# Patient Record
Sex: MALE | Race: WHITE | Employment: OTHER | URBAN - NONMETROPOLITAN AREA
[De-identification: names, ages, dates, MRNs, and addresses within clinical notes are randomized per-mention and may not be internally consistent; named-entity substitution may affect disease eponyms.]

---

## 2017-11-06 ENCOUNTER — HOSPITAL ENCOUNTER (EMERGENCY)
Age: 52
Discharge: HOME | End: 2017-11-06
Payer: MEDICARE

## 2017-11-06 ENCOUNTER — APPOINTMENT (OUTPATIENT)
Dept: GENERAL RADIOLOGY | Age: 52
End: 2017-11-06
Payer: MEDICARE

## 2017-11-06 VITALS
DIASTOLIC BLOOD PRESSURE: 85 MMHG | TEMPERATURE: 97.1 F | RESPIRATION RATE: 16 BRPM | SYSTOLIC BLOOD PRESSURE: 124 MMHG | HEIGHT: 73 IN | OXYGEN SATURATION: 96 % | HEART RATE: 91 BPM

## 2017-11-06 DIAGNOSIS — M54.50 ACUTE EXACERBATION OF CHRONIC LOW BACK PAIN: ICD-10-CM

## 2017-11-06 DIAGNOSIS — G89.29 ACUTE EXACERBATION OF CHRONIC LOW BACK PAIN: ICD-10-CM

## 2017-11-06 DIAGNOSIS — S16.1XXA STRAIN OF NECK MUSCLE, INITIAL ENCOUNTER: Primary | ICD-10-CM

## 2017-11-06 PROCEDURE — 6370000000 HC RX 637 (ALT 250 FOR IP): Performed by: PHYSICIAN ASSISTANT

## 2017-11-06 PROCEDURE — 72100 X-RAY EXAM L-S SPINE 2/3 VWS: CPT

## 2017-11-06 PROCEDURE — 72040 X-RAY EXAM NECK SPINE 2-3 VW: CPT

## 2017-11-06 PROCEDURE — 99283 EMERGENCY DEPT VISIT LOW MDM: CPT

## 2017-11-06 RX ORDER — CYCLOBENZAPRINE HCL 10 MG
10 TABLET ORAL ONCE
Status: COMPLETED | OUTPATIENT
Start: 2017-11-06 | End: 2017-11-06

## 2017-11-06 RX ORDER — BENZTROPINE MESYLATE 1 MG/1
1 TABLET ORAL 2 TIMES DAILY
COMMUNITY

## 2017-11-06 RX ORDER — IBUPROFEN 800 MG/1
800 TABLET ORAL EVERY 8 HOURS PRN
Qty: 20 TABLET | Refills: 0 | Status: SHIPPED | OUTPATIENT
Start: 2017-11-06 | End: 2017-11-14 | Stop reason: ALTCHOICE

## 2017-11-06 RX ORDER — IBUPROFEN 800 MG/1
800 TABLET ORAL ONCE
Status: COMPLETED | OUTPATIENT
Start: 2017-11-06 | End: 2017-11-06

## 2017-11-06 RX ORDER — CYCLOBENZAPRINE HCL 10 MG
10 TABLET ORAL 3 TIMES DAILY PRN
Qty: 30 TABLET | Refills: 0 | Status: SHIPPED | OUTPATIENT
Start: 2017-11-06 | End: 2018-11-08 | Stop reason: ALTCHOICE

## 2017-11-06 RX ADMIN — CYCLOBENZAPRINE HYDROCHLORIDE 10 MG: 10 TABLET, FILM COATED ORAL at 18:38

## 2017-11-06 RX ADMIN — IBUPROFEN 800 MG: 800 TABLET, FILM COATED ORAL at 18:38

## 2017-11-06 ASSESSMENT — ENCOUNTER SYMPTOMS
ABDOMINAL PAIN: 0
CONSTIPATION: 0
NAUSEA: 0
SORE THROAT: 0
EYE DISCHARGE: 0
WHEEZING: 0
BLOOD IN STOOL: 0
BACK PAIN: 1
VOMITING: 0
DIARRHEA: 0
COUGH: 0
ABDOMINAL DISTENTION: 0
EYE PAIN: 0
CHEST TIGHTNESS: 0
SHORTNESS OF BREATH: 0

## 2017-11-06 ASSESSMENT — PAIN DESCRIPTION - LOCATION: LOCATION: BACK

## 2017-11-06 ASSESSMENT — PAIN DESCRIPTION - PAIN TYPE: TYPE: ACUTE PAIN

## 2017-11-06 ASSESSMENT — PAIN SCALES - GENERAL
PAINLEVEL_OUTOF10: 9
PAINLEVEL_OUTOF10: 9

## 2017-11-06 NOTE — ED PROVIDER NOTES
[facility] ED  eMERGENCY dEPARTMENT eNCOUnter      Pt Name: Willem Barlow  MRN: [id number]  Birthdate 1965  Date of evaluation: 11/6/2017  Provider: Jessie Lind PA-C    CHIEF COMPLAINT       Chief Complaint   Patient presents with    Neck Pain     feel out of chair 1.5 week ago and states has neck pain since    Back Pain     just woke up today from it. lower back       HISTORY OF PRESENT ILLNESS    Willem Barlow is a 47 y.o. male who presents to the emergency department from home with c/o Neck pain. Says he fell getting up out of a chair a week and a half ago and has noticed right-sided neck pain since that time. It is minimal at rest but worse with movement. He denies any numbness or tingling. He denies any headache. He also reports right-sided low back pain which has been present x 6 days. It is not worsening but not getting any better. It radiates down his buttocks and into his hamstring. Worse with movement. Denies any loss of bowel or bladder. Denies any injury to the low back. He does report some chronic low back pain that comes and goes. He has tried aspirin and migraine medicine for his pain which have not been helpful for either the neck or back pain. Triage notes and Nursing notes were reviewed by myself. Any discrepancies are addressed above. PAST MEDICAL HISTORY     Past Medical History:   Diagnosis Date    Bipolar 1 disorder ([facility]     Seizures ([facility]        SURGICAL HISTORY     History reviewed. No pertinent surgical history.     CURRENT MEDICATIONS       Discharge Medication List as of 11/6/2017  7:14 PM      CONTINUE these medications which have NOT CHANGED    Details   benztropine (COGENTIN) 1 MG tablet Take 1 mg by mouth 2 times dailyHistorical Med      lurasidone (LATUDA) 40 MG TABS tablet Take 40 mg by mouth nightlyHistorical Med      atorvastatin (LIPITOR) 20 MG tablet Take 20 mg by mouth daily      DULoxetine (CYMBALTA) 20 MG capsule Take 20 mg by pain), tenderness (right sciatic notch) and spasm (right low back, paraspinous muscles). He exhibits no bony tenderness, no swelling, no edema and no laceration. Arms:  Lymphadenopathy:     He has no cervical adenopathy. Neurological: He is alert and oriented to person, place, and time. He has normal reflexes. No cranial nerve deficit. He exhibits normal muscle tone. Coordination normal.   Skin: Skin is warm and dry. No rash noted. He is not diaphoretic. No erythema. No pallor. Psychiatric: He has a normal mood and affect. His behavior is normal. Judgment and thought content normal.       DIAGNOSTIC RESULTS     EKG: (none if blank)  All EKG's are interpreted by the Emergency Department Physician who either signs or Co-signs this chart in the absence of a cardiologist.    RADIOLOGY: (none if blank)   Interpretation per the Radiologist below, if available at the time of this note:    XR LUMBAR SPINE (2-3 VIEWS)   Final Result   Impression:     No acute findings. Electronically Signed By: Sadiq Hernadez   on  11/06/2017  19:02      XR CERVICAL SPINE (2-3 VIEWS)   Final Result   Impression:     No significant findings. Electronically Signed By: Sadiq Hernadez   on  11/06/2017  19:00          LABS:  Labs Reviewed - No data to display    All other labs were within normal range or not returned as of this dictation. EMERGENCY DEPARTMENT COURSE and Medical Decision Making:     MDM/  ED Course      Pt reports improvement in pain with analgesic and muscle relaxant. Xrays show no acute injury. Advised to f/u with PCP for pain. Strict return precautions and follow up instructions were discussed with the patient with which the patient agrees    CONSULTS: (None if blank)  None    Procedures: (None if blank)       CLINICAL IMPRESSION      1. Strain of neck muscle, initial encounter    2.  Acute exacerbation of chronic low back pain          DISPOSITION/PLAN   DISPOSITION Decision to Discharge    PATIENT

## 2017-11-14 ENCOUNTER — HOSPITAL ENCOUNTER (EMERGENCY)
Age: 52
Discharge: HOME | End: 2017-11-14
Payer: MEDICARE

## 2017-11-14 VITALS
RESPIRATION RATE: 18 BRPM | SYSTOLIC BLOOD PRESSURE: 116 MMHG | WEIGHT: 280 LBS | HEART RATE: 89 BPM | DIASTOLIC BLOOD PRESSURE: 79 MMHG | TEMPERATURE: 97.2 F | BODY MASS INDEX: 36.94 KG/M2 | OXYGEN SATURATION: 98 %

## 2017-11-14 DIAGNOSIS — S39.012A STRAIN OF LUMBAR REGION, INITIAL ENCOUNTER: Primary | ICD-10-CM

## 2017-11-14 PROCEDURE — 6370000000 HC RX 637 (ALT 250 FOR IP): Performed by: PHYSICIAN ASSISTANT

## 2017-11-14 PROCEDURE — 96372 THER/PROPH/DIAG INJ SC/IM: CPT

## 2017-11-14 PROCEDURE — 99282 EMERGENCY DEPT VISIT SF MDM: CPT

## 2017-11-14 PROCEDURE — 6360000002 HC RX W HCPCS: Performed by: PHYSICIAN ASSISTANT

## 2017-11-14 RX ORDER — CYCLOBENZAPRINE HCL 10 MG
10 TABLET ORAL ONCE
Status: COMPLETED | OUTPATIENT
Start: 2017-11-14 | End: 2017-11-14

## 2017-11-14 RX ORDER — DEXAMETHASONE SODIUM PHOSPHATE 10 MG/ML
10 INJECTION, SOLUTION INTRAMUSCULAR; INTRAVENOUS ONCE
Status: COMPLETED | OUTPATIENT
Start: 2017-11-14 | End: 2017-11-14

## 2017-11-14 RX ORDER — METHOCARBAMOL 750 MG/1
750 TABLET, FILM COATED ORAL 4 TIMES DAILY
Qty: 20 TABLET | Refills: 0 | Status: SHIPPED | OUTPATIENT
Start: 2017-11-14 | End: 2017-11-19

## 2017-11-14 RX ORDER — KETOROLAC TROMETHAMINE 30 MG/ML
60 INJECTION, SOLUTION INTRAMUSCULAR; INTRAVENOUS ONCE
Status: COMPLETED | OUTPATIENT
Start: 2017-11-14 | End: 2017-11-14

## 2017-11-14 RX ADMIN — CYCLOBENZAPRINE HYDROCHLORIDE 10 MG: 10 TABLET, FILM COATED ORAL at 18:08

## 2017-11-14 RX ADMIN — KETOROLAC TROMETHAMINE 60 MG: 30 INJECTION, SOLUTION INTRAMUSCULAR at 18:09

## 2017-11-14 RX ADMIN — DEXAMETHASONE SODIUM PHOSPHATE 10 MG: 10 INJECTION, SOLUTION INTRAMUSCULAR; INTRAVENOUS at 18:08

## 2017-11-14 ASSESSMENT — PAIN SCALES - GENERAL
PAINLEVEL_OUTOF10: 7
PAINLEVEL_OUTOF10: 9

## 2017-11-14 ASSESSMENT — ENCOUNTER SYMPTOMS
EYE PAIN: 0
RHINORRHEA: 0
TROUBLE SWALLOWING: 0
COUGH: 0
BACK PAIN: 1
EYE DISCHARGE: 0
DIARRHEA: 0
VOMITING: 0
NAUSEA: 0
WHEEZING: 0
ABDOMINAL PAIN: 0
SINUS PRESSURE: 0

## 2017-11-14 ASSESSMENT — PAIN DESCRIPTION - ORIENTATION: ORIENTATION: LOWER;MID

## 2017-11-14 ASSESSMENT — PAIN DESCRIPTION - PAIN TYPE: TYPE: CHRONIC PAIN

## 2017-11-14 ASSESSMENT — PAIN DESCRIPTION - LOCATION: LOCATION: BACK

## 2017-11-14 NOTE — ED PROVIDER NOTES
[facility] ED  eMERGENCY dEPARTMENT eNCOUnter      Pt Name: Bernabe Farrell  MRN: [id number]  [place] 1965  Date of evaluation: 11/14/2017  Provider: Autumn Aldana PA-C, [address]       Chief Complaint   Patient presents with    Back Pain     pt states low chronic back pain , seen here last week for the same. Pt states it is now worse after raking leaves and riding his bike       [address] [facility]    Bernabe Farrell is a 47 y.o. male who presents to the emergency department from home With complaints of back pain. Patient states that he's had chronic back pain. He states that he was raking his leaves over the weekend and strained his back. He denies any direct trauma or blow. Patient states she was seen here last week and actually had a doctor's appointment today but did not have a ride to the doctor department this morning but did have a ride to the emergency room this evening. Patient denies any loss of bladder bowel function. Patient denies any weakness in his lower extremity is. Patient denies any hematuria or dysuria. Triage notes and Nursing notes were reviewed by myself. Any discrepancies are addressed above. PAST MEDICAL HISTORY     Past Medical History:   Diagnosis Date    Back pain     B[facility] 1 disorder ([facility]     Seizures ([facility]        SURGICAL HISTORY     History reviewed. No pertinent surgical history. CURRENT MEDICATIONS       Previous Medications    ALBUTEROL (PROVENTIL) (2.5 MG/3ML) 0.083% NEBULIZER SOLUTION    Take 3 mLs by nebulization every 4 hours as needed for Wheezing or Shortness of Breath. ATORVASTATIN (LIPITOR) 20 MG TABLET    Take 20 mg by mouth daily    BENZTROPINE (COGENTIN) 1 MG TABLET    Take 1 mg by mouth 2 times daily    COLCHICINE (COLCRYS) 0.6 MG TABLET    Take 1 tablet by mouth daily for 3 doses.     CYCLOBENZAPRINE (FLEXERIL) 10 MG TABLET    Take 1 tablet by mouth 3 times daily as needed for Muscle spasms    DIVALPROEX (DEPAKOTE) 500 MG EC TABLET    Take 1,500 mg by mouth daily. DULOXETINE (CYMBALTA) 20 MG CAPSULE    Take 20 mg by mouth daily    LURASIDONE (LATUDA) 40 MG TABS TABLET    Take 40 mg by mouth nightly    OLANZAPINE ZYDIS (ZYPREXA ZYDIS) 15 MG DISINTEGRATING TABLET    Take 30 mg by mouth nightly. ALLERGIES     Pcn [penicillins]    FAMILY HISTORY     History reviewed. No pertinent family history. SOCIAL HISTORY       Social History     Social History    Marital status:      Spouse name: N/A    Number of children: N/A    Years of education: N/A     Social History Main Topics    Smoking status: Current Every Day Smoker     Packs/day: 1.00     Types: Cigarettes    Smokeless tobacco: Never Used    Alcohol use Yes      Comment: SOMETIMES    Drug use:      Types: Marijuana      Comment: sometimes    Sexual activity: Not Asked     Other Topics Concern    None     Social History Narrative    None       REVIEW OF SYSTEMS       Review of Systems   Constitutional: Negative for chills and fever. HENT: Negative for congestion, ear pain, rhinorrhea, sinus pressure and trouble swallowing. Eyes: Negative for pain and discharge. Respiratory: Negative for cough and wheezing. Cardiovascular: Negative for chest pain and palpitations. Gastrointestinal: Negative for abdominal pain, diarrhea, nausea and vomiting. Endocrine: Negative for polyuria. Genitourinary: Negative for difficulty urinating, dysuria, flank pain and frequency. Musculoskeletal: Positive for back pain. Skin: Negative for rash. Neurological: Negative for headaches. Psychiatric/Behavioral: Negative for agitation. All other systems reviewed and are negative. Except as noted above the remainder of the review of systems was reviewed and is negative.    PHYSICAL EXAM    (up to 7 for level 4, 8 or more for level 5)     ED Triage Vitals [11/14/17 1618]   BP Temp Temp Source Pulse Resp SpO2 Height Weight   116/79 97.2 °F received Toradol and Decadron and Flexeril discharged home with etodolac and Robaxin  Strict return precautions and follow up instructions were discussed with the patient with which the patient agrees    CONSULTS: (None if blank)  None    Procedures: (None if blank)       CLINICAL IMPRESSION      1.  Strain of lumbar region, initial encounter          DISPOSITION/PLAN   DISPOSITION Discharge - Pending Orders Complete    PATIENT REFERRED TO:  Princess Horton, CNP  [address]   [phone]    In 3 days        DISCHARGE MEDICATIONS:  New Prescriptions    ETODOLAC (LODINE) 300 MG CAPSULE    Take 1 capsule by mouth every 8 hours    METHOCARBAMOL (ROBAXIN) 750 MG TABLET    Take 1 tablet by mouth 4 times daily for 5 days              (Please note that portions of this note were completed with a voice recognition program.  Efforts were made to edit the dictations but occasionally words are mis-transcribed.)      Shaun Mauricio PA-C, [address] [facility] (electronically signed)  Attending Emergency Physician          Shaun Mauricio PA-C  11/14/17 6610

## 2017-12-18 ENCOUNTER — HOSPITAL ENCOUNTER (OUTPATIENT)
Age: 52
Discharge: HOME | End: 2017-12-18
Payer: MEDICARE

## 2017-12-18 LAB
ABSOLUTE EOS #: 0.3 K/UL (ref 0–0.4)
ABSOLUTE IMMATURE GRANULOCYTE: ABNORMAL K/UL (ref 0–0.3)
ABSOLUTE LYMPH #: 2.8 K/UL (ref 1–4.8)
ABSOLUTE MONO #: 0.6 K/UL (ref 0–1)
ALBUMIN SERPL-MCNC: 4.5 G/DL (ref 3.5–5.2)
ALBUMIN/GLOBULIN RATIO: 1.7 (ref 1–2.5)
ALP BLD-CCNC: 81 U/L (ref 40–129)
ALT SERPL-CCNC: 24 U/L (ref 5–41)
AST SERPL-CCNC: 23 U/L
BASOPHILS # BLD: 1 % (ref 0–2)
BASOPHILS ABSOLUTE: 0 K/UL (ref 0–0.2)
BILIRUB SERPL-MCNC: 0.47 MG/DL (ref 0.3–1.2)
BILIRUBIN DIRECT: <0.08 MG/DL
BILIRUBIN, INDIRECT: NORMAL MG/DL (ref 0–1)
CHOLESTEROL/HDL RATIO: 5.8
CHOLESTEROL: 263 MG/DL
DIFFERENTIAL TYPE: ABNORMAL
EOSINOPHILS RELATIVE PERCENT: 4 % (ref 0–8)
GLOBULIN: NORMAL G/DL (ref 1.5–3.8)
GLUCOSE BLD-MCNC: 89 MG/DL (ref 70–99)
HCT VFR BLD CALC: 54.9 % (ref 41–53)
HDLC SERPL-MCNC: 45 MG/DL
HEMOGLOBIN: 18 G/DL (ref 13.5–17)
IMMATURE GRANULOCYTES: ABNORMAL %
LDL CHOLESTEROL: 172 MG/DL (ref 0–130)
LYMPHOCYTES # BLD: 35 % (ref 24–44)
MCH RBC QN AUTO: 31.4 PG (ref 26–34)
MCHC RBC AUTO-ENTMCNC: 32.8 G/DL (ref 31–37)
MCV RBC AUTO: 96 FL (ref 80–100)
MONOCYTES # BLD: 7 % (ref 0–12)
PDW BLD-RTO: 13.4 % (ref 12.1–15.2)
PLATELET # BLD: 218 K/UL (ref 140–450)
PLATELET ESTIMATE: ABNORMAL
PMV BLD AUTO: 8.1 FL (ref 6–12)
RBC # BLD: 5.72 M/UL (ref 4.5–5.9)
RBC # BLD: ABNORMAL 10*6/UL
SEG NEUTROPHILS: 53 % (ref 36–66)
SEGMENTED NEUTROPHILS ABSOLUTE COUNT: 4.2 K/UL (ref 1.8–7.7)
TOTAL PROTEIN: 7.2 G/DL (ref 6.4–8.3)
TRIGL SERPL-MCNC: 228 MG/DL
VALPROIC ACID LEVEL: 79 UG/ML (ref 50–125)
VALPROIC DATE LAST DOSE: NORMAL
VALPROIC DOSE AMOUNT: NORMAL
VALPROIC TIME LAST DOSE: NORMAL
VLDLC SERPL CALC-MCNC: ABNORMAL MG/DL (ref 1–30)
WBC # BLD: 7.9 K/UL (ref 3.5–11)
WBC # BLD: ABNORMAL 10*3/UL

## 2017-12-18 PROCEDURE — 80061 LIPID PANEL: CPT

## 2017-12-18 PROCEDURE — 80164 ASSAY DIPROPYLACETIC ACD TOT: CPT

## 2017-12-18 PROCEDURE — 85025 COMPLETE CBC W/AUTO DIFF WBC: CPT

## 2017-12-18 PROCEDURE — 82947 ASSAY GLUCOSE BLOOD QUANT: CPT

## 2017-12-18 PROCEDURE — 80076 HEPATIC FUNCTION PANEL: CPT

## 2017-12-18 PROCEDURE — 36415 COLL VENOUS BLD VENIPUNCTURE: CPT

## 2018-05-21 ENCOUNTER — HOSPITAL ENCOUNTER (OUTPATIENT)
Age: 53
Discharge: HOME | End: 2018-05-21
Payer: MEDICARE

## 2018-05-21 LAB
ALBUMIN SERPL-MCNC: 4.9 G/DL (ref 3.5–5.2)
ALBUMIN/GLOBULIN RATIO: 1.8 (ref 1–2.5)
ALP BLD-CCNC: 80 U/L (ref 40–129)
ALT SERPL-CCNC: 12 U/L (ref 5–41)
AST SERPL-CCNC: 18 U/L
BILIRUB SERPL-MCNC: 0.6 MG/DL (ref 0.3–1.2)
BILIRUBIN DIRECT: <0.08 MG/DL
BILIRUBIN, INDIRECT: NORMAL MG/DL (ref 0–1)
CHOLESTEROL/HDL RATIO: 6.5
CHOLESTEROL: 287 MG/DL
GLOBULIN: NORMAL G/DL (ref 1.5–3.8)
HDLC SERPL-MCNC: 44 MG/DL
LDL CHOLESTEROL: 186 MG/DL (ref 0–130)
TOTAL PROTEIN: 7.7 G/DL (ref 6.4–8.3)
TRIGL SERPL-MCNC: 286 MG/DL
VLDLC SERPL CALC-MCNC: ABNORMAL MG/DL (ref 1–30)

## 2018-05-21 PROCEDURE — 80061 LIPID PANEL: CPT

## 2018-05-21 PROCEDURE — 36415 COLL VENOUS BLD VENIPUNCTURE: CPT

## 2018-05-21 PROCEDURE — 80076 HEPATIC FUNCTION PANEL: CPT

## 2018-08-20 ENCOUNTER — APPOINTMENT (OUTPATIENT)
Dept: GENERAL RADIOLOGY | Age: 53
End: 2018-08-20
Payer: MEDICARE

## 2018-08-20 ENCOUNTER — HOSPITAL ENCOUNTER (EMERGENCY)
Age: 53
Discharge: HOME | End: 2018-08-20
Attending: EMERGENCY MEDICINE
Payer: MEDICARE

## 2018-08-20 VITALS
RESPIRATION RATE: 18 BRPM | TEMPERATURE: 96.9 F | HEIGHT: 72 IN | SYSTOLIC BLOOD PRESSURE: 132 MMHG | WEIGHT: 275 LBS | BODY MASS INDEX: 37.25 KG/M2 | OXYGEN SATURATION: 95 % | DIASTOLIC BLOOD PRESSURE: 96 MMHG | HEART RATE: 101 BPM

## 2018-08-20 DIAGNOSIS — M25.561 ACUTE PAIN OF RIGHT KNEE: Primary | ICD-10-CM

## 2018-08-20 PROCEDURE — 6370000000 HC RX 637 (ALT 250 FOR IP): Performed by: EMERGENCY MEDICINE

## 2018-08-20 PROCEDURE — 99283 EMERGENCY DEPT VISIT LOW MDM: CPT

## 2018-08-20 PROCEDURE — 73564 X-RAY EXAM KNEE 4 OR MORE: CPT

## 2018-08-20 RX ORDER — IBUPROFEN 600 MG/1
600 TABLET ORAL ONCE
Status: COMPLETED | OUTPATIENT
Start: 2018-08-20 | End: 2018-08-20

## 2018-08-20 RX ADMIN — IBUPROFEN 600 MG: 600 TABLET ORAL at 09:33

## 2018-08-20 ASSESSMENT — PAIN DESCRIPTION - LOCATION: LOCATION: KNEE

## 2018-08-20 ASSESSMENT — PAIN SCALES - GENERAL
PAINLEVEL_OUTOF10: 8
PAINLEVEL_OUTOF10: 8

## 2018-08-20 ASSESSMENT — PAIN DESCRIPTION - DESCRIPTORS: DESCRIPTORS: ACHING;THROBBING

## 2018-08-20 ASSESSMENT — PAIN DESCRIPTION - ORIENTATION: ORIENTATION: RIGHT

## 2018-08-20 ASSESSMENT — PAIN DESCRIPTION - PAIN TYPE: TYPE: CHRONIC PAIN

## 2018-08-20 NOTE — ED PROVIDER NOTES
[facility] ED  eMERGENCY dEPARTMENT eNCOUnter      Pt Name: Vielka Stoddard  MRN: [id number]  [place] 1965  Date of evaluation: 8/20/2018  Provider: Laura Hooker, [address]       Chief Complaint   Patient presents with    Knee Pain     Onset 2.5months, no known injury         HISTORY OF PRESENT ILLNESS   (Location/Symptom, Timing/Onset, Context/Setting, Quality, Duration, Modifying Factors, Severity) Note limiting factors. HPI    Vielka Stoddard is a 47 y.o. male who presents to the emergency department Complains of was becoming chronic knee pain. No known injury. Patient is advised to establish a primary care physician for treatment he may consider follow-up with orthopedic surgery. Patient is overweight. He has not followed -up with a primary care doctor. He decided to ride his bike to the ER without any difficulty. He does have a mental health history. He denies recent fall or trauma. He did say about a week ago he had fallen however, when I asked about an abrasion and  scab over his right knee. Advised the patient I will get an x-ray of the knee. He denies head  Injury, he denies any other complaints. He has full range of motion of his right knee. Dorsal pedal posterior tibial pulses are strong. Popliteal pulses are strong. No knee dislocation. Full range of motion. No findings a medical emergency    Nursing Notes were reviewed. REVIEW OF SYSTEMS    (2+ for level 4; 10+ for level 5)   Review of Systems   systems reviewed and otherwise acutely negative except as in the [address] PAST MEDICAL HISTORY     Past Medical History:   Diagnosis Date    Back pain     [facility] [address]     Seizures ([facility]        SURGICAL HISTORY     History reviewed. No pertinent surgical history.     CURRENT MEDICATIONS       Discharge Medication List as of 8/20/2018 11:06 AM      CONTINUE these medications which have NOT CHANGED    Details   etodolac (LODINE) 300 MG capsule Take 1 capsule by mouth every 8 hours, Disp-15 capsule, R-0Print      benztropine (COGENTIN) 1 MG tablet Take 1 mg by mouth 2 times dailyHistorical Med      lurasidone (LATUDA) 40 MG TABS tablet Take 40 mg by mouth nightlyHistorical Med      atorvastatin (LIPITOR) 20 MG tablet Take 20 mg by mouth daily      DULoxetine (CYMBALTA) 20 MG capsule Take 20 mg by mouth daily      albuterol (PROVENTIL) (2.5 MG/3ML) 0.083% nebulizer solution Take 3 mLs by nebulization every 4 hours as needed for Wheezing or Shortness of Breath., Disp-120 each, R-3      divalproex (DEPAKOTE) 500 MG EC tablet Take 1,500 mg by mouth daily. OLANZapine zydis (ZYPREXA ZYDIS) 15 MG disintegrating tablet Take 30 mg by mouth nightly. cyclobenzaprine (FLEXERIL) 10 MG tablet Take 1 tablet by mouth 3 times daily as needed for Muscle spasms, Disp-30 tablet, R-0Print      colchicine (COLCRYS) 0.6 MG tablet Take 1 tablet by mouth daily for 3 doses. , Disp-3 tablet, R-0Take 2 tablet initially and the 1 tablet in 1 hour . ALLERGIES     Pcn [penicillins]    FAMILY HISTORY     History reviewed. No pertinent family history.      SOCIAL HISTORY       Social History     Social History    Marital status:      Spouse name: N/A    Number of children: N/A    Years of education: N/A     Social History Main Topics    Smoking status: Current Every Day Smoker     Packs/day: 1.00     Types: Cigarettes    Smokeless tobacco: Never Used    Alcohol use Yes      Comment: SOMETIMES    Drug use: Yes     Types: Marijuana      Comment: sometimes    Sexual activity: Not Asked     Other Topics Concern    None     Social History Narrative    None       SCREENINGS           PHYSICAL EXAM    (up to 7 for level 4, 8 or more for level 5)     ED Triage Vitals   BP Temp Temp Source Pulse Resp SpO2 Height Weight   08/20/18 0837 08/20/18 0835 08/20/18 0835 08/20/18 0835 08/20/18 0837 08/20/18 0837 08/20/18 0835 08/20/18 0835   (!) 132/96 96.9 °F (36.1 °C) Tympanic 101 18 95 % 6' (1.829 m) 275 lb (124.7 kg)     ED Triage Vitals   Enc Vitals Group      BP 08/20/18 0837 (!) 132/96      Pulse 08/20/18 0835 101      Resp 08/20/18 0837 18      Temp 08/20/18 0835 96.9 °F (36.1 °C)      Temp Source 08/20/18 0835 Tympanic      SpO2 08/20/18 0837 95 %      Weight 08/20/18 0835 275 lb (124.7 kg)      Height 08/20/18 0835 6' (1.829 m)      Head Circumference --       Peak Flow --       Pain Score --       Pain Loc --       Pain Edu? --       Excl. in [address]? --      Physical Exam    GENERAL APPEARANCE: Awake and alert. Cooperative. No acute distress. HEAD: Normocephalic. Atraumatic. EYES: Sclera anicteric. ABDOMEN: Soft. Non-distended. Non-tender. Obese  EXTREMITIES: No acute deformities. See history of present illness for full exam.   SKIN: Warm and dry. No petechiae/ purpura. Old appearing abrasion scab over right knee  NEUROLOGICAL: No gross facial drooping. Moves all 4 extremities spontaneously. DIAGNOSTIC RESULTS     EKG (Per Emergency Physician):       RADIOLOGY (Per Emergency Physician): Interpretation per the Radiologist below, if available at the time of this note:  Xr Knee Right (min 4 Views)    Result Date: 8/20/2018  REPORT: 4 views right knee INDICATION: Right knee pain; nontraumatic FINDINGS: No acute fracture or dislocation is seen. Mild tricompartmental joint space loss with marginal osteophyte formation. Patella is well seated within the patellar groove. No joint effusion or soft tissue swelling. Final report electronically signed by Dafne Ornelas on 8/20/2018 9:24 AM    NO ACUTE PROCESS      ED BEDSIDE ULTRASOUND:   Performed by ED Physician - none    LABS:  Labs Reviewed - No data to display     No results found for this visit on 08/20/18. All other labs were within normal range or not returned as of this dictation.     EMERGENCY DEPARTMENT COURSE and DIFFERENTIAL DIAGNOSIS/MDM:   Vitals:    Vitals:    08/20/18 1364 08/20/18

## 2018-11-08 ENCOUNTER — HOSPITAL ENCOUNTER (EMERGENCY)
Age: 53
Discharge: HOME | End: 2018-11-08
Payer: MEDICARE

## 2018-11-08 ENCOUNTER — APPOINTMENT (OUTPATIENT)
Dept: GENERAL RADIOLOGY | Age: 53
End: 2018-11-08
Payer: MEDICARE

## 2018-11-08 VITALS
TEMPERATURE: 98.1 F | SYSTOLIC BLOOD PRESSURE: 121 MMHG | RESPIRATION RATE: 18 BRPM | HEIGHT: 72 IN | OXYGEN SATURATION: 98 % | DIASTOLIC BLOOD PRESSURE: 84 MMHG | BODY MASS INDEX: 36.84 KG/M2 | HEART RATE: 80 BPM | WEIGHT: 272 LBS

## 2018-11-08 DIAGNOSIS — M10.9 GOUT OF LEFT FOOT, UNSPECIFIED CAUSE, UNSPECIFIED CHRONICITY: Primary | ICD-10-CM

## 2018-11-08 PROCEDURE — 99283 EMERGENCY DEPT VISIT LOW MDM: CPT

## 2018-11-08 PROCEDURE — 73620 X-RAY EXAM OF FOOT: CPT

## 2018-11-08 PROCEDURE — 6370000000 HC RX 637 (ALT 250 FOR IP): Performed by: PHYSICIAN ASSISTANT

## 2018-11-08 RX ORDER — PREDNISONE 20 MG/1
60 TABLET ORAL ONCE
Status: COMPLETED | OUTPATIENT
Start: 2018-11-08 | End: 2018-11-08

## 2018-11-08 RX ORDER — PREDNISONE 20 MG/1
60 TABLET ORAL DAILY
Qty: 15 TABLET | Refills: 0 | Status: SHIPPED | OUTPATIENT
Start: 2018-11-08 | End: 2018-11-13

## 2018-11-08 RX ORDER — HYDROCODONE BITARTRATE AND ACETAMINOPHEN 5; 325 MG/1; MG/1
1 TABLET ORAL EVERY 6 HOURS PRN
Qty: 10 TABLET | Refills: 0 | Status: SHIPPED | OUTPATIENT
Start: 2018-11-08 | End: 2018-11-11

## 2018-11-08 RX ADMIN — PREDNISONE 60 MG: 20 TABLET ORAL at 14:43

## 2018-11-08 ASSESSMENT — ENCOUNTER SYMPTOMS: ROS SKIN COMMENTS: SEE HPI

## 2018-11-08 ASSESSMENT — PAIN DESCRIPTION - PAIN TYPE: TYPE: ACUTE PAIN

## 2018-11-08 ASSESSMENT — PAIN DESCRIPTION - ORIENTATION: ORIENTATION: LEFT

## 2018-11-08 ASSESSMENT — PAIN SCALES - GENERAL: PAINLEVEL_OUTOF10: 9

## 2018-11-08 ASSESSMENT — PAIN DESCRIPTION - DESCRIPTORS: DESCRIPTORS: ACHING

## 2018-11-08 ASSESSMENT — PAIN DESCRIPTION - LOCATION: LOCATION: FOOT

## 2018-11-29 ENCOUNTER — HOSPITAL ENCOUNTER (OUTPATIENT)
Age: 53
Discharge: HOME | End: 2018-11-29
Payer: MEDICARE

## 2018-11-29 LAB
ABSOLUTE EOS #: 0.19 K/UL (ref 0–0.44)
ABSOLUTE IMMATURE GRANULOCYTE: 0.04 K/UL (ref 0–0.3)
ABSOLUTE LYMPH #: 3.9 K/UL (ref 1.1–3.7)
ABSOLUTE MONO #: 0.54 K/UL (ref 0.1–1.2)
ALBUMIN SERPL-MCNC: 4.6 G/DL (ref 3.5–5.2)
ALBUMIN/GLOBULIN RATIO: 1.7 (ref 1–2.5)
ALP BLD-CCNC: 92 U/L (ref 40–129)
ALT SERPL-CCNC: 14 U/L (ref 5–41)
AST SERPL-CCNC: 19 U/L
BASOPHILS # BLD: 1 % (ref 0–2)
BASOPHILS ABSOLUTE: 0.07 K/UL (ref 0–0.2)
BILIRUB SERPL-MCNC: 0.41 MG/DL (ref 0.3–1.2)
BILIRUBIN DIRECT: <0.08 MG/DL
BILIRUBIN, INDIRECT: NORMAL MG/DL (ref 0–1)
CHOLESTEROL/HDL RATIO: 4.7
CHOLESTEROL: 218 MG/DL
DIFFERENTIAL TYPE: ABNORMAL
EOSINOPHILS RELATIVE PERCENT: 2 % (ref 1–4)
GLOBULIN: NORMAL G/DL (ref 1.5–3.8)
GLUCOSE BLD-MCNC: 82 MG/DL (ref 70–99)
HCT VFR BLD CALC: 52.7 % (ref 40.7–50.3)
HDLC SERPL-MCNC: 46 MG/DL
HEMOGLOBIN: 18.3 G/DL (ref 13–17)
IMMATURE GRANULOCYTES: 1 %
LDL CHOLESTEROL: 116 MG/DL (ref 0–130)
LYMPHOCYTES # BLD: 49 % (ref 24–43)
MCH RBC QN AUTO: 32.2 PG (ref 25.2–33.5)
MCHC RBC AUTO-ENTMCNC: 34.7 G/DL (ref 28.4–34.8)
MCV RBC AUTO: 92.8 FL (ref 82.6–102.9)
MONOCYTES # BLD: 7 % (ref 3–12)
NRBC AUTOMATED: 0 PER 100 WBC
PDW BLD-RTO: 12.4 % (ref 11.8–14.4)
PLATELET # BLD: 220 K/UL (ref 138–453)
PLATELET ESTIMATE: ABNORMAL
PMV BLD AUTO: 9.2 FL (ref 8.1–13.5)
RBC # BLD: 5.68 M/UL (ref 4.21–5.77)
RBC # BLD: ABNORMAL 10*6/UL
SEG NEUTROPHILS: 40 % (ref 36–65)
SEGMENTED NEUTROPHILS ABSOLUTE COUNT: 3.19 K/UL (ref 1.5–8.1)
TOTAL PROTEIN: 7.3 G/DL (ref 6.4–8.3)
TRIGL SERPL-MCNC: 282 MG/DL
VALPROIC ACID LEVEL: 46 UG/ML (ref 50–125)
VALPROIC DATE LAST DOSE: ABNORMAL
VALPROIC DOSE AMOUNT: ABNORMAL
VALPROIC TIME LAST DOSE: ABNORMAL
VLDLC SERPL CALC-MCNC: ABNORMAL MG/DL (ref 1–30)
WBC # BLD: 7.9 K/UL (ref 3.5–11.3)
WBC # BLD: ABNORMAL 10*3/UL

## 2018-11-29 PROCEDURE — 36415 COLL VENOUS BLD VENIPUNCTURE: CPT

## 2018-11-29 PROCEDURE — 80061 LIPID PANEL: CPT

## 2018-11-29 PROCEDURE — 80076 HEPATIC FUNCTION PANEL: CPT

## 2018-11-29 PROCEDURE — 82947 ASSAY GLUCOSE BLOOD QUANT: CPT

## 2018-11-29 PROCEDURE — 80164 ASSAY DIPROPYLACETIC ACD TOT: CPT

## 2018-11-29 PROCEDURE — 85025 COMPLETE CBC W/AUTO DIFF WBC: CPT

## 2019-05-16 ENCOUNTER — HOSPITAL ENCOUNTER (OUTPATIENT)
Age: 54
Setting detail: SPECIMEN
Discharge: HOME | End: 2019-05-16
Payer: MEDICARE

## 2019-05-16 LAB
ABSOLUTE EOS #: 0.11 K/UL (ref 0–0.44)
ABSOLUTE IMMATURE GRANULOCYTE: 0.03 K/UL (ref 0–0.3)
ABSOLUTE LYMPH #: 2.25 K/UL (ref 1.1–3.7)
ABSOLUTE MONO #: 0.48 K/UL (ref 0.1–1.2)
ALBUMIN SERPL-MCNC: 4.9 G/DL (ref 3.5–5.2)
ALBUMIN/GLOBULIN RATIO: 2.2 (ref 1–2.5)
ALP BLD-CCNC: 77 U/L (ref 40–129)
ALT SERPL-CCNC: 10 U/L (ref 5–41)
ANION GAP SERPL CALCULATED.3IONS-SCNC: 17 MMOL/L (ref 9–17)
AST SERPL-CCNC: 15 U/L
BASOPHILS # BLD: 1 % (ref 0–2)
BASOPHILS ABSOLUTE: 0.04 K/UL (ref 0–0.2)
BILIRUB SERPL-MCNC: 0.59 MG/DL (ref 0.3–1.2)
BUN BLDV-MCNC: 15 MG/DL (ref 6–20)
BUN/CREAT BLD: NORMAL (ref 9–20)
CALCIUM SERPL-MCNC: 10.2 MG/DL (ref 8.6–10.4)
CHLORIDE BLD-SCNC: 101 MMOL/L (ref 98–107)
CHOLESTEROL/HDL RATIO: 4.8
CHOLESTEROL: 207 MG/DL
CO2: 22 MMOL/L (ref 20–31)
CREAT SERPL-MCNC: 1.02 MG/DL (ref 0.7–1.2)
DIFFERENTIAL TYPE: ABNORMAL
EOSINOPHILS RELATIVE PERCENT: 2 % (ref 1–4)
GFR AFRICAN AMERICAN: >60 ML/MIN
GFR NON-AFRICAN AMERICAN: >60 ML/MIN
GFR SERPL CREATININE-BSD FRML MDRD: NORMAL ML/MIN/{1.73_M2}
GFR SERPL CREATININE-BSD FRML MDRD: NORMAL ML/MIN/{1.73_M2}
GLUCOSE BLD-MCNC: 94 MG/DL (ref 70–99)
HCT VFR BLD CALC: 57.4 % (ref 40.7–50.3)
HDLC SERPL-MCNC: 43 MG/DL
HEMOGLOBIN: 18.9 G/DL (ref 13–17)
IMMATURE GRANULOCYTES: 1 %
LDL CHOLESTEROL: 111 MG/DL (ref 0–130)
LYMPHOCYTES # BLD: 36 % (ref 24–43)
MCH RBC QN AUTO: 31.8 PG (ref 25.2–33.5)
MCHC RBC AUTO-ENTMCNC: 32.9 G/DL (ref 28.4–34.8)
MCV RBC AUTO: 96.6 FL (ref 82.6–102.9)
MONOCYTES # BLD: 8 % (ref 3–12)
NRBC AUTOMATED: 0 PER 100 WBC
PDW BLD-RTO: 13.8 % (ref 11.8–14.4)
PLATELET # BLD: 217 K/UL (ref 138–453)
PLATELET ESTIMATE: ABNORMAL
PMV BLD AUTO: 10 FL (ref 8.1–13.5)
POTASSIUM SERPL-SCNC: 5 MMOL/L (ref 3.7–5.3)
PROSTATE SPECIFIC ANTIGEN: 0.78 UG/L
RBC # BLD: 5.94 M/UL (ref 4.21–5.77)
RBC # BLD: ABNORMAL 10*6/UL
SEG NEUTROPHILS: 52 % (ref 36–65)
SEGMENTED NEUTROPHILS ABSOLUTE COUNT: 3.33 K/UL (ref 1.5–8.1)
SODIUM BLD-SCNC: 140 MMOL/L (ref 135–144)
THYROXINE, FREE: 0.95 NG/DL (ref 0.93–1.7)
TOTAL PROTEIN: 7.1 G/DL (ref 6.4–8.3)
TRIGL SERPL-MCNC: 263 MG/DL
TSH SERPL DL<=0.05 MIU/L-ACNC: 2.65 MIU/L (ref 0.3–5)
VLDLC SERPL CALC-MCNC: ABNORMAL MG/DL (ref 1–30)
WBC # BLD: 6.2 K/UL (ref 3.5–11.3)
WBC # BLD: ABNORMAL 10*3/UL

## 2019-08-06 ENCOUNTER — HOSPITAL ENCOUNTER (OUTPATIENT)
Dept: ULTRASOUND IMAGING | Age: 54
Discharge: HOME | End: 2019-08-08
Payer: MEDICARE

## 2019-08-06 DIAGNOSIS — D17.30 BENIGN LIPOMATOUS NEOPLASM OF SKIN AND SUBCUTANEOUS TISSUE: ICD-10-CM

## 2019-08-06 PROCEDURE — 76881 US COMPL JOINT R-T W/IMG: CPT

## 2019-08-20 ENCOUNTER — OFFICE VISIT (OUTPATIENT)
Dept: SURGERY | Age: 54
End: 2019-08-20
Payer: MEDICARE

## 2019-08-20 VITALS
SYSTOLIC BLOOD PRESSURE: 117 MMHG | RESPIRATION RATE: 22 BRPM | WEIGHT: 265 LBS | HEART RATE: 98 BPM | BODY MASS INDEX: 35.89 KG/M2 | DIASTOLIC BLOOD PRESSURE: 78 MMHG | HEIGHT: 72 IN

## 2019-08-20 DIAGNOSIS — F31.9 BIPOLAR 1 DISORDER (HCC): ICD-10-CM

## 2019-08-20 DIAGNOSIS — G40.909 SEIZURE DISORDER (HCC): ICD-10-CM

## 2019-08-20 DIAGNOSIS — K62.5 RECTAL BLEEDING: ICD-10-CM

## 2019-08-20 DIAGNOSIS — D17.21 LIPOMA OF RIGHT UPPER EXTREMITY: Primary | ICD-10-CM

## 2019-08-20 DIAGNOSIS — Z01.818 PRE-OP TESTING: ICD-10-CM

## 2019-08-20 PROCEDURE — 3017F COLORECTAL CA SCREEN DOC REV: CPT | Performed by: SURGERY

## 2019-08-20 PROCEDURE — 99203 OFFICE O/P NEW LOW 30 MIN: CPT | Performed by: SURGERY

## 2019-08-20 PROCEDURE — G8417 CALC BMI ABV UP PARAM F/U: HCPCS | Performed by: SURGERY

## 2019-08-20 PROCEDURE — G8427 DOCREV CUR MEDS BY ELIG CLIN: HCPCS | Performed by: SURGERY

## 2019-08-20 PROCEDURE — 4004F PT TOBACCO SCREEN RCVD TLK: CPT | Performed by: SURGERY

## 2019-08-20 RX ORDER — SODIUM, POTASSIUM,MAG SULFATES 17.5-3.13G
1 SOLUTION, RECONSTITUTED, ORAL ORAL ONCE
Qty: 2 BOTTLE | Refills: 0 | Status: SHIPPED | OUTPATIENT
Start: 2019-08-20 | End: 2019-08-20

## 2019-08-20 ASSESSMENT — ENCOUNTER SYMPTOMS
ABDOMINAL PAIN: 0
VOMITING: 0
BACK PAIN: 0
COUGH: 0
SORE THROAT: 0
ANAL BLEEDING: 1
TROUBLE SWALLOWING: 0
NAUSEA: 0
CHOKING: 0
BLOOD IN STOOL: 0
SHORTNESS OF BREATH: 0

## 2019-08-20 NOTE — PROGRESS NOTES
meetings of clubs or organizations: None     Relationship status: None    Intimate partner violence:     Fear of current or ex partner: None     Emotionally abused: None     Physically abused: None     Forced sexual activity: None   Other Topics Concern    None   Social History Narrative    None       Objective:   Physical Exam   Constitutional: He is oriented to person, place, and time. He appears well-developed and well-nourished. HENT:   Head: Normocephalic and atraumatic. Mouth/Throat: Oropharynx is clear and moist.   Eyes: Pupils are equal, round, and reactive to light. Conjunctivae and EOM are normal. No scleral icterus. Neck: Normal range of motion. Neck supple. No JVD present. No tracheal deviation present. Cardiovascular: Normal rate and regular rhythm. Pulmonary/Chest: Effort normal and breath sounds normal. No respiratory distress. He exhibits no tenderness. Abdominal: Soft. Bowel sounds are normal. He exhibits no distension and no mass. There is no tenderness. There is no rebound and no guarding. Musculoskeletal: Normal range of motion. He exhibits no edema. Lymphadenopathy:     He has no cervical adenopathy. Neurological: He is alert and oriented to person, place, and time. No cranial nerve deficit. Skin: Skin is warm and dry. No rash noted. No erythema. R upper arm lipoma. Psychiatric: He has a normal mood and affect. His behavior is normal. Judgment and thought content normal.   Nursing note and vitals reviewed.        US EXTREMITY JOINT RIGHT NON VASC COMPLETE   Status: Final result   Order Providers     Authorizing Encounter Billing   [facility] Golden CNP [facility] ULTRASOUND ROOM Xavier Lynn MD          Signed by     Signed Date/Time  Phone Pager   Alyse Vivas 8/06/2019 15:35 [phone]    Reading Providers     Read Date Phone Pager   Alyse Vivas Aug 6, 2019 [phone]    Radiation Dose Estimates     No radiation information found for this patient   Narrative

## 2019-08-20 NOTE — COMMUNICATION BODY
Assessment:   Diagnosis Orders   1. Lipoma of right upper extremity     2. Rectal bleeding     3. Bipolar 1 disorder ([facility]     4. Seizure disorder ([facility]     5. Pre-op testing  EKG 12 Lead    XR CHEST STANDARD (2 VW)         Plan: Will plan excisional biopsy R upper arm lipoma and colonoscopy.   Risks, benefits, alternatives thoroughly reviewed and accepted by Mr Jeffrey Be, including remote risk of bleeding, infection, recurrence, GI perforation, missed lesions, etc.

## 2019-08-20 NOTE — PATIENT INSTRUCTIONS
prep is worse than the test. It may be uncomfortable, and you may feel hungry on the clear liquid diet. Some people do not go to work or do their usual activities on the day of the prep. Arrange to have someone take you home after the test.  What can you expect after a colonoscopy? The nurses will watch you for 1 to 2 hours until the medicines wear off. Then you can go home. You will need a ride. Your doctor will tell you when you can eat and do your usual activities. Your doctor will talk to you about when you will need your next colonoscopy. The results of your test and your risk for colorectal cancer will help your doctor decide how often you need to be checked. Follow-up care is a key part of your treatment and safety. Be sure to make and go to all appointments, and call your doctor if you are having problems. It's also a good idea to know your test results and keep a list of the medicines you take. Where can you learn more? Go to https://[employer]encr[employer] Wave. org and sign in to your demandmart account. Enter J215 in the ididwork box to learn more about \"Learning About Colonoscopy. \"     If you do not have an account, please click on the \"Sign Up Now\" link. Current as of: December 19, 2018  Content Version: 12.1  © 1000-0242 Healthwise, Incorporated. Care instructions adapted under license by [facility] ([facility]). If you have questions about a medical condition or this instruction, always ask your healthcare professional. [place] [address] any warranty or liability for your use of this information.

## 2019-09-05 NOTE — ED NOTES
Pt aware of high volume in the department, awaiting room availability     David Jasso RN  11/14/17 7803
I have reviewed and confirmed nurses' notes for patient's medications, allergies, medical history, and surgical history.

## 2019-09-10 ENCOUNTER — HOSPITAL ENCOUNTER (OUTPATIENT)
Age: 54
Discharge: HOME | DRG: 286 | End: 2019-09-10
Payer: MEDICARE

## 2019-09-10 ENCOUNTER — HOSPITAL ENCOUNTER (OUTPATIENT)
Dept: GENERAL RADIOLOGY | Age: 54
Discharge: HOME | DRG: 286 | End: 2019-09-12
Payer: MEDICARE

## 2019-09-10 ENCOUNTER — HOSPITAL ENCOUNTER (OUTPATIENT)
Age: 54
Discharge: HOME | DRG: 286 | End: 2019-09-12
Payer: MEDICARE

## 2019-09-10 DIAGNOSIS — Z01.818 PRE-OP TESTING: ICD-10-CM

## 2019-09-10 PROCEDURE — 71046 X-RAY EXAM CHEST 2 VIEWS: CPT

## 2019-09-10 PROCEDURE — 93005 ELECTROCARDIOGRAM TRACING: CPT | Performed by: SURGERY

## 2019-09-11 ENCOUNTER — TELEPHONE (OUTPATIENT)
Dept: SURGERY | Age: 54
End: 2019-09-11

## 2019-09-11 DIAGNOSIS — G40.909 SEIZURE DISORDER (HCC): Primary | ICD-10-CM

## 2019-09-11 NOTE — TELEPHONE ENCOUNTER
Attempted to contact patient regarding colonoscopy and excision of lipoma right upper extremity scheduled with Dr Ceasar Chatman on 09/18/19. Surgery postponed due to recent seizure activity. Referral placed to neurology. Left patient message encouraging to contact [facility] surgery office to discuss.

## 2019-09-12 ENCOUNTER — APPOINTMENT (OUTPATIENT)
Dept: GENERAL RADIOLOGY | Age: 54
DRG: 286 | End: 2019-09-12
Payer: MEDICARE

## 2019-09-12 ENCOUNTER — HOSPITAL ENCOUNTER (INPATIENT)
Age: 54
LOS: 4 days | Discharge: ANOTHER HOSPITAL | DRG: 286 | End: 2019-09-17
Attending: EMERGENCY MEDICINE | Admitting: INTERNAL MEDICINE
Payer: MEDICARE

## 2019-09-12 ENCOUNTER — APPOINTMENT (OUTPATIENT)
Dept: CT IMAGING | Age: 54
DRG: 286 | End: 2019-09-12
Payer: MEDICARE

## 2019-09-12 ENCOUNTER — APPOINTMENT (OUTPATIENT)
Dept: ULTRASOUND IMAGING | Age: 54
DRG: 286 | End: 2019-09-12
Payer: MEDICARE

## 2019-09-12 DIAGNOSIS — I50.9 ACUTE ON CHRONIC CONGESTIVE HEART FAILURE, UNSPECIFIED HEART FAILURE TYPE (HCC): Primary | ICD-10-CM

## 2019-09-12 DIAGNOSIS — J81.0 ACUTE PULMONARY EDEMA (HCC): ICD-10-CM

## 2019-09-12 DIAGNOSIS — N50.9 TENDERNESS OF SCROTUM: ICD-10-CM

## 2019-09-12 DIAGNOSIS — E87.70 HYPERVOLEMIA, UNSPECIFIED HYPERVOLEMIA TYPE: ICD-10-CM

## 2019-09-12 DIAGNOSIS — R18.8 OTHER ASCITES: ICD-10-CM

## 2019-09-12 LAB
-: ABNORMAL
ABO/RH: NORMAL
ABSOLUTE EOS #: 0.47 K/UL (ref 0–0.44)
ABSOLUTE IMMATURE GRANULOCYTE: 0.05 K/UL (ref 0–0.3)
ABSOLUTE LYMPH #: 1.78 K/UL (ref 1.1–3.7)
ABSOLUTE MONO #: 1.21 K/UL (ref 0.1–1.2)
ALBUMIN SERPL-MCNC: 4 G/DL (ref 3.5–5.2)
ALBUMIN/GLOBULIN RATIO: 1.5 (ref 1–2.5)
ALLEN TEST: ABNORMAL
ALP BLD-CCNC: 96 U/L (ref 40–129)
ALT SERPL-CCNC: 27 U/L (ref 5–41)
AMORPHOUS: ABNORMAL
ANION GAP SERPL CALCULATED.3IONS-SCNC: 15 MMOL/L (ref 9–17)
ANTIBODY SCREEN: NEGATIVE
ARM BAND NUMBER: NORMAL
AST SERPL-CCNC: 30 U/L
BACTERIA: ABNORMAL
BASOPHILS # BLD: 1 % (ref 0–2)
BASOPHILS ABSOLUTE: 0.05 K/UL (ref 0–0.2)
BILIRUB SERPL-MCNC: 0.57 MG/DL (ref 0.3–1.2)
BILIRUBIN URINE: ABNORMAL
BNP INTERPRETATION: ABNORMAL
BUN BLDV-MCNC: 13 MG/DL (ref 6–20)
BUN/CREAT BLD: 12 (ref 9–20)
CALCIUM SERPL-MCNC: 9.5 MG/DL (ref 8.6–10.4)
CARBOXYHEMOGLOBIN: ABNORMAL % (ref 0–5)
CASTS UA: ABNORMAL /LPF
CHLORIDE BLD-SCNC: 96 MMOL/L (ref 98–107)
CO2: 25 MMOL/L (ref 20–31)
COLOR: YELLOW
COMMENT UA: ABNORMAL
CREAT SERPL-MCNC: 1.05 MG/DL (ref 0.7–1.2)
CRYSTALS, UA: ABNORMAL /HPF
DIFFERENTIAL TYPE: ABNORMAL
EOSINOPHILS RELATIVE PERCENT: 6 % (ref 1–4)
EPITHELIAL CELLS UA: ABNORMAL /HPF (ref 0–5)
EXPIRATION DATE: NORMAL
FIO2: ABNORMAL
GFR AFRICAN AMERICAN: >60 ML/MIN
GFR NON-AFRICAN AMERICAN: >60 ML/MIN
GFR SERPL CREATININE-BSD FRML MDRD: ABNORMAL ML/MIN/{1.73_M2}
GFR SERPL CREATININE-BSD FRML MDRD: ABNORMAL ML/MIN/{1.73_M2}
GLUCOSE BLD-MCNC: 103 MG/DL (ref 70–99)
GLUCOSE URINE: NEGATIVE
HCO3 VENOUS: 26.9 MMOL/L (ref 24–30)
HCT VFR BLD CALC: 47.4 % (ref 40.7–50.3)
HEMOGLOBIN: 15 G/DL (ref 13–17)
IMMATURE GRANULOCYTES: 1 %
KETONES, URINE: NEGATIVE
LEUKOCYTE ESTERASE, URINE: NEGATIVE
LIPASE: 15 U/L (ref 13–60)
LYMPHOCYTES # BLD: 21 % (ref 24–43)
MCH RBC QN AUTO: 28.8 PG (ref 25.2–33.5)
MCHC RBC AUTO-ENTMCNC: 31.6 G/DL (ref 28.4–34.8)
MCV RBC AUTO: 91 FL (ref 82.6–102.9)
METHEMOGLOBIN: ABNORMAL % (ref 0–1.9)
MODE: ABNORMAL
MONOCYTES # BLD: 14 % (ref 3–12)
MUCUS: ABNORMAL
NEGATIVE BASE EXCESS, VEN: ABNORMAL MMOL/L (ref 0–2)
NITRITE, URINE: NEGATIVE
NOTIFICATION TIME: ABNORMAL
NOTIFICATION: ABNORMAL
NRBC AUTOMATED: 0 PER 100 WBC
O2 DEVICE/FLOW/%: ABNORMAL
O2 SAT, VEN: 55.1 % (ref 60–85)
OTHER OBSERVATIONS UA: ABNORMAL
OXYHEMOGLOBIN: ABNORMAL % (ref 95–98)
PATIENT TEMP: 37
PCO2, VEN, TEMP ADJ: ABNORMAL MMHG (ref 39–55)
PCO2, VEN: 44 (ref 39–55)
PDW BLD-RTO: 14.6 % (ref 11.8–14.4)
PEEP/CPAP: ABNORMAL
PH UA: 6.5 (ref 5–9)
PH VENOUS: 7.4 (ref 7.32–7.42)
PH, VEN, TEMP ADJ: ABNORMAL (ref 7.32–7.42)
PLATELET # BLD: 175 K/UL (ref 138–453)
PLATELET ESTIMATE: ABNORMAL
PMV BLD AUTO: 9.5 FL (ref 8.1–13.5)
PO2, VEN, TEMP ADJ: ABNORMAL MMHG (ref 30–50)
PO2, VEN: 29.1 (ref 30–50)
POSITIVE BASE EXCESS, VEN: 1.7 MMOL/L (ref 0–2)
POTASSIUM SERPL-SCNC: 3.8 MMOL/L (ref 3.7–5.3)
PRO-BNP: ABNORMAL PG/ML
PROTEIN UA: ABNORMAL
PSV: ABNORMAL
PT. POSITION: ABNORMAL
RBC # BLD: 5.21 M/UL (ref 4.21–5.77)
RBC # BLD: ABNORMAL 10*6/UL
RBC UA: ABNORMAL /HPF (ref 0–2)
RENAL EPITHELIAL, UA: ABNORMAL /HPF
RESPIRATORY RATE: 20
SAMPLE SITE: ABNORMAL
SEG NEUTROPHILS: 57 % (ref 36–65)
SEGMENTED NEUTROPHILS ABSOLUTE COUNT: 4.86 K/UL (ref 1.5–8.1)
SET RATE: ABNORMAL
SODIUM BLD-SCNC: 136 MMOL/L (ref 135–144)
SPECIFIC GRAVITY UA: 1.01 (ref 1.01–1.02)
TEXT FOR RESPIRATORY: ABNORMAL
TOTAL HB: ABNORMAL G/DL (ref 12–16)
TOTAL PROTEIN: 6.7 G/DL (ref 6.4–8.3)
TOTAL RATE: ABNORMAL
TRICHOMONAS: ABNORMAL
TROPONIN INTERP: NORMAL
TROPONIN INTERP: NORMAL
TROPONIN T: <0.03 NG/ML
TROPONIN T: <0.03 NG/ML
TROPONIN, HIGH SENSITIVITY: NORMAL NG/L (ref 0–22)
TROPONIN, HIGH SENSITIVITY: NORMAL NG/L (ref 0–22)
TURBIDITY: CLEAR
URINE HGB: NEGATIVE
UROBILINOGEN, URINE: NORMAL
VALPROIC ACID LEVEL: 40 UG/ML (ref 50–125)
VALPROIC DATE LAST DOSE: ABNORMAL
VALPROIC DOSE AMOUNT: ABNORMAL
VALPROIC TIME LAST DOSE: ABNORMAL
VT: ABNORMAL
WBC # BLD: 8.4 K/UL (ref 3.5–11.3)
WBC # BLD: ABNORMAL 10*3/UL
WBC UA: ABNORMAL /HPF (ref 0–5)
YEAST: ABNORMAL

## 2019-09-12 PROCEDURE — 71045 X-RAY EXAM CHEST 1 VIEW: CPT

## 2019-09-12 PROCEDURE — 6360000004 HC RX CONTRAST MEDICATION: Performed by: EMERGENCY MEDICINE

## 2019-09-12 PROCEDURE — 84484 ASSAY OF TROPONIN QUANT: CPT

## 2019-09-12 PROCEDURE — 86901 BLOOD TYPING SEROLOGIC RH(D): CPT

## 2019-09-12 PROCEDURE — 86850 RBC ANTIBODY SCREEN: CPT

## 2019-09-12 PROCEDURE — 6360000002 HC RX W HCPCS: Performed by: EMERGENCY MEDICINE

## 2019-09-12 PROCEDURE — 96375 TX/PRO/DX INJ NEW DRUG ADDON: CPT

## 2019-09-12 PROCEDURE — 93005 ELECTROCARDIOGRAM TRACING: CPT | Performed by: EMERGENCY MEDICINE

## 2019-09-12 PROCEDURE — 80165 DIPROPYLACETIC ACID FREE: CPT

## 2019-09-12 PROCEDURE — 93975 VASCULAR STUDY: CPT

## 2019-09-12 PROCEDURE — 85025 COMPLETE CBC W/AUTO DIFF WBC: CPT

## 2019-09-12 PROCEDURE — 80053 COMPREHEN METABOLIC PANEL: CPT

## 2019-09-12 PROCEDURE — 81001 URINALYSIS AUTO W/SCOPE: CPT

## 2019-09-12 PROCEDURE — 96374 THER/PROPH/DIAG INJ IV PUSH: CPT

## 2019-09-12 PROCEDURE — 36415 COLL VENOUS BLD VENIPUNCTURE: CPT

## 2019-09-12 PROCEDURE — 86900 BLOOD TYPING SEROLOGIC ABO: CPT

## 2019-09-12 PROCEDURE — 82805 BLOOD GASES W/O2 SATURATION: CPT

## 2019-09-12 PROCEDURE — 99285 EMERGENCY DEPT VISIT HI MDM: CPT

## 2019-09-12 PROCEDURE — 6370000000 HC RX 637 (ALT 250 FOR IP): Performed by: EMERGENCY MEDICINE

## 2019-09-12 PROCEDURE — 76870 US EXAM SCROTUM: CPT

## 2019-09-12 PROCEDURE — 74174 CTA ABD&PLVS W/CONTRAST: CPT

## 2019-09-12 PROCEDURE — 83880 ASSAY OF NATRIURETIC PEPTIDE: CPT

## 2019-09-12 PROCEDURE — 83690 ASSAY OF LIPASE: CPT

## 2019-09-12 PROCEDURE — 94664 DEMO&/EVAL PT USE INHALER: CPT

## 2019-09-12 PROCEDURE — 94640 AIRWAY INHALATION TREATMENT: CPT

## 2019-09-12 PROCEDURE — 80164 ASSAY DIPROPYLACETIC ACD TOT: CPT

## 2019-09-12 RX ORDER — IPRATROPIUM BROMIDE AND ALBUTEROL SULFATE 2.5; .5 MG/3ML; MG/3ML
1 SOLUTION RESPIRATORY (INHALATION) ONCE
Status: COMPLETED | OUTPATIENT
Start: 2019-09-12 | End: 2019-09-12

## 2019-09-12 RX ORDER — FUROSEMIDE 10 MG/ML
40 INJECTION INTRAMUSCULAR; INTRAVENOUS ONCE
Status: COMPLETED | OUTPATIENT
Start: 2019-09-12 | End: 2019-09-12

## 2019-09-12 RX ORDER — FUROSEMIDE 10 MG/ML
20 INJECTION INTRAMUSCULAR; INTRAVENOUS ONCE
Status: DISCONTINUED | OUTPATIENT
Start: 2019-09-12 | End: 2019-09-12

## 2019-09-12 RX ORDER — METHYLPREDNISOLONE SODIUM SUCCINATE 125 MG/2ML
125 INJECTION, POWDER, LYOPHILIZED, FOR SOLUTION INTRAMUSCULAR; INTRAVENOUS ONCE
Status: COMPLETED | OUTPATIENT
Start: 2019-09-12 | End: 2019-09-12

## 2019-09-12 RX ADMIN — FUROSEMIDE 40 MG: 10 INJECTION, SOLUTION INTRAMUSCULAR; INTRAVENOUS at 21:44

## 2019-09-12 RX ADMIN — IOPAMIDOL 100 ML: 755 INJECTION, SOLUTION INTRAVENOUS at 21:18

## 2019-09-12 RX ADMIN — METHYLPREDNISOLONE SODIUM SUCCINATE 125 MG: 125 INJECTION, POWDER, FOR SOLUTION INTRAMUSCULAR; INTRAVENOUS at 20:31

## 2019-09-12 RX ADMIN — IPRATROPIUM BROMIDE AND ALBUTEROL SULFATE 1 AMPULE: .5; 3 SOLUTION RESPIRATORY (INHALATION) at 20:28

## 2019-09-12 ASSESSMENT — ENCOUNTER SYMPTOMS
NAUSEA: 0
SHORTNESS OF BREATH: 1
COUGH: 0
ABDOMINAL PAIN: 0
VOMITING: 0
RHINORRHEA: 0
COLOR CHANGE: 0
CHEST TIGHTNESS: 1

## 2019-09-13 ENCOUNTER — APPOINTMENT (OUTPATIENT)
Dept: NON INVASIVE DIAGNOSTICS | Age: 54
DRG: 286 | End: 2019-09-13
Payer: MEDICARE

## 2019-09-13 PROBLEM — I50.33 ACUTE ON CHRONIC DIASTOLIC CHF (CONGESTIVE HEART FAILURE), NYHA CLASS 3 (HCC): Status: ACTIVE | Noted: 2019-09-13

## 2019-09-13 LAB
ABSOLUTE EOS #: 0 K/UL (ref 0–0.44)
ABSOLUTE IMMATURE GRANULOCYTE: 0 K/UL (ref 0–0.3)
ABSOLUTE LYMPH #: 0.48 K/UL (ref 1.1–3.7)
ABSOLUTE MONO #: 0.06 K/UL (ref 0.1–1.2)
ANION GAP SERPL CALCULATED.3IONS-SCNC: 17 MMOL/L (ref 9–17)
BASOPHILS # BLD: 0 % (ref 0–2)
BASOPHILS ABSOLUTE: 0 K/UL (ref 0–0.2)
BUN BLDV-MCNC: 13 MG/DL (ref 6–20)
BUN/CREAT BLD: 16 (ref 9–20)
CALCIUM SERPL-MCNC: 9.5 MG/DL (ref 8.6–10.4)
CHLORIDE BLD-SCNC: 94 MMOL/L (ref 98–107)
CHOLESTEROL/HDL RATIO: 3
CHOLESTEROL: 113 MG/DL
CO2: 22 MMOL/L (ref 20–31)
CREAT SERPL-MCNC: 0.83 MG/DL (ref 0.7–1.2)
DIFFERENTIAL TYPE: ABNORMAL
EKG ATRIAL RATE: 107 BPM
EKG ATRIAL RATE: 83 BPM
EKG ATRIAL RATE: 97 BPM
EKG P AXIS: 57 DEGREES
EKG P AXIS: 59 DEGREES
EKG P AXIS: 66 DEGREES
EKG P-R INTERVAL: 156 MS
EKG P-R INTERVAL: 174 MS
EKG P-R INTERVAL: 182 MS
EKG Q-T INTERVAL: 364 MS
EKG Q-T INTERVAL: 376 MS
EKG Q-T INTERVAL: 420 MS
EKG QRS DURATION: 122 MS
EKG QRS DURATION: 136 MS
EKG QRS DURATION: 142 MS
EKG QTC CALCULATION (BAZETT): 477 MS
EKG QTC CALCULATION (BAZETT): 485 MS
EKG QTC CALCULATION (BAZETT): 493 MS
EKG R AXIS: 108 DEGREES
EKG R AXIS: 26 DEGREES
EKG R AXIS: 42 DEGREES
EKG T AXIS: 12 DEGREES
EKG T AXIS: 23 DEGREES
EKG T AXIS: 73 DEGREES
EKG VENTRICULAR RATE: 107 BPM
EKG VENTRICULAR RATE: 83 BPM
EKG VENTRICULAR RATE: 97 BPM
EOSINOPHILS RELATIVE PERCENT: 0 % (ref 1–4)
GFR AFRICAN AMERICAN: >60 ML/MIN
GFR NON-AFRICAN AMERICAN: >60 ML/MIN
GFR SERPL CREATININE-BSD FRML MDRD: ABNORMAL ML/MIN/{1.73_M2}
GFR SERPL CREATININE-BSD FRML MDRD: ABNORMAL ML/MIN/{1.73_M2}
GLUCOSE BLD-MCNC: 138 MG/DL (ref 70–99)
HCT VFR BLD CALC: 45 % (ref 40.7–50.3)
HDLC SERPL-MCNC: 38 MG/DL
HEMOGLOBIN: 14.1 G/DL (ref 13–17)
IMMATURE GRANULOCYTES: 0 %
LDL CHOLESTEROL: 62 MG/DL (ref 0–130)
LV EF: 20 %
LVEF MODALITY: NORMAL
LYMPHOCYTES # BLD: 8 % (ref 24–43)
MAGNESIUM: 2.2 MG/DL (ref 1.6–2.6)
MCH RBC QN AUTO: 28.7 PG (ref 25.2–33.5)
MCHC RBC AUTO-ENTMCNC: 31.3 G/DL (ref 28.4–34.8)
MCV RBC AUTO: 91.6 FL (ref 82.6–102.9)
MONOCYTES # BLD: 1 % (ref 3–12)
MORPHOLOGY: NORMAL
NRBC AUTOMATED: 0 PER 100 WBC
PDW BLD-RTO: 14.5 % (ref 11.8–14.4)
PLATELET # BLD: 145 K/UL (ref 138–453)
PLATELET ESTIMATE: ABNORMAL
PMV BLD AUTO: 9.4 FL (ref 8.1–13.5)
POTASSIUM SERPL-SCNC: 3.9 MMOL/L (ref 3.7–5.3)
PROCALCITONIN: 0.06 NG/ML
RBC # BLD: 4.91 M/UL (ref 4.21–5.77)
RBC # BLD: ABNORMAL 10*6/UL
SEG NEUTROPHILS: 91 % (ref 36–65)
SEGMENTED NEUTROPHILS ABSOLUTE COUNT: 5.46 K/UL (ref 1.5–8.1)
SODIUM BLD-SCNC: 133 MMOL/L (ref 135–144)
THYROXINE, FREE: 1.37 NG/DL (ref 0.93–1.7)
TRIGL SERPL-MCNC: 67 MG/DL
TSH SERPL DL<=0.05 MIU/L-ACNC: 2.37 MIU/L (ref 0.3–5)
VALPROIC ACID, FREE: 5.3 UG/ML (ref 7–23)
VLDLC SERPL CALC-MCNC: ABNORMAL MG/DL (ref 1–30)
WBC # BLD: 6 K/UL (ref 3.5–11.3)
WBC # BLD: ABNORMAL 10*3/UL

## 2019-09-13 PROCEDURE — 93010 ELECTROCARDIOGRAM REPORT: CPT | Performed by: INTERNAL MEDICINE

## 2019-09-13 PROCEDURE — 6370000000 HC RX 637 (ALT 250 FOR IP): Performed by: INTERNAL MEDICINE

## 2019-09-13 PROCEDURE — 94664 DEMO&/EVAL PT USE INHALER: CPT

## 2019-09-13 PROCEDURE — 84439 ASSAY OF FREE THYROXINE: CPT

## 2019-09-13 PROCEDURE — 6360000002 HC RX W HCPCS: Performed by: INTERNAL MEDICINE

## 2019-09-13 PROCEDURE — 6370000000 HC RX 637 (ALT 250 FOR IP): Performed by: FAMILY MEDICINE

## 2019-09-13 PROCEDURE — 93306 TTE W/DOPPLER COMPLETE: CPT

## 2019-09-13 PROCEDURE — 80061 LIPID PANEL: CPT

## 2019-09-13 PROCEDURE — 85025 COMPLETE CBC W/AUTO DIFF WBC: CPT

## 2019-09-13 PROCEDURE — 99222 1ST HOSP IP/OBS MODERATE 55: CPT | Performed by: FAMILY MEDICINE

## 2019-09-13 PROCEDURE — 2580000003 HC RX 258: Performed by: INTERNAL MEDICINE

## 2019-09-13 PROCEDURE — 80048 BASIC METABOLIC PNL TOTAL CA: CPT

## 2019-09-13 PROCEDURE — 1200000000 HC SEMI PRIVATE

## 2019-09-13 PROCEDURE — 84443 ASSAY THYROID STIM HORMONE: CPT

## 2019-09-13 PROCEDURE — 83735 ASSAY OF MAGNESIUM: CPT

## 2019-09-13 PROCEDURE — 84145 PROCALCITONIN (PCT): CPT

## 2019-09-13 PROCEDURE — 93005 ELECTROCARDIOGRAM TRACING: CPT | Performed by: INTERNAL MEDICINE

## 2019-09-13 RX ORDER — OLANZAPINE 20 MG/1
20 TABLET, ORALLY DISINTEGRATING ORAL NIGHTLY
Status: DISCONTINUED | OUTPATIENT
Start: 2019-09-13 | End: 2019-09-14 | Stop reason: CLARIF

## 2019-09-13 RX ORDER — SODIUM CHLORIDE 0.9 % (FLUSH) 0.9 %
10 SYRINGE (ML) INJECTION PRN
Status: DISCONTINUED | OUTPATIENT
Start: 2019-09-13 | End: 2019-09-17 | Stop reason: HOSPADM

## 2019-09-13 RX ORDER — ACETAMINOPHEN 325 MG/1
650 TABLET ORAL EVERY 4 HOURS PRN
Status: DISCONTINUED | OUTPATIENT
Start: 2019-09-13 | End: 2019-09-17 | Stop reason: HOSPADM

## 2019-09-13 RX ORDER — ALBUTEROL SULFATE 2.5 MG/3ML
2.5 SOLUTION RESPIRATORY (INHALATION) EVERY 4 HOURS PRN
Status: DISCONTINUED | OUTPATIENT
Start: 2019-09-13 | End: 2019-09-17 | Stop reason: HOSPADM

## 2019-09-13 RX ORDER — FUROSEMIDE 10 MG/ML
40 INJECTION INTRAMUSCULAR; INTRAVENOUS 2 TIMES DAILY
Status: DISCONTINUED | OUTPATIENT
Start: 2019-09-13 | End: 2019-09-16

## 2019-09-13 RX ORDER — ONDANSETRON 2 MG/ML
4 INJECTION INTRAMUSCULAR; INTRAVENOUS EVERY 6 HOURS PRN
Status: DISCONTINUED | OUTPATIENT
Start: 2019-09-13 | End: 2019-09-17 | Stop reason: HOSPADM

## 2019-09-13 RX ORDER — METOPROLOL SUCCINATE 25 MG/1
25 TABLET, EXTENDED RELEASE ORAL DAILY
Status: DISCONTINUED | OUTPATIENT
Start: 2019-09-13 | End: 2019-09-17 | Stop reason: HOSPADM

## 2019-09-13 RX ORDER — BENZTROPINE MESYLATE 1 MG/1
1 TABLET ORAL 2 TIMES DAILY
Status: DISCONTINUED | OUTPATIENT
Start: 2019-09-13 | End: 2019-09-17 | Stop reason: HOSPADM

## 2019-09-13 RX ORDER — DIVALPROEX SODIUM 500 MG/1
1500 TABLET, DELAYED RELEASE ORAL DAILY
Status: DISCONTINUED | OUTPATIENT
Start: 2019-09-13 | End: 2019-09-17 | Stop reason: HOSPADM

## 2019-09-13 RX ORDER — DULOXETIN HYDROCHLORIDE 60 MG/1
60 CAPSULE, DELAYED RELEASE ORAL DAILY
Status: DISCONTINUED | OUTPATIENT
Start: 2019-09-13 | End: 2019-09-17 | Stop reason: HOSPADM

## 2019-09-13 RX ORDER — ATORVASTATIN CALCIUM 20 MG/1
20 TABLET, FILM COATED ORAL DAILY
Status: DISCONTINUED | OUTPATIENT
Start: 2019-09-13 | End: 2019-09-17 | Stop reason: HOSPADM

## 2019-09-13 RX ORDER — SODIUM CHLORIDE 0.9 % (FLUSH) 0.9 %
10 SYRINGE (ML) INJECTION EVERY 12 HOURS SCHEDULED
Status: DISCONTINUED | OUTPATIENT
Start: 2019-09-13 | End: 2019-09-17 | Stop reason: HOSPADM

## 2019-09-13 RX ORDER — FAMOTIDINE 20 MG/1
20 TABLET, FILM COATED ORAL 2 TIMES DAILY
Status: DISCONTINUED | OUTPATIENT
Start: 2019-09-13 | End: 2019-09-17 | Stop reason: HOSPADM

## 2019-09-13 RX ADMIN — BENZTROPINE MESYLATE 1 MG: 1 TABLET ORAL at 02:19

## 2019-09-13 RX ADMIN — FUROSEMIDE 40 MG: 10 INJECTION, SOLUTION INTRAMUSCULAR; INTRAVENOUS at 09:04

## 2019-09-13 RX ADMIN — FAMOTIDINE 20 MG: 20 TABLET ORAL at 20:52

## 2019-09-13 RX ADMIN — BENZTROPINE MESYLATE 1 MG: 1 TABLET ORAL at 09:05

## 2019-09-13 RX ADMIN — Medication 10 ML: at 20:52

## 2019-09-13 RX ADMIN — ENOXAPARIN SODIUM 40 MG: 40 INJECTION SUBCUTANEOUS at 09:05

## 2019-09-13 RX ADMIN — FUROSEMIDE 40 MG: 10 INJECTION, SOLUTION INTRAMUSCULAR; INTRAVENOUS at 17:20

## 2019-09-13 RX ADMIN — ACETAMINOPHEN 650 MG: 325 TABLET, FILM COATED ORAL at 23:22

## 2019-09-13 RX ADMIN — DIVALPROEX SODIUM 1500 MG: 500 TABLET, DELAYED RELEASE ORAL at 09:06

## 2019-09-13 RX ADMIN — DULOXETINE HYDROCHLORIDE 60 MG: 60 CAPSULE, DELAYED RELEASE ORAL at 09:05

## 2019-09-13 RX ADMIN — ATORVASTATIN CALCIUM 20 MG: 20 TABLET, FILM COATED ORAL at 09:05

## 2019-09-13 RX ADMIN — FAMOTIDINE 20 MG: 20 TABLET ORAL at 09:05

## 2019-09-13 RX ADMIN — FUROSEMIDE 40 MG: 10 INJECTION, SOLUTION INTRAMUSCULAR; INTRAVENOUS at 02:19

## 2019-09-13 RX ADMIN — METOPROLOL SUCCINATE 25 MG: 25 TABLET, EXTENDED RELEASE ORAL at 18:52

## 2019-09-13 RX ADMIN — Medication 10 ML: at 17:21

## 2019-09-13 RX ADMIN — Medication 10 ML: at 09:05

## 2019-09-13 RX ADMIN — FAMOTIDINE 20 MG: 20 TABLET ORAL at 02:19

## 2019-09-13 RX ADMIN — SACUBITRIL AND VALSARTAN 1 TABLET: 24; 26 TABLET, FILM COATED ORAL at 20:52

## 2019-09-13 RX ADMIN — BENZTROPINE MESYLATE 1 MG: 1 TABLET ORAL at 20:52

## 2019-09-13 ASSESSMENT — PAIN DESCRIPTION - PAIN TYPE: TYPE: ACUTE PAIN

## 2019-09-13 ASSESSMENT — PAIN SCALES - GENERAL
PAINLEVEL_OUTOF10: 0
PAINLEVEL_OUTOF10: 6
PAINLEVEL_OUTOF10: 0

## 2019-09-13 ASSESSMENT — PAIN DESCRIPTION - LOCATION: LOCATION: GENERALIZED

## 2019-09-13 NOTE — PROGRESS NOTES
TBSP) over past 24 hrs []  Ineffective & non-prod to less than 25 ML over past 24 hrs []  Ineffective and, or greater than 25 ml sputum prod. past 24 hrs. []  Nonspon- taneous; Requires suctioning 0   Pulmonary History  (PULM HX) []  No smoking and no chronic pulmonaryhistory []  Former smoker. Quit over 12 mos. ago [x]  Current smoker or quit w/ in 12 mos []  Pulm. History and, or 20 pk/yr smoking hx []  Admitted w/ acute pulm. dx and, or has been admitted w/ pulm. dx 2 or more times over past 12 mos 2   Surgical History this Admit  (SURG HX) [x]  No surgery []  General surgery []  Lower abdominal []  Thoracic or upper abdominal   []  Thoracic w/ pulm. disease 0   Chest X-Ray (CXR)/CT Scan [x]  Clear or not applicable []  Not available []  Atelect- asis or pleural effusions []  Localized infiltrate or pulm. edema []  Con-solidated Infiltrates, bilateral, or in more than 1 lobe 0   Slow or Forced VC, FEV1 OR PEFR (PULM FXN)  [x]  80% or greater, or not indicated []  Pt. unable to perform []  FEV1 or PEFR or VC 51-79%. []  FEV1 or PEFR or VC  30-49%   []  FEV1 or PEFR or VC less than 30%   0   TOTAL ACUITY: 3       CARE PLAN    If Acuity Level is 2, 3, or 4 in any of the following:    [] BILATERAL BREATH SOUNDS (BBS)     [x] PULMONARY HISTORY (PULM HX)  [] PULMONARY FUNCTION (PULM FX)    Goal: Improve respiratory functions in patients with airway disease and decrease WOB    [x] AEROSOL PROTOCOL    Total Acuity:   16-32  []  Secondary Assessment in 24 hrs Total Acuity:  9-15  []  Secondary Assessment in 24 hrs Total Acuity:  4-8  []  Secondary Assessment in 48 hrs Total Acuity:  0-3  [x]  Secondary Assessment in 72 hrs   [facility] AEROSOL THERAPY with  [physician-ordered bronchodilator(s)] q 4 & Albuterol PRN q2 hrs. Breath-Actuated Neb if BBS Acuity = 4, and pt. can use MP. Notify physician if condition deteriorates. [facility] AEROSOL THERAPY with  [physician-ordered bronchodilator(s)]  QID and Albuterol PRN q4 hrs.

## 2019-09-13 NOTE — PLAN OF CARE
Problem: Falls - Risk of:  Goal: Will remain free from falls  Description  Will remain free from falls  Note:   PT is alert and oriented to own limitations and knows how to use the call system to ask for assist     Problem: OXYGENATION/RESPIRATORY FUNCTION  Goal: Patient will maintain patent airway  Note:   PT^ has a strong cough     Problem: ACTIVITY INTOLERANCE/IMPAIRED MOBILITY  Goal: Mobility/activity is maintained at optimum level for patient  Note:   SOB with exertion

## 2019-09-13 NOTE — PROGRESS NOTES
Met with Patient this a.m. He is a 49year old single, white male, admitted with new diagnosis of CHF. Patient lives alone in [place]. Not able to stay awake for this assessment. Requests that writer come back at a later time.     Abdiaziz. Shawn Hernandez [address] [place] [address]  9/13/2019

## 2019-09-13 NOTE — ED PROVIDER NOTES
[facility] AT THE [facility]  Emergency Department Encounter  EmergencyMedicine Attending     Pt Name:Hayes Padron  MRN: [id number]  Birthdate 1965  Date of evaluation: 9/12/19  PCP:  CAM Bruon - CNP    CHIEF COMPLAINT       Chief Complaint   Patient presents with    Insect Bite     complains of insect bite to scrotum for past 4 days. HISTORY OF PRESENT ILLNESS  (Location/Symptom, Timing/Onset, Context/Setting, Quality, Duration, Modifying Factors, Severity.)      Hallie Fernandez is a 49 y.o. male who presents with multiple different complaints including left-sided testicular and scrotal pain for the last 4 or 5 days. Also reports chest pain and shortness of breath ongoing for the last 4 or 5 days as well. Pain is left-sided, 9 out of 10, no radiations, no associated nausea vomiting however does report associated shortness of breath. + Orthopnea. Patient also reports of diffuse abdominal pain, denies any nausea vomiting, diarrhea constipation, blood in his stool. Denies any bowel movements that are bloody. Denies any black stool as well. Patient does appear significantly anemic. No dysuria frequency urgency reported however does report left-sided testicular pain which she says started from an insect bite. Denies history of a blood clot, denies any hemoptysis. PAST MEDICAL / SURGICAL / SOCIAL / FAMILY HISTORY      has a past medical history of Back pain, Bipolar 1 disorder ([facility] [address] and Seizures ([facility] [address] has a past surgical history that includes Toe Surgery and Tooth Extraction.     Social History     Socioeconomic History    Marital status: Single     Spouse name: Not on file    Number of children: Not on file    Years of education: Not on file    Highest education level: Not on file   Occupational History    Not on file   Social Needs    Financial resource strain: Not on file    Food insecurity:     Worry: Not on file     Inability: Not on file    Transportation needs:     Medical: Not gangrene. Some minimal left scrotal swelling with tenderness over the left testicle. Musculoskeletal: He exhibits no edema or tenderness. No asymmetrical leg swelling, no calf tenderness on examination bilaterally   Neurological: He is alert and oriented to person, place, and time. Skin: Skin is warm and dry. No erythema. Vitals reviewed.       DIFFERENTIAL  DIAGNOSIS     PLAN (LABS / IMAGING / EKG):  Orders Placed This Encounter   Procedures    XR CHEST PORTABLE    CTA CHEST ABDOMEN PELVIS W CONTRAST    US SCROTUM AND TESTICLES    US DUP ABD PEL RETRO SCROT COMPLETE    CBC auto differential    Comprehensive Metabolic Panel    Lipase    Urinalysis Reflex to Culture    Blood gas, venous    Valproic acid level, total    Valproic acid level, free    Brain Natriuretic Peptide    Troponin    Troponin    Microscopic Urinalysis    Basic Metabolic Panel    Magnesium    TSH    T4, free    Lipid panel - fasting    CBC auto differential    DIET CARDIAC;    Vital signs per unit routine    Telemetry monitoring    Orthostatic blood pressure    Notify physician    Bedrest with bedside commode    Daily weights    Intake and output    Tobacco cessation education protocol    Full Code    Inpatient consult to Heart Failure Nurse/Coordinator    Inpatient consult to Dietitian    Consult to Cardiology    Respiratory care evaluation only    Initiate Oxygen Therapy Protocol    EKG 12 Lead    EKG 12 lead    Echocardiogram complete 2D with doppler with color    TYPE AND SCREEN    PATIENT STATUS (FROM ED OR OR/PROCEDURAL) Inpatient       MEDICATIONS ORDERED:  Orders Placed This Encounter   Medications    ipratropium-albuterol (DUONEB) nebulizer solution 1 ampule    methylPREDNISolone sodium (SOLU-MEDROL) injection 125 mg    iopamidol (ISOVUE-370) 76 % injection 100 mL    DISCONTD: furosemide (LASIX) injection 20 mg    furosemide (LASIX) injection 40 mg    albuterol (PROVENTIL) nebulizer solution 2.5 mg    atorvastatin (LIPITOR) tablet 20 mg    benztropine (COGENTIN) tablet 1 mg    divalproex (DEPAKOTE) DR tablet 1,500 mg    DULoxetine (CYMBALTA) extended release capsule 60 mg    lurasidone (LATUDA) tablet 40 mg    OLANZapine zydis (ZYPREXA) disintegrating tablet 20 mg    sodium chloride flush 0.9 % injection 10 mL    sodium chloride flush 0.9 % injection 10 mL    magnesium hydroxide (MILK OF MAGNESIA) 400 MG/5ML suspension 30 mL    ondansetron (ZOFRAN) injection 4 mg    famotidine (PEPCID) tablet 20 mg    enoxaparin (LOVENOX) injection 40 mg    acetaminophen (TYLENOL) tablet 650 mg    furosemide (LASIX) injection 40 mg       DDX: CHF versus COPD versus PE versus pneumonia versus testicular torsion versus colitis vs UTI    DIAGNOSTIC RESULTS / EMERGENCY DEPARTMENT COURSE / MDM   :  Results for orders placed or performed during the hospital encounter of 09/12/19   CBC auto differential   Result Value Ref Range    WBC 8.4 3.5 - 11.3 k/uL    RBC 5.21 4.21 - 5.77 m/uL    Hemoglobin 15.0 13.0 - 17.0 g/dL    Hematocrit 47.4 40.7 - 50.3 %    MCV 91.0 82.6 - 102.9 fL    MCH 28.8 25.2 - 33.5 pg    MCHC 31.6 28.4 - 34.8 g/dL    RDW 14.6 (H) 11.8 - 14.4 %    Platelets 242 685 - 076 k/uL    MPV 9.5 8.1 - 13.5 fL    NRBC Automated 0.0 0.0 per 100 WBC    Differential Type NOT REPORTED     Seg Neutrophils 57 36 - 65 %    Lymphocytes 21 (L) 24 - 43 %    Monocytes 14 (H) 3 - 12 %    Eosinophils % 6 (H) 1 - 4 %    Basophils 1 0 - 2 %    Immature Granulocytes 1 (H) 0 %    Segs Absolute 4.86 1.50 - 8.10 k/uL    Absolute Lymph # 1.78 1.10 - 3.70 k/uL    Absolute Mono # 1.21 (H) 0.10 - 1.20 k/uL    Absolute Eos # 0.47 (H) 0.00 - 0.44 k/uL    Basophils Absolute 0.05 0.00 - 0.20 k/uL    Absolute Immature Granulocyte 0.05 0.00 - 0.30 k/uL    WBC Morphology NOT REPORTED     RBC Morphology NOT REPORTED     Platelet Estimate NOT REPORTED    Comprehensive Metabolic Panel   Result Value Ref Range    Glucose 103 (H)

## 2019-09-13 NOTE — H&P
Hospital Medicine  History and Physical    Patient:  Mehreen Smith  MRN: [id number]    Chief Complaint:  Scrotal pain    History Obtained From:  patient, electronic medical record  PCP: CAM Gaytan CNP    History of Present Illness: The patient is a 49 y.o. male  with a history of severe mental illness who presented to the ER C/O something biting him in the scrotum. Work up subsequently showed he was in acute CHF. Now admitted for eval and treatment. Past Medical History:        Diagnosis Date    Back pain     Bipolar 1 disorder ([facility]     Seizures ([facility] [address]     LAST SEIZURE 9-4-19       Past Surgical History:        Procedure Laterality Date    TOE SURGERY      TOOTH EXTRACTION         Medications Prior to Admission:    Prior to Admission medications    Medication Sig Start Date End Date Taking? Authorizing Provider   benztropine (COGENTIN) 1 MG tablet Take 1 mg by mouth 2 times daily   Yes Historical Provider, MD   lurasidone (LATUDA) 40 MG TABS tablet Take 40 mg by mouth daily    Yes Historical Provider, MD   atorvastatin (LIPITOR) 20 MG tablet Take 20 mg by mouth daily   Yes Historical Provider, MD   DULoxetine (CYMBALTA) 20 MG capsule Take 60 mg by mouth daily    Yes Historical Provider, MD   divalproex (DEPAKOTE) 500 MG EC tablet Take 1,500 mg by mouth daily. Yes Historical Provider, MD   OLANZapine zydis (ZYPREXA ZYDIS) 15 MG disintegrating tablet Take 20 mg by mouth nightly    Yes Historical Provider, MD   albuterol (PROVENTIL) (2.5 MG/3ML) 0.083% nebulizer solution Take 3 mLs by nebulization every 4 hours as needed for Wheezing or Shortness of Breath. 4/29/13   Kvng Walton MD       Allergies:  Pcn [penicillins]    Social History:   TOBACCO:   reports that he has been smoking cigarettes. He has been smoking about 1.00 pack per day. He has never used smokeless tobacco.  ETOH:   reports that he drinks alcohol. OCCUPATION: none    Family History:   History reviewed.  No pertinent family history. Review of Systems:  Not obtainable due to patient's mental state. Physical Exam:    Vitals:   Vitals:    09/13/19 0600   BP:    Pulse: 79   Resp: 29   Temp:    SpO2: 98%     Weight: 255 lb 6.4 oz (115.8 kg)       GEN:   Not oriented to date, no apparent distress--strange affect, answers \"yes\" to all health inquiries   EYES: No gross abnormalities. , PERRL and EOMI  NECK: normal, supple, no lymphadenopathy,  no carotid bruits  PULM: clear to auscultation bilaterally- no wheezes, rales or rhonchi, normal air movement, no respiratory distress  COR: regular rate & rhythm, no murmurs and no gallops  ABD:  soft, non-tender, non-distended, normal bowel sounds, no masses or organomegaly  EXT:   no cyanosis, clubbing or edema present    NEURO: grossly intact except mental status  SKIN:  no rashes or significant lesions      -----------------------------------------------------------------  Diagnostic Data: Reviewed    Assessment:      Hospital Problems:  Active Problems:    Acute on chronic diastolic CHF (congestive heart failure), NYHA class 3 ([facility])  Resolved Problems:    * No resolved hospital problems. *      All Problems:  Patient Active Problem List   Diagnosis    Onychia and paronychia of toe    Other specified disease of nail    Acute on chronic diastolic CHF (congestive heart failure), [facility]           Plan:       · This patient requires inpatient admission because of  New, acute diastolic CHF   · Factors affecting the medical complexity of this patient include mental health  · Estimated length of stay is 2 days  · Echo, cardio eval  ·   High risk medications: none    Adan Lowe MD  CORE MEASURES  · DVT prophylaxis: Lovenox  · Decubitus ulcer present on admission: No  · CODE STATUS: FULL CODE  · Nutrition Status: good   · Physical therapy: No   · Old Charts reviewed: Yes  · EKG Reviewed: Yes  · Advance Directive Addressed:  \"Yes - in chart    Adan Lowe M.D.

## 2019-09-13 NOTE — CONSULTS
Anderson MERIDA am scribing for and in the presence of Narendra Crabtree MD, MS, F.A.C.C..      Patient: Mehreen Smith  : 1965  Date of Admission: 2019  Primary Care Physician: Randy Chatman  Today's Date: 2019    REASON FOR CONSULTATION: Insect Bite (complains of insect bite to scrotum for past 4 days. )    HPI: Mr. Lenny Pryor is a 49 y.o. male who was admitted to the [facility] due to insect bite to his scrotum and was found to have acute combined heart failure with symptoms including lower extremity edema and week history of progressive increased shortness of breath. He was also found to have evidence of cirrhosis of the liver and portal hypertension. He is a past smoker and would smoke about a pack per day but is not sure how long he had smoked before. He does drink every day and says he drinks a couple of shots of whiskey per day. Echocardiogram on 2019 showed an ejection fraction of 20% with moderate to severe mitral regurgitation. Symptoms of heart failure: He reports that he could walk about a block a couple of months ago without any problems but now develops shortness of breath with any exertion. Mr. Lenny Pryor does report having chest pressure/pain that is on and off that has been going on for days. He denies any abdominal pain, bleeding problems, problems with his medications or any other concerns at this time. Patient Vitals for the past 96 hrs (Last 3 readings):   Weight   19 0600 255 lb 6.4 oz (115.8 kg)   19 0145 261 lb 14.4 oz (118.8 kg)       Past Medical History:   Diagnosis Date    Back pain     Bipolar 1 disorder (HCC)     Seizures ([facility] [address]     LAST SEIZURE 19       CURRENT ALLERGIES: Pcn [penicillins] REVIEW OF SYSTEMS: 14 systems were reviewed. Pertinent positives and negatives as above, all else negative.      Past Surgical History:   Procedure Laterality Date    TOE SURGERY      TOOTH EXTRACTION      Social History:  Social History Tobacco Use    Smoking status: Current Every Day Smoker     Packs/day: 1.00     Types: Cigarettes    Smokeless tobacco: Never Used   Substance Use Topics    Alcohol use: Yes     Comment: SOMETIMES    Drug use: Yes     Types: Marijuana     Comment: sometimes        CURRENT MEDICATIONS:  Outpatient Medications Marked as Taking for the 9/12/19 encounter [facility] Encounter)   Medication Sig Dispense Refill    benztropine (COGENTIN) 1 MG tablet Take 1 mg by mouth 2 times daily      lurasidone (LATUDA) 40 MG TABS tablet Take 40 mg by mouth daily       atorvastatin (LIPITOR) 20 MG tablet Take 20 mg by mouth daily      DULoxetine (CYMBALTA) 20 MG capsule Take 60 mg by mouth daily       divalproex (DEPAKOTE) 500 MG EC tablet Take 1,500 mg by mouth daily.  OLANZapine zydis (ZYPREXA ZYDIS) 15 MG disintegrating tablet Take 20 mg by mouth nightly            albuterol (PROVENTIL) nebulizer solution 2.5 mg Q4H PRN   atorvastatin (LIPITOR) tablet 20 mg Daily   benztropine (COGENTIN) tablet 1 mg BID   divalproex (DEPAKOTE) DR tablet 1,500 mg Daily   DULoxetine (CYMBALTA) extended release capsule 60 mg Daily   lurasidone (LATUDA) tablet 40 mg Daily   OLANZapine zydis (ZYPREXA) disintegrating tablet 20 mg Nightly   sodium chloride flush 0.9 % injection 10 mL 2 times per day   sodium chloride flush 0.9 % injection 10 mL PRN   magnesium hydroxide (MILK OF MAGNESIA) 400 MG/5ML suspension 30 mL Daily PRN   ondansetron (ZOFRAN) injection 4 mg Q6H PRN   famotidine (PEPCID) tablet 20 mg BID   enoxaparin (LOVENOX) injection 40 mg Daily   acetaminophen (TYLENOL) tablet 650 mg Q4H PRN   furosemide (LASIX) injection 40 mg BID        FAMILY HISTORY: family history is not on file. PHYSICAL EXAM:   /82   Pulse 89   Temp 96.6 °F (35.9 °C) (Temporal)   Resp 20   Ht 6' (1.829 m)   Wt 255 lb 6.4 oz (115.8 kg)   SpO2 93%   BMI 34.64 kg/m²  Body mass index is 34.64 kg/m².     Constitutional: He is oriented to person, combined heart failure: New York Heart Association Class: IV (Severe limitations, symptoms even at rest)   Beta Blocker: START Metoprolol succinate (Toprol XL) 25 mg daily. I also discussed the potential side effects of this medication including lightheadedness and dizziness and instructed them to stop the medication of this occurs and call our office if this occurs.  ACE Inibitor/ARB: START sacubitril/valsartan (Entresto) 24/26 mg twice daily.  Diuretics: Continue furosemide (Lasix) 40 mg 2 times daily.  Patient Education/Instructions: I did have my heart failure nurse spend about 10 minutes with the Mr. Mara Tsai going over his heart failure packet including the signs and symptoms to watch for including shortness of breath, weight gain, lightheadedness/dizziness. I asked him to call the office if he were to develop persistent or worsening shortness of breath or weight gain of more than 3 pounds in 1-7 days.  Continue to monitor daily weight and intake/output     · Moderate to severe Mitral Regurgitation: possibly symptomatic  · Beta Blocker: START Metoprolol succinate (Toprol XL) 25 mg daily. I also discussed the potential side effects of this medication including lightheadedness and dizziness and instructed them to stop the medication of this occurs and call our office if this occurs. · ACE Inibitor/ARB: START sacubitril/valsartan (Entresto) 24/26 mg twice daily. · Diuretics: Continue furosemide (Lasix) 40 mg IV 2 times daily. · Idiopathic Cardiomyopathy  · Treatment as above. · Heart catheterization possibly in the future when his heart failure is better controlled. · History of daily alcohol abuse: Monitor for the possibility of delirium tremmons    I spoke with Dr. Phuong Guardado who was agreeable with the plan. Once again, thank you for allowing me to participate in this patients care. Please do not hesitate to contact me if I could be of any further assistance. Sincerely,  Everton Valerio

## 2019-09-14 LAB
ANION GAP SERPL CALCULATED.3IONS-SCNC: 12 MMOL/L (ref 9–17)
BUN BLDV-MCNC: 14 MG/DL (ref 6–20)
BUN/CREAT BLD: 16 (ref 9–20)
CALCIUM SERPL-MCNC: 9.4 MG/DL (ref 8.6–10.4)
CHLORIDE BLD-SCNC: 93 MMOL/L (ref 98–107)
CO2: 31 MMOL/L (ref 20–31)
CREAT SERPL-MCNC: 0.88 MG/DL (ref 0.7–1.2)
GFR AFRICAN AMERICAN: >60 ML/MIN
GFR NON-AFRICAN AMERICAN: >60 ML/MIN
GFR SERPL CREATININE-BSD FRML MDRD: ABNORMAL ML/MIN/{1.73_M2}
GFR SERPL CREATININE-BSD FRML MDRD: ABNORMAL ML/MIN/{1.73_M2}
GLUCOSE BLD-MCNC: 124 MG/DL (ref 74–100)
GLUCOSE BLD-MCNC: 95 MG/DL (ref 70–99)
MAGNESIUM: 2 MG/DL (ref 1.6–2.6)
POTASSIUM SERPL-SCNC: 3.6 MMOL/L (ref 3.7–5.3)
SODIUM BLD-SCNC: 136 MMOL/L (ref 135–144)

## 2019-09-14 PROCEDURE — 6370000000 HC RX 637 (ALT 250 FOR IP): Performed by: FAMILY MEDICINE

## 2019-09-14 PROCEDURE — 83735 ASSAY OF MAGNESIUM: CPT

## 2019-09-14 PROCEDURE — 1200000000 HC SEMI PRIVATE

## 2019-09-14 PROCEDURE — 6370000000 HC RX 637 (ALT 250 FOR IP): Performed by: INTERNAL MEDICINE

## 2019-09-14 PROCEDURE — 6360000002 HC RX W HCPCS: Performed by: INTERNAL MEDICINE

## 2019-09-14 PROCEDURE — 36415 COLL VENOUS BLD VENIPUNCTURE: CPT

## 2019-09-14 PROCEDURE — 82947 ASSAY GLUCOSE BLOOD QUANT: CPT

## 2019-09-14 PROCEDURE — 80048 BASIC METABOLIC PNL TOTAL CA: CPT

## 2019-09-14 PROCEDURE — 2580000003 HC RX 258: Performed by: INTERNAL MEDICINE

## 2019-09-14 RX ORDER — OLANZAPINE 20 MG/1
20 TABLET ORAL NIGHTLY
Status: DISCONTINUED | OUTPATIENT
Start: 2019-09-14 | End: 2019-09-17 | Stop reason: HOSPADM

## 2019-09-14 RX ADMIN — FAMOTIDINE 20 MG: 20 TABLET ORAL at 20:30

## 2019-09-14 RX ADMIN — SACUBITRIL AND VALSARTAN 1 TABLET: 24; 26 TABLET, FILM COATED ORAL at 08:30

## 2019-09-14 RX ADMIN — BENZTROPINE MESYLATE 1 MG: 1 TABLET ORAL at 20:30

## 2019-09-14 RX ADMIN — SACUBITRIL AND VALSARTAN 1 TABLET: 24; 26 TABLET, FILM COATED ORAL at 20:38

## 2019-09-14 RX ADMIN — FAMOTIDINE 20 MG: 20 TABLET ORAL at 08:30

## 2019-09-14 RX ADMIN — FUROSEMIDE 40 MG: 10 INJECTION, SOLUTION INTRAMUSCULAR; INTRAVENOUS at 15:05

## 2019-09-14 RX ADMIN — DIVALPROEX SODIUM 1500 MG: 500 TABLET, DELAYED RELEASE ORAL at 08:30

## 2019-09-14 RX ADMIN — Medication 10 ML: at 20:30

## 2019-09-14 RX ADMIN — BENZTROPINE MESYLATE 1 MG: 1 TABLET ORAL at 08:30

## 2019-09-14 RX ADMIN — FUROSEMIDE 40 MG: 10 INJECTION, SOLUTION INTRAMUSCULAR; INTRAVENOUS at 08:30

## 2019-09-14 RX ADMIN — METOPROLOL SUCCINATE 25 MG: 25 TABLET, EXTENDED RELEASE ORAL at 08:30

## 2019-09-14 RX ADMIN — LURASIDONE HYDROCHLORIDE 40 MG: 40 TABLET, FILM COATED ORAL at 15:02

## 2019-09-14 RX ADMIN — ACETAMINOPHEN 650 MG: 325 TABLET, FILM COATED ORAL at 15:47

## 2019-09-14 RX ADMIN — DULOXETINE HYDROCHLORIDE 60 MG: 60 CAPSULE, DELAYED RELEASE ORAL at 08:30

## 2019-09-14 RX ADMIN — Medication 10 ML: at 08:30

## 2019-09-14 RX ADMIN — OLANZAPINE 20 MG: 20 TABLET, FILM COATED ORAL at 20:30

## 2019-09-14 RX ADMIN — ENOXAPARIN SODIUM 40 MG: 40 INJECTION SUBCUTANEOUS at 08:30

## 2019-09-14 RX ADMIN — ATORVASTATIN CALCIUM 20 MG: 20 TABLET, FILM COATED ORAL at 08:30

## 2019-09-14 ASSESSMENT — PAIN SCALES - GENERAL
PAINLEVEL_OUTOF10: 6
PAINLEVEL_OUTOF10: 0
PAINLEVEL_OUTOF10: 0

## 2019-09-15 LAB
ANION GAP SERPL CALCULATED.3IONS-SCNC: 12 MMOL/L (ref 9–17)
BUN BLDV-MCNC: 15 MG/DL (ref 6–20)
BUN/CREAT BLD: 16 (ref 9–20)
CALCIUM SERPL-MCNC: 9.6 MG/DL (ref 8.6–10.4)
CHLORIDE BLD-SCNC: 94 MMOL/L (ref 98–107)
CO2: 34 MMOL/L (ref 20–31)
CREAT SERPL-MCNC: 0.96 MG/DL (ref 0.7–1.2)
GFR AFRICAN AMERICAN: >60 ML/MIN
GFR NON-AFRICAN AMERICAN: >60 ML/MIN
GFR SERPL CREATININE-BSD FRML MDRD: ABNORMAL ML/MIN/{1.73_M2}
GFR SERPL CREATININE-BSD FRML MDRD: ABNORMAL ML/MIN/{1.73_M2}
GLUCOSE BLD-MCNC: 86 MG/DL (ref 74–100)
GLUCOSE BLD-MCNC: 92 MG/DL (ref 70–99)
MAGNESIUM: 2.2 MG/DL (ref 1.6–2.6)
POTASSIUM SERPL-SCNC: 3.7 MMOL/L (ref 3.7–5.3)
SODIUM BLD-SCNC: 140 MMOL/L (ref 135–144)

## 2019-09-15 PROCEDURE — 6370000000 HC RX 637 (ALT 250 FOR IP): Performed by: FAMILY MEDICINE

## 2019-09-15 PROCEDURE — 82947 ASSAY GLUCOSE BLOOD QUANT: CPT

## 2019-09-15 PROCEDURE — 6370000000 HC RX 637 (ALT 250 FOR IP): Performed by: INTERNAL MEDICINE

## 2019-09-15 PROCEDURE — 1200000000 HC SEMI PRIVATE

## 2019-09-15 PROCEDURE — 83735 ASSAY OF MAGNESIUM: CPT

## 2019-09-15 PROCEDURE — 80048 BASIC METABOLIC PNL TOTAL CA: CPT

## 2019-09-15 PROCEDURE — 6360000002 HC RX W HCPCS: Performed by: INTERNAL MEDICINE

## 2019-09-15 PROCEDURE — 36415 COLL VENOUS BLD VENIPUNCTURE: CPT

## 2019-09-15 PROCEDURE — 2580000003 HC RX 258: Performed by: INTERNAL MEDICINE

## 2019-09-15 RX ADMIN — FUROSEMIDE 40 MG: 10 INJECTION, SOLUTION INTRAMUSCULAR; INTRAVENOUS at 19:46

## 2019-09-15 RX ADMIN — METOPROLOL SUCCINATE 25 MG: 25 TABLET, EXTENDED RELEASE ORAL at 09:26

## 2019-09-15 RX ADMIN — FUROSEMIDE 40 MG: 10 INJECTION, SOLUTION INTRAMUSCULAR; INTRAVENOUS at 09:25

## 2019-09-15 RX ADMIN — ENOXAPARIN SODIUM 40 MG: 40 INJECTION SUBCUTANEOUS at 09:25

## 2019-09-15 RX ADMIN — OLANZAPINE 20 MG: 20 TABLET, FILM COATED ORAL at 23:19

## 2019-09-15 RX ADMIN — ATORVASTATIN CALCIUM 20 MG: 20 TABLET, FILM COATED ORAL at 09:27

## 2019-09-15 RX ADMIN — FAMOTIDINE 20 MG: 20 TABLET ORAL at 09:27

## 2019-09-15 RX ADMIN — BENZTROPINE MESYLATE 1 MG: 1 TABLET ORAL at 20:58

## 2019-09-15 RX ADMIN — LURASIDONE HYDROCHLORIDE 40 MG: 40 TABLET, FILM COATED ORAL at 23:19

## 2019-09-15 RX ADMIN — FAMOTIDINE 20 MG: 20 TABLET ORAL at 20:59

## 2019-09-15 RX ADMIN — SACUBITRIL AND VALSARTAN 1 TABLET: 24; 26 TABLET, FILM COATED ORAL at 20:59

## 2019-09-15 RX ADMIN — DULOXETINE HYDROCHLORIDE 60 MG: 60 CAPSULE, DELAYED RELEASE ORAL at 09:27

## 2019-09-15 RX ADMIN — SACUBITRIL AND VALSARTAN 1 TABLET: 24; 26 TABLET, FILM COATED ORAL at 09:30

## 2019-09-15 RX ADMIN — DIVALPROEX SODIUM 1500 MG: 500 TABLET, DELAYED RELEASE ORAL at 09:27

## 2019-09-15 RX ADMIN — BENZTROPINE MESYLATE 1 MG: 1 TABLET ORAL at 09:27

## 2019-09-15 RX ADMIN — Medication 10 ML: at 09:28

## 2019-09-15 ASSESSMENT — PAIN SCALES - GENERAL: PAINLEVEL_OUTOF10: 0

## 2019-09-15 NOTE — PLAN OF CARE
Problem: Falls - Risk of:  Goal: Will remain free from falls  Description  Will remain free from falls  Outcome: Ongoing  Note:   Fall assessment completed daily. Bed in lowest position. Call light within reach. Falling star posted to outside of door. Fall risk sticker in place on ID band. Side rails up 2/4. Bed alarm on for safety. Patient ambulates independently. Will continue to monitor. Goal: Absence of physical injury  Description  Absence of physical injury  Outcome: Ongoing     Problem: OXYGENATION/RESPIRATORY FUNCTION  Goal: Patient will achieve/maintain normal respiratory rate/effort  Description  Respiratory rate and effort will be within normal limits for the patient  Outcome: Ongoing  Note:   Patient is SOB with activity. Will continue to monitor. Problem: HEMODYNAMIC STATUS  Goal: Patient has stable vital signs and fluid balance  Outcome: Ongoing     Problem: FLUID AND ELECTROLYTE IMBALANCE  Goal: Fluid and electrolyte balance are achieved/maintained  Outcome: Ongoing     Problem: ACTIVITY INTOLERANCE/IMPAIRED MOBILITY  Goal: Mobility/activity is maintained at optimum level for patient  Outcome: Ongoing  Note:   Patient is alert and oriented and has demonstrated the use of using the call light for assistance before getting up. Education has been provided to defer the use of the bed alarm and/or restraint free alarm as the patient has shown competency in calling for assistance and verbalizing the risk. Problem: Nutrition  Goal: Optimal nutrition therapy  Description  Nutrition Problem: Altered nutrition-related lab values  Intervention: Food and/or Nutrient Delivery: Continue current diet  Nutritional Goals: PO > 75%   Outcome: Ongoing     Problem: Pain:  Goal: Pain level will decrease  Description  Pain level will decrease  Outcome: Ongoing  Note:   Pain assessed every 4 hours. Patient is able to communicate with staff the need for pain interventions. Patient currently denies pain. Will continue to monitor.      Goal: Control of acute pain  Description  Control of acute pain  Outcome: Ongoing  Goal: Control of chronic pain  Description  Control of chronic pain  Outcome: Ongoing

## 2019-09-15 NOTE — PROGRESS NOTES
Progress Note    SUBJECTIVE:  FU related to  / doing better. No SOB  No CP  Some lightheadedness. Medication withdrawal vs BP    OBJECTIVE:    Vitals:   TEMPERATURE:  Current - Temp: 96.9 °F (36.1 °C);  Max - Temp  Av.2 °F (36.2 °C)  Min: 96.9 °F (36.1 °C)  Max: 97.5 °F (36.4 °C)  RESPIRATIONS RANGE: Resp  Av.3  Min: 16  Max: 20  PULSE RANGE: Pulse  Av.5  Min: 70  Max: 86  BLOOD PRESSURE RANGE:  Systolic (07HKC), PH , Min:99 , ZBW:769   ; Diastolic (50XYX), MBC:83, Min:64, Max:76    PULSE OXIMETRY RANGE: SpO2  Av %  Min: 93 %  Max: 96 %  24HR INTAKE/OUTPUT:      Intake/Output Summary (Last 24 hours) at 9/15/2019 0942  Last data filed at 2019 1451  Gross per 24 hour   Intake --   Output 1650 ml   Net -1650 ml     -----------------------------------------------------------------  Exam:  General: A & O x3 and slow response  HEENT: Supple neck & negative  Heart: Regular  Lungs: clear to auscultation bilaterally & no retractions  Abdomen: Normal & soft, No tenderness and BS normal  Extremities:  No edema   Neuro: NonFocal     -----------------------------------------------------------------  Diagnostic Data:  Lab Results   Component Value Date    WBC 6.0 2019    HGB 14.1 2019     2019       Lab Results   Component Value Date    BUN 15 09/15/2019    CREATININE 0.96 09/15/2019     09/15/2019    K 3.7 09/15/2019    CALCIUM 9.6 09/15/2019    CL 94 (L) 09/15/2019    CO2 34 (H) 09/15/2019    LABGLOM >60 09/15/2019       Lab Results   Component Value Date    WBCUA 0 TO 2 2019    RBCUA 0 TO 2 2019    EPITHUA 0 TO 2 2019    LEUKOCYTESUR NEGATIVE 2019    SPECGRAV 1.010 2019    GLUCOSEU NEGATIVE 2019    KETUA NEGATIVE 2019    PROTEINU 2+ (A) 2019    HGBUR NEGATIVE 2019    CASTUA NOT REPORTED 2019    CRYSTUA NOT REPORTED 2019    BACTERIA 1+ (A) 2019    YEAST NOT REPORTED 2019       Lab Results

## 2019-09-16 LAB
ANION GAP SERPL CALCULATED.3IONS-SCNC: 13 MMOL/L (ref 9–17)
BUN BLDV-MCNC: 15 MG/DL (ref 6–20)
BUN/CREAT BLD: 17 (ref 9–20)
CALCIUM SERPL-MCNC: 9.1 MG/DL (ref 8.6–10.4)
CHLORIDE BLD-SCNC: 94 MMOL/L (ref 98–107)
CO2: 30 MMOL/L (ref 20–31)
CREAT SERPL-MCNC: 0.89 MG/DL (ref 0.7–1.2)
GFR AFRICAN AMERICAN: >60 ML/MIN
GFR NON-AFRICAN AMERICAN: >60 ML/MIN
GFR SERPL CREATININE-BSD FRML MDRD: ABNORMAL ML/MIN/{1.73_M2}
GFR SERPL CREATININE-BSD FRML MDRD: ABNORMAL ML/MIN/{1.73_M2}
GLUCOSE BLD-MCNC: 140 MG/DL (ref 70–99)
HCT VFR BLD CALC: 46.7 % (ref 40.7–50.3)
HEMOGLOBIN: 14.5 G/DL (ref 13–17)
MAGNESIUM: 2.3 MG/DL (ref 1.6–2.6)
MCH RBC QN AUTO: 29.1 PG (ref 25.2–33.5)
MCHC RBC AUTO-ENTMCNC: 31 G/DL (ref 28.4–34.8)
MCV RBC AUTO: 93.8 FL (ref 82.6–102.9)
NRBC AUTOMATED: 0 PER 100 WBC
PDW BLD-RTO: 14.4 % (ref 11.8–14.4)
PLATELET # BLD: 152 K/UL (ref 138–453)
PMV BLD AUTO: 9.7 FL (ref 8.1–13.5)
POTASSIUM SERPL-SCNC: 3.4 MMOL/L (ref 3.7–5.3)
RBC # BLD: 4.98 M/UL (ref 4.21–5.77)
SODIUM BLD-SCNC: 137 MMOL/L (ref 135–144)
WBC # BLD: 7.2 K/UL (ref 3.5–11.3)

## 2019-09-16 PROCEDURE — 1200000000 HC SEMI PRIVATE

## 2019-09-16 PROCEDURE — 6370000000 HC RX 637 (ALT 250 FOR IP): Performed by: INTERNAL MEDICINE

## 2019-09-16 PROCEDURE — 6360000002 HC RX W HCPCS: Performed by: INTERNAL MEDICINE

## 2019-09-16 PROCEDURE — 83735 ASSAY OF MAGNESIUM: CPT

## 2019-09-16 PROCEDURE — 2580000003 HC RX 258: Performed by: INTERNAL MEDICINE

## 2019-09-16 PROCEDURE — 6370000000 HC RX 637 (ALT 250 FOR IP): Performed by: FAMILY MEDICINE

## 2019-09-16 PROCEDURE — 80048 BASIC METABOLIC PNL TOTAL CA: CPT

## 2019-09-16 PROCEDURE — 99233 SBSQ HOSP IP/OBS HIGH 50: CPT | Performed by: FAMILY MEDICINE

## 2019-09-16 PROCEDURE — 85027 COMPLETE CBC AUTOMATED: CPT

## 2019-09-16 PROCEDURE — 36415 COLL VENOUS BLD VENIPUNCTURE: CPT

## 2019-09-16 RX ORDER — FUROSEMIDE 40 MG/1
40 TABLET ORAL DAILY
Status: DISCONTINUED | OUTPATIENT
Start: 2019-09-17 | End: 2019-09-17 | Stop reason: HOSPADM

## 2019-09-16 RX ORDER — BACITRACIN, NEOMYCIN, POLYMYXIN B 400; 3.5; 5 [USP'U]/G; MG/G; [USP'U]/G
OINTMENT TOPICAL 2 TIMES DAILY
Status: DISCONTINUED | OUTPATIENT
Start: 2019-09-16 | End: 2019-09-17 | Stop reason: HOSPADM

## 2019-09-16 RX ADMIN — SACUBITRIL AND VALSARTAN 1 TABLET: 24; 26 TABLET, FILM COATED ORAL at 09:15

## 2019-09-16 RX ADMIN — DULOXETINE HYDROCHLORIDE 60 MG: 60 CAPSULE, DELAYED RELEASE ORAL at 09:15

## 2019-09-16 RX ADMIN — Medication 10 ML: at 20:28

## 2019-09-16 RX ADMIN — BENZTROPINE MESYLATE 1 MG: 1 TABLET ORAL at 20:28

## 2019-09-16 RX ADMIN — BENZTROPINE MESYLATE 1 MG: 1 TABLET ORAL at 09:15

## 2019-09-16 RX ADMIN — ATORVASTATIN CALCIUM 20 MG: 20 TABLET, FILM COATED ORAL at 09:15

## 2019-09-16 RX ADMIN — POLYMYXIN B SULFATE, BACITRACIN ZINC, NEOMYCIN SULFATE: 5000; 3.5; 4 OINTMENT TOPICAL at 20:30

## 2019-09-16 RX ADMIN — FUROSEMIDE 40 MG: 10 INJECTION, SOLUTION INTRAMUSCULAR; INTRAVENOUS at 09:15

## 2019-09-16 RX ADMIN — POLYMYXIN B SULFATE, BACITRACIN ZINC, NEOMYCIN SULFATE: 5000; 3.5; 4 OINTMENT TOPICAL at 11:18

## 2019-09-16 RX ADMIN — FAMOTIDINE 20 MG: 20 TABLET ORAL at 09:15

## 2019-09-16 RX ADMIN — DIVALPROEX SODIUM 1500 MG: 500 TABLET, DELAYED RELEASE ORAL at 09:15

## 2019-09-16 RX ADMIN — LURASIDONE HYDROCHLORIDE 40 MG: 40 TABLET, FILM COATED ORAL at 20:30

## 2019-09-16 RX ADMIN — FAMOTIDINE 20 MG: 20 TABLET ORAL at 20:28

## 2019-09-16 RX ADMIN — OLANZAPINE 20 MG: 20 TABLET, FILM COATED ORAL at 20:30

## 2019-09-16 RX ADMIN — ENOXAPARIN SODIUM 40 MG: 40 INJECTION SUBCUTANEOUS at 09:15

## 2019-09-16 RX ADMIN — Medication 10 ML: at 09:15

## 2019-09-16 RX ADMIN — METOPROLOL SUCCINATE 25 MG: 25 TABLET, EXTENDED RELEASE ORAL at 09:15

## 2019-09-16 RX ADMIN — SACUBITRIL AND VALSARTAN 1 TABLET: 24; 26 TABLET, FILM COATED ORAL at 20:28

## 2019-09-16 ASSESSMENT — PAIN SCALES - GENERAL
PAINLEVEL_OUTOF10: 0
PAINLEVEL_OUTOF10: 0

## 2019-09-16 NOTE — PROGRESS NOTES
Sarah Dawkins am scribing for and in the presence of Jacquie Cortez. Inés Davis MD, MS, F.A.C.C..      Patient: Jan Orellana  : 1965  Date of Admission: 2019  Primary Care Physician: Shayla Hollingsworth  Today's Date: 2019    REASON FOR CONSULTATION: Heart failure, EF 20%    HPI: Mr. Conor Sidhu is a 49 y.o. male who was admitted to the [facility] due to insect bite to his scrotum and was found to have acute combined heart failure with symptoms including lower extremity edema and week history of progressive increased shortness of breath. He was also found to have evidence of cirrhosis of the liver and portal hypertension. He is a past smoker and would smoke about a pack per day but is not sure how long he had smoked before. He does drink every day and says he drinks a couple of shots of whiskey per day. Echocardiogram on 2019 showed an ejection fraction of 20% with moderate to severe mitral regurgitation. Mr. Conor Sidhu is better today. He lives at home alone although he does have a sister that helps him at times. He is down 17 pounds since admission and says he feels much better. He denies any abdominal pain, bleeding problems, problems with his medications or any other concerns at this time. Patient Vitals for the past 96 hrs (Last 3 readings):   Weight   19 0001 238 lb (108 kg)   19 0515 253 lb 14.4 oz (115.2 kg)   19 0600 255 lb 6.4 oz (115.8 kg)       Past Medical History:   Diagnosis Date    Back pain     Bipolar 1 disorder (HCC)     Seizures ([facility] [address]     LAST SEIZURE 19       CURRENT ALLERGIES: Pcn [penicillins] REVIEW OF SYSTEMS: 14 systems were reviewed. Pertinent positives and negatives as above, all else negative.      Past Surgical History:   Procedure Laterality Date    TOE SURGERY      TOOTH EXTRACTION      Social History:  Social History     Tobacco Use    Smoking status: Current Every Day Smoker     Packs/day: 1.00     Types: Cigarettes    Smokeless

## 2019-09-16 NOTE — PROGRESS NOTES
Progress Note    SUBJECTIVE:  Patient seen for acute diastolic CHF  ROS:   Constitutional: negative  for fevers, and negative for chills. Respiratory: negative for shortness of breath, negative for cough, and negative for wheezing  Cardiovascular: negative for chest pain, and negative for palpitations  Gastrointestinal: negative for abdominal pain, negative for nausea,negative for vomiting, negative for diarrhea, and negative for constipation     All other systems were reviewed with the patient and are negative unless otherwise stated in HPI    OBJECTIVE:    EXAM:  Vitals:    09/16/19 0001   BP:    Pulse: 82   Resp:    Temp:    SpO2:       Weight: 238 lb (108 kg)    Height: 6' (182.9 cm)     GEN:   A & O x3, no apparent distress  EYES: No gross abnormalities. NECK: normal, supple, no lymphadenopathy,   PULM: clear to auscultation bilaterally- no wheezes, rales or rhonchi, normal air movement, no respiratory distress  COR: regular rate & rhythm, no murmurs and no gallops  ABD:  soft, non-tender, non-distended, normal bowel sounds, no masses or organomegaly  EXT:   no cyanosis, clubbing or edema present    NEURO: negative  SKIN:  no rashes or significant lesions        BMP:    Lab Results   Component Value Date     09/15/2019    K 3.7 09/15/2019    CL 94 09/15/2019    CO2 34 09/15/2019    BUN 15 09/15/2019    LABALBU 4.0 09/12/2019    LABALBU 4.7 11/15/2011    CREATININE 0.96 09/15/2019    CALCIUM 9.6 09/15/2019    GFRAA >60 09/15/2019    LABGLOM >60 09/15/2019    GLUCOSE 92 09/15/2019    GLUCOSE 116 05/27/2012           Us Scrotum And Testicles    Result Date: 9/12/2019  EXAMINATION: ULTRASOUND OF THE SCROTUM/TESTICLES WITH COLOR DOPPLER FLOW EVALUATION 9/12/2019 COMPARISON: None. HISTORY: ORDERING SYSTEM PROVIDED HISTORY: L scrotal tenderness reported. FINDINGS: Measurements: Right testicle: 4.5 x 2.7 x 2.1 cm Left testicle: 4.4 x 2.8 x 1.9 cm Right: Grey scale:   The right testicle demonstrates normal the mA/kV was utilized to reduce the radiation dose to as low as reasonably achievable. COMPARISON: None. HISTORY: ORDERING SYSTEM PROVIDED HISTORY: chest pain, shortness of breath, anemic appearing, resp rate 24-28. R/O PE vs dissection. Diffuse abdominal tenderness as well. FINDINGS: CTA: The thoracoabdominal aorta is normal in caliber. No significant atherosclerotic change. No aneurysm or dissection. The innominate artery and bilateral carotid and subclavian arteries are patent at the arch. The SMA and celiac artery are patent as are proximal branching vessels. The renal arteries are patent bilaterally. The DAYRON is patent. The iliac and femoral arteries are patent bilaterally. Chest: Mediastinum: The heart is enlarged. Pulmonary arteries are normal in caliber. Shotty mediastinal and hilar lymph nodes are observed. An index lymph node at the anterior right jere measures 16 mm with a fatty hilum. Lungs/pleura: The airways are patent. No pleural fluid. Focal ground-glass opacification in the superior segment of the right lower lobe. A secondary focus is observed in the anterior segment of the right upper lobe. Scattered mild interstitial changes are also seen in the periphery of the left upper and lower lobe. Soft Tissues/Bones: No skeletal abnormalities are appreciated within the chest. Abdomen/Pelvis: Organs: Slight nodular contour of the liver with no focal hepatic lesion. Ascites and mesenteric edema is demonstrated with mild periportal edema as well. The gallbladder, biliary ducts and pancreas are normal.  The adrenal glands and kidneys are normal. GI/Bowel: The stomach, duodenum and small bowel are normal. A normal appendix is visualized. The colon is normal. Pelvis: Bladder mucosal thickening likely due to nondistention. The prostate is normal. Peritoneum/Retroperitoneum: No free air. Shotty retroperitoneal nodes are not enlarged. Bones/Soft Tissues: No significant skeletal abnormalities. congestive heart failure, NYHA class 4 (HCC)    Portal hypertension (HCC)    Alcoholic cirrhosis of liver without ascites (HCC)    Acute on chronic diastolic CHF (congestive heart failure), NYHA class 3 ([address]  Resolved Problems:    * No resolved hospital problems.  *      All Problems:  Patient Active Problem List   Diagnosis    Onychia and paronychia of toe    Other specified disease of nail    Acute on chronic diastolic CHF (congestive heart failure), NYHA class 3 (HCC)    Acute on chronic congestive heart failure (HCC)    Acute on chronic combined systolic and diastolic congestive heart failure, NYHA class 4 (HCC)    Portal hypertension (HCC)    Alcoholic cirrhosis of liver without ascites (HCC)       ASSESSMENT/PLAN:  ·  CHF, cardiomyopathy---will discuss with cardiology     HIGH RISK MEDS: none    Petra Grimm M.D.

## 2019-09-17 ENCOUNTER — HOSPITAL ENCOUNTER (INPATIENT)
Age: 54
LOS: 2 days | Discharge: HOME | DRG: 246 | End: 2019-09-19
Attending: INTERNAL MEDICINE | Admitting: INTERNAL MEDICINE
Payer: MEDICARE

## 2019-09-17 ENCOUNTER — APPOINTMENT (OUTPATIENT)
Dept: CARDIAC CATH/INVASIVE PROCEDURES | Age: 54
DRG: 286 | End: 2019-09-17
Payer: MEDICARE

## 2019-09-17 VITALS
SYSTOLIC BLOOD PRESSURE: 107 MMHG | BODY MASS INDEX: 32.21 KG/M2 | HEART RATE: 78 BPM | RESPIRATION RATE: 20 BRPM | DIASTOLIC BLOOD PRESSURE: 64 MMHG | OXYGEN SATURATION: 93 % | WEIGHT: 237.8 LBS | HEIGHT: 72 IN | TEMPERATURE: 97.1 F

## 2019-09-17 DIAGNOSIS — I50.33 ACUTE ON CHRONIC DIASTOLIC CHF (CONGESTIVE HEART FAILURE), NYHA CLASS 3 (HCC): Primary | ICD-10-CM

## 2019-09-17 DIAGNOSIS — I25.10 CAD, MULTIPLE VESSEL: ICD-10-CM

## 2019-09-17 PROBLEM — I50.9 ACUTE HEART FAILURE (HCC): Status: ACTIVE | Noted: 2019-09-17

## 2019-09-17 PROBLEM — I25.110 ATHEROSCLEROSIS OF NATIVE CORONARY ARTERY OF NATIVE HEART WITH UNSTABLE ANGINA PECTORIS (HCC): Status: ACTIVE | Noted: 2019-09-17

## 2019-09-17 LAB
ANION GAP SERPL CALCULATED.3IONS-SCNC: 12 MMOL/L (ref 9–17)
BUN BLDV-MCNC: 17 MG/DL (ref 6–20)
BUN/CREAT BLD: 17 (ref 9–20)
CALCIUM SERPL-MCNC: 9.8 MG/DL (ref 8.6–10.4)
CHLORIDE BLD-SCNC: 98 MMOL/L (ref 98–107)
CO2: 30 MMOL/L (ref 20–31)
CREAT SERPL-MCNC: 0.98 MG/DL (ref 0.7–1.2)
GFR AFRICAN AMERICAN: >60 ML/MIN
GFR NON-AFRICAN AMERICAN: >60 ML/MIN
GFR SERPL CREATININE-BSD FRML MDRD: NORMAL ML/MIN/{1.73_M2}
GFR SERPL CREATININE-BSD FRML MDRD: NORMAL ML/MIN/{1.73_M2}
GLUCOSE BLD-MCNC: 93 MG/DL (ref 70–99)
HCT VFR BLD CALC: 50.7 % (ref 40.7–50.3)
HEMOGLOBIN: 15.7 G/DL (ref 13–17)
MAGNESIUM: 2.4 MG/DL (ref 1.6–2.6)
MCH RBC QN AUTO: 28.2 PG (ref 25.2–33.5)
MCHC RBC AUTO-ENTMCNC: 31 G/DL (ref 28.4–34.8)
MCV RBC AUTO: 91 FL (ref 82.6–102.9)
NRBC AUTOMATED: 0 PER 100 WBC
PDW BLD-RTO: 14.1 % (ref 11.8–14.4)
PLATELET # BLD: 157 K/UL (ref 138–453)
PMV BLD AUTO: 8.9 FL (ref 8.1–13.5)
POTASSIUM SERPL-SCNC: 4.2 MMOL/L (ref 3.7–5.3)
RBC # BLD: 5.57 M/UL (ref 4.21–5.77)
SODIUM BLD-SCNC: 140 MMOL/L (ref 135–144)
TROPONIN INTERP: ABNORMAL
TROPONIN INTERP: NORMAL
TROPONIN T: ABNORMAL NG/ML
TROPONIN T: NORMAL NG/ML
TROPONIN, HIGH SENSITIVITY: 22 NG/L (ref 0–22)
TROPONIN, HIGH SENSITIVITY: 25 NG/L (ref 0–22)
WBC # BLD: 7.5 K/UL (ref 3.5–11.3)

## 2019-09-17 PROCEDURE — 93458 L HRT ARTERY/VENTRICLE ANGIO: CPT | Performed by: FAMILY MEDICINE

## 2019-09-17 PROCEDURE — B2111ZZ FLUOROSCOPY OF MULTIPLE CORONARY ARTERIES USING LOW OSMOLAR CONTRAST: ICD-10-PCS | Performed by: FAMILY MEDICINE

## 2019-09-17 PROCEDURE — 4A023N7 MEASUREMENT OF CARDIAC SAMPLING AND PRESSURE, LEFT HEART, PERCUTANEOUS APPROACH: ICD-10-PCS | Performed by: FAMILY MEDICINE

## 2019-09-17 PROCEDURE — 80048 BASIC METABOLIC PNL TOTAL CA: CPT

## 2019-09-17 PROCEDURE — 85027 COMPLETE CBC AUTOMATED: CPT

## 2019-09-17 PROCEDURE — 6370000000 HC RX 637 (ALT 250 FOR IP): Performed by: FAMILY MEDICINE

## 2019-09-17 PROCEDURE — 83735 ASSAY OF MAGNESIUM: CPT

## 2019-09-17 PROCEDURE — 2060000000 HC ICU INTERMEDIATE R&B

## 2019-09-17 PROCEDURE — 2709999900 HC NON-CHARGEABLE SUPPLY

## 2019-09-17 PROCEDURE — 2500000003 HC RX 250 WO HCPCS

## 2019-09-17 PROCEDURE — 36415 COLL VENOUS BLD VENIPUNCTURE: CPT

## 2019-09-17 PROCEDURE — 2580000003 HC RX 258: Performed by: FAMILY MEDICINE

## 2019-09-17 PROCEDURE — 6360000004 HC RX CONTRAST MEDICATION: Performed by: INTERNAL MEDICINE

## 2019-09-17 PROCEDURE — C1769 GUIDE WIRE: HCPCS

## 2019-09-17 PROCEDURE — 6360000002 HC RX W HCPCS

## 2019-09-17 PROCEDURE — C1894 INTRO/SHEATH, NON-LASER: HCPCS

## 2019-09-17 PROCEDURE — 6370000000 HC RX 637 (ALT 250 FOR IP): Performed by: PHYSICIAN ASSISTANT

## 2019-09-17 PROCEDURE — B2151ZZ FLUOROSCOPY OF LEFT HEART USING LOW OSMOLAR CONTRAST: ICD-10-PCS | Performed by: FAMILY MEDICINE

## 2019-09-17 PROCEDURE — 84484 ASSAY OF TROPONIN QUANT: CPT

## 2019-09-17 PROCEDURE — 99223 1ST HOSP IP/OBS HIGH 75: CPT | Performed by: PHYSICIAN ASSISTANT

## 2019-09-17 PROCEDURE — 2580000003 HC RX 258: Performed by: PHYSICIAN ASSISTANT

## 2019-09-17 PROCEDURE — C1887 CATHETER, GUIDING: HCPCS

## 2019-09-17 PROCEDURE — 6360000002 HC RX W HCPCS: Performed by: PHYSICIAN ASSISTANT

## 2019-09-17 RX ORDER — ACETAMINOPHEN 325 MG/1
650 TABLET ORAL EVERY 4 HOURS PRN
Status: DISCONTINUED | OUTPATIENT
Start: 2019-09-17 | End: 2019-09-17 | Stop reason: HOSPADM

## 2019-09-17 RX ORDER — ONDANSETRON 2 MG/ML
4 INJECTION INTRAMUSCULAR; INTRAVENOUS EVERY 6 HOURS PRN
Status: DISCONTINUED | OUTPATIENT
Start: 2019-09-17 | End: 2019-09-19 | Stop reason: HOSPADM

## 2019-09-17 RX ORDER — HYDRALAZINE HYDROCHLORIDE 20 MG/ML
10 INJECTION INTRAMUSCULAR; INTRAVENOUS EVERY 6 HOURS PRN
Status: DISCONTINUED | OUTPATIENT
Start: 2019-09-17 | End: 2019-09-19 | Stop reason: HOSPADM

## 2019-09-17 RX ORDER — DIVALPROEX SODIUM 500 MG/1
1500 TABLET, DELAYED RELEASE ORAL DAILY
Status: DISCONTINUED | OUTPATIENT
Start: 2019-09-17 | End: 2019-09-19 | Stop reason: HOSPADM

## 2019-09-17 RX ORDER — MAGNESIUM SULFATE 1 G/100ML
1 INJECTION INTRAVENOUS PRN
Status: DISCONTINUED | OUTPATIENT
Start: 2019-09-17 | End: 2019-09-19 | Stop reason: HOSPADM

## 2019-09-17 RX ORDER — POTASSIUM CHLORIDE 29.8 MG/ML
10 INJECTION INTRAVENOUS ONCE
Status: DISCONTINUED | OUTPATIENT
Start: 2019-09-17 | End: 2019-09-17

## 2019-09-17 RX ORDER — POTASSIUM CHLORIDE 7.45 MG/ML
10 INJECTION INTRAVENOUS ONCE
Status: DISCONTINUED | OUTPATIENT
Start: 2019-09-17 | End: 2019-09-17

## 2019-09-17 RX ORDER — FAMOTIDINE 20 MG/1
20 TABLET, FILM COATED ORAL 2 TIMES DAILY
Status: DISCONTINUED | OUTPATIENT
Start: 2019-09-17 | End: 2019-09-19 | Stop reason: HOSPADM

## 2019-09-17 RX ORDER — BENZTROPINE MESYLATE 1 MG/1
1 TABLET ORAL 2 TIMES DAILY
Status: DISCONTINUED | OUTPATIENT
Start: 2019-09-17 | End: 2019-09-19 | Stop reason: HOSPADM

## 2019-09-17 RX ORDER — SODIUM CHLORIDE 0.9 % (FLUSH) 0.9 %
10 SYRINGE (ML) INJECTION PRN
Status: DISCONTINUED | OUTPATIENT
Start: 2019-09-17 | End: 2019-09-17 | Stop reason: HOSPADM

## 2019-09-17 RX ORDER — FUROSEMIDE 10 MG/ML
20 INJECTION INTRAMUSCULAR; INTRAVENOUS DAILY
Status: DISCONTINUED | OUTPATIENT
Start: 2019-09-18 | End: 2019-09-19 | Stop reason: HOSPADM

## 2019-09-17 RX ORDER — POTASSIUM CHLORIDE 7.45 MG/ML
10 INJECTION INTRAVENOUS PRN
Status: DISCONTINUED | OUTPATIENT
Start: 2019-09-17 | End: 2019-09-19 | Stop reason: HOSPADM

## 2019-09-17 RX ORDER — POTASSIUM CHLORIDE 20 MEQ/1
40 TABLET, EXTENDED RELEASE ORAL PRN
Status: DISCONTINUED | OUTPATIENT
Start: 2019-09-17 | End: 2019-09-19 | Stop reason: HOSPADM

## 2019-09-17 RX ORDER — DIPHENHYDRAMINE HCL 25 MG
50 CAPSULE ORAL ONCE
Status: DISCONTINUED | OUTPATIENT
Start: 2019-09-17 | End: 2019-09-17 | Stop reason: HOSPADM

## 2019-09-17 RX ORDER — NITROGLYCERIN 0.4 MG/1
0.4 TABLET SUBLINGUAL EVERY 5 MIN PRN
Status: DISCONTINUED | OUTPATIENT
Start: 2019-09-17 | End: 2019-09-17 | Stop reason: HOSPADM

## 2019-09-17 RX ORDER — SODIUM CHLORIDE 0.9 % (FLUSH) 0.9 %
10 SYRINGE (ML) INJECTION PRN
Status: DISCONTINUED | OUTPATIENT
Start: 2019-09-17 | End: 2019-09-18

## 2019-09-17 RX ORDER — DULOXETIN HYDROCHLORIDE 30 MG/1
60 CAPSULE, DELAYED RELEASE ORAL DAILY
Status: DISCONTINUED | OUTPATIENT
Start: 2019-09-17 | End: 2019-09-19 | Stop reason: HOSPADM

## 2019-09-17 RX ORDER — SODIUM CHLORIDE 9 MG/ML
INJECTION, SOLUTION INTRAVENOUS CONTINUOUS
Status: DISCONTINUED | OUTPATIENT
Start: 2019-09-17 | End: 2019-09-17 | Stop reason: HOSPADM

## 2019-09-17 RX ORDER — SODIUM CHLORIDE 0.9 % (FLUSH) 0.9 %
10 SYRINGE (ML) INJECTION EVERY 12 HOURS SCHEDULED
Status: DISCONTINUED | OUTPATIENT
Start: 2019-09-17 | End: 2019-09-17 | Stop reason: HOSPADM

## 2019-09-17 RX ORDER — ALBUTEROL SULFATE 2.5 MG/3ML
2.5 SOLUTION RESPIRATORY (INHALATION) EVERY 4 HOURS PRN
Status: DISCONTINUED | OUTPATIENT
Start: 2019-09-17 | End: 2019-09-19 | Stop reason: HOSPADM

## 2019-09-17 RX ORDER — FUROSEMIDE 10 MG/ML
20 INJECTION INTRAMUSCULAR; INTRAVENOUS DAILY
Status: DISCONTINUED | OUTPATIENT
Start: 2019-09-17 | End: 2019-09-17

## 2019-09-17 RX ORDER — ATORVASTATIN CALCIUM 20 MG/1
20 TABLET, FILM COATED ORAL DAILY
Status: DISCONTINUED | OUTPATIENT
Start: 2019-09-17 | End: 2019-09-19 | Stop reason: HOSPADM

## 2019-09-17 RX ORDER — SODIUM CHLORIDE 0.9 % (FLUSH) 0.9 %
10 SYRINGE (ML) INJECTION EVERY 12 HOURS SCHEDULED
Status: DISCONTINUED | OUTPATIENT
Start: 2019-09-17 | End: 2019-09-18

## 2019-09-17 RX ORDER — LISINOPRIL 5 MG/1
5 TABLET ORAL DAILY
Status: DISCONTINUED | OUTPATIENT
Start: 2019-09-18 | End: 2019-09-19 | Stop reason: HOSPADM

## 2019-09-17 RX ORDER — OLANZAPINE 10 MG/1
20 TABLET, ORALLY DISINTEGRATING ORAL NIGHTLY
Status: DISCONTINUED | OUTPATIENT
Start: 2019-09-17 | End: 2019-09-19 | Stop reason: HOSPADM

## 2019-09-17 RX ORDER — NITROGLYCERIN 0.4 MG/1
0.4 TABLET SUBLINGUAL EVERY 5 MIN PRN
Status: DISCONTINUED | OUTPATIENT
Start: 2019-09-17 | End: 2019-09-19 | Stop reason: HOSPADM

## 2019-09-17 RX ADMIN — Medication 10 ML: at 20:05

## 2019-09-17 RX ADMIN — LURASIDONE HYDROCHLORIDE 40 MG: 40 TABLET, FILM COATED ORAL at 16:36

## 2019-09-17 RX ADMIN — OLANZAPINE 20 MG: 10 TABLET, ORALLY DISINTEGRATING ORAL at 20:05

## 2019-09-17 RX ADMIN — BENZTROPINE MESYLATE 1 MG: 1 TABLET ORAL at 20:05

## 2019-09-17 RX ADMIN — FAMOTIDINE 20 MG: 20 TABLET, FILM COATED ORAL at 20:05

## 2019-09-17 RX ADMIN — ATORVASTATIN CALCIUM 20 MG: 20 TABLET, FILM COATED ORAL at 10:29

## 2019-09-17 RX ADMIN — SODIUM CHLORIDE: 9 INJECTION, SOLUTION INTRAVENOUS at 07:40

## 2019-09-17 RX ADMIN — SACUBITRIL AND VALSARTAN 1 TABLET: 24; 26 TABLET, FILM COATED ORAL at 10:28

## 2019-09-17 RX ADMIN — ENOXAPARIN SODIUM 40 MG: 40 INJECTION SUBCUTANEOUS at 16:36

## 2019-09-17 RX ADMIN — ACETAMINOPHEN 650 MG: 325 TABLET, FILM COATED ORAL at 10:29

## 2019-09-17 RX ADMIN — DIVALPROEX SODIUM 1500 MG: 500 TABLET, DELAYED RELEASE ORAL at 10:28

## 2019-09-17 RX ADMIN — METOPROLOL TARTRATE 25 MG: 25 TABLET ORAL at 20:05

## 2019-09-17 RX ADMIN — DULOXETINE HYDROCHLORIDE 60 MG: 60 CAPSULE, DELAYED RELEASE ORAL at 10:28

## 2019-09-17 RX ADMIN — IOPAMIDOL 70 ML: 755 INJECTION, SOLUTION INTRAVENOUS at 09:29

## 2019-09-17 RX ADMIN — FAMOTIDINE 20 MG: 20 TABLET ORAL at 10:29

## 2019-09-17 RX ADMIN — METOPROLOL SUCCINATE 25 MG: 25 TABLET, EXTENDED RELEASE ORAL at 10:29

## 2019-09-17 RX ADMIN — BENZTROPINE MESYLATE 1 MG: 1 TABLET ORAL at 10:28

## 2019-09-17 ASSESSMENT — PAIN DESCRIPTION - PAIN TYPE: TYPE: ACUTE PAIN

## 2019-09-17 ASSESSMENT — PAIN SCALES - GENERAL
PAINLEVEL_OUTOF10: 7
PAINLEVEL_OUTOF10: 0
PAINLEVEL_OUTOF10: 0
PAINLEVEL_OUTOF10: 7

## 2019-09-17 ASSESSMENT — PAIN DESCRIPTION - LOCATION: LOCATION: GENERALIZED

## 2019-09-17 NOTE — PROGRESS NOTES
Call received for [facility],  Bed assignment received, waiting on ETA-[facility] to transport.

## 2019-09-17 NOTE — PROGRESS NOTES
Pt resting quietly in bed with eyes shut, arouses easily to name called. Cath lab staff on unit to take pt to his heart cath this morning. Vital signs obtained and morning assessment completed. Pt denies pain at this time. Respirations even and unlabored while on room air. Call light in reach. Will continue to monitor.

## 2019-09-17 NOTE — DISCHARGE SUMMARY
Component Value Date    WBC 7.5 09/17/2019    HGB 15.7 09/17/2019     09/17/2019       Lab Results   Component Value Date    BUN 17 09/17/2019    CREATININE 0.98 09/17/2019     09/17/2019    K 4.2 09/17/2019    CALCIUM 9.8 09/17/2019    CL 98 09/17/2019    CO2 30 09/17/2019    LABGLOM >60 09/17/2019       Lab Results   Component Value Date    WBCUA 0 TO 2 09/12/2019    RBCUA 0 TO 2 09/12/2019    EPITHUA 0 TO 2 09/12/2019    LEUKOCYTESUR NEGATIVE 09/12/2019    SPECGRAV 1.010 09/12/2019    GLUCOSEU NEGATIVE 09/12/2019    KETUA NEGATIVE 09/12/2019    PROTEINU 2+ (A) 09/12/2019    HGBUR NEGATIVE 09/12/2019    CASTUA NOT REPORTED 09/12/2019    CRYSTUA NOT REPORTED 09/12/2019    BACTERIA 1+ (A) 09/12/2019    YEAST NOT REPORTED 09/12/2019       Us Scrotum And Testicles    Result Date: 9/12/2019  EXAMINATION: ULTRASOUND OF THE SCROTUM/TESTICLES WITH COLOR DOPPLER FLOW EVALUATION 9/12/2019 COMPARISON: None. HISTORY: ORDERING SYSTEM PROVIDED HISTORY: L scrotal tenderness reported. FINDINGS: Measurements: Right testicle: 4.5 x 2.7 x 2.1 cm Left testicle: 4.4 x 2.8 x 1.9 cm Right: Grey scale: The right testicle demonstrates normal homogeneous echotexture without focal lesion. No evidence of testicular microlithiasis. Doppler Evaluation:  There is normal arterial and venous Doppler flow within the testicle. Scrotal Sac:  Hyperechoic echogenicity in the scrotum inferior to the right testicle. No evidence of peristalsis. No appreciable mass. Epididymis:  No acute abnormality. Left: Grey scale: The left testicle demonstrates normal homogeneous echotexture without focal lesion. No evidence of testicular microlithiasis. Doppler Evaluation:  There is normal arterial and venous Doppler flow within the testicle. Scrotal Sac:  Similar pattern of hyperechoic echogenicity inferior to the testicle in the scrotum. There is also a 6 mm calcified scrotal curtis that is noted incidentally.   A small varicocele is also applicable)  Preparation of Discharge Summary  Preparation of Medication Reconciliation  Preparation of Discharge Prescriptions    Signed:  Bear Vargas PA-C  9/17/2019, 8:58 AM

## 2019-09-17 NOTE — OP NOTE
-  Coronary Angiography Brief Post Operative Note:    Severe two vessel coronary artery disease involving involving a 100% proximal probably co-dominant RCA, 90% stenosis in the distal part of the proximal LAD at a bifircation with a moderate sized D1 branch that also has an 80% stenosis. Mildly elevated left ventricular end diastolic pressure. Consult to cardiothoracic surgery for consideration of multi-vessel coronary artery bypass surgery vs high risk stenting. EF 20% on echocardiography. See report for further details.

## 2019-09-18 ENCOUNTER — APPOINTMENT (OUTPATIENT)
Dept: CARDIAC CATH/INVASIVE PROCEDURES | Age: 54
DRG: 246 | End: 2019-09-18
Attending: INTERNAL MEDICINE
Payer: MEDICARE

## 2019-09-18 PROBLEM — I25.10 CAD, MULTIPLE VESSEL: Status: ACTIVE | Noted: 2019-09-18

## 2019-09-18 PROBLEM — I25.5 ISCHEMIC CARDIOMYOPATHY: Status: ACTIVE | Noted: 2019-09-18

## 2019-09-18 LAB
ALBUMIN SERPL-MCNC: 3.4 G/DL (ref 3.5–5.2)
ALBUMIN/GLOBULIN RATIO: 1.4 (ref 1–2.5)
ALP BLD-CCNC: 74 U/L (ref 40–129)
ALT SERPL-CCNC: 26 U/L (ref 5–41)
ANION GAP SERPL CALCULATED.3IONS-SCNC: 10 MMOL/L (ref 9–17)
AST SERPL-CCNC: 23 U/L
BILIRUB SERPL-MCNC: 0.49 MG/DL (ref 0.3–1.2)
BNP INTERPRETATION: ABNORMAL
BUN BLDV-MCNC: 14 MG/DL (ref 6–20)
BUN/CREAT BLD: ABNORMAL (ref 9–20)
CALCIUM SERPL-MCNC: 9.1 MG/DL (ref 8.6–10.4)
CHLORIDE BLD-SCNC: 101 MMOL/L (ref 98–107)
CO2: 27 MMOL/L (ref 20–31)
CREAT SERPL-MCNC: 0.74 MG/DL (ref 0.7–1.2)
GFR AFRICAN AMERICAN: >60 ML/MIN
GFR NON-AFRICAN AMERICAN: >60 ML/MIN
GFR SERPL CREATININE-BSD FRML MDRD: ABNORMAL ML/MIN/{1.73_M2}
GFR SERPL CREATININE-BSD FRML MDRD: ABNORMAL ML/MIN/{1.73_M2}
GLUCOSE BLD-MCNC: 92 MG/DL (ref 70–99)
HCT VFR BLD CALC: 49.4 % (ref 40.7–50.3)
HEMOGLOBIN: 14.6 G/DL (ref 13–17)
MAGNESIUM: 2.4 MG/DL (ref 1.6–2.6)
MCH RBC QN AUTO: 27.1 PG (ref 25.2–33.5)
MCHC RBC AUTO-ENTMCNC: 29.6 G/DL (ref 28.4–34.8)
MCV RBC AUTO: 91.7 FL (ref 82.6–102.9)
NRBC AUTOMATED: 0 PER 100 WBC
PDW BLD-RTO: 14.4 % (ref 11.8–14.4)
PLATELET # BLD: 124 K/UL (ref 138–453)
PMV BLD AUTO: 9.3 FL (ref 8.1–13.5)
POTASSIUM SERPL-SCNC: 4.5 MMOL/L (ref 3.7–5.3)
PRO-BNP: 2510 PG/ML
RBC # BLD: 5.39 M/UL (ref 4.21–5.77)
SODIUM BLD-SCNC: 138 MMOL/L (ref 135–144)
TOTAL PROTEIN: 5.9 G/DL (ref 6.4–8.3)
WBC # BLD: 6.7 K/UL (ref 3.5–11.3)

## 2019-09-18 PROCEDURE — 2580000003 HC RX 258: Performed by: NURSE PRACTITIONER

## 2019-09-18 PROCEDURE — 2500000003 HC RX 250 WO HCPCS

## 2019-09-18 PROCEDURE — C9600 PERC DRUG-EL COR STENT SING: HCPCS | Performed by: INTERNAL MEDICINE

## 2019-09-18 PROCEDURE — 6370000000 HC RX 637 (ALT 250 FOR IP)

## 2019-09-18 PROCEDURE — 6360000004 HC RX CONTRAST MEDICATION

## 2019-09-18 PROCEDURE — 92921 HC PRQ CARDIAC ANGIO ADDL ART: CPT | Performed by: INTERNAL MEDICINE

## 2019-09-18 PROCEDURE — 6370000000 HC RX 637 (ALT 250 FOR IP): Performed by: STUDENT IN AN ORGANIZED HEALTH CARE EDUCATION/TRAINING PROGRAM

## 2019-09-18 PROCEDURE — 2580000003 HC RX 258: Performed by: STUDENT IN AN ORGANIZED HEALTH CARE EDUCATION/TRAINING PROGRAM

## 2019-09-18 PROCEDURE — 80053 COMPREHEN METABOLIC PANEL: CPT

## 2019-09-18 PROCEDURE — 027034Z DILATION OF CORONARY ARTERY, ONE ARTERY WITH DRUG-ELUTING INTRALUMINAL DEVICE, PERCUTANEOUS APPROACH: ICD-10-PCS | Performed by: INTERNAL MEDICINE

## 2019-09-18 PROCEDURE — C1887 CATHETER, GUIDING: HCPCS

## 2019-09-18 PROCEDURE — 2060000000 HC ICU INTERMEDIATE R&B

## 2019-09-18 PROCEDURE — 85027 COMPLETE CBC AUTOMATED: CPT

## 2019-09-18 PROCEDURE — 83735 ASSAY OF MAGNESIUM: CPT

## 2019-09-18 PROCEDURE — APPSS30 APP SPLIT SHARED TIME 16-30 MINUTES: Performed by: PHYSICIAN ASSISTANT

## 2019-09-18 PROCEDURE — 83880 ASSAY OF NATRIURETIC PEPTIDE: CPT

## 2019-09-18 PROCEDURE — C1725 CATH, TRANSLUMIN NON-LASER: HCPCS

## 2019-09-18 PROCEDURE — 2709999900 HC NON-CHARGEABLE SUPPLY

## 2019-09-18 PROCEDURE — 6360000002 HC RX W HCPCS

## 2019-09-18 PROCEDURE — C1874 STENT, COATED/COV W/DEL SYS: HCPCS

## 2019-09-18 PROCEDURE — 36415 COLL VENOUS BLD VENIPUNCTURE: CPT

## 2019-09-18 PROCEDURE — 6360000002 HC RX W HCPCS: Performed by: PHYSICIAN ASSISTANT

## 2019-09-18 PROCEDURE — C1769 GUIDE WIRE: HCPCS

## 2019-09-18 PROCEDURE — 2580000003 HC RX 258: Performed by: PHYSICIAN ASSISTANT

## 2019-09-18 PROCEDURE — C1894 INTRO/SHEATH, NON-LASER: HCPCS

## 2019-09-18 PROCEDURE — 6370000000 HC RX 637 (ALT 250 FOR IP): Performed by: PHYSICIAN ASSISTANT

## 2019-09-18 PROCEDURE — 99232 SBSQ HOSP IP/OBS MODERATE 35: CPT | Performed by: INTERNAL MEDICINE

## 2019-09-18 RX ORDER — NICOTINE 21 MG/24HR
1 PATCH, TRANSDERMAL 24 HOURS TRANSDERMAL DAILY
Status: DISCONTINUED | OUTPATIENT
Start: 2019-09-18 | End: 2019-09-19 | Stop reason: HOSPADM

## 2019-09-18 RX ORDER — ASPIRIN 81 MG/1
81 TABLET ORAL DAILY
Status: DISCONTINUED | OUTPATIENT
Start: 2019-09-19 | End: 2019-09-19 | Stop reason: HOSPADM

## 2019-09-18 RX ORDER — SODIUM CHLORIDE 0.9 % (FLUSH) 0.9 %
10 SYRINGE (ML) INJECTION PRN
Status: DISCONTINUED | OUTPATIENT
Start: 2019-09-18 | End: 2019-09-19 | Stop reason: HOSPADM

## 2019-09-18 RX ORDER — 0.9 % SODIUM CHLORIDE 0.9 %
250 INTRAVENOUS SOLUTION INTRAVENOUS ONCE
Status: COMPLETED | OUTPATIENT
Start: 2019-09-18 | End: 2019-09-18

## 2019-09-18 RX ORDER — SODIUM CHLORIDE 0.9 % (FLUSH) 0.9 %
10 SYRINGE (ML) INJECTION EVERY 12 HOURS SCHEDULED
Status: DISCONTINUED | OUTPATIENT
Start: 2019-09-18 | End: 2019-09-19 | Stop reason: HOSPADM

## 2019-09-18 RX ORDER — ACETAMINOPHEN 325 MG/1
650 TABLET ORAL EVERY 4 HOURS PRN
Status: DISCONTINUED | OUTPATIENT
Start: 2019-09-18 | End: 2019-09-19 | Stop reason: HOSPADM

## 2019-09-18 RX ORDER — 0.9 % SODIUM CHLORIDE 0.9 %
250 INTRAVENOUS SOLUTION INTRAVENOUS ONCE
Status: DISCONTINUED | OUTPATIENT
Start: 2019-09-18 | End: 2019-09-18

## 2019-09-18 RX ADMIN — BENZTROPINE MESYLATE 1 MG: 1 TABLET ORAL at 21:03

## 2019-09-18 RX ADMIN — Medication 10 ML: at 08:25

## 2019-09-18 RX ADMIN — Medication 10 ML: at 21:03

## 2019-09-18 RX ADMIN — DULOXETINE HYDROCHLORIDE 60 MG: 30 CAPSULE, DELAYED RELEASE ORAL at 08:26

## 2019-09-18 RX ADMIN — BENZTROPINE MESYLATE 1 MG: 1 TABLET ORAL at 08:27

## 2019-09-18 RX ADMIN — ASPIRIN 325 MG: 325 TABLET, COATED ORAL at 08:27

## 2019-09-18 RX ADMIN — DIVALPROEX SODIUM 1500 MG: 500 TABLET, DELAYED RELEASE ORAL at 08:27

## 2019-09-18 RX ADMIN — ENOXAPARIN SODIUM 40 MG: 40 INJECTION SUBCUTANEOUS at 08:26

## 2019-09-18 RX ADMIN — LURASIDONE HYDROCHLORIDE 40 MG: 40 TABLET, FILM COATED ORAL at 08:26

## 2019-09-18 RX ADMIN — FAMOTIDINE 20 MG: 20 TABLET, FILM COATED ORAL at 21:03

## 2019-09-18 RX ADMIN — SODIUM CHLORIDE 250 ML: 9 INJECTION, SOLUTION INTRAVENOUS at 01:06

## 2019-09-18 RX ADMIN — FUROSEMIDE 20 MG: 10 INJECTION, SOLUTION INTRAMUSCULAR; INTRAVENOUS at 08:25

## 2019-09-18 RX ADMIN — FAMOTIDINE 20 MG: 20 TABLET, FILM COATED ORAL at 08:26

## 2019-09-18 RX ADMIN — OLANZAPINE 20 MG: 10 TABLET, ORALLY DISINTEGRATING ORAL at 21:03

## 2019-09-18 RX ADMIN — ATORVASTATIN CALCIUM 20 MG: 20 TABLET, FILM COATED ORAL at 08:27

## 2019-09-18 NOTE — PROGRESS NOTES
Cheryl Gust[facility]    Progress Note    9/18/2019    4:38 PM    Name:   Elias Oseguera  MRN:     [id number]     Acct:      [de-identified]   Room:   [address] [place] [address] Day:  1  Admit Date:  9/17/2019  1:02 PM    PCP:   [facility] CNP  Code Status:  Full Code    Subjective:     C/C: Chest pain, shortness of breath    Interval History Status: not changed. Pt with no new complaints. States that is chest pain and shortness of breath is the \"same\". Denying fever, chills, nausea, vomiting, diarrhea. Remains afebrile and hemodynamically stable. Labs reviewed. No adverse events reported per nursing. Will undergo cardiac catheterization today. Brief History:     Elias Oseguera is a 49 y.o. male with a medical history significant for cirrhosis, bipolar disorder. Presented to [facility] ED complaining of chest pain, shortness of breath and scrotal swelling. He was ultimately admitted to the [facility]. He underwent echocardiogram which revealed EF of 20%. He was diuresed approximately 17 lbs and underwent heart cath today, which revealed 90% proximal LAD occlusion and 100% occluded non dominant RCA. Transfer to [place] [address] was requested for further evaluation by cardiothoracic surgery. Review of Systems:     Constitutional:  negative for chills, fevers, sweats  Respiratory:  negative for cough, dyspnea on exertion, hemoptysis, shortness of breath, wheezing  Cardiovascular:  negative for chest pain, chest pressure/discomfort, lower extremity edema, palpitations  Gastrointestinal:  negative for abdominal pain, constipation, diarrhea, nausea, vomiting  Neurological:  negative for dizziness, headache    Medications: Allergies:     Allergies   Allergen Reactions    Pcn [Penicillins]        Current Meds:   Scheduled Meds:    atorvastatin  20 mg Oral Daily    benztropine  1 mg Oral BID    divalproex  1,500 mg Oral Daily    DULoxetine  60 mg 4.5   CL 94* 98  --   --  101   CO2 30 30  --   --  27   GLUCOSE 140* 93  --   --  92   BUN 15 17  --   --  14   CREATININE 0.89 0.98  --   --  0.74   MG 2.3 2.4  --   --  2.4   ANIONGAP 13 12  --   --  10   LABGLOM >60 >60  --   --  >60   GFRAA >60 >60  --   --  >60   CALCIUM 9.1 9.8  --   --  9.1   PROBNP  --   --   --   --  2,510*   TROPHS  --   --  22 25*  --      Recent Labs     09/18/19  0601   PROT 5.9*   LABALBU 3.4*   AST 23   ALT 26   ALKPHOS 74   BILITOT 0.49     ABG:  Lab Results   Component Value Date    FIO2 NOT REPORTED 09/12/2019     Lab Results   Component Value Date/Time    SPECIAL NOT REPORTED 04/29/2013 11:51 AM     Lab Results   Component Value Date/Time    CULTURE STREPTOCOCCI, BETA HEMOLYTIC GROUP G LARGE AMOUNT (A) 05/24/2012 08:59 PM    CULTURE (A) 05/24/2012 08:59 PM     STAPHYLOCOCCUS SPECIES, COAGULASE NEGATIVE SMALL AMOUNT    CULTURE  05/24/2012 08:59 PM     Performed at [facility] [address], [facility] [address]    CULTURE  [phone] 05/24/2012 08:59 PM       Radiology:  Us Scrotum And Testicles    Result Date: 9/12/2019  1. No evidence of testicular torsion. 2. Hyperechoic echogenicity bilateral scrotum may represent herniation of inguinal fat. There is no evidence of peristalsing bowel or discrete mass. 3. Left varicocele. Xr Chest Portable    Result Date: 9/12/2019  Cardiomegaly and increasing mild pulmonary vascular congestion centrally. Cta Chest Abdomen Pelvis W Contrast    Result Date: 9/12/2019  1. Normal CTA of the aorta. 2. Probable changes of cirrhosis and portal hypertension. Mild ascites and moderate mesenteric edema throughout the abdomen. 3. Focal ground-glass opacities most notably in the right upper and lower lobes. This may represent asymmetric pulmonary edema. Developing pneumonia can not be excluded. Us Dup Abd Pel Retro Scrot Complete    Result Date: 9/12/2019  1. No evidence of testicular torsion.  2. Hyperechoic echogenicity bilateral scrotum may represent herniation of inguinal fat. There is no evidence of peristalsing bowel or discrete mass. 3. Left varicocele. Physical Examination:        General appearance:  alert, cooperative and no distress  Mental Status:  oriented to person, place and time and normal affect  Lungs:  clear to auscultation bilaterally, normal effort  Heart:  regular rate and rhythm, no murmur  Abdomen:  soft, nontender, nondistended, normal bowel sounds, no masses, hepatomegaly, splenomegaly  Extremities:  no edema, redness, tenderness in the calves  Skin:  no gross lesions, rashes, induration    Assessment:        Hospital Problems           Last Modified POA    Portal hypertension ([facility] 5/82/5090 Yes    Alcoholic cirrhosis of liver without ascites ([address] 9/18/2019 Yes    Atherosclerosis of native coronary artery of native heart with unstable angina pectoris ([address] 9/18/2019 Yes    Acute heart failure ([address] 9/17/2019 Yes    Ischemic cardiomyopathy 9/18/2019 Yes          Plan:        1. Plan is for cardiac catheterization today  2. IV diuresis  3. Medical optimization with beta blocker, ACEI, aspirin, statin, diuresis  4. Monitor and control blood pressure  5. Monitor electrolytes, replete as necessary  6.  Continue psych meds    Weldon Romberg, [place]  9/18/2019  4:38 PM

## 2019-09-18 NOTE — CONSULTS
LIPID PANEL:  Lab Results   Component Value Date    HDL 38 09/13/2019    TRIG 67 09/13/2019     LIVER PROFILE:  Recent Labs     09/18/19  0601   AST 23   ALT 26   LABALBU 3.4*         Other Current Problems  Patient Active Problem List   Diagnosis    Onychia and paronychia of toe    Other specified disease of nail    Acute on chronic diastolic CHF (congestive heart failure), NYHA class 3 (HCC)    Acute on chronic congestive heart failure (HCC)    Acute on chronic combined systolic and diastolic congestive heart failure, NYHA class 4 (HCC)    Portal hypertension (HCC)    Alcoholic cirrhosis of liver without ascites (HCC)    Idiopathic cardiomyopathy ([address]    Atherosclerosis of native coronary artery of native heart with unstable angina pectoris ([address]    Acute heart failure ([address]       IMPRESSION & Recommendations:    CAD s/p cath: severe 2 vessel disease, 100% proximal co-dominant RCA, 90% stenosis proximal LAD at a bifircation, 80% stenosis moderate sized D1 branch , CT surgery declined CABG, will require PCI  CHFrEF: EF 20%, likely partially result of ischemic damage, lasix, entresto, toprol xl  Portal hypertension  Alcoholic cirrhosis      Discussed with patient, family, and Nurse. Electronically signed by Janine Dinero MD on 9/18/2019 at 8:32 [address] [place] [address] [facility]  [facility]  [phone]

## 2019-09-18 NOTE — OP NOTE
dev > 0.5 mm         [] PVC's freq  [] PVC's infrequent    Stress Test Performed:      [] Yes    [x] No     Type:     [] Stress Echo   [] Exercise Stress Test (no imaging)      [] Stress Nuclear  [] Stress Imaging     Results   [] Negative   [] Positive        [] Indeterminate  [] Unavailable     If Positive/ Risk / Extent of Ischemia:       [] Low  [] Intermediate         [] High  [] Unavailable      Cardiac CTA Performed:     [] Yes    [x] No      Results   [] CAD   [] Non obstructive CAD      [] No CAD   [] Uncertain      [] Unknown   [] Structural Disease. Pre Procedure Medications:   [x] Yes    [] No         [] ASA  [] Beta Blockers      [] Nitrate  [] Ca Channel Blockers      [] Ranolazine  [x] Statin       [] Plavix/Others antiplatelets          Daily Hawkins MD  Fellow, [address] Cowan MD Attending physician  [facility]

## 2019-09-18 NOTE — H&P
(COGENTIN) 1 MG tablet Take 1 mg by mouth 2 times daily    Historical Provider, MD   lurasidone (LATUDA) 40 MG TABS tablet Take 40 mg by mouth daily     Historical Provider, MD   atorvastatin (LIPITOR) 20 MG tablet Take 20 mg by mouth daily    Historical Provider, MD   DULoxetine (CYMBALTA) 20 MG capsule Take 60 mg by mouth daily     Historical Provider, MD   albuterol (PROVENTIL) (2.5 MG/3ML) 0.083% nebulizer solution Take 3 mLs by nebulization every 4 hours as needed for Wheezing or Shortness of Breath. 4/29/13   Marcelle Dunlap MD   divalproex (DEPAKOTE) 500 MG EC tablet Take 1,500 mg by mouth daily. Historical Provider, MD   OLANZapine zydis (ZYPREXA ZYDIS) 15 MG disintegrating tablet Take 20 mg by mouth nightly     Historical Provider, MD        Allergies:     Pcn [penicillins]    Social History:     Tobacco:    reports that he has been smoking cigarettes. He has been smoking about 1.00 pack per day. He has never used smokeless tobacco.  Alcohol:      reports that he drinks alcohol. Drug Use:  reports that he has current or past drug history. Drug: Marijuana. Family History:     Pt denies any significant family history    Review of Systems:     Positive and Negative as described in HPI.     CONSTITUTIONAL:  negative for fevers, chills, sweats, fatigue, weight loss  HEENT:  negative for vision, hearing changes, runny nose, throat pain  RESPIRATORY:  negative for shortness of breath, cough, congestion, wheezing  CARDIOVASCULAR:  negative for chest pain, palpitations  GASTROINTESTINAL:  negative for nausea, vomiting, diarrhea, constipation, change in bowel habits, abdominal pain   GENITOURINARY:  negative for difficulty of urination, burning with urination, frequency   INTEGUMENT:  negative for rash, skin lesions, easy bruising   HEMATOLOGIC/LYMPHATIC:  negative for swelling/edema   ALLERGIC/IMMUNOLOGIC:  negative for urticaria , itching  ENDOCRINE:  negative increase in drinking, increase in urination,

## 2019-09-18 NOTE — PLAN OF CARE
Problem: Falls - Risk of:  Goal: Will remain free from falls  Description  Will remain free from falls  9/18/2019 0035 by Teofilo Loja RN  Outcome: Ongoing  9/17/2019 1907 by Kathy Wisdom RN  Outcome: Ongoing  Goal: Absence of physical injury  Description  Absence of physical injury  9/18/2019 0035 by Teofilo Loja RN  Outcome: Ongoing  9/17/2019 1907 by Kathy Wisdom RN  Outcome: Ongoing     Problem: Cardiac Output - Decreased:  Goal: Hemodynamic stability will improve  Description  Hemodynamic stability will improve  Outcome: Ongoing

## 2019-09-19 VITALS
TEMPERATURE: 97.7 F | OXYGEN SATURATION: 97 % | HEART RATE: 85 BPM | BODY MASS INDEX: 31 KG/M2 | RESPIRATION RATE: 16 BRPM | HEIGHT: 72 IN | WEIGHT: 228.84 LBS | DIASTOLIC BLOOD PRESSURE: 61 MMHG | SYSTOLIC BLOOD PRESSURE: 96 MMHG

## 2019-09-19 PROBLEM — I25.10 CAD IN NATIVE ARTERY: Status: ACTIVE | Noted: 2019-09-19

## 2019-09-19 PROBLEM — I25.110 ATHEROSCLEROSIS OF NATIVE CORONARY ARTERY OF NATIVE HEART WITH UNSTABLE ANGINA PECTORIS (HCC): Status: RESOLVED | Noted: 2019-09-17 | Resolved: 2019-09-19

## 2019-09-19 LAB
ANION GAP SERPL CALCULATED.3IONS-SCNC: 17 MMOL/L (ref 9–17)
BUN BLDV-MCNC: 20 MG/DL (ref 6–20)
BUN/CREAT BLD: NORMAL (ref 9–20)
CALCIUM SERPL-MCNC: 9.6 MG/DL (ref 8.6–10.4)
CHLORIDE BLD-SCNC: 100 MMOL/L (ref 98–107)
CO2: 22 MMOL/L (ref 20–31)
CREAT SERPL-MCNC: 0.85 MG/DL (ref 0.7–1.2)
GFR AFRICAN AMERICAN: >60 ML/MIN
GFR NON-AFRICAN AMERICAN: >60 ML/MIN
GFR SERPL CREATININE-BSD FRML MDRD: NORMAL ML/MIN/{1.73_M2}
GFR SERPL CREATININE-BSD FRML MDRD: NORMAL ML/MIN/{1.73_M2}
GLUCOSE BLD-MCNC: 88 MG/DL (ref 70–99)
HCT VFR BLD CALC: 45.2 % (ref 40.7–50.3)
HEMOGLOBIN: 16.1 G/DL (ref 13–17)
MCH RBC QN AUTO: 32.7 PG (ref 25.2–33.5)
MCHC RBC AUTO-ENTMCNC: 35.6 G/DL (ref 28.4–34.8)
MCV RBC AUTO: 91.9 FL (ref 82.6–102.9)
NRBC AUTOMATED: 0 PER 100 WBC
PDW BLD-RTO: 17.4 % (ref 11.8–14.4)
PLATELET # BLD: ABNORMAL K/UL (ref 138–453)
PLATELET, FLUORESCENCE: 130 K/UL (ref 138–453)
PLATELET, IMMATURE FRACTION: 1.6 % (ref 1.1–10.3)
PMV BLD AUTO: ABNORMAL FL (ref 8.1–13.5)
POTASSIUM SERPL-SCNC: 4.6 MMOL/L (ref 3.7–5.3)
RBC # BLD: 4.92 M/UL (ref 4.21–5.77)
SODIUM BLD-SCNC: 139 MMOL/L (ref 135–144)
WBC # BLD: 8.1 K/UL (ref 3.5–11.3)

## 2019-09-19 PROCEDURE — 6370000000 HC RX 637 (ALT 250 FOR IP): Performed by: STUDENT IN AN ORGANIZED HEALTH CARE EDUCATION/TRAINING PROGRAM

## 2019-09-19 PROCEDURE — 85027 COMPLETE CBC AUTOMATED: CPT

## 2019-09-19 PROCEDURE — APPSS30 APP SPLIT SHARED TIME 16-30 MINUTES: Performed by: PHYSICIAN ASSISTANT

## 2019-09-19 PROCEDURE — 2580000003 HC RX 258: Performed by: STUDENT IN AN ORGANIZED HEALTH CARE EDUCATION/TRAINING PROGRAM

## 2019-09-19 PROCEDURE — 80048 BASIC METABOLIC PNL TOTAL CA: CPT

## 2019-09-19 PROCEDURE — 36415 COLL VENOUS BLD VENIPUNCTURE: CPT

## 2019-09-19 PROCEDURE — 97165 OT EVAL LOW COMPLEX 30 MIN: CPT

## 2019-09-19 PROCEDURE — 85055 RETICULATED PLATELET ASSAY: CPT

## 2019-09-19 PROCEDURE — 6360000002 HC RX W HCPCS: Performed by: STUDENT IN AN ORGANIZED HEALTH CARE EDUCATION/TRAINING PROGRAM

## 2019-09-19 PROCEDURE — 99232 SBSQ HOSP IP/OBS MODERATE 35: CPT | Performed by: INTERNAL MEDICINE

## 2019-09-19 PROCEDURE — 97162 PT EVAL MOD COMPLEX 30 MIN: CPT

## 2019-09-19 PROCEDURE — 97530 THERAPEUTIC ACTIVITIES: CPT

## 2019-09-19 RX ORDER — FAMOTIDINE 20 MG/1
20 TABLET, FILM COATED ORAL 2 TIMES DAILY
Qty: 60 TABLET | Refills: 3 | Status: SHIPPED | OUTPATIENT
Start: 2019-09-19

## 2019-09-19 RX ORDER — LISINOPRIL 5 MG/1
5 TABLET ORAL DAILY
Qty: 30 TABLET | Refills: 3 | Status: SHIPPED | OUTPATIENT
Start: 2019-09-20 | End: 2019-11-25

## 2019-09-19 RX ORDER — FUROSEMIDE 20 MG/1
20 TABLET ORAL DAILY
Qty: 60 TABLET | Refills: 3 | Status: SHIPPED | OUTPATIENT
Start: 2019-09-19 | End: 2019-10-14

## 2019-09-19 RX ORDER — NITROGLYCERIN 0.4 MG/1
TABLET SUBLINGUAL
Qty: 25 TABLET | Refills: 3 | Status: SHIPPED | OUTPATIENT
Start: 2019-09-19 | End: 2019-12-02

## 2019-09-19 RX ORDER — ASPIRIN 81 MG/1
81 TABLET ORAL DAILY
Qty: 30 TABLET | Refills: 3 | Status: SHIPPED | OUTPATIENT
Start: 2019-09-20 | End: 2020-03-24 | Stop reason: SDUPTHER

## 2019-09-19 RX ORDER — NICOTINE 21 MG/24HR
1 PATCH, TRANSDERMAL 24 HOURS TRANSDERMAL DAILY
Qty: 30 PATCH | Refills: 3 | Status: SHIPPED | OUTPATIENT
Start: 2019-09-20

## 2019-09-19 RX ADMIN — BENZTROPINE MESYLATE 1 MG: 1 TABLET ORAL at 08:58

## 2019-09-19 RX ADMIN — Medication 10 ML: at 08:59

## 2019-09-19 RX ADMIN — DIVALPROEX SODIUM 1500 MG: 500 TABLET, DELAYED RELEASE ORAL at 08:57

## 2019-09-19 RX ADMIN — METOPROLOL TARTRATE 25 MG: 25 TABLET ORAL at 08:57

## 2019-09-19 RX ADMIN — LISINOPRIL 5 MG: 5 TABLET ORAL at 08:56

## 2019-09-19 RX ADMIN — ATORVASTATIN CALCIUM 20 MG: 20 TABLET, FILM COATED ORAL at 08:58

## 2019-09-19 RX ADMIN — DULOXETINE HYDROCHLORIDE 60 MG: 30 CAPSULE, DELAYED RELEASE ORAL at 08:57

## 2019-09-19 RX ADMIN — TICAGRELOR 90 MG: 90 TABLET ORAL at 08:57

## 2019-09-19 RX ADMIN — FAMOTIDINE 20 MG: 20 TABLET, FILM COATED ORAL at 08:57

## 2019-09-19 RX ADMIN — Medication 81 MG: at 08:57

## 2019-09-19 RX ADMIN — LURASIDONE HYDROCHLORIDE 40 MG: 40 TABLET, FILM COATED ORAL at 08:57

## 2019-09-19 RX ADMIN — ENOXAPARIN SODIUM 40 MG: 40 INJECTION SUBCUTANEOUS at 08:59

## 2019-09-19 RX ADMIN — FUROSEMIDE 20 MG: 10 INJECTION, SOLUTION INTRAMUSCULAR; INTRAVENOUS at 08:59

## 2019-09-19 ASSESSMENT — PAIN SCALES - GENERAL: PAINLEVEL_OUTOF10: 0

## 2019-09-19 NOTE — DISCHARGE SUMMARY
Results   Component Value Date    TSH 2.37 09/13/2019        Radiology:  Us Scrotum And Testicles    Result Date: 9/12/2019  1. No evidence of testicular torsion. 2. Hyperechoic echogenicity bilateral scrotum may represent herniation of inguinal fat. There is no evidence of peristalsing bowel or discrete mass. 3. Left varicocele. Xr Chest Portable    Result Date: 9/12/2019  Cardiomegaly and increasing mild pulmonary vascular congestion centrally. Cta Chest Abdomen Pelvis W Contrast    Result Date: 9/12/2019  1. Normal CTA of the aorta. 2. Probable changes of cirrhosis and portal hypertension. Mild ascites and moderate mesenteric edema throughout the abdomen. 3. Focal ground-glass opacities most notably in the right upper and lower lobes. This may represent asymmetric pulmonary edema. Developing pneumonia can not be excluded. Us Dup Abd Pel Retro Scrot Complete    Result Date: 9/12/2019  1. No evidence of testicular torsion. 2. Hyperechoic echogenicity bilateral scrotum may represent herniation of inguinal fat. There is no evidence of peristalsing bowel or discrete mass. 3. Left varicocele. Consultations:    Consults:     Final Specialist Recommendations/Findings:   IP CONSULT TO CARDIOLOGY  IP CONSULT TO [address] [place] [address]      The patient was seen and examined on day of discharge and this discharge summary is in conjunction with any daily progress note from day of discharge.     Discharge plan:     Disposition: Home    Physician Follow Up:   [facility], APRN - [address]  [phone]    Schedule an appointment as soon as possible for a visit in 2 weeks  follow-up    Pastor Anderson MD  [facility]  [phone]    On 10/2/2019  1:20pm for hospital cardiology follow-up       Diet: cardiac diet    Activity: As tolerated    Discharge Medications:      Medication List      START taking these medications    aspirin 81 MG EC tablet  Take 1

## 2019-09-19 NOTE — PROGRESS NOTES
09/17/19  1852 09/18/19  0601 09/19/19  0559     --   --  138 139   K 4.2  --   --  4.5 4.6   CL 98  --   --  101 100   CO2 30  --   --  27 22   GLUCOSE 93  --   --  92 88   BUN 17  --   --  14 20   CREATININE 0.98  --   --  0.74 0.85   MG 2.4  --   --  2.4  --    ANIONGAP 12  --   --  10 17   LABGLOM >60  --   --  >60 >60   GFRAA >60  --   --  >60 >60   CALCIUM 9.8  --   --  9.1 9.6   PROBNP  --   --   --  2,510*  --    TROPHS  --  22 25*  --   --      Recent Labs     09/18/19  0601   PROT 5.9*   LABALBU 3.4*   AST 23   ALT 26   ALKPHOS 74   BILITOT 0.49     ABG:  Lab Results   Component Value Date    FIO2 NOT REPORTED 09/12/2019     Lab Results   Component Value Date/Time    SPECIAL NOT REPORTED 04/29/2013 11:51 AM     Lab Results   Component Value Date/Time    CULTURE STREPTOCOCCI, BETA HEMOLYTIC GROUP G LARGE AMOUNT (A) 05/24/2012 08:59 PM    CULTURE (A) 05/24/2012 08:59 PM     STAPHYLOCOCCUS SPECIES, COAGULASE NEGATIVE SMALL AMOUNT    CULTURE  05/24/2012 08:59 PM     Performed at [facility]  [address]    CULTURE  [phone] 05/24/2012 08:59 PM       Radiology:  Us Scrotum And Testicles    Result Date: 9/12/2019  1. No evidence of testicular torsion. 2. Hyperechoic echogenicity bilateral scrotum may represent herniation of inguinal fat. There is no evidence of peristalsing bowel or discrete mass. 3. Left varicocele. Xr Chest Portable    Result Date: 9/12/2019  Cardiomegaly and increasing mild pulmonary vascular congestion centrally. Cta Chest Abdomen Pelvis W Contrast    Result Date: 9/12/2019  1. Normal CTA of the aorta. 2. Probable changes of cirrhosis and portal hypertension. Mild ascites and moderate mesenteric edema throughout the abdomen. 3. Focal ground-glass opacities most notably in the right upper and lower lobes. This may represent asymmetric pulmonary edema. Developing pneumonia can not be excluded.      Us Dup Abd Pel Retro Scrot Complete    Result Date: 9/12/2019  1. No evidence of testicular torsion. 2. Hyperechoic echogenicity bilateral scrotum may represent herniation of inguinal fat. There is no evidence of peristalsing bowel or discrete mass. 3. Left varicocele. Physical Examination:        General appearance:  alert, cooperative and no distress  Mental Status:  oriented to person, place and time and normal affect  Lungs:  clear to auscultation bilaterally, normal effort  Heart:  regular rate and rhythm, no murmur  Abdomen:  soft, nontender, nondistended, normal bowel sounds, no masses, hepatomegaly, splenomegaly  Extremities:  no edema, redness, tenderness in the calves  Skin:  no gross lesions, rashes, induration    Assessment:        Hospital Problems           Last Modified POA    * (Principal) CAD, multiple vessel 9/19/2019 Yes    Portal hypertension ([address] 4/05/1448 Yes    Alcoholic cirrhosis of liver without ascites ([address] 9/19/2019 Yes    Acute on chronic diastolic CHF (congestive heart failure), NYHA class 3 ([address] 9/19/2019 Yes    Ischemic cardiomyopathy 9/19/2019 Yes    CAD in native artery 9/19/2019 Yes          Plan:        1. Post cath management per cardiology  2. Medical management with BB, ACEI, diuretic, aspirin, brillinta, statin  3. Continue home psych meds  4.  Pt appropriate for discharge home    Cain Fernandez  9/19/2019  4:05 PM

## 2019-09-19 NOTE — DISCHARGE INSTR - DIET
the base of your diet. · Choose heart-healthy fats such as canola, olive, and flaxseed oil, and foods high in heart-healthy fats, such as nuts, seeds, soybeans, tofu, and fish. · Eat fish at least twice per week; the fish highest in omega-3 fatty acids and lowest in mercury include salmon, herring, mackerel, sardines, and canned chunk light tuna. If you eat fish less than twice per week or have high triglycerides, talk to your doctor about taking fish oil supplements. · Read food labels. ¨ For products low in fat and cholesterol, look for fat free, low-fat, cholesterol free, saturated fat free, and trans fat freeAlso scan the Nutrition Facts Label, which lists saturated fat, trans fat, and cholesterol amounts. ¨ For products low in sodium, look for sodium free, very low sodium, low sodium, no added salt, and unsalted   · Skip the salt when cooking or at the table; if food needs more flavor, get creative and try out different herbs and spices. Garlic and onion also add substantial flavor to foods. · Trim any visible fat off meat and poultry before cooking, and drain the fat off after robertson. · Use cooking methods that require little or no added fat, such as grilling, boiling, baking, poaching, broiling, roasting, steaming, stir-frying, and sauting. · Avoid fast food and convenience food. They tend to be high in saturated and trans fat and have a lot of added salt. · Talk to a registered dietitian for individualized diet advice.       Last Reviewed: March 2011 Bam Arellano MS, MPH, RD   Updated: 3/29/2011

## 2019-09-19 NOTE — PROGRESS NOTES
Pt. Family here to pick pt. Up. Pt. And family given discharge instructions and verbalize instructions. Pt. Wheeled off floor by aide to car.

## 2019-09-19 NOTE — PROGRESS NOTES
Occupational Therapy   Occupational Therapy Initial Assessment  Date: 2019   Patient Name: Kai Vidal  MRN: [id number]     : 1965     Copied from Cardiology Progress note 2019:  Assessment / Acute Cardiac Problems:   1. CAD s/p PTCA/KENNY to LAD & D1    Date of Service: 2019    Discharge Recommendations:    No therapy recommended at discharge. OT Equipment Recommendations  Equipment Needed: No    Assessment   Performance deficits / Impairments: Decreased functional mobility ; Decreased ADL status; Decreased endurance  Assessment: Pt would benefit from continued acute care OT to address minimal deficits in ADL/ functional activities, endurance and functional transfers/ mobility following admission. Pt required SBA for functional transfers/ mobility and is home alone. Prognosis: Good  Decision Making: Low Complexity  OT Education: Plan of Care;OT Role  REQUIRES OT FOLLOW UP: Yes  Activity Tolerance  Activity Tolerance: Patient Tolerated treatment well  Safety Devices  Safety Devices in place: Yes  Type of devices: Left in bed;Call light within reach;Nurse notified;Gait belt  Restraints  Initially in place: No        Patient Diagnosis(es): The primary encounter diagnosis was Acute on chronic diastolic CHF (congestive heart failure), NYHA class 3 ([address] A diagnosis of CAD, multiple vessel was also pertinent to this visit. has a past medical history of Back pain, Bipolar 1 disorder ([address] and Seizures ([address] has a past surgical history that includes Toe Surgery; Tooth Extraction; and Cardiac catheterization (Left, 2019).     Restrictions  Restrictions/Precautions  Restrictions/Precautions: Fall Risk, General Precautions  Required Braces or Orthoses?: No    Subjective   General  Patient assessed for rehabilitation services?: Yes  Family / Caregiver Present: No    Social/Functional History  Social/Functional History  Lives With: Alone  Type of Home: Apartment(1st floor apartment [employer]  Coordination  Movements Are Fluid And Coordinated: Yes     Transfers  Stand Step Transfers: Stand by assistance  Sit to stand: Stand by assistance  Stand to sit: Stand by assistance  Transfer Comments: Pt intermittently noted to demo SOB/ \"panting\" , reports, \"I'm fine\"     Cognition  Overall Cognitive Status: WFL     Perception  Overall Perceptual Status: WFL        Sensation  Overall Sensation Status: WFL(Pt denies numbness/ tingling )        LUE AROM (degrees)  LUE AROM : WFL  RUE AROM (degrees)  RUE AROM : WFL  LUE Strength  Gross LUE Strength: WFL  RUE Strength  Gross RUE Strength: WFL     Plan   Plan  Times per week: 1-2 total sessions    AM-PAC Score  AM-PAC Inpatient Daily Activity Raw Score: 19 (09/19/19 1129)  AM-PAC Inpatient ADL T-Scale Score : 40.22 (09/19/19 1129)  ADL Inpatient CMS 0-100% Score: 42.8 (09/19/19 1129)  ADL Inpatient CMS G-Code Modifier : CK (09/19/19 1129)    Goals  Short term goals  Time Frame for Short term goals: by discharge, pt will  Short term goal 1: demo I in UE ADL activities   Short term goal 2: demo MI/ I in LE ADL activities   Short term goal 3: demo understanding and I use of EC/WS, fall prevention and proper pursed lipped breathing tech during functional activities   Short term goal 4: demo increased activity tolerance of 30+ min to assist with ADL/ functional activities   Short term goal 5: demo I in functional transfers/ mobility to assist with ADL/ functional activities   Patient Goals   Patient goals : to go home     Therapy Time   Individual Concurrent Group Co-treatment   Time In 0921         Time Out 0932         Minutes 11          See above for LOF. RN reports patient is medically stable for therapy treatment this date. Chart reviewed prior to treatment and patient is agreeable for therapy. All lines intact and patient positioned comfortably at end of treatment. All patient needs addressed prior to ending therapy session.          Co-evaluation with [address], OTR/L

## 2019-09-19 NOTE — PROGRESS NOTES
Physical Therapy    Facility/Department: [facility] 3  Initial Assessment    NAME: Mellissa Padron  : 1965  MRN: [id number]   CT surgery refused CABG, s/p stenting 19. Date of Service: 2019    Discharge Recommendations: No further therapy required at discharge. PT Equipment Recommendations  Equipment Needed: No    Assessment   Body structures, Functions, Activity limitations: Decreased functional mobility ; Decreased endurance;Decreased balance;Decreased safe awareness  Assessment: The pt ambulated 20ft with CGA due to decreased safety awareness. Ambulation distance limited by pt's endurance deficits this date. Recommend additional acute PT to maximize safety and independence. Prognosis: Good  Decision Making: Medium Complexity  PT Education: Goals;PT Role;Plan of Care;General Safety  REQUIRES PT FOLLOW UP: Yes  Activity Tolerance  Activity Tolerance: Patient limited by endurance       Patient Diagnosis(es): The primary encounter diagnosis was Acute on chronic diastolic CHF (congestive heart failure), NYHA class 3 ([address] A diagnosis of CAD, multiple vessel was also pertinent to this visit. has a past medical history of Back pain, Bipolar 1 disorder ([address] and Seizures ([address] has a past surgical history that includes Toe Surgery; Tooth Extraction; and Cardiac catheterization (Left, 2019). Restrictions  Restrictions/Precautions  Restrictions/Precautions: Fall Risk, General Precautions  Required Braces or Orthoses?: No  Vision/Hearing  Vision: Impaired  Vision Exceptions: Wears glasses at all times  Hearing: Within functional limits     Subjective  General  Patient assessed for rehabilitation services?: Yes  Response To Previous Treatment: Not applicable  Family / Caregiver Present: No  Follows Commands: Within Functional Limits  Subjective  Subjective: RN and pt agreeable to PT. Pt sleeping upon arrival, easily aroused. Pt with flat affect, however cooperative throughout.   Pain Screening  Patient Currently in Pain: Denies  Vital Signs  Patient Currently in Pain: Denies       Orientation  Orientation  Overall Orientation Status: Within Normal Limits  Social/Functional History  Social/Functional History  Lives With: Alone  Type of Home: Apartment(1st floor apartment )  Home Layout: One level  Home Access: Stairs to enter without rails  Entrance Stairs - Number of Steps: 4 CARY  Entrance Stairs - Rails: None  Bathroom Shower/Tub: Tub/Shower unit  Bathroom Toilet: Standard  Bathroom Equipment: (none)  Home Equipment: (none-- pt unsure of AD avaliable )  ADL Assistance: Independent  Homemaking Assistance: Independent  Homemaking Responsibilities: Yes  Meal Prep Responsibility: Primary  Laundry Responsibility: Primary(laundry located in same building )  Cleaning Responsibility: Primary  Shopping Responsibility: Primary  Ambulation Assistance: Independent  Transfer Assistance: Independent  Active : No  Mode of Transportation: Walk  Occupation: On disability  Leisure & Hobbies: listen to music   Cognition   Cognition  Overall Cognitive Status: WFL    Objective          Joint Mobility  Spine: WFL  ROM RLE: WFL  ROM LLE: WFL  ROM RUE: WFL  ROM LUE: WFL  Strength RLE  Strength RLE: WFL  Strength LLE  Strength LLE: WFL  Strength RUE  Strength RUE: WFL  Strength LUE  Strength LUE: WFL  Tone RLE  RLE Tone: [place]  Tone LLE  LLE Tone: [place]  Motor Control  Gross Motor?: WFL  Sensation  Overall Sensation Status: WFL(Pt denies numbness/ tingling )  Bed mobility  Rolling to Left: Supervision  Rolling to Right: Supervision  Supine to Sit: Stand by assistance  Sit to Supine: Stand by assistance  Scooting: Stand by assistance  Comment: VC's for safety awareness for line management  Transfers  Sit to Stand: Contact guard assistance  Stand to sit: Contact guard assistance  Ambulation  Ambulation?: Yes  Ambulation 1  Surface: level tile  Device: No Device  Assistance: Contact guard

## 2019-10-02 ENCOUNTER — OFFICE VISIT (OUTPATIENT)
Dept: CARDIOLOGY | Age: 54
End: 2019-10-02
Payer: MEDICARE

## 2019-10-02 ENCOUNTER — HOSPITAL ENCOUNTER (OUTPATIENT)
Age: 54
Discharge: HOME | End: 2019-10-02
Payer: MEDICARE

## 2019-10-02 VITALS
SYSTOLIC BLOOD PRESSURE: 100 MMHG | RESPIRATION RATE: 18 BRPM | WEIGHT: 220 LBS | DIASTOLIC BLOOD PRESSURE: 76 MMHG | HEART RATE: 88 BPM | BODY MASS INDEX: 29.8 KG/M2 | HEIGHT: 72 IN | OXYGEN SATURATION: 96 %

## 2019-10-02 DIAGNOSIS — I25.10 CAD, MULTIPLE VESSEL: ICD-10-CM

## 2019-10-02 DIAGNOSIS — I25.5 ISCHEMIC CARDIOMYOPATHY: ICD-10-CM

## 2019-10-02 DIAGNOSIS — T50.905A MEDICATION SIDE EFFECT, INITIAL ENCOUNTER: ICD-10-CM

## 2019-10-02 DIAGNOSIS — I25.10 ASHD (ARTERIOSCLEROTIC HEART DISEASE): ICD-10-CM

## 2019-10-02 DIAGNOSIS — R06.02 SHORTNESS OF BREATH: ICD-10-CM

## 2019-10-02 DIAGNOSIS — I25.5 ISCHEMIC CARDIOMYOPATHY: Primary | ICD-10-CM

## 2019-10-02 LAB
ANION GAP SERPL CALCULATED.3IONS-SCNC: 17 MMOL/L (ref 9–17)
BUN BLDV-MCNC: 14 MG/DL (ref 6–20)
BUN/CREAT BLD: 13 (ref 9–20)
CALCIUM SERPL-MCNC: 10.2 MG/DL (ref 8.6–10.4)
CHLORIDE BLD-SCNC: 99 MMOL/L (ref 98–107)
CO2: 23 MMOL/L (ref 20–31)
CREAT SERPL-MCNC: 1.08 MG/DL (ref 0.7–1.2)
GFR AFRICAN AMERICAN: >60 ML/MIN
GFR NON-AFRICAN AMERICAN: >60 ML/MIN
GFR SERPL CREATININE-BSD FRML MDRD: ABNORMAL ML/MIN/{1.73_M2}
GFR SERPL CREATININE-BSD FRML MDRD: ABNORMAL ML/MIN/{1.73_M2}
GLUCOSE BLD-MCNC: 133 MG/DL (ref 70–99)
HCT VFR BLD CALC: 52.6 % (ref 40.7–50.3)
HEMOGLOBIN: 16.6 G/DL (ref 13–17)
MCH RBC QN AUTO: 27.9 PG (ref 25.2–33.5)
MCHC RBC AUTO-ENTMCNC: 31.6 G/DL (ref 28.4–34.8)
MCV RBC AUTO: 88.4 FL (ref 82.6–102.9)
NRBC AUTOMATED: 0 PER 100 WBC
PDW BLD-RTO: 15.1 % (ref 11.8–14.4)
PLATELET # BLD: 256 K/UL (ref 138–453)
PMV BLD AUTO: 8.7 FL (ref 8.1–13.5)
POTASSIUM SERPL-SCNC: 4.7 MMOL/L (ref 3.7–5.3)
RBC # BLD: 5.95 M/UL (ref 4.21–5.77)
SODIUM BLD-SCNC: 139 MMOL/L (ref 135–144)
WBC # BLD: 6.6 K/UL (ref 3.5–11.3)

## 2019-10-02 PROCEDURE — 80048 BASIC METABOLIC PNL TOTAL CA: CPT

## 2019-10-02 PROCEDURE — 85027 COMPLETE CBC AUTOMATED: CPT

## 2019-10-02 PROCEDURE — G8417 CALC BMI ABV UP PARAM F/U: HCPCS | Performed by: FAMILY MEDICINE

## 2019-10-02 PROCEDURE — 36415 COLL VENOUS BLD VENIPUNCTURE: CPT

## 2019-10-02 PROCEDURE — 3017F COLORECTAL CA SCREEN DOC REV: CPT | Performed by: FAMILY MEDICINE

## 2019-10-02 PROCEDURE — 4004F PT TOBACCO SCREEN RCVD TLK: CPT | Performed by: FAMILY MEDICINE

## 2019-10-02 PROCEDURE — 99214 OFFICE O/P EST MOD 30 MIN: CPT | Performed by: FAMILY MEDICINE

## 2019-10-02 PROCEDURE — G8427 DOCREV CUR MEDS BY ELIG CLIN: HCPCS | Performed by: FAMILY MEDICINE

## 2019-10-02 PROCEDURE — 1111F DSCHRG MED/CURRENT MED MERGE: CPT | Performed by: FAMILY MEDICINE

## 2019-10-02 PROCEDURE — G8598 ASA/ANTIPLAT THER USED: HCPCS | Performed by: FAMILY MEDICINE

## 2019-10-02 PROCEDURE — G8484 FLU IMMUNIZE NO ADMIN: HCPCS | Performed by: FAMILY MEDICINE

## 2019-10-02 RX ORDER — CLOPIDOGREL BISULFATE 75 MG/1
75 TABLET ORAL DAILY
Qty: 90 TABLET | Refills: 3 | Status: SHIPPED | OUTPATIENT
Start: 2019-10-02

## 2019-10-10 ENCOUNTER — TELEPHONE (OUTPATIENT)
Dept: CARDIOLOGY | Age: 54
End: 2019-10-10

## 2019-10-10 ENCOUNTER — HOSPITAL ENCOUNTER (EMERGENCY)
Age: 54
Discharge: HOME | End: 2019-10-10
Attending: EMERGENCY MEDICINE
Payer: MEDICARE

## 2019-10-10 VITALS
SYSTOLIC BLOOD PRESSURE: 102 MMHG | RESPIRATION RATE: 13 BRPM | TEMPERATURE: 97.6 F | WEIGHT: 220 LBS | DIASTOLIC BLOOD PRESSURE: 79 MMHG | HEART RATE: 74 BPM | OXYGEN SATURATION: 99 % | HEIGHT: 72 IN | BODY MASS INDEX: 29.8 KG/M2

## 2019-10-10 DIAGNOSIS — I95.9 HYPOTENSION, UNSPECIFIED HYPOTENSION TYPE: Primary | ICD-10-CM

## 2019-10-10 LAB
ABSOLUTE EOS #: 0.17 K/UL (ref 0–0.44)
ABSOLUTE IMMATURE GRANULOCYTE: 0.04 K/UL (ref 0–0.3)
ABSOLUTE LYMPH #: 2.59 K/UL (ref 1.1–3.7)
ABSOLUTE MONO #: 0.8 K/UL (ref 0.1–1.2)
ANION GAP SERPL CALCULATED.3IONS-SCNC: 16 MMOL/L (ref 9–17)
BASOPHILS # BLD: 0 % (ref 0–2)
BASOPHILS ABSOLUTE: <0.03 K/UL (ref 0–0.2)
BUN BLDV-MCNC: 17 MG/DL (ref 6–20)
BUN/CREAT BLD: 17 (ref 9–20)
CALCIUM SERPL-MCNC: 9.3 MG/DL (ref 8.6–10.4)
CHLORIDE BLD-SCNC: 96 MMOL/L (ref 98–107)
CO2: 24 MMOL/L (ref 20–31)
CREAT SERPL-MCNC: 0.98 MG/DL (ref 0.7–1.2)
DIFFERENTIAL TYPE: ABNORMAL
EKG ATRIAL RATE: 69 BPM
EKG P AXIS: 44 DEGREES
EKG P-R INTERVAL: 184 MS
EKG Q-T INTERVAL: 434 MS
EKG QRS DURATION: 140 MS
EKG QTC CALCULATION (BAZETT): 465 MS
EKG R AXIS: -23 DEGREES
EKG T AXIS: 3 DEGREES
EKG VENTRICULAR RATE: 69 BPM
EOSINOPHILS RELATIVE PERCENT: 2 % (ref 1–4)
GFR AFRICAN AMERICAN: >60 ML/MIN
GFR NON-AFRICAN AMERICAN: >60 ML/MIN
GFR SERPL CREATININE-BSD FRML MDRD: ABNORMAL ML/MIN/{1.73_M2}
GFR SERPL CREATININE-BSD FRML MDRD: ABNORMAL ML/MIN/{1.73_M2}
GLUCOSE BLD-MCNC: 74 MG/DL (ref 70–99)
HCT VFR BLD CALC: 45.2 % (ref 40.7–50.3)
HEMOGLOBIN: 14.8 G/DL (ref 13–17)
IMMATURE GRANULOCYTES: 1 %
LYMPHOCYTES # BLD: 34 % (ref 24–43)
MCH RBC QN AUTO: 27.7 PG (ref 25.2–33.5)
MCHC RBC AUTO-ENTMCNC: 32.7 G/DL (ref 28.4–34.8)
MCV RBC AUTO: 84.5 FL (ref 82.6–102.9)
MONOCYTES # BLD: 11 % (ref 3–12)
NRBC AUTOMATED: 0 PER 100 WBC
PDW BLD-RTO: 14.6 % (ref 11.8–14.4)
PLATELET # BLD: 171 K/UL (ref 138–453)
PLATELET ESTIMATE: ABNORMAL
PMV BLD AUTO: 8.3 FL (ref 8.1–13.5)
POTASSIUM SERPL-SCNC: 4.2 MMOL/L (ref 3.7–5.3)
RBC # BLD: 5.35 M/UL (ref 4.21–5.77)
RBC # BLD: ABNORMAL 10*6/UL
SEG NEUTROPHILS: 52 % (ref 36–65)
SEGMENTED NEUTROPHILS ABSOLUTE COUNT: 4.03 K/UL (ref 1.5–8.1)
SODIUM BLD-SCNC: 136 MMOL/L (ref 135–144)
WBC # BLD: 7.7 K/UL (ref 3.5–11.3)
WBC # BLD: ABNORMAL 10*3/UL

## 2019-10-10 PROCEDURE — 80048 BASIC METABOLIC PNL TOTAL CA: CPT

## 2019-10-10 PROCEDURE — 36415 COLL VENOUS BLD VENIPUNCTURE: CPT

## 2019-10-10 PROCEDURE — 2580000003 HC RX 258: Performed by: EMERGENCY MEDICINE

## 2019-10-10 PROCEDURE — 99285 EMERGENCY DEPT VISIT HI MDM: CPT

## 2019-10-10 PROCEDURE — 93010 ELECTROCARDIOGRAM REPORT: CPT | Performed by: INTERNAL MEDICINE

## 2019-10-10 PROCEDURE — 85025 COMPLETE CBC W/AUTO DIFF WBC: CPT

## 2019-10-10 PROCEDURE — 93005 ELECTROCARDIOGRAM TRACING: CPT | Performed by: EMERGENCY MEDICINE

## 2019-10-10 PROCEDURE — 96365 THER/PROPH/DIAG IV INF INIT: CPT

## 2019-10-10 RX ORDER — SODIUM CHLORIDE, SODIUM LACTATE, POTASSIUM CHLORIDE, CALCIUM CHLORIDE 600; 310; 30; 20 MG/100ML; MG/100ML; MG/100ML; MG/100ML
1000 INJECTION, SOLUTION INTRAVENOUS ONCE
Status: COMPLETED | OUTPATIENT
Start: 2019-10-10 | End: 2019-10-10

## 2019-10-10 RX ADMIN — SODIUM CHLORIDE, POTASSIUM CHLORIDE, SODIUM LACTATE AND CALCIUM CHLORIDE 1000 ML: 600; 310; 30; 20 INJECTION, SOLUTION INTRAVENOUS at 16:00

## 2019-10-10 ASSESSMENT — PAIN DESCRIPTION - DESCRIPTORS: DESCRIPTORS: PRESSURE

## 2019-10-10 ASSESSMENT — PAIN SCALES - GENERAL: PAINLEVEL_OUTOF10: 1

## 2019-10-10 ASSESSMENT — PAIN DESCRIPTION - PAIN TYPE: TYPE: ACUTE PAIN

## 2019-10-10 ASSESSMENT — PAIN DESCRIPTION - LOCATION: LOCATION: OTHER (COMMENT)

## 2019-10-10 ASSESSMENT — PAIN DESCRIPTION - ORIENTATION: ORIENTATION: RIGHT;LOWER

## 2019-10-14 ENCOUNTER — OFFICE VISIT (OUTPATIENT)
Dept: CARDIOLOGY | Age: 54
End: 2019-10-14
Payer: MEDICARE

## 2019-10-14 VITALS
RESPIRATION RATE: 20 BRPM | HEIGHT: 72 IN | HEART RATE: 68 BPM | SYSTOLIC BLOOD PRESSURE: 94 MMHG | BODY MASS INDEX: 30.34 KG/M2 | OXYGEN SATURATION: 98 % | DIASTOLIC BLOOD PRESSURE: 70 MMHG | WEIGHT: 224 LBS

## 2019-10-14 DIAGNOSIS — I25.10 CAD IN NATIVE ARTERY: Primary | ICD-10-CM

## 2019-10-14 DIAGNOSIS — I50.33 ACUTE ON CHRONIC DIASTOLIC CHF (CONGESTIVE HEART FAILURE), NYHA CLASS 3 (HCC): ICD-10-CM

## 2019-10-14 DIAGNOSIS — I25.5 ISCHEMIC CARDIOMYOPATHY: ICD-10-CM

## 2019-10-14 DIAGNOSIS — Z71.6 TOBACCO ABUSE COUNSELING: ICD-10-CM

## 2019-10-14 PROCEDURE — 1111F DSCHRG MED/CURRENT MED MERGE: CPT | Performed by: FAMILY MEDICINE

## 2019-10-14 PROCEDURE — G8427 DOCREV CUR MEDS BY ELIG CLIN: HCPCS | Performed by: FAMILY MEDICINE

## 2019-10-14 PROCEDURE — 3017F COLORECTAL CA SCREEN DOC REV: CPT | Performed by: FAMILY MEDICINE

## 2019-10-14 PROCEDURE — 99214 OFFICE O/P EST MOD 30 MIN: CPT | Performed by: FAMILY MEDICINE

## 2019-10-14 PROCEDURE — G8598 ASA/ANTIPLAT THER USED: HCPCS | Performed by: FAMILY MEDICINE

## 2019-10-14 PROCEDURE — G8484 FLU IMMUNIZE NO ADMIN: HCPCS | Performed by: FAMILY MEDICINE

## 2019-10-14 PROCEDURE — G8417 CALC BMI ABV UP PARAM F/U: HCPCS | Performed by: FAMILY MEDICINE

## 2019-10-14 PROCEDURE — 1036F TOBACCO NON-USER: CPT | Performed by: FAMILY MEDICINE

## 2019-10-14 RX ORDER — FUROSEMIDE 20 MG/1
10 TABLET ORAL DAILY
Qty: 90 TABLET | Refills: 3 | Status: ON HOLD
Start: 2019-10-14 | End: 2020-02-13 | Stop reason: HOSPADM

## 2019-10-14 RX ORDER — TERBUTALINE SULFATE 5 MG/1
5 TABLET ORAL EVERY 6 HOURS
COMMUNITY
End: 2019-10-31

## 2019-10-16 ENCOUNTER — HOSPITAL ENCOUNTER (EMERGENCY)
Age: 54
Discharge: HOME | End: 2019-10-16
Attending: EMERGENCY MEDICINE
Payer: MEDICARE

## 2019-10-16 ENCOUNTER — APPOINTMENT (OUTPATIENT)
Dept: CT IMAGING | Age: 54
End: 2019-10-16
Payer: MEDICARE

## 2019-10-16 ENCOUNTER — TELEPHONE (OUTPATIENT)
Dept: OTHER | Facility: CLINIC | Age: 54
End: 2019-10-16

## 2019-10-16 VITALS
SYSTOLIC BLOOD PRESSURE: 92 MMHG | WEIGHT: 226 LBS | DIASTOLIC BLOOD PRESSURE: 68 MMHG | HEART RATE: 67 BPM | TEMPERATURE: 97.6 F | RESPIRATION RATE: 23 BRPM | OXYGEN SATURATION: 99 % | HEIGHT: 72 IN | BODY MASS INDEX: 30.61 KG/M2

## 2019-10-16 DIAGNOSIS — J18.9 PNEUMONIA DUE TO ORGANISM: Primary | ICD-10-CM

## 2019-10-16 DIAGNOSIS — R09.1 PLEURITIS: ICD-10-CM

## 2019-10-16 LAB
ABSOLUTE EOS #: 0.15 K/UL (ref 0–0.44)
ABSOLUTE IMMATURE GRANULOCYTE: 0.03 K/UL (ref 0–0.3)
ABSOLUTE LYMPH #: 1.84 K/UL (ref 1.1–3.7)
ABSOLUTE MONO #: 0.7 K/UL (ref 0.1–1.2)
ALBUMIN SERPL-MCNC: 3.8 G/DL (ref 3.5–5.2)
ALBUMIN/GLOBULIN RATIO: 1.1 (ref 1–2.5)
ALP BLD-CCNC: 116 U/L (ref 40–129)
ALT SERPL-CCNC: 10 U/L (ref 5–41)
ANION GAP SERPL CALCULATED.3IONS-SCNC: 11 MMOL/L (ref 9–17)
AST SERPL-CCNC: 16 U/L
BASOPHILS # BLD: 0 % (ref 0–2)
BASOPHILS ABSOLUTE: <0.03 K/UL (ref 0–0.2)
BILIRUB SERPL-MCNC: 0.54 MG/DL (ref 0.3–1.2)
BUN BLDV-MCNC: 10 MG/DL (ref 6–20)
BUN/CREAT BLD: 10 (ref 9–20)
CALCIUM SERPL-MCNC: 10.1 MG/DL (ref 8.6–10.4)
CHLORIDE BLD-SCNC: 98 MMOL/L (ref 98–107)
CO2: 26 MMOL/L (ref 20–31)
CREAT SERPL-MCNC: 1.03 MG/DL (ref 0.7–1.2)
DIFFERENTIAL TYPE: ABNORMAL
EOSINOPHILS RELATIVE PERCENT: 2 % (ref 1–4)
GFR AFRICAN AMERICAN: >60 ML/MIN
GFR NON-AFRICAN AMERICAN: >60 ML/MIN
GFR SERPL CREATININE-BSD FRML MDRD: NORMAL ML/MIN/{1.73_M2}
GFR SERPL CREATININE-BSD FRML MDRD: NORMAL ML/MIN/{1.73_M2}
GLUCOSE BLD-MCNC: 87 MG/DL (ref 70–99)
HCT VFR BLD CALC: 46.1 % (ref 40.7–50.3)
HEMOGLOBIN: 15.2 G/DL (ref 13–17)
IMMATURE GRANULOCYTES: 0 %
INR BLD: 1 (ref 0.9–1.2)
LYMPHOCYTES # BLD: 26 % (ref 24–43)
MCH RBC QN AUTO: 27.7 PG (ref 25.2–33.5)
MCHC RBC AUTO-ENTMCNC: 33 G/DL (ref 28.4–34.8)
MCV RBC AUTO: 84.1 FL (ref 82.6–102.9)
MONOCYTES # BLD: 10 % (ref 3–12)
NRBC AUTOMATED: 0 PER 100 WBC
PARTIAL THROMBOPLASTIN TIME: 27.8 SEC (ref 23.2–34.4)
PDW BLD-RTO: 15.1 % (ref 11.8–14.4)
PLATELET # BLD: 147 K/UL (ref 138–453)
PLATELET ESTIMATE: ABNORMAL
PMV BLD AUTO: 8.3 FL (ref 8.1–13.5)
POTASSIUM SERPL-SCNC: 4.7 MMOL/L (ref 3.7–5.3)
PROTHROMBIN TIME: 10.3 SEC (ref 9.7–12.2)
RBC # BLD: 5.48 M/UL (ref 4.21–5.77)
RBC # BLD: ABNORMAL 10*6/UL
SEG NEUTROPHILS: 62 % (ref 36–65)
SEGMENTED NEUTROPHILS ABSOLUTE COUNT: 4.39 K/UL (ref 1.5–8.1)
SODIUM BLD-SCNC: 135 MMOL/L (ref 135–144)
TOTAL PROTEIN: 7.3 G/DL (ref 6.4–8.3)
TROPONIN INTERP: NORMAL
TROPONIN T: <0.03 NG/ML
TROPONIN, HIGH SENSITIVITY: NORMAL NG/L (ref 0–22)
WBC # BLD: 7.1 K/UL (ref 3.5–11.3)
WBC # BLD: ABNORMAL 10*3/UL

## 2019-10-16 PROCEDURE — 85730 THROMBOPLASTIN TIME PARTIAL: CPT

## 2019-10-16 PROCEDURE — 80053 COMPREHEN METABOLIC PANEL: CPT

## 2019-10-16 PROCEDURE — 85610 PROTHROMBIN TIME: CPT

## 2019-10-16 PROCEDURE — 2580000003 HC RX 258: Performed by: EMERGENCY MEDICINE

## 2019-10-16 PROCEDURE — 93005 ELECTROCARDIOGRAM TRACING: CPT | Performed by: EMERGENCY MEDICINE

## 2019-10-16 PROCEDURE — 6370000000 HC RX 637 (ALT 250 FOR IP): Performed by: EMERGENCY MEDICINE

## 2019-10-16 PROCEDURE — 85025 COMPLETE CBC W/AUTO DIFF WBC: CPT

## 2019-10-16 PROCEDURE — 71275 CT ANGIOGRAPHY CHEST: CPT

## 2019-10-16 PROCEDURE — 99284 EMERGENCY DEPT VISIT MOD MDM: CPT

## 2019-10-16 PROCEDURE — 84484 ASSAY OF TROPONIN QUANT: CPT

## 2019-10-16 PROCEDURE — 36415 COLL VENOUS BLD VENIPUNCTURE: CPT

## 2019-10-16 PROCEDURE — 6360000004 HC RX CONTRAST MEDICATION: Performed by: EMERGENCY MEDICINE

## 2019-10-16 RX ORDER — DOXYCYCLINE HYCLATE 100 MG
100 TABLET ORAL 2 TIMES DAILY
Qty: 20 TABLET | Refills: 0 | Status: SHIPPED | OUTPATIENT
Start: 2019-10-16 | End: 2019-10-26

## 2019-10-16 RX ORDER — 0.9 % SODIUM CHLORIDE 0.9 %
1000 INTRAVENOUS SOLUTION INTRAVENOUS ONCE
Status: COMPLETED | OUTPATIENT
Start: 2019-10-16 | End: 2019-10-16

## 2019-10-16 RX ORDER — DOXYCYCLINE HYCLATE 100 MG/1
100 CAPSULE ORAL ONCE
Status: COMPLETED | OUTPATIENT
Start: 2019-10-16 | End: 2019-10-16

## 2019-10-16 RX ADMIN — SODIUM CHLORIDE 1000 ML: 9 INJECTION, SOLUTION INTRAVENOUS at 17:07

## 2019-10-16 RX ADMIN — DOXYCYCLINE HYCLATE 100 MG: 100 CAPSULE ORAL at 19:31

## 2019-10-16 RX ADMIN — IOPAMIDOL 100 ML: 755 INJECTION, SOLUTION INTRAVENOUS at 18:37

## 2019-10-16 ASSESSMENT — PAIN DESCRIPTION - DIRECTION: RADIATING_TOWARDS: RIGHT BACK

## 2019-10-16 ASSESSMENT — PAIN DESCRIPTION - LOCATION: LOCATION: CHEST

## 2019-10-16 ASSESSMENT — ENCOUNTER SYMPTOMS
VOMITING: 0
COUGH: 1
SORE THROAT: 0
BACK PAIN: 1
NAUSEA: 0
SHORTNESS OF BREATH: 0
ABDOMINAL PAIN: 0
COLOR CHANGE: 0
DIARRHEA: 0

## 2019-10-16 ASSESSMENT — PAIN DESCRIPTION - ORIENTATION: ORIENTATION: RIGHT

## 2019-10-16 ASSESSMENT — PAIN DESCRIPTION - PAIN TYPE: TYPE: ACUTE PAIN

## 2019-10-16 ASSESSMENT — PAIN DESCRIPTION - DESCRIPTORS: DESCRIPTORS: SHARP

## 2019-10-16 ASSESSMENT — PAIN SCALES - GENERAL: PAINLEVEL_OUTOF10: 10

## 2019-10-17 ENCOUNTER — TELEPHONE (OUTPATIENT)
Dept: OTHER | Facility: CLINIC | Age: 54
End: 2019-10-17

## 2019-10-17 LAB
EKG ATRIAL RATE: 77 BPM
EKG P AXIS: 38 DEGREES
EKG P-R INTERVAL: 182 MS
EKG Q-T INTERVAL: 398 MS
EKG QRS DURATION: 138 MS
EKG QTC CALCULATION (BAZETT): 450 MS
EKG R AXIS: 25 DEGREES
EKG T AXIS: 43 DEGREES
EKG VENTRICULAR RATE: 77 BPM

## 2019-10-17 PROCEDURE — 93010 ELECTROCARDIOGRAM REPORT: CPT | Performed by: FAMILY MEDICINE

## 2019-10-23 ENCOUNTER — HOSPITAL ENCOUNTER (OUTPATIENT)
Dept: NON INVASIVE DIAGNOSTICS | Age: 54
Discharge: HOME | End: 2019-10-23
Payer: MEDICARE

## 2019-10-23 DIAGNOSIS — I25.5 ISCHEMIC CARDIOMYOPATHY: ICD-10-CM

## 2019-10-23 DIAGNOSIS — I25.10 ASHD (ARTERIOSCLEROTIC HEART DISEASE): ICD-10-CM

## 2019-10-23 LAB
LV EF: 23 %
LVEF MODALITY: NORMAL

## 2019-10-23 PROCEDURE — 93306 TTE W/DOPPLER COMPLETE: CPT

## 2019-10-24 ENCOUNTER — TELEPHONE (OUTPATIENT)
Dept: CARDIOLOGY | Age: 54
End: 2019-10-24

## 2019-11-08 ENCOUNTER — TELEPHONE (OUTPATIENT)
Dept: CARDIOLOGY | Age: 54
End: 2019-11-08

## 2019-11-12 ENCOUNTER — OFFICE VISIT (OUTPATIENT)
Dept: SURGERY | Age: 54
End: 2019-11-12
Payer: MEDICARE

## 2019-11-12 VITALS
DIASTOLIC BLOOD PRESSURE: 62 MMHG | HEIGHT: 72 IN | BODY MASS INDEX: 31.69 KG/M2 | SYSTOLIC BLOOD PRESSURE: 93 MMHG | HEART RATE: 60 BPM | WEIGHT: 234 LBS | RESPIRATION RATE: 22 BRPM

## 2019-11-12 DIAGNOSIS — Z95.5 S/P PRIMARY ANGIOPLASTY WITH CORONARY STENT: ICD-10-CM

## 2019-11-12 DIAGNOSIS — D17.21 LIPOMA OF RIGHT UPPER EXTREMITY: ICD-10-CM

## 2019-11-12 DIAGNOSIS — K62.5 RECTAL BLEEDING: Primary | ICD-10-CM

## 2019-11-12 DIAGNOSIS — Z12.11 ENCOUNTER FOR SCREENING COLONOSCOPY: Primary | ICD-10-CM

## 2019-11-12 DIAGNOSIS — F31.9 BIPOLAR 1 DISORDER (HCC): ICD-10-CM

## 2019-11-12 DIAGNOSIS — G40.909 SEIZURE DISORDER (HCC): ICD-10-CM

## 2019-11-12 DIAGNOSIS — I25.10 CAD, MULTIPLE VESSEL: ICD-10-CM

## 2019-11-12 PROCEDURE — G8598 ASA/ANTIPLAT THER USED: HCPCS | Performed by: SURGERY

## 2019-11-12 PROCEDURE — 3017F COLORECTAL CA SCREEN DOC REV: CPT | Performed by: SURGERY

## 2019-11-12 PROCEDURE — 99213 OFFICE O/P EST LOW 20 MIN: CPT | Performed by: SURGERY

## 2019-11-12 PROCEDURE — G8427 DOCREV CUR MEDS BY ELIG CLIN: HCPCS | Performed by: SURGERY

## 2019-11-12 PROCEDURE — 4004F PT TOBACCO SCREEN RCVD TLK: CPT | Performed by: SURGERY

## 2019-11-12 PROCEDURE — G8417 CALC BMI ABV UP PARAM F/U: HCPCS | Performed by: SURGERY

## 2019-11-12 PROCEDURE — G8484 FLU IMMUNIZE NO ADMIN: HCPCS | Performed by: SURGERY

## 2019-11-17 ASSESSMENT — ENCOUNTER SYMPTOMS
CHOKING: 0
SORE THROAT: 0
TROUBLE SWALLOWING: 0
COUGH: 1
ABDOMINAL PAIN: 0
BACK PAIN: 0
NAUSEA: 0
BLOOD IN STOOL: 0
SHORTNESS OF BREATH: 0
VOMITING: 0

## 2019-11-21 ENCOUNTER — HOSPITAL ENCOUNTER (OUTPATIENT)
Dept: NEUROLOGY | Age: 54
Discharge: HOME | End: 2019-11-21
Payer: MEDICARE

## 2019-11-21 ENCOUNTER — HOSPITAL ENCOUNTER (OUTPATIENT)
Dept: CT IMAGING | Age: 54
Discharge: HOME | End: 2019-11-23
Payer: MEDICARE

## 2019-11-21 DIAGNOSIS — R55 VASOVAGAL SYNCOPE: ICD-10-CM

## 2019-11-21 PROCEDURE — 70450 CT HEAD/BRAIN W/O DYE: CPT

## 2019-11-21 PROCEDURE — 95819 EEG AWAKE AND ASLEEP: CPT

## 2019-11-25 ENCOUNTER — OFFICE VISIT (OUTPATIENT)
Dept: CARDIOLOGY | Age: 54
End: 2019-11-25
Payer: MEDICARE

## 2019-11-25 VITALS
HEART RATE: 52 BPM | SYSTOLIC BLOOD PRESSURE: 114 MMHG | DIASTOLIC BLOOD PRESSURE: 74 MMHG | HEIGHT: 72 IN | OXYGEN SATURATION: 96 % | RESPIRATION RATE: 18 BRPM | WEIGHT: 251 LBS | BODY MASS INDEX: 34 KG/M2

## 2019-11-25 DIAGNOSIS — I50.42 CHRONIC COMBINED SYSTOLIC AND DIASTOLIC CONGESTIVE HEART FAILURE (HCC): Primary | ICD-10-CM

## 2019-11-25 DIAGNOSIS — I25.10 CAD IN NATIVE ARTERY: ICD-10-CM

## 2019-11-25 DIAGNOSIS — I25.5 ISCHEMIC CARDIOMYOPATHY: ICD-10-CM

## 2019-11-25 PROCEDURE — G8417 CALC BMI ABV UP PARAM F/U: HCPCS | Performed by: FAMILY MEDICINE

## 2019-11-25 PROCEDURE — G8484 FLU IMMUNIZE NO ADMIN: HCPCS | Performed by: FAMILY MEDICINE

## 2019-11-25 PROCEDURE — 99214 OFFICE O/P EST MOD 30 MIN: CPT | Performed by: FAMILY MEDICINE

## 2019-11-25 PROCEDURE — G8427 DOCREV CUR MEDS BY ELIG CLIN: HCPCS | Performed by: FAMILY MEDICINE

## 2019-11-25 PROCEDURE — 4004F PT TOBACCO SCREEN RCVD TLK: CPT | Performed by: FAMILY MEDICINE

## 2019-11-25 PROCEDURE — G8598 ASA/ANTIPLAT THER USED: HCPCS | Performed by: FAMILY MEDICINE

## 2019-11-25 PROCEDURE — 3017F COLORECTAL CA SCREEN DOC REV: CPT | Performed by: FAMILY MEDICINE

## 2019-11-26 DIAGNOSIS — I50.42 CHRONIC COMBINED SYSTOLIC AND DIASTOLIC CONGESTIVE HEART FAILURE (HCC): ICD-10-CM

## 2019-11-26 DIAGNOSIS — I25.10 CAD IN NATIVE ARTERY: ICD-10-CM

## 2019-12-12 ENCOUNTER — HOSPITAL ENCOUNTER (OUTPATIENT)
Age: 54
Setting detail: SPECIMEN
Discharge: HOME | End: 2019-12-12
Payer: MEDICARE

## 2019-12-12 LAB
ALBUMIN SERPL-MCNC: 4.3 G/DL (ref 3.5–5.2)
ALBUMIN/GLOBULIN RATIO: 1.5 (ref 1–2.5)
ALP BLD-CCNC: 107 U/L (ref 40–129)
ALT SERPL-CCNC: 14 U/L (ref 5–41)
AST SERPL-CCNC: 19 U/L
BILIRUB SERPL-MCNC: 0.23 MG/DL (ref 0.3–1.2)
BILIRUBIN DIRECT: <0.08 MG/DL
BILIRUBIN, INDIRECT: ABNORMAL MG/DL (ref 0–1)
CHOLESTEROL, FASTING: 203 MG/DL
CHOLESTEROL/HDL RATIO: 3.3
GLOBULIN: ABNORMAL G/DL (ref 1.5–3.8)
HDLC SERPL-MCNC: 62 MG/DL
LDL CHOLESTEROL: 117 MG/DL (ref 0–130)
TOTAL PROTEIN: 7.2 G/DL (ref 6.4–8.3)
TRIGLYCERIDE, FASTING: 118 MG/DL
VLDLC SERPL CALC-MCNC: ABNORMAL MG/DL (ref 1–30)

## 2019-12-18 ENCOUNTER — HOSPITAL ENCOUNTER (OUTPATIENT)
Dept: NON INVASIVE DIAGNOSTICS | Age: 54
Discharge: HOME | End: 2019-12-18
Payer: MEDICARE

## 2019-12-18 DIAGNOSIS — I25.10 CAD IN NATIVE ARTERY: ICD-10-CM

## 2019-12-18 DIAGNOSIS — I50.42 CHRONIC COMBINED SYSTOLIC AND DIASTOLIC CONGESTIVE HEART FAILURE (HCC): ICD-10-CM

## 2019-12-18 LAB
LV EF: 18 %
LVEF MODALITY: NORMAL

## 2019-12-18 PROCEDURE — 93306 TTE W/DOPPLER COMPLETE: CPT

## 2019-12-20 ENCOUNTER — OFFICE VISIT (OUTPATIENT)
Dept: CARDIOLOGY | Age: 54
End: 2019-12-20
Payer: MEDICARE

## 2019-12-20 VITALS
RESPIRATION RATE: 18 BRPM | SYSTOLIC BLOOD PRESSURE: 129 MMHG | OXYGEN SATURATION: 97 % | HEART RATE: 74 BPM | DIASTOLIC BLOOD PRESSURE: 78 MMHG | HEIGHT: 72 IN | WEIGHT: 252 LBS | BODY MASS INDEX: 34.13 KG/M2

## 2019-12-20 DIAGNOSIS — I25.5 ISCHEMIC CARDIOMYOPATHY: ICD-10-CM

## 2019-12-20 DIAGNOSIS — I25.10 CAD, MULTIPLE VESSEL: Primary | ICD-10-CM

## 2019-12-20 DIAGNOSIS — I50.42 CHRONIC COMBINED SYSTOLIC AND DIASTOLIC CONGESTIVE HEART FAILURE (HCC): ICD-10-CM

## 2019-12-20 DIAGNOSIS — Z95.820 S/P ANGIOPLASTY WITH STENT: ICD-10-CM

## 2019-12-20 PROCEDURE — G8598 ASA/ANTIPLAT THER USED: HCPCS | Performed by: FAMILY MEDICINE

## 2019-12-20 PROCEDURE — G8427 DOCREV CUR MEDS BY ELIG CLIN: HCPCS | Performed by: FAMILY MEDICINE

## 2019-12-20 PROCEDURE — 3017F COLORECTAL CA SCREEN DOC REV: CPT | Performed by: FAMILY MEDICINE

## 2019-12-20 PROCEDURE — 99214 OFFICE O/P EST MOD 30 MIN: CPT | Performed by: FAMILY MEDICINE

## 2019-12-20 PROCEDURE — 4004F PT TOBACCO SCREEN RCVD TLK: CPT | Performed by: FAMILY MEDICINE

## 2019-12-20 PROCEDURE — G8417 CALC BMI ABV UP PARAM F/U: HCPCS | Performed by: FAMILY MEDICINE

## 2019-12-20 PROCEDURE — G8484 FLU IMMUNIZE NO ADMIN: HCPCS | Performed by: FAMILY MEDICINE

## 2019-12-20 RX ORDER — LISINOPRIL 2.5 MG/1
2.5 TABLET ORAL DAILY
Qty: 90 TABLET | Refills: 3 | Status: SHIPPED | OUTPATIENT
Start: 2019-12-20 | End: 2020-01-20

## 2020-01-20 ENCOUNTER — OFFICE VISIT (OUTPATIENT)
Dept: CARDIOLOGY | Age: 55
End: 2020-01-20
Payer: MEDICARE

## 2020-01-20 VITALS
DIASTOLIC BLOOD PRESSURE: 67 MMHG | SYSTOLIC BLOOD PRESSURE: 93 MMHG | RESPIRATION RATE: 18 BRPM | HEART RATE: 57 BPM | OXYGEN SATURATION: 98 % | WEIGHT: 247 LBS | HEIGHT: 72 IN | BODY MASS INDEX: 33.46 KG/M2

## 2020-01-20 PROBLEM — I95.0 IDIOPATHIC HYPOTENSION: Status: ACTIVE | Noted: 2020-01-20

## 2020-01-20 PROCEDURE — G8417 CALC BMI ABV UP PARAM F/U: HCPCS | Performed by: FAMILY MEDICINE

## 2020-01-20 PROCEDURE — 4004F PT TOBACCO SCREEN RCVD TLK: CPT | Performed by: FAMILY MEDICINE

## 2020-01-20 PROCEDURE — G8427 DOCREV CUR MEDS BY ELIG CLIN: HCPCS | Performed by: FAMILY MEDICINE

## 2020-01-20 PROCEDURE — 99214 OFFICE O/P EST MOD 30 MIN: CPT | Performed by: FAMILY MEDICINE

## 2020-01-20 PROCEDURE — 99213 OFFICE O/P EST LOW 20 MIN: CPT | Performed by: FAMILY MEDICINE

## 2020-01-20 PROCEDURE — 3017F COLORECTAL CA SCREEN DOC REV: CPT | Performed by: FAMILY MEDICINE

## 2020-01-20 PROCEDURE — G8484 FLU IMMUNIZE NO ADMIN: HCPCS | Performed by: FAMILY MEDICINE

## 2020-01-20 RX ORDER — LOSARTAN POTASSIUM 25 MG/1
12.5 TABLET ORAL DAILY
Qty: 45 TABLET | Refills: 3 | Status: ON HOLD
Start: 2020-01-20 | End: 2020-03-23 | Stop reason: HOSPADM

## 2020-01-20 RX ORDER — ATORVASTATIN CALCIUM 40 MG/1
40 TABLET, FILM COATED ORAL DAILY
COMMUNITY
Start: 2020-01-14

## 2020-01-20 RX ORDER — DOXEPIN HYDROCHLORIDE 50 MG/1
50 CAPSULE ORAL
COMMUNITY
Start: 2020-01-08

## 2020-01-20 NOTE — PATIENT INSTRUCTIONS
SURVEY:    You may be receiving a survey from [employer] regarding your visit today. Please complete the survey to enable us to provide the highest quality of care to you and your family. If you cannot score us a very good on any question, please call the office to discuss how we could have made your experience a very good one. Thank you.

## 2020-01-20 NOTE — PROGRESS NOTES
Jair Jewell am scribing for and in the presence of Jaye Aguayo. Iman GALLO, MS, F.A.C.C. Patient: Elvin Tanner  : 1965  Date of Visit: 2020    REASON FOR VISIT / CONSULTATION: Follow-up (Hx: CAD, s/p stent, CHF, Ischemic cardiomyopathy. Pt is here for 1 month f/u Pt is feeling okay today. He states since he started lisinopril his hair is falling out. Does have CP that feels like an ache all the time for the last couple days, its constant but doesn not radiate. Denies: SOB, dizziness/lightheaded, palpitations.)      History of Present Illness:        Dear CAM Jordan - CNP,    I had the pleasure of seeing Elvin Tanner, who is a 49 y.o. male with a history of being admitted to the hospital due to insect bite to his scrotum and was found to have acute combined heart failure with symptoms including lower extremity edema and week history of progressive increased shortness of breath. He was also found to have evidence of cirrhosis of the liver and portal hypertension. He is a past smoker and would smoke about a pack per day but is not sure how long he had smoked before. Echocardiogram on 2019 showed an ejection fraction of 20% with moderate to severe mitral regurgitation. A heart catheterization on 19 showed severe 2 vessel coronary artery disease leading to stenting of his LAD and D1 arteries on 2019. Echo done on 2019 showed an EF of 15-20%. Mr. Barby Marte is here for follow up today. He states he is having some chest achiness over the last coupole of days. He states he was outside working in the cold and around fire he is unsure if that is the cause of this discomfort in his chest. He denies any palpitations or lightheaded or dizziness. He denies any problems with current medications.       Bleeding Risks: Mr. Barby Marte denies any current or recent bleeding problems including a history of a GI bleed, ulcers, recent or upcoming surgeries, blood in his about the risk and benefits of having Bi-V ICD placed and told him that from a safety standpoint he really should strongly consider this to not only reduce his risk of sudden death, but if we were able to be a bi-ventricular ICD, potentially improve his EF and quality of life. Mr. Charles Needs verbalized understanding of the risks but says would like to try changing Lisinopril to Losartan and have repeat echo in one month.  Additional Testing: I Ordered an Echocardiogram to assess Mr. Padron's ejection fraction and to look for significant valvular heart disease as a source of Mr. Padron symptoms    · Hypotension: Largely asymptomatic: remain well hydrated    · Medication side effect: patient fears lisinopril is causing alopecia: Switch to Losartan as above. In the meantime, I encouraged Mr. Charles Needs to continue to take his other medications. FOLLOW UP:   I told Mr. Charles Needs to call my office if he had any problems, but otherwise I asked him to Return in about 4 weeks (around 2/17/2020). However, I would be happy to see him sooner should the need arise. Sincerely,  Terry Sneed. Iman GALLO, MS, F.A.C.C. [facility] Cardiology Specialist    [address] [place] [address]  Phone: [phone], Fax: [phone]     I believe that the risk of significant morbidity and mortality related to the patient's current medical conditions are: Intermediate. The documentation recorded by the scribe, accurately and completely reflects the services I personally performed and the decisions made by me. Sheryl Villavicencio MD, MS, F.A.C.C.  January 20, 2020

## 2020-02-10 ENCOUNTER — HOSPITAL ENCOUNTER (EMERGENCY)
Age: 55
Discharge: ANOTHER HOSPITAL | End: 2020-02-10
Payer: MEDICARE

## 2020-02-10 ENCOUNTER — APPOINTMENT (OUTPATIENT)
Dept: GENERAL RADIOLOGY | Age: 55
End: 2020-02-10
Payer: MEDICARE

## 2020-02-10 VITALS
HEART RATE: 89 BPM | RESPIRATION RATE: 20 BRPM | SYSTOLIC BLOOD PRESSURE: 100 MMHG | BODY MASS INDEX: 33.9 KG/M2 | DIASTOLIC BLOOD PRESSURE: 63 MMHG | TEMPERATURE: 97 F | WEIGHT: 250 LBS | OXYGEN SATURATION: 94 %

## 2020-02-10 PROBLEM — I21.4 NSTEMI (NON-ST ELEVATED MYOCARDIAL INFARCTION) (HCC): Status: ACTIVE | Noted: 2020-02-10

## 2020-02-10 LAB
ABSOLUTE EOS #: 0.06 K/UL (ref 0–0.44)
ABSOLUTE IMMATURE GRANULOCYTE: 0.03 K/UL (ref 0–0.3)
ABSOLUTE LYMPH #: 1.28 K/UL (ref 1.1–3.7)
ABSOLUTE MONO #: 0.52 K/UL (ref 0.1–1.2)
ANION GAP SERPL CALCULATED.3IONS-SCNC: 15 MMOL/L (ref 9–17)
BASOPHILS # BLD: 1 % (ref 0–2)
BASOPHILS ABSOLUTE: 0.04 K/UL (ref 0–0.2)
BNP INTERPRETATION: ABNORMAL
BUN BLDV-MCNC: 29 MG/DL (ref 6–20)
BUN/CREAT BLD: 23 (ref 9–20)
CALCIUM SERPL-MCNC: 9.2 MG/DL (ref 8.6–10.4)
CHLORIDE BLD-SCNC: 103 MMOL/L (ref 98–107)
CO2: 22 MMOL/L (ref 20–31)
CREAT SERPL-MCNC: 1.26 MG/DL (ref 0.7–1.2)
DIFFERENTIAL TYPE: ABNORMAL
EKG ATRIAL RATE: 96 BPM
EKG P AXIS: 80 DEGREES
EKG P-R INTERVAL: 166 MS
EKG Q-T INTERVAL: 394 MS
EKG QRS DURATION: 140 MS
EKG QTC CALCULATION (BAZETT): 497 MS
EKG R AXIS: 158 DEGREES
EKG T AXIS: 81 DEGREES
EKG VENTRICULAR RATE: 96 BPM
EOSINOPHILS RELATIVE PERCENT: 1 % (ref 1–4)
GFR AFRICAN AMERICAN: >60 ML/MIN
GFR NON-AFRICAN AMERICAN: 60 ML/MIN
GFR SERPL CREATININE-BSD FRML MDRD: ABNORMAL ML/MIN/{1.73_M2}
GFR SERPL CREATININE-BSD FRML MDRD: ABNORMAL ML/MIN/{1.73_M2}
GLUCOSE BLD-MCNC: 106 MG/DL (ref 70–99)
HCT VFR BLD CALC: 42.3 % (ref 40.7–50.3)
HCT VFR BLD CALC: 43.2 % (ref 40.7–50.3)
HEMOGLOBIN: 13.2 G/DL (ref 13–17)
HEMOGLOBIN: 13.4 G/DL (ref 13–17)
IMMATURE GRANULOCYTES: 0 %
LYMPHOCYTES # BLD: 16 % (ref 24–43)
MCH RBC QN AUTO: 29.6 PG (ref 25.2–33.5)
MCH RBC QN AUTO: 29.7 PG (ref 25.2–33.5)
MCHC RBC AUTO-ENTMCNC: 31 G/DL (ref 28.4–34.8)
MCHC RBC AUTO-ENTMCNC: 31.2 G/DL (ref 28.4–34.8)
MCV RBC AUTO: 95.3 FL (ref 82.6–102.9)
MCV RBC AUTO: 95.4 FL (ref 82.6–102.9)
MONOCYTES # BLD: 7 % (ref 3–12)
NRBC AUTOMATED: 0 PER 100 WBC
NRBC AUTOMATED: 0 PER 100 WBC
PARTIAL THROMBOPLASTIN TIME: 25.7 SEC (ref 23.2–34.4)
PARTIAL THROMBOPLASTIN TIME: 92.3 SEC (ref 23.2–34.4)
PDW BLD-RTO: 15.7 % (ref 11.8–14.4)
PDW BLD-RTO: 15.8 % (ref 11.8–14.4)
PLATELET # BLD: 229 K/UL (ref 138–453)
PLATELET # BLD: 239 K/UL (ref 138–453)
PLATELET ESTIMATE: ABNORMAL
PMV BLD AUTO: 8.8 FL (ref 8.1–13.5)
PMV BLD AUTO: 8.9 FL (ref 8.1–13.5)
POTASSIUM SERPL-SCNC: 4.4 MMOL/L (ref 3.7–5.3)
PRO-BNP: ABNORMAL PG/ML
RBC # BLD: 4.44 M/UL (ref 4.21–5.77)
RBC # BLD: 4.53 M/UL (ref 4.21–5.77)
RBC # BLD: ABNORMAL 10*6/UL
SEG NEUTROPHILS: 75 % (ref 36–65)
SEGMENTED NEUTROPHILS ABSOLUTE COUNT: 5.99 K/UL (ref 1.5–8.1)
SODIUM BLD-SCNC: 140 MMOL/L (ref 135–144)
TROPONIN INTERP: ABNORMAL
TROPONIN T: ABNORMAL NG/ML
TROPONIN, HIGH SENSITIVITY: 352 NG/L (ref 0–22)
TROPONIN, HIGH SENSITIVITY: 362 NG/L (ref 0–22)
TROPONIN, HIGH SENSITIVITY: 397 NG/L (ref 0–22)
WBC # BLD: 7.9 K/UL (ref 3.5–11.3)
WBC # BLD: 8.1 K/UL (ref 3.5–11.3)
WBC # BLD: ABNORMAL 10*3/UL

## 2020-02-10 PROCEDURE — 84484 ASSAY OF TROPONIN QUANT: CPT

## 2020-02-10 PROCEDURE — 93005 ELECTROCARDIOGRAM TRACING: CPT | Performed by: EMERGENCY MEDICINE

## 2020-02-10 PROCEDURE — 6370000000 HC RX 637 (ALT 250 FOR IP): Performed by: PHYSICIAN ASSISTANT

## 2020-02-10 PROCEDURE — 85730 THROMBOPLASTIN TIME PARTIAL: CPT

## 2020-02-10 PROCEDURE — 85025 COMPLETE CBC W/AUTO DIFF WBC: CPT

## 2020-02-10 PROCEDURE — 71045 X-RAY EXAM CHEST 1 VIEW: CPT

## 2020-02-10 PROCEDURE — 93010 ELECTROCARDIOGRAM REPORT: CPT | Performed by: INTERNAL MEDICINE

## 2020-02-10 PROCEDURE — 6360000002 HC RX W HCPCS: Performed by: PHYSICIAN ASSISTANT

## 2020-02-10 PROCEDURE — 85027 COMPLETE CBC AUTOMATED: CPT

## 2020-02-10 PROCEDURE — 80048 BASIC METABOLIC PNL TOTAL CA: CPT

## 2020-02-10 PROCEDURE — 83880 ASSAY OF NATRIURETIC PEPTIDE: CPT

## 2020-02-10 PROCEDURE — 96374 THER/PROPH/DIAG INJ IV PUSH: CPT

## 2020-02-10 PROCEDURE — 99285 EMERGENCY DEPT VISIT HI MDM: CPT

## 2020-02-10 PROCEDURE — 96375 TX/PRO/DX INJ NEW DRUG ADDON: CPT

## 2020-02-10 PROCEDURE — 36415 COLL VENOUS BLD VENIPUNCTURE: CPT

## 2020-02-10 RX ORDER — HEPARIN SODIUM 1000 [USP'U]/ML
4000 INJECTION, SOLUTION INTRAVENOUS; SUBCUTANEOUS ONCE
Status: COMPLETED | OUTPATIENT
Start: 2020-02-10 | End: 2020-02-10

## 2020-02-10 RX ORDER — HEPARIN SODIUM 1000 [USP'U]/ML
2000 INJECTION, SOLUTION INTRAVENOUS; SUBCUTANEOUS PRN
Status: DISCONTINUED | OUTPATIENT
Start: 2020-02-10 | End: 2020-02-11 | Stop reason: HOSPADM

## 2020-02-10 RX ORDER — FUROSEMIDE 10 MG/ML
40 INJECTION INTRAMUSCULAR; INTRAVENOUS ONCE
Status: COMPLETED | OUTPATIENT
Start: 2020-02-10 | End: 2020-02-10

## 2020-02-10 RX ORDER — HEPARIN SODIUM 1000 [USP'U]/ML
4000 INJECTION, SOLUTION INTRAVENOUS; SUBCUTANEOUS PRN
Status: DISCONTINUED | OUTPATIENT
Start: 2020-02-10 | End: 2020-02-11 | Stop reason: HOSPADM

## 2020-02-10 RX ORDER — HEPARIN SODIUM 10000 [USP'U]/100ML
1000 INJECTION, SOLUTION INTRAVENOUS CONTINUOUS
Status: DISCONTINUED | OUTPATIENT
Start: 2020-02-10 | End: 2020-02-11 | Stop reason: HOSPADM

## 2020-02-10 RX ADMIN — HEPARIN SODIUM 4000 UNITS: 1000 INJECTION INTRAVENOUS; SUBCUTANEOUS at 14:54

## 2020-02-10 RX ADMIN — FUROSEMIDE 40 MG: 10 INJECTION, SOLUTION INTRAMUSCULAR; INTRAVENOUS at 17:17

## 2020-02-10 RX ADMIN — NITROGLYCERIN 0.5 INCH: 20 OINTMENT TOPICAL at 14:55

## 2020-02-10 RX ADMIN — HEPARIN SODIUM AND DEXTROSE 9 UNITS/KG/HR: 10000; 5 INJECTION INTRAVENOUS at 22:05

## 2020-02-10 RX ADMIN — HEPARIN SODIUM AND DEXTROSE 1000 UNITS/HR: 10000; 5 INJECTION INTRAVENOUS at 14:54

## 2020-02-10 NOTE — ED NOTES
[facility] [employer] [facility] is discharge dependent     Cedrick Handler  02/10/20 8301

## 2020-02-10 NOTE — ED NOTES
Dr Jerald Gallegos called at office, he is with a patient and will call back     [employer]   02/10/20 4128

## 2020-02-10 NOTE — ED PROVIDER NOTES
[facility] ED  EMERGENCY DEPARTMENT ENCOUNTER      Pt Name: Lucian Coates  MRN: [id number]  [place] 1965  Date of evaluation: 2/10/2020  Provider: Renata Herzog PA-C    CHIEF COMPLAINT       Chief Complaint   Patient presents with    Shortness of Breath     pt states onset when he started taking Losartan 2 weeks ago       HISTORY OF PRESENT ILLNESS    Lucian Coates is a 49 y.o. male who presents to the emergency department from home Acute MI in September 2019 with 1 stent placed. Added losartan 2 weeks ago by the cardiologist Dr. Stafford Sizer has been winded since then even short of breath at rest but states he cannot walk across the room without being short of breath. Dyspnea on exertion with no chest pain or palpitations. His cough fever or chills or sore throat. Triage notes and Nursing notes were reviewed by myself. Any discrepancies are addressed above. PAST MEDICAL HISTORY     Past Medical History:   Diagnosis Date    Back pain     Bipolar 1 disorder ([facility]     Seizures ([facility]     LAST SEIZURE 9-4-19       SURGICAL HISTORY       Past Surgical History:   Procedure Laterality Date    CARDIAC CATHETERIZATION Left 09/17/2019    DR Valerio[facility]/right radial-Severe two vessel coronary artery disease involving involving a 100% proximal probably co-dominant RCA, 90% stenosis in the distal part of the proximal LAD at a bifircation with a moderate sized D1 branch that also has an 80% stenosis. Mildly elevated left ventricular end diastolic pressure.  Consult to cardiothoracic surgery for consideration of multi-vess    TOE SURGERY      TOOTH EXTRACTION         CURRENT MEDICATIONS       Previous Medications    ASPIRIN 81 MG EC TABLET    Take 1 tablet by mouth daily    ATORVASTATIN (LIPITOR) 40 MG TABLET    Take 40 mg by mouth daily    BENZTROPINE (COGENTIN) 1 MG TABLET    Take 1 mg by mouth 2 times daily    CLOPIDOGREL (PLAVIX) 75 MG TABLET    Take 1 tablet by mouth daily organization: Not on file     Attends meetings of clubs or organizations: Not on file     Relationship status: Not on file    Intimate partner violence:     Fear of current or ex partner: Not on file     Emotionally abused: Not on file     Physically abused: Not on file     Forced sexual activity: Not on file   Other Topics Concern    Not on file   Social History Narrative    Not on file       REVIEW OF SYSTEMS     Review of Systems  Except as noted above the remainder of the review of systems was reviewed and is negative. SCREENINGS    Ayush Coma Scale  Eye Opening: Spontaneous  Best Verbal Response: Oriented  Best Motor Response: Obeys commands  [place] Coma Scale Score: 15      PHYSICAL EXAM    (up to 7 for level 4, 8 or more for level 5)     ED Triage Vitals [02/10/20 1254]   BP Temp Temp Source Pulse Resp SpO2 Height Weight   112/66 97 °F (36.1 °C) Tympanic 90 24 94 % -- 250 lb (113.4 kg)       Physical Exam  Active and oriented ×3. Nontoxic. No acute distress. Well-hydrated. Appears visibly mildly winded  Head is atraumatic, facies symmetrical.  Pupils equal round and reactive to light, extraocular movements intact, anterior chambers clear bilaterally  Neck is supple. No adenopathy. Mildly labored breathing. Lungs clear to auscultation. No wheezes rales or rhonchi noted. Heart regular rate and rhythm. No murmur noted  Abdomen positive bowel sounds, no bruit. soft and nontender. No organomegaly or masses noted. No pulsatile masses noted. Skin free of any obvious rashes or lesions. Extremities without edema. No calf tenderness noted. Distal pulses and sensation intact. Good capillary refill noted. No acute neurologic deficit noted. Clear speech.       DIAGNOSTIC RESULTS     EKG:(none if blank)  All EKG's are interpreted by [facility] Phys[facility] who either signs or Co-signs this chart in the absence of a cardiologist.    EKG Interpretation    Interpreted by emergency department physician    Rhythm: normal sinus   Rate: 96  Axis: normal  Ectopy: premature ventricular contractions (frequent)  Conduction: nonspecific interventricular conduction block  ST Segments: nonspecific changes  T Waves: non specific changes  Q Waves: II and III    Clinical Impression: non-specific EKG    Mayi Blandon II      RADIOLOGY: (none if blank)   Interpretation per the Radiologistbelow, if available at the time of this note:    XR CHEST PORTABLE   Preliminary Result   Enlarged cardiac silhouette and mild central vascular congestion, similar to   9/12/2019. LABS:  Labs Reviewed   BASIC METABOLIC PANEL - Abnormal; Notable for the following components:       Result Value    Glucose 106 (*)     BUN 29 (*)     CREATININE 1.26 (*)     Bun/Cre Ratio 23 (*)     GFR Non- 60 (*)     All other components within normal limits   BRAIN NATRIURETIC PEPTIDE - Abnormal; Notable for the following components:    Pro-BNP 11,127 (*)     All other components within normal limits   CBC WITH AUTO DIFFERENTIAL - Abnormal; Notable for the following components:    RDW 15.8 (*)     Seg Neutrophils 75 (*)     Lymphocytes 16 (*)     All other components within normal limits   TROPONIN - Abnormal; Notable for the following components:    Troponin, High Sensitivity 397 (*)     All other components within normal limits   CBC - Abnormal; Notable for the following components:    RDW 15.7 (*)     All other components within normal limits   TROPONIN - Abnormal; Notable for the following components:    Troponin, High Sensitivity 352 (*)     All other components within normal limits   APTT   APTT   APTT       All other labs were within normal range or not returned as of this dictation.     EMERGENCY DEPARTMENT COURSE andMedical Decision Making:     Vitals:    Vitals:    02/10/20 1254 02/10/20 1400 02/10/20 1504   BP: 112/66 (!) 118/51 110/85   Pulse: 90 99 101   Resp: 24  18   Temp: 97 °F (36.1 °C)     TempSrc: recognition program.  Efforts were made to edit the dictations but occasionallywords are mis-transcribed.)      Lidia Ward II, PA-C (electronically signed)           Lidia Ward II, PA-C  02/10/20 1677

## 2020-02-10 NOTE — ED NOTES
[facility] called to initiate transfer process to [employer].  She will page Cardiology     Jose Alejandro Sawant  02/10/20 2652

## 2020-02-11 ENCOUNTER — HOSPITAL ENCOUNTER (INPATIENT)
Age: 55
LOS: 2 days | Discharge: HOME | DRG: 250 | End: 2020-02-13
Attending: INTERNAL MEDICINE | Admitting: FAMILY MEDICINE
Payer: MEDICARE

## 2020-02-11 LAB
EKG ATRIAL RATE: 91 BPM
EKG P AXIS: 77 DEGREES
EKG P-R INTERVAL: 176 MS
EKG Q-T INTERVAL: 392 MS
EKG QRS DURATION: 144 MS
EKG QTC CALCULATION (BAZETT): 482 MS
EKG R AXIS: 162 DEGREES
EKG T AXIS: 71 DEGREES
EKG VENTRICULAR RATE: 91 BPM
HCT VFR BLD CALC: 43.6 % (ref 40.7–50.3)
HEMOGLOBIN: 13.5 G/DL (ref 13–17)
MCH RBC QN AUTO: 29.9 PG (ref 25.2–33.5)
MCHC RBC AUTO-ENTMCNC: 31 G/DL (ref 28.4–34.8)
MCV RBC AUTO: 96.5 FL (ref 82.6–102.9)
NRBC AUTOMATED: 0 PER 100 WBC
PARTIAL THROMBOPLASTIN TIME: 27.2 SEC (ref 20.5–30.5)
PARTIAL THROMBOPLASTIN TIME: 27.3 SEC (ref 20.5–30.5)
PARTIAL THROMBOPLASTIN TIME: 35.5 SEC (ref 20.5–30.5)
PDW BLD-RTO: 15.7 % (ref 11.8–14.4)
PLATELET # BLD: 223 K/UL (ref 138–453)
PMV BLD AUTO: 9.1 FL (ref 8.1–13.5)
RBC # BLD: 4.52 M/UL (ref 4.21–5.77)
TROPONIN INTERP: ABNORMAL
TROPONIN INTERP: ABNORMAL
TROPONIN T: ABNORMAL NG/ML
TROPONIN T: ABNORMAL NG/ML
TROPONIN, HIGH SENSITIVITY: 395 NG/L (ref 0–22)
TROPONIN, HIGH SENSITIVITY: 402 NG/L (ref 0–22)
WBC # BLD: 7 K/UL (ref 3.5–11.3)

## 2020-02-11 PROCEDURE — 85027 COMPLETE CBC AUTOMATED: CPT

## 2020-02-11 PROCEDURE — 84484 ASSAY OF TROPONIN QUANT: CPT

## 2020-02-11 PROCEDURE — 93005 ELECTROCARDIOGRAM TRACING: CPT | Performed by: PHYSICIAN ASSISTANT

## 2020-02-11 PROCEDURE — 2580000003 HC RX 258: Performed by: PHYSICIAN ASSISTANT

## 2020-02-11 PROCEDURE — 99223 1ST HOSP IP/OBS HIGH 75: CPT | Performed by: FAMILY MEDICINE

## 2020-02-11 PROCEDURE — 36415 COLL VENOUS BLD VENIPUNCTURE: CPT

## 2020-02-11 PROCEDURE — 6360000002 HC RX W HCPCS: Performed by: PHYSICIAN ASSISTANT

## 2020-02-11 PROCEDURE — 85730 THROMBOPLASTIN TIME PARTIAL: CPT

## 2020-02-11 PROCEDURE — 6370000000 HC RX 637 (ALT 250 FOR IP): Performed by: PHYSICIAN ASSISTANT

## 2020-02-11 PROCEDURE — 2060000000 HC ICU INTERMEDIATE R&B

## 2020-02-11 RX ORDER — HEPARIN SODIUM 1000 [USP'U]/ML
2000 INJECTION, SOLUTION INTRAVENOUS; SUBCUTANEOUS PRN
Status: DISCONTINUED | OUTPATIENT
Start: 2020-02-11 | End: 2020-02-13 | Stop reason: HOSPADM

## 2020-02-11 RX ORDER — DULOXETIN HYDROCHLORIDE 30 MG/1
60 CAPSULE, DELAYED RELEASE ORAL DAILY
Status: DISCONTINUED | OUTPATIENT
Start: 2020-02-11 | End: 2020-02-13 | Stop reason: HOSPADM

## 2020-02-11 RX ORDER — NICOTINE 21 MG/24HR
1 PATCH, TRANSDERMAL 24 HOURS TRANSDERMAL DAILY
Status: DISCONTINUED | OUTPATIENT
Start: 2020-02-11 | End: 2020-02-13 | Stop reason: HOSPADM

## 2020-02-11 RX ORDER — DIVALPROEX SODIUM 500 MG/1
1500 TABLET, DELAYED RELEASE ORAL DAILY
Status: DISCONTINUED | OUTPATIENT
Start: 2020-02-11 | End: 2020-02-13 | Stop reason: HOSPADM

## 2020-02-11 RX ORDER — CLOPIDOGREL BISULFATE 75 MG/1
75 TABLET ORAL DAILY
Status: DISCONTINUED | OUTPATIENT
Start: 2020-02-11 | End: 2020-02-13 | Stop reason: HOSPADM

## 2020-02-11 RX ORDER — FUROSEMIDE 10 MG/ML
40 INJECTION INTRAMUSCULAR; INTRAVENOUS 2 TIMES DAILY
Status: DISCONTINUED | OUTPATIENT
Start: 2020-02-11 | End: 2020-02-13

## 2020-02-11 RX ORDER — SODIUM CHLORIDE 0.9 % (FLUSH) 0.9 %
10 SYRINGE (ML) INJECTION PRN
Status: DISCONTINUED | OUTPATIENT
Start: 2020-02-11 | End: 2020-02-13 | Stop reason: HOSPADM

## 2020-02-11 RX ORDER — BENZTROPINE MESYLATE 1 MG/1
1 TABLET ORAL 2 TIMES DAILY
Status: DISCONTINUED | OUTPATIENT
Start: 2020-02-11 | End: 2020-02-13 | Stop reason: HOSPADM

## 2020-02-11 RX ORDER — OLANZAPINE 10 MG/1
20 TABLET, ORALLY DISINTEGRATING ORAL NIGHTLY
Status: DISCONTINUED | OUTPATIENT
Start: 2020-02-11 | End: 2020-02-13 | Stop reason: HOSPADM

## 2020-02-11 RX ORDER — ALBUTEROL SULFATE 2.5 MG/3ML
2.5 SOLUTION RESPIRATORY (INHALATION)
Status: DISCONTINUED | OUTPATIENT
Start: 2020-02-11 | End: 2020-02-13

## 2020-02-11 RX ORDER — LOSARTAN POTASSIUM 25 MG/1
12.5 TABLET ORAL DAILY
Status: DISCONTINUED | OUTPATIENT
Start: 2020-02-11 | End: 2020-02-13 | Stop reason: HOSPADM

## 2020-02-11 RX ORDER — FAMOTIDINE 20 MG/1
20 TABLET, FILM COATED ORAL 2 TIMES DAILY
Status: DISCONTINUED | OUTPATIENT
Start: 2020-02-11 | End: 2020-02-11

## 2020-02-11 RX ORDER — HEPARIN SODIUM 1000 [USP'U]/ML
4000 INJECTION, SOLUTION INTRAVENOUS; SUBCUTANEOUS PRN
Status: DISCONTINUED | OUTPATIENT
Start: 2020-02-11 | End: 2020-02-13 | Stop reason: HOSPADM

## 2020-02-11 RX ORDER — DOXEPIN HYDROCHLORIDE 25 MG/1
50 CAPSULE ORAL NIGHTLY
Status: DISCONTINUED | OUTPATIENT
Start: 2020-02-11 | End: 2020-02-13 | Stop reason: HOSPADM

## 2020-02-11 RX ORDER — ONDANSETRON 2 MG/ML
4 INJECTION INTRAMUSCULAR; INTRAVENOUS EVERY 6 HOURS PRN
Status: DISCONTINUED | OUTPATIENT
Start: 2020-02-11 | End: 2020-02-13 | Stop reason: HOSPADM

## 2020-02-11 RX ORDER — NITROGLYCERIN 0.4 MG/1
0.4 TABLET SUBLINGUAL EVERY 5 MIN PRN
Status: DISCONTINUED | OUTPATIENT
Start: 2020-02-11 | End: 2020-02-13 | Stop reason: HOSPADM

## 2020-02-11 RX ORDER — POTASSIUM CHLORIDE 7.45 MG/ML
10 INJECTION INTRAVENOUS PRN
Status: DISCONTINUED | OUTPATIENT
Start: 2020-02-11 | End: 2020-02-13 | Stop reason: HOSPADM

## 2020-02-11 RX ORDER — FAMOTIDINE 20 MG/1
20 TABLET, FILM COATED ORAL 2 TIMES DAILY
Status: DISCONTINUED | OUTPATIENT
Start: 2020-02-11 | End: 2020-02-13 | Stop reason: HOSPADM

## 2020-02-11 RX ORDER — MAGNESIUM SULFATE 1 G/100ML
1 INJECTION INTRAVENOUS PRN
Status: DISCONTINUED | OUTPATIENT
Start: 2020-02-11 | End: 2020-02-13 | Stop reason: HOSPADM

## 2020-02-11 RX ORDER — SODIUM CHLORIDE 0.9 % (FLUSH) 0.9 %
10 SYRINGE (ML) INJECTION EVERY 12 HOURS SCHEDULED
Status: DISCONTINUED | OUTPATIENT
Start: 2020-02-11 | End: 2020-02-13 | Stop reason: HOSPADM

## 2020-02-11 RX ORDER — ATORVASTATIN CALCIUM 40 MG/1
40 TABLET, FILM COATED ORAL DAILY
Status: DISCONTINUED | OUTPATIENT
Start: 2020-02-11 | End: 2020-02-13 | Stop reason: HOSPADM

## 2020-02-11 RX ORDER — ASPIRIN 81 MG/1
81 TABLET ORAL DAILY
Status: DISCONTINUED | OUTPATIENT
Start: 2020-02-11 | End: 2020-02-13 | Stop reason: HOSPADM

## 2020-02-11 RX ORDER — POTASSIUM CHLORIDE 20 MEQ/1
40 TABLET, EXTENDED RELEASE ORAL PRN
Status: DISCONTINUED | OUTPATIENT
Start: 2020-02-11 | End: 2020-02-13 | Stop reason: HOSPADM

## 2020-02-11 RX ORDER — HEPARIN SODIUM 10000 [USP'U]/100ML
8.9 INJECTION, SOLUTION INTRAVENOUS CONTINUOUS
Status: DISCONTINUED | OUTPATIENT
Start: 2020-02-11 | End: 2020-02-13

## 2020-02-11 RX ADMIN — METOPROLOL TARTRATE 12.5 MG: 25 TABLET ORAL at 02:22

## 2020-02-11 RX ADMIN — HEPARIN SODIUM 4000 UNITS: 1000 INJECTION INTRAVENOUS; SUBCUTANEOUS at 06:49

## 2020-02-11 RX ADMIN — METOPROLOL TARTRATE 12.5 MG: 25 TABLET ORAL at 10:13

## 2020-02-11 RX ADMIN — DESMOPRESSIN ACETATE 40 MG: 0.2 TABLET ORAL at 10:13

## 2020-02-11 RX ADMIN — Medication 10 ML: at 10:12

## 2020-02-11 RX ADMIN — FUROSEMIDE 40 MG: 10 INJECTION, SOLUTION INTRAMUSCULAR; INTRAVENOUS at 20:33

## 2020-02-11 RX ADMIN — Medication 81 MG: at 10:13

## 2020-02-11 RX ADMIN — FUROSEMIDE 40 MG: 10 INJECTION, SOLUTION INTRAMUSCULAR; INTRAVENOUS at 10:12

## 2020-02-11 RX ADMIN — METOPROLOL TARTRATE 12.5 MG: 25 TABLET ORAL at 23:11

## 2020-02-11 RX ADMIN — DULOXETINE HYDROCHLORIDE 60 MG: 30 CAPSULE, DELAYED RELEASE ORAL at 10:13

## 2020-02-11 RX ADMIN — HEPARIN SODIUM 13.14 UNITS/KG/HR: 10000 INJECTION, SOLUTION INTRAVENOUS at 13:48

## 2020-02-11 RX ADMIN — BENZTROPINE MESYLATE 1 MG: 1 TABLET ORAL at 20:34

## 2020-02-11 RX ADMIN — LURASIDONE HYDROCHLORIDE 40 MG: 40 TABLET, FILM COATED ORAL at 10:13

## 2020-02-11 RX ADMIN — DIVALPROEX SODIUM 1500 MG: 500 TABLET, DELAYED RELEASE ORAL at 10:13

## 2020-02-11 RX ADMIN — FAMOTIDINE 20 MG: 20 TABLET, FILM COATED ORAL at 02:22

## 2020-02-11 RX ADMIN — FAMOTIDINE 20 MG: 20 TABLET, FILM COATED ORAL at 10:13

## 2020-02-11 RX ADMIN — DOXEPIN HYDROCHLORIDE 50 MG: 25 CAPSULE ORAL at 20:34

## 2020-02-11 RX ADMIN — FAMOTIDINE 20 MG: 20 TABLET, FILM COATED ORAL at 20:34

## 2020-02-11 RX ADMIN — HEPARIN SODIUM 9.12 UNITS/KG/HR: 10000 INJECTION, SOLUTION INTRAVENOUS at 01:15

## 2020-02-11 RX ADMIN — OLANZAPINE 20 MG: 10 TABLET, ORALLY DISINTEGRATING ORAL at 20:34

## 2020-02-11 RX ADMIN — CLOPIDOGREL 75 MG: 75 TABLET, FILM COATED ORAL at 10:13

## 2020-02-11 RX ADMIN — LOSARTAN POTASSIUM 12.5 MG: 25 TABLET, FILM COATED ORAL at 10:12

## 2020-02-11 RX ADMIN — BENZTROPINE MESYLATE 1 MG: 1 TABLET ORAL at 10:13

## 2020-02-11 RX ADMIN — HEPARIN SODIUM 2000 UNITS: 1000 INJECTION INTRAVENOUS; SUBCUTANEOUS at 16:44

## 2020-02-11 RX ADMIN — Medication 10 ML: at 20:34

## 2020-02-11 RX ADMIN — BENZTROPINE MESYLATE 1 MG: 1 TABLET ORAL at 02:22

## 2020-02-11 RX ADMIN — DOXEPIN HYDROCHLORIDE 50 MG: 25 CAPSULE ORAL at 02:22

## 2020-02-11 RX ADMIN — OLANZAPINE 20 MG: 10 TABLET, ORALLY DISINTEGRATING ORAL at 02:22

## 2020-02-11 ASSESSMENT — ENCOUNTER SYMPTOMS
ABDOMINAL PAIN: 0
CONSTIPATION: 0
RHINORRHEA: 0
VOMITING: 0
CHEST TIGHTNESS: 0
DIARRHEA: 0
SINUS PRESSURE: 0
SHORTNESS OF BREATH: 1
WHEEZING: 0
NAUSEA: 0
VOICE CHANGE: 0
COUGH: 0
CHOKING: 0
BACK PAIN: 0

## 2020-02-11 ASSESSMENT — PAIN SCALES - GENERAL: PAINLEVEL_OUTOF10: 0

## 2020-02-11 NOTE — PROGRESS NOTES
Smoking Cessation - topics covered   [x]  Health Risks  [x]  Benefits of Quitting   [x]  Smoking Cessation  []  Patient has no history of tobacco use per note in significant history. []  Patient is former smoker per note in significant history. Patient quit in   []  No need for tobacco cessation education. [x]  Booklet given  [x]  Patient verbalizes understanding. []  Patient denies need for tobacco cessation education. []  Unable to meet with patient today. Will follow up as able.   Jess Starks  1:53 PM

## 2020-02-11 NOTE — PROGRESS NOTES
Nutrition Assessment    Type and Reason for Visit: Initial, Positive Nutrition Screen(Wt loss, poor appetite )    Nutrition Recommendations:   -Continue cardiac, 2 gm Na diet w/ 1500 mL FR  -Suggest ensure enlive supplements BID   -Will monitor po intake and weights     Nutrition Assessment:  Pt admitted d/t SOB. Pt was sleeping during time of visit - observed 50-75% of his b-fast tray consumed. Pt w/ 6.4% wt loss x 7 mo per EMR, not considered significant. Will add supplements to better meet pt's est'd needs and monitor wt trends. Malnutrition Assessment:  · Malnutrition Status: Insufficient data  · Context: Acute illness or injury  · Findings of the 6 clinical characteristics of malnutrition (Minimum of 2 out of 6 clinical characteristics is required to make the diagnosis of moderate or severe Protein Calorie Malnutrition based on AND/ASPEN Guidelines):  1. Energy Intake-Unable to assess,      2. Weight Loss-5% loss or greater, in 6 months  3. Fat Loss-Unable to assess,    4. Muscle Loss-Unable to assess,    5. Fluid Accumulation-Mild fluid accumulation, Extremities  6.  Strength-Not measured    Nutrition Risk Level:  Moderate    Nutrient Needs:  · Estimated Daily Total Kcal: 1.2-1.3 ~>9064-1715 kcals/d   · Estimated Daily Protein (g): 1.2-1.3 gm/kg ~> gms/d     Nutrition Diagnosis:   · Problem: Predicted suboptimal energy intake  · Etiology: related to (medical condition )     Signs and symptoms:  as evidenced by Weight loss(Need for ONS )    Objective Information:  · Wound Type: None  · Current Nutrition Therapies:  · Oral Diet Orders: Cardiac, 2gm Sodium, Fluid Restriction   · Oral Diet intake: 51-75%  · Oral Nutrition Supplement (ONS) Orders: None  · Anthropometric Measures:  · Ht: 6' (182.9 cm)   · Current Body Wt: 248 lb (112.5 kg)  · Admission Body Wt: 248 lb (112.5 kg)  · % Weight Change:  ,  6.4% wt loss x 7 mo per EMR   · Ideal Body Wt: 178 lb (80.7 kg), % Ideal Body 139%

## 2020-02-11 NOTE — CONSULTS
[phone] [id number]. EKG normal sinus at 91. Chest xray demonstrated central vascular congestion suggesting pulmonary edema. Of note on 09/17/19 he was admitted with 90% proximal LAD and 100% occluded non dominant RCA. Cardiothoracic surgery was consulted however patient was not deemed a surgical candidate due to liver cirrhosis to the degree of nodularity on CT scan with evidence of cristela-systemic hypertension. He was cathed and stent was placed to LAD and D1. Past Medical History:     has a past medical history of Alcoholic cirrhosis ([facility] [address] Back pain, Bipolar 1 disorder ([facility] [address] CAD (coronary artery disease), Idiopathic cardiomyopathy ([facility] [address] and Seizures ([facility] [address] Past Surgical History:     has a past surgical history that includes Toe Surgery; Tooth Extraction; and Cardiac catheterization (Left, 09/17/2019). Home Medications:    Prior to Admission medications    Medication Sig Start Date End Date Taking?  Authorizing Provider   atorvastatin (LIPITOR) 40 MG tablet Take 40 mg by mouth daily 1/14/20  Yes Historical Provider, MD   doxepin (SINEQUAN) 50 MG capsule Take 50 mg by mouth 1/8/20  Yes Historical Provider, MD   losartan (COZAAR) 25 MG tablet Take 0.5 tablets by mouth daily 1/20/20  Yes Tri Watts MD   metoprolol tartrate (LOPRESSOR) 25 MG tablet Take 0.5 tablets by mouth 2 times daily 11/26/19  Yes Tri Watts MD   furosemide (LASIX) 20 MG tablet Take 0.5 tablets by mouth daily 10/14/19  Yes Tri Watts MD   clopidogrel (PLAVIX) 75 MG tablet Take 1 tablet by mouth daily 10/2/19  Yes Tri Watts MD   aspirin 81 MG EC tablet Take 1 tablet by mouth daily 9/20/19  Yes Kecia Swift APRN - CNP   benztropine (COGENTIN) 1 MG tablet Take 1 mg by mouth 2 times daily   Yes Historical Provider, MD   lurasidone (LATUDA) 40 MG TABS tablet Take 40 mg by mouth daily    Yes Historical Provider, MD   DULoxetine (CYMBALTA) 20 MG capsule Take 60 mg by mouth daily    Yes Historical Provider, MD divalproex (DEPAKOTE) 500 MG EC tablet Take 1,500 mg by mouth daily. Yes Historical Provider, MD   OLANZapine zydis (ZYPREXA ZYDIS) 15 MG disintegrating tablet Take 20 mg by mouth nightly    Yes Historical Provider, MD   famotidine (PEPCID) 20 MG tablet Take 1 tablet by mouth 2 times daily 9/19/19   CAM Cunningham CNP   nicotine (Blessing Pac) 21 MG/24HR Place 1 patch onto the skin daily 9/20/19   CAM Cunningham CNP     Allergies:   Ticagrelor and Pcn [penicillins]     Social History:     reports that he has been smoking cigarettes. He has been smoking about 1.00 pack per day. He has never used smokeless tobacco. He reports current alcohol use. He reports current drug use. Frequency: 1.00 time per week. Drug: Marijuana. Family History:  family history is not on file. No h/o sudden cardiac death. No for premature CAD     REVIEW OF SYSTEMS:    General: Admits to a decrease in energy and physical activity due to shortness of breath. Admits to weight gain. Pt sleeps sitting up. Denies any fevers, chills, rigors. HEENT: No visual disturbances, hearing disturbances, nasal drainage or neck swelling  Heart: Admits to right sided chest pain, Denies palpitations. Lungs: Admits to SOB, SANTIAGO, cough. Abdomen: Denies abdominal pain, nausea, vomiting, constipation, diarrhea. Extremities: Denies any leg swelling or calf tenderness. Musculo skeletal: Denies any arthralgias, joint swelling. Skin: Denies any rash/skin changes. Hem/Onc: Denies bleeding gums, swollen lymph nodes. Endo: Denies polydypsia, polyphagia. PHYSICAL EXAM:    Physical Examination:    Temperature:  Temp: 97.7 °F (36.5 °C)  Respirations:  Resp: 20  Pulse:   Pulse: 89  BP:    BP: 104/80    Constitutional and General Appearance: alert, cooperative, appears in mild distress during examination. Talking only in short sentences and becoming winded with short discussion. HEENT: PERRL, no cervical lymphadenopathy.  No masses palpable. Normal oral mucosa  Respiratory:  · Normal excursion and expansion no use of accessory muscles but there is visible increased work of breathing. · Resp Auscultation: Increased respiratory effort. On auscultation: there are crackles diffusely. Cardiovascular:  · The apical impulse is not displaced  · Heart auscultation: Regular rate, S1 and S2 present, no murmur appreciated. · Peripheral pulses are symmetrical and full   Abdomen:  · No masses or tenderness  · Bowel sounds present  Extremities:  ·  No Cyanosis or Clubbing  ·  Lower extremity edema: No  ·  Skin: Warm and dry  Neurological:  · Alert and oriented. · Moves all extremities well    DATA:    Labs:   CBC:   Recent Labs     02/10/20  1503 02/11/20  0138   WBC 8.1 7.0   HGB 13.4 13.5   HCT 43.2 43.6    223     BMP:   Recent Labs     02/10/20  1328      K 4.4   CO2 22   BUN 29*   CREATININE 1.26*   LABGLOM 60*   GLUCOSE 106*     BNP: No results for input(s): BNP in the last 72 hours. PT/INR: No results for input(s): PROTIME, INR in the last 72 hours. APTT:  Recent Labs     02/11/20  0138 02/11/20  0545   APTT 27.2 27.3     CARDIAC ENZYMES:No results for input(s): CKTOTAL, CKMB, CKMBINDEX, TROPONINI in the last 72 hours. FASTING LIPID PANEL:  Lab Results   Component Value Date    HDL 62 12/12/2019    TRIG 67 09/13/2019     LIVER PROFILE:No results for input(s): AST, ALT, LABALBU in the last 72 hours. Imaging  Xr Chest Portable    Result Date: 2/10/2020  EXAMINATION: ONE XRAY VIEW OF THE CHEST 2/10/2020 1:15 pm COMPARISON: 9/12/2019 HISTORY: ORDERING SYSTEM PROVIDED HISTORY: dyspnea TECHNOLOGIST PROVIDED HISTORY: dyspnea FINDINGS: Enlarged cardiac silhouette is unchanged. Mild central vascular congestion is unchanged. There is mild diffuse interstitial thickening, which is also unchanged. No new airspace consolidation. No pneumothorax or evidence of pleural effusion. No free air beneath the diaphragm.   No evidence of

## 2020-02-11 NOTE — PROGRESS NOTES
Physical Therapy  DATE: 2020    NAME: Yuliet Fitzgerald  MRN: [id number]   : 1965    Patient not seen this date for Physical Therapy due to:  [] Blood transfusion in progress  [] Hemodialysis  []  Patient Declined  [] Spine Precautions   [] Strict Bedrest  [] Surgery/ Procedure  [] Testing      [] Other        [x] PT being discontinued at this time. Patient independent. No further needs. Pt very tired, states he didn't sleep well last night; he states he's independent at baseline; he denies PT needs and states he'll get up to the chair later today. Defer PT eval d/t pt independence. [] PT being discontinued at this time as the patient has been transferred to palliative care. No further needs.     Torri Hinton, PT

## 2020-02-11 NOTE — PROGRESS NOTES
assessment at level  2  ·   ·   · []    Bronchodilator Assessment  BRONCHODILATOR ASSESSMENT SCORE  Score 0 1 2 3 4 5   Breath Sounds   []  Patient Baseline []  No Wheeze good aeration [x]  Faint, scattered wheezing, good aeration []  Expiratory Wheezing and or moderately diminished []  Insp/Exp wheeze and/or very diminished []  Insp/Exp and/ or marked distress   Respiratory Rate   []  Patient Baseline []  Less than 20 [x]  Less than 20 []  20-25 []  Greater than 25 []  Greater than 25   Peak flow % of Pred or PB []  NA   []  Greater than 90%  []  81-90% []  71-80% []  Less than or equal to 70%  or unable to perform []  Unable due to Respiratory Distress   Dyspnea re []  Patient Baseline []  No SOB []  No SOB [x]  SOB on exertion []  SOB min activity []  At rest/acute   e FEV% Predicted       []  NA []  Above 69%  []  Unable []  Above 60-69%  []  Unable []  Above 50-59%  []  Unable []  Above 35-49%  []  Unable []  Less than 35%  []  Unable                  Carlean Mode  2:51 AM

## 2020-02-11 NOTE — PROGRESS NOTES
Pt admitted to floor via EMS from [facility], pt A&Ox4, ambulatory, heparin gtt running. Vitals stable. Call light within reach. Will continue to monitor.

## 2020-02-11 NOTE — PROGRESS NOTES
Occupational Therapy    Occupational Therapy Not Seen Note    DATE: 2020  Name: Gaston Sosa  : 1965  MRN: [id number]    Patient not available for Occupational Therapy due to:    Pt independent with ADL's and functional mobility. Pt with no OT acute care needs at this time, will defer OT eval. RN in agreement.       Electronically signed by Rosy Ramirez OT on 2020 at 10:34 AM

## 2020-02-11 NOTE — H&P
 TOOTH EXTRACTION          Medications Prior to Admission:     Prior to Admission medications    Medication Sig Start Date End Date Taking? Authorizing Provider   atorvastatin (LIPITOR) 40 MG tablet Take 40 mg by mouth daily 1/14/20  Yes Historical Provider, MD   doxepin (SINEQUAN) 50 MG capsule Take 50 mg by mouth 1/8/20  Yes Historical Provider, MD   losartan (COZAAR) 25 MG tablet Take 0.5 tablets by mouth daily 1/20/20  Yes Loki Mcghee MD   metoprolol tartrate (LOPRESSOR) 25 MG tablet Take 0.5 tablets by mouth 2 times daily 11/26/19  Yes Loki Mcghee MD   furosemide (LASIX) 20 MG tablet Take 0.5 tablets by mouth daily 10/14/19  Yes Loki Mcghee MD   clopidogrel (PLAVIX) 75 MG tablet Take 1 tablet by mouth daily 10/2/19  Yes Loki Mcghee MD   aspirin 81 MG EC tablet Take 1 tablet by mouth daily 9/20/19  Yes James Eye, APRN - CNP   benztropine (COGENTIN) 1 MG tablet Take 1 mg by mouth 2 times daily   Yes Historical Provider, MD   lurasidone (LATUDA) 40 MG TABS tablet Take 40 mg by mouth daily    Yes Historical Provider, MD   DULoxetine (CYMBALTA) 20 MG capsule Take 60 mg by mouth daily    Yes Historical Provider, MD   divalproex (DEPAKOTE) 500 MG EC tablet Take 1,500 mg by mouth daily. Yes Historical Provider, MD   OLANZapine zydis (ZYPREXA ZYDIS) 15 MG disintegrating tablet Take 20 mg by mouth nightly    Yes Historical Provider, MD   famotidine (PEPCID) 20 MG tablet Take 1 tablet by mouth 2 times daily 9/19/19   James Eye, APRN - CNP   nicotine (Flex Kaufman) 21 MG/24HR Place 1 patch onto the skin daily 9/20/19   James Eye, APRN - CNP        Allergies:     Ticagrelor and Pcn [penicillins]    Social History:     Tobacco:    reports that he has been smoking cigarettes. He has been smoking about 1.00 pack per day. He has never used smokeless tobacco.  Alcohol:      reports current alcohol use. Drug Use:  reports current drug use. Frequency: 1.00 time per week.  Drug: Ref Range    PTT 25.7 23.2 - 34.4 sec   Troponin    Collection Time: 02/10/20  3:40 PM   Result Value Ref Range    Troponin, High Sensitivity 352 (HH) 0 - 22 ng/L    Troponin T NOT REPORTED <0.03 ng/mL    Troponin Interp NOT REPORTED    Troponin    Collection Time: 02/10/20  6:35 PM   Result Value Ref Range    Troponin, High Sensitivity 362 (HH) 0 - 22 ng/L    Troponin T NOT REPORTED <0.03 ng/mL    Troponin Interp NOT REPORTED    APTT    Collection Time: 02/10/20  8:20 PM   Result Value Ref Range    PTT 92.3 (H) 23.2 - 34.4 sec   Troponin    Collection Time: 02/11/20  1:38 AM   Result Value Ref Range    Troponin, High Sensitivity 395 (HH) 0 - 22 ng/L    Troponin T NOT REPORTED <0.03 ng/mL    Troponin Interp NOT REPORTED    CBC    Collection Time: 02/11/20  1:38 AM   Result Value Ref Range    WBC 7.0 3.5 - 11.3 k/uL    RBC 4.52 4.21 - 5.77 m/uL    Hemoglobin 13.5 13.0 - 17.0 g/dL    Hematocrit 43.6 40.7 - 50.3 %    MCV 96.5 82.6 - 102.9 fL    MCH 29.9 25.2 - 33.5 pg    MCHC 31.0 28.4 - 34.8 g/dL    RDW 15.7 (H) 11.8 - 14.4 %    Platelets 957 474 - 586 k/uL    MPV 9.1 8.1 - 13.5 fL    NRBC Automated 0.0 0.0 per 100 WBC   APTT    Collection Time: 02/11/20  1:38 AM   Result Value Ref Range    PTT 27.2 20.5 - 30.5 sec   EKG 12 lead    Collection Time: 02/11/20  2:28 AM   Result Value Ref Range    Ventricular Rate 91 BPM    Atrial Rate 91 BPM    P-R Interval 176 ms    QRS Duration 144 ms    Q-T Interval 392 ms    QTc Calculation (Bazett) 482 ms    P Axis 77 degrees    R Axis 162 degrees    T Axis 71 degrees   Troponin    Collection Time: 02/11/20  5:45 AM   Result Value Ref Range    Troponin, High Sensitivity 402 (HH) 0 - 22 ng/L    Troponin T NOT REPORTED <0.03 ng/mL    Troponin Interp NOT REPORTED    APTT    Collection Time: 02/11/20  5:45 AM   Result Value Ref Range    PTT 27.3 20.5 - 30.5 sec       Imaging/Diagonstics:    Xr Chest Portable    Result Date: 2/10/2020  Enlarged cardiac silhouette and mild central

## 2020-02-12 ENCOUNTER — APPOINTMENT (OUTPATIENT)
Dept: CARDIAC CATH/INVASIVE PROCEDURES | Age: 55
DRG: 250 | End: 2020-02-12
Attending: INTERNAL MEDICINE
Payer: MEDICARE

## 2020-02-12 LAB
ALBUMIN SERPL-MCNC: 3.4 G/DL (ref 3.5–5.2)
ALBUMIN/GLOBULIN RATIO: 1.3 (ref 1–2.5)
ALP BLD-CCNC: 67 U/L (ref 40–129)
ALT SERPL-CCNC: 10 U/L (ref 5–41)
ANION GAP SERPL CALCULATED.3IONS-SCNC: 16 MMOL/L (ref 9–17)
AST SERPL-CCNC: 13 U/L
BILIRUB SERPL-MCNC: 0.44 MG/DL (ref 0.3–1.2)
BNP INTERPRETATION: ABNORMAL
BUN BLDV-MCNC: 24 MG/DL (ref 6–20)
BUN/CREAT BLD: ABNORMAL (ref 9–20)
CALCIUM SERPL-MCNC: 9 MG/DL (ref 8.6–10.4)
CHLORIDE BLD-SCNC: 102 MMOL/L (ref 98–107)
CHOLESTEROL/HDL RATIO: 2.8
CHOLESTEROL: 122 MG/DL
CO2: 27 MMOL/L (ref 20–31)
CREAT SERPL-MCNC: 0.99 MG/DL (ref 0.7–1.2)
GFR AFRICAN AMERICAN: >60 ML/MIN
GFR NON-AFRICAN AMERICAN: >60 ML/MIN
GFR SERPL CREATININE-BSD FRML MDRD: ABNORMAL ML/MIN/{1.73_M2}
GFR SERPL CREATININE-BSD FRML MDRD: ABNORMAL ML/MIN/{1.73_M2}
GLUCOSE BLD-MCNC: 87 MG/DL (ref 70–99)
HCT VFR BLD CALC: 43.1 % (ref 40.7–50.3)
HDLC SERPL-MCNC: 44 MG/DL
HEMOGLOBIN: 13.6 G/DL (ref 13–17)
LDL CHOLESTEROL: 65 MG/DL (ref 0–130)
MAGNESIUM: 2.5 MG/DL (ref 1.6–2.6)
MCH RBC QN AUTO: 29.7 PG (ref 25.2–33.5)
MCHC RBC AUTO-ENTMCNC: 31.6 G/DL (ref 28.4–34.8)
MCV RBC AUTO: 94.1 FL (ref 82.6–102.9)
NRBC AUTOMATED: 0 PER 100 WBC
PARTIAL THROMBOPLASTIN TIME: 46.9 SEC (ref 20.5–30.5)
PARTIAL THROMBOPLASTIN TIME: 75.7 SEC (ref 20.5–30.5)
PDW BLD-RTO: 15.2 % (ref 11.8–14.4)
PLATELET # BLD: 195 K/UL (ref 138–453)
PMV BLD AUTO: 9.2 FL (ref 8.1–13.5)
POTASSIUM SERPL-SCNC: 3.8 MMOL/L (ref 3.7–5.3)
PRO-BNP: 6080 PG/ML
RBC # BLD: 4.58 M/UL (ref 4.21–5.77)
SODIUM BLD-SCNC: 145 MMOL/L (ref 135–144)
TOTAL PROTEIN: 6.1 G/DL (ref 6.4–8.3)
TRIGL SERPL-MCNC: 66 MG/DL
VLDLC SERPL CALC-MCNC: NORMAL MG/DL (ref 1–30)
WBC # BLD: 5.6 K/UL (ref 3.5–11.3)

## 2020-02-12 PROCEDURE — 6370000000 HC RX 637 (ALT 250 FOR IP): Performed by: PHYSICIAN ASSISTANT

## 2020-02-12 PROCEDURE — 6360000004 HC RX CONTRAST MEDICATION

## 2020-02-12 PROCEDURE — C1887 CATHETER, GUIDING: HCPCS

## 2020-02-12 PROCEDURE — 2709999900 HC NON-CHARGEABLE SUPPLY

## 2020-02-12 PROCEDURE — C1725 CATH, TRANSLUMIN NON-LASER: HCPCS

## 2020-02-12 PROCEDURE — 80053 COMPREHEN METABOLIC PANEL: CPT

## 2020-02-12 PROCEDURE — 83880 ASSAY OF NATRIURETIC PEPTIDE: CPT

## 2020-02-12 PROCEDURE — C1894 INTRO/SHEATH, NON-LASER: HCPCS

## 2020-02-12 PROCEDURE — 80061 LIPID PANEL: CPT

## 2020-02-12 PROCEDURE — B2111ZZ FLUOROSCOPY OF MULTIPLE CORONARY ARTERIES USING LOW OSMOLAR CONTRAST: ICD-10-PCS | Performed by: INTERNAL MEDICINE

## 2020-02-12 PROCEDURE — 2060000000 HC ICU INTERMEDIATE R&B

## 2020-02-12 PROCEDURE — 2580000003 HC RX 258: Performed by: PHYSICIAN ASSISTANT

## 2020-02-12 PROCEDURE — 6360000002 HC RX W HCPCS: Performed by: PHYSICIAN ASSISTANT

## 2020-02-12 PROCEDURE — 92921 HC PRQ CARDIAC ANGIO ADDL ART: CPT | Performed by: INTERNAL MEDICINE

## 2020-02-12 PROCEDURE — C1769 GUIDE WIRE: HCPCS

## 2020-02-12 PROCEDURE — 85730 THROMBOPLASTIN TIME PARTIAL: CPT

## 2020-02-12 PROCEDURE — 93454 CORONARY ARTERY ANGIO S&I: CPT | Performed by: INTERNAL MEDICINE

## 2020-02-12 PROCEDURE — 02703ZZ DILATION OF CORONARY ARTERY, ONE ARTERY, PERCUTANEOUS APPROACH: ICD-10-PCS | Performed by: INTERNAL MEDICINE

## 2020-02-12 PROCEDURE — 2500000003 HC RX 250 WO HCPCS

## 2020-02-12 PROCEDURE — 99232 SBSQ HOSP IP/OBS MODERATE 35: CPT | Performed by: FAMILY MEDICINE

## 2020-02-12 PROCEDURE — 85049 AUTOMATED PLATELET COUNT: CPT

## 2020-02-12 PROCEDURE — 83735 ASSAY OF MAGNESIUM: CPT

## 2020-02-12 PROCEDURE — 85027 COMPLETE CBC AUTOMATED: CPT

## 2020-02-12 PROCEDURE — 36415 COLL VENOUS BLD VENIPUNCTURE: CPT

## 2020-02-12 PROCEDURE — 6360000002 HC RX W HCPCS

## 2020-02-12 PROCEDURE — 92943 PRQ TRLUML REVSC CH OCC ANT: CPT | Performed by: INTERNAL MEDICINE

## 2020-02-12 PROCEDURE — 2580000003 HC RX 258: Performed by: INTERNAL MEDICINE

## 2020-02-12 RX ORDER — SODIUM CHLORIDE 0.9 % (FLUSH) 0.9 %
10 SYRINGE (ML) INJECTION EVERY 12 HOURS SCHEDULED
Status: CANCELLED | OUTPATIENT
Start: 2020-02-12

## 2020-02-12 RX ORDER — ACETAMINOPHEN 325 MG/1
650 TABLET ORAL EVERY 4 HOURS PRN
Status: CANCELLED | OUTPATIENT
Start: 2020-02-12

## 2020-02-12 RX ORDER — SODIUM CHLORIDE 0.9 % (FLUSH) 0.9 %
10 SYRINGE (ML) INJECTION PRN
Status: CANCELLED | OUTPATIENT
Start: 2020-02-12

## 2020-02-12 RX ORDER — SODIUM CHLORIDE 9 MG/ML
INJECTION, SOLUTION INTRAVENOUS CONTINUOUS
Status: DISCONTINUED | OUTPATIENT
Start: 2020-02-12 | End: 2020-02-13

## 2020-02-12 RX ADMIN — METOPROLOL TARTRATE 12.5 MG: 25 TABLET ORAL at 09:36

## 2020-02-12 RX ADMIN — Medication 81 MG: at 09:37

## 2020-02-12 RX ADMIN — DULOXETINE HYDROCHLORIDE 60 MG: 30 CAPSULE, DELAYED RELEASE ORAL at 09:37

## 2020-02-12 RX ADMIN — Medication 10 ML: at 09:37

## 2020-02-12 RX ADMIN — DIVALPROEX SODIUM 1500 MG: 500 TABLET, DELAYED RELEASE ORAL at 09:37

## 2020-02-12 RX ADMIN — BENZTROPINE MESYLATE 1 MG: 1 TABLET ORAL at 09:37

## 2020-02-12 RX ADMIN — FUROSEMIDE 40 MG: 10 INJECTION, SOLUTION INTRAMUSCULAR; INTRAVENOUS at 09:35

## 2020-02-12 RX ADMIN — HEPARIN SODIUM 17.16 UNITS/KG/HR: 10000 INJECTION, SOLUTION INTRAVENOUS at 04:20

## 2020-02-12 RX ADMIN — METOPROLOL TARTRATE 12.5 MG: 25 TABLET ORAL at 22:02

## 2020-02-12 RX ADMIN — OLANZAPINE 20 MG: 10 TABLET, ORALLY DISINTEGRATING ORAL at 22:03

## 2020-02-12 RX ADMIN — FAMOTIDINE 20 MG: 20 TABLET, FILM COATED ORAL at 09:37

## 2020-02-12 RX ADMIN — DOXEPIN HYDROCHLORIDE 50 MG: 25 CAPSULE ORAL at 22:03

## 2020-02-12 RX ADMIN — DESMOPRESSIN ACETATE 40 MG: 0.2 TABLET ORAL at 09:37

## 2020-02-12 RX ADMIN — CLOPIDOGREL 75 MG: 75 TABLET, FILM COATED ORAL at 09:37

## 2020-02-12 RX ADMIN — HEPARIN SODIUM 2000 UNITS: 1000 INJECTION INTRAVENOUS; SUBCUTANEOUS at 00:59

## 2020-02-12 RX ADMIN — Medication 10 ML: at 22:03

## 2020-02-12 RX ADMIN — FUROSEMIDE 40 MG: 10 INJECTION, SOLUTION INTRAMUSCULAR; INTRAVENOUS at 22:02

## 2020-02-12 RX ADMIN — SODIUM CHLORIDE: 9 INJECTION, SOLUTION INTRAVENOUS at 14:23

## 2020-02-12 RX ADMIN — BENZTROPINE MESYLATE 1 MG: 1 TABLET ORAL at 22:03

## 2020-02-12 RX ADMIN — FAMOTIDINE 20 MG: 20 TABLET, FILM COATED ORAL at 22:03

## 2020-02-12 ASSESSMENT — PAIN DESCRIPTION - ORIENTATION
ORIENTATION: RIGHT
ORIENTATION: RIGHT

## 2020-02-12 ASSESSMENT — ENCOUNTER SYMPTOMS
SHORTNESS OF BREATH: 0
NAUSEA: 0
CHOKING: 0
CHEST TIGHTNESS: 0
RHINORRHEA: 0
DIARRHEA: 0
WHEEZING: 0
CONSTIPATION: 0
VOMITING: 0
ABDOMINAL PAIN: 0
VOICE CHANGE: 0
COUGH: 0
SINUS PRESSURE: 0
BACK PAIN: 0

## 2020-02-12 ASSESSMENT — PAIN DESCRIPTION - DESCRIPTORS
DESCRIPTORS: ACHING
DESCRIPTORS: ACHING

## 2020-02-12 ASSESSMENT — PAIN SCALES - GENERAL
PAINLEVEL_OUTOF10: 4
PAINLEVEL_OUTOF10: 0
PAINLEVEL_OUTOF10: 6
PAINLEVEL_OUTOF10: 0

## 2020-02-12 ASSESSMENT — PAIN DESCRIPTION - PAIN TYPE
TYPE: ACUTE PAIN
TYPE: ACUTE PAIN

## 2020-02-12 ASSESSMENT — PAIN DESCRIPTION - LOCATION
LOCATION: ABDOMEN;ARM
LOCATION: ABDOMEN;ARM

## 2020-02-12 NOTE — OP NOTE
[facility]    CARDIAC CATHETERIZATION    Date:   2/12/2020  Patient name:  Jessica Vickers  Date of admission:  2/11/2020 12:41 AM  MRN:   [id number]  YOB: 1965    Operators:  Primary:   Rick Patten MD (Attending Physician)    Assistant/CV fellow: Ashly Lyn MD      Procedure performed:   [x] Left Heart Catheterization. [] Graft Angiography. [x] Left Ventriculography. [] Right Heart Catheterization. [x] Coronary Angiography. [] Aortic Valve Studies. [x] PCI: LAD     [] Other:       Pre Procedure Conscious Sedation Data:  ASA Class:    [] I [] II [x] III [] IV    Mallampati Class:  [] I [] II [x] III [] IV      Indication:  [] STEMI      [] + Stress test  [] ACS      [] + EKG Changes  [x] Non Q MI       [x] Significant Risk Factors  [] Recurrent Angina             [] Diabetes Mellitus    [] New LBBB      [] Uncontrolled HTN. [x] CHF / Low EF changes     [] Abnormal CTA / Ca Score      Procedure:  Access:  [x] Femoral  [] Radial  artery       [x] Right  [] Left    Procedure: After informed consent was obtained with explanation of the risks and benefits, patient was brought to the cath lab. The access area was prepped and draped in sterile fashion. 1% lidocaine was used for local block. The artery was cannulated with 6  Fr sheath with brisk arterial blood return. The side port was frequently flushed and aspirated with normal saline. Findings:    LMCA: Mild irregularities 10-20%.     LAD: 100% proximal stenosis proximal to previous stent, not able to wire into LAD despite multiple attempts and wires, successful wiring into Diagonal. Multiple PTCA in proximal LAD and diagonal with no regain of flow. Lesion on 1st Diag: Proximal subsection. 100% stenosis 12 mm length reduced to 100%      Lesion on Prox LAD: Proximal subsection. 100% stenosis 18 mm length reduced to 100%.      LCx: Mild irregularities 10-20%.     RCA: 100% occluded from before with left to right

## 2020-02-12 NOTE — PROGRESS NOTES
Patient admitted, consent signed and questions answered. Patient ready for procedure. Call light to reach with side rails up 2 of 2. Bilateral groin hairs clipped. No one at bedside with patient. History and physical complete.

## 2020-02-12 NOTE — PROGRESS NOTES
Reactions    Ticagrelor Shortness Of Breath    Pcn [Penicillins]       Medications :  aspirin, 81 mg, Oral, Daily  atorvastatin, 40 mg, Oral, Daily  benztropine, 1 mg, Oral, BID  clopidogrel, 75 mg, Oral, Daily  divalproex, 1,500 mg, Oral, Daily  doxepin, 50 mg, Oral, Nightly  DULoxetine, 60 mg, Oral, Daily  losartan, 12.5 mg, Oral, Daily  lurasidone, 40 mg, Oral, Daily  metoprolol tartrate, 12.5 mg, Oral, BID  OLANZapine zydis, 20 mg, Oral, Nightly  sodium chloride flush, 10 mL, Intravenous, 2 times per day  nicotine, 1 patch, Transdermal, Daily  furosemide, 40 mg, Intravenous, BID  famotidine, 20 mg, Oral, BID        Review of Systems   Review of Systems   Constitutional: Negative for activity change, appetite change, fatigue, fever and unexpected weight change. HENT: Negative for congestion, nosebleeds, rhinorrhea, sinus pressure, sneezing and voice change. Respiratory: Negative for cough, choking, chest tightness, shortness of breath and wheezing. Cardiovascular: Negative for chest pain, palpitations and leg swelling. Gastrointestinal: Negative for abdominal pain, constipation, diarrhea, nausea and vomiting. Genitourinary: Negative for difficulty urinating, discharge, dysuria, frequency and testicular pain. Musculoskeletal: Negative for back pain. Skin: Negative for rash. Neurological: Negative for dizziness, weakness, light-headedness and headaches. Hematological: Does not bruise/bleed easily. Psychiatric/Behavioral: Negative for agitation, behavioral problems, confusion, self-injury, sleep disturbance and suicidal ideas.      Objective :      Current Vitals : Temp: 97.6 °F (36.4 °C),  Pulse: 88, Resp: 20, BP: 92/63, SpO2: 95 %  Last 24 Hrs Vitals   Patient Vitals for the past 24 hrs:   BP Temp Temp src Pulse Resp SpO2 Weight   02/12/20 0638 -- -- -- -- -- -- 243 lb 4.8 oz (110.4 kg)   02/12/20 0418 92/63 97.6 °F (36.4 °C) Oral 88 20 95 % --   02/12/20 0342 -- -- -- 77 -- 96 % -- 02/11/20 2330 (!) 93/58 97.4 °F (36.3 °C) Axillary 98 -- -- --   02/11/20 2311 97/71 -- -- 110 -- -- --   02/11/20 2156 -- -- -- -- -- 98 % --   02/11/20 1900 94/68 97.5 °F (36.4 °C) Axillary 83 -- -- --   02/11/20 1806 102/68 -- -- 90 20 -- --   02/11/20 1055 102/72 98.1 °F (36.7 °C) Oral 99 25 98 % --   02/11/20 0857 104/80 97.7 °F (36.5 °C) Axillary 89 20 96 % --     Intake / output   02/11 0701 - 02/12 0700  In: 7208 [P.O.:1150; I.V.:399]  Out: 3750 [Urine:3750]  Physical Exam:  Physical Exam  Vitals signs and nursing note reviewed. Constitutional:       General: He is not in acute distress. Appearance: He is not diaphoretic. Interventions: Nasal cannula in place. HENT:      Head: Normocephalic and atraumatic. Nose:      Right Sinus: No maxillary sinus tenderness or frontal sinus tenderness. Left Sinus: No maxillary sinus tenderness or frontal sinus tenderness. Mouth/Throat:      Pharynx: No oropharyngeal exudate. Eyes:      General: No scleral icterus. Conjunctiva/sclera: Conjunctivae normal.      Pupils: Pupils are equal, round, and reactive to light. Neck:      Musculoskeletal: Full passive range of motion without pain and neck supple. Thyroid: No thyromegaly. Vascular: No JVD. Cardiovascular:      Rate and Rhythm: Normal rate and regular rhythm. Pulses:           Dorsalis pedis pulses are 2+ on the right side and 2+ on the left side. Heart sounds: Normal heart sounds. No murmur. Pulmonary:      Effort: Pulmonary effort is normal.      Breath sounds: Normal breath sounds. No wheezing or rales. Abdominal:      Palpations: Abdomen is soft. There is no mass. Tenderness: There is no abdominal tenderness. Lymphadenopathy:      Head:      Right side of head: No submandibular adenopathy. Left side of head: No submandibular adenopathy. Cervical: No cervical adenopathy. Skin:     General: Skin is warm.    Neurological:      Mental Status: He is alert and oriented to person, place, and time. Motor: No tremor. Psychiatric:         Behavior: Behavior is cooperative. Laboratory findings:    Recent Labs     02/10/20  1503 02/11/20  0138 02/12/20  0619   WBC 8.1 7.0 5.6   HGB 13.4 13.5 13.6   HCT 43.2 43.6 43.1    223 195     Recent Labs     02/10/20  1328      K 4.4      CO2 22   GLUCOSE 106*   BUN 29*   CREATININE 1.26*   CALCIUM 9.2     No results for input(s): PROT, LABALBU, LABA1C, J8POSOI, V5PEFHC, FT4, TSH, AST, ALT, LDH, GGT, ALKPHOS, BILITOT, BILIDIR, AMMONIA, AMYLASE, LIPASE, LACTATE, CHOL, HDL, LDLCHOLESTEROL, CHOLHDLRATIO, TRIG, VLDL, BNP, TROPONINI, CKTOTAL, CKMB, CKMBINDEX, RF, VIC in the last 72 hours. Specific Gravity, UA   Date Value Ref Range Status   09/12/2019 1.010 1.010 - 1.020 Final     Protein, UA   Date Value Ref Range Status   09/12/2019 2+ (A) NEGATIVE Final     RBC, UA   Date Value Ref Range Status   09/12/2019 0 TO 2 0 - 2 /HPF Final     Bacteria, UA   Date Value Ref Range Status   09/12/2019 1+ (A) None Final     Nitrite, Urine   Date Value Ref Range Status   09/12/2019 NEGATIVE NEGATIVE Final     WBC, UA   Date Value Ref Range Status   09/12/2019 0 TO 2 0 - 5 /HPF Final     Leukocyte Esterase, Urine   Date Value Ref Range Status   09/12/2019 NEGATIVE NEGATIVE Final       Imaging / Clinical Data :-   Xr Chest Portable    Result Date: 2/10/2020  Enlarged cardiac silhouette and mild central vascular congestion, similar to 9/12/2019. Clinical Course : stable  Assessment and Plan  :        1. Acute on chronic systolic and diastolic CHF - continue  IV diuretics, fluid restriction, strict I's and O's monitoring. Monitor kidney function. 2. NSTEMI -heparin infusion. Cardiology on Board and plan for Cath today. 3. CAD s/p stent - continue aspirin, Plavix, Lipitor  4. Nonischemic cardiomyopathy - EF 20%  5. H/o alcoholism -   6.  Alcoholic cirrhosis with portal hypertension      Continue to monitor vitals , Intake / output ,  Cell count , HGB , Kidney function, oxygenation  as indicated . Plan and updates discussed with patient ,  answers  explained to satisfaction.    Plan discussed with Staff  RN     (Please note that portions of this note were completed with a voice recognition program. Efforts were made to edit the dictations but occasionally words are mis-transcribed.)      Patrizia Rob MD  2/12/2020

## 2020-02-13 VITALS
DIASTOLIC BLOOD PRESSURE: 69 MMHG | OXYGEN SATURATION: 94 % | SYSTOLIC BLOOD PRESSURE: 95 MMHG | RESPIRATION RATE: 20 BRPM | HEART RATE: 84 BPM | BODY MASS INDEX: 32.91 KG/M2 | WEIGHT: 242.95 LBS | HEIGHT: 72 IN | TEMPERATURE: 97.3 F

## 2020-02-13 LAB
ANION GAP SERPL CALCULATED.3IONS-SCNC: 12 MMOL/L (ref 9–17)
BUN BLDV-MCNC: 23 MG/DL (ref 6–20)
BUN/CREAT BLD: ABNORMAL (ref 9–20)
CALCIUM SERPL-MCNC: 9 MG/DL (ref 8.6–10.4)
CHLORIDE BLD-SCNC: 100 MMOL/L (ref 98–107)
CO2: 25 MMOL/L (ref 20–31)
CREAT SERPL-MCNC: 0.99 MG/DL (ref 0.7–1.2)
GFR AFRICAN AMERICAN: >60 ML/MIN
GFR NON-AFRICAN AMERICAN: >60 ML/MIN
GFR SERPL CREATININE-BSD FRML MDRD: ABNORMAL ML/MIN/{1.73_M2}
GFR SERPL CREATININE-BSD FRML MDRD: ABNORMAL ML/MIN/{1.73_M2}
GLUCOSE BLD-MCNC: 111 MG/DL (ref 70–99)
HCT VFR BLD CALC: 42.1 % (ref 40.7–50.3)
HEMOGLOBIN: 13.3 G/DL (ref 13–17)
MCH RBC QN AUTO: 29.6 PG (ref 25.2–33.5)
MCHC RBC AUTO-ENTMCNC: 31.6 G/DL (ref 28.4–34.8)
MCV RBC AUTO: 93.6 FL (ref 82.6–102.9)
NRBC AUTOMATED: 0 PER 100 WBC
PDW BLD-RTO: 15.1 % (ref 11.8–14.4)
PLATELET # BLD: 188 K/UL (ref 138–453)
PLATELET # BLD: 191 K/UL (ref 138–453)
PMV BLD AUTO: 8.5 FL (ref 8.1–13.5)
POTASSIUM SERPL-SCNC: 3.7 MMOL/L (ref 3.7–5.3)
RBC # BLD: 4.5 M/UL (ref 4.21–5.77)
SODIUM BLD-SCNC: 137 MMOL/L (ref 135–144)
WBC # BLD: 5.8 K/UL (ref 3.5–11.3)

## 2020-02-13 PROCEDURE — 85027 COMPLETE CBC AUTOMATED: CPT

## 2020-02-13 PROCEDURE — 99232 SBSQ HOSP IP/OBS MODERATE 35: CPT | Performed by: FAMILY MEDICINE

## 2020-02-13 PROCEDURE — 6370000000 HC RX 637 (ALT 250 FOR IP): Performed by: PHYSICIAN ASSISTANT

## 2020-02-13 PROCEDURE — 36415 COLL VENOUS BLD VENIPUNCTURE: CPT

## 2020-02-13 PROCEDURE — 80048 BASIC METABOLIC PNL TOTAL CA: CPT

## 2020-02-13 RX ORDER — NITROGLYCERIN 0.4 MG/1
TABLET SUBLINGUAL
Qty: 25 TABLET | Refills: 3 | Status: SHIPPED | OUTPATIENT
Start: 2020-02-13 | End: 2020-02-18 | Stop reason: SDUPTHER

## 2020-02-13 RX ORDER — FUROSEMIDE 20 MG/1
20 TABLET ORAL 2 TIMES DAILY
Qty: 60 TABLET | Refills: 3 | Status: ON HOLD | OUTPATIENT
Start: 2020-02-13 | End: 2020-03-04 | Stop reason: HOSPADM

## 2020-02-13 RX ORDER — ALBUTEROL SULFATE 2.5 MG/3ML
2.5 SOLUTION RESPIRATORY (INHALATION) EVERY 6 HOURS PRN
Status: DISCONTINUED | OUTPATIENT
Start: 2020-02-13 | End: 2020-02-13 | Stop reason: HOSPADM

## 2020-02-13 RX ORDER — FUROSEMIDE 20 MG/1
20 TABLET ORAL 2 TIMES DAILY
Status: DISCONTINUED | OUTPATIENT
Start: 2020-02-13 | End: 2020-02-13 | Stop reason: HOSPADM

## 2020-02-13 RX ADMIN — BENZTROPINE MESYLATE 1 MG: 1 TABLET ORAL at 09:21

## 2020-02-13 RX ADMIN — CLOPIDOGREL 75 MG: 75 TABLET, FILM COATED ORAL at 09:21

## 2020-02-13 RX ADMIN — Medication 81 MG: at 09:22

## 2020-02-13 RX ADMIN — DIVALPROEX SODIUM 1500 MG: 500 TABLET, DELAYED RELEASE ORAL at 09:21

## 2020-02-13 RX ADMIN — DULOXETINE HYDROCHLORIDE 60 MG: 30 CAPSULE, DELAYED RELEASE ORAL at 09:21

## 2020-02-13 RX ADMIN — FAMOTIDINE 20 MG: 20 TABLET, FILM COATED ORAL at 09:22

## 2020-02-13 ASSESSMENT — ENCOUNTER SYMPTOMS
BACK PAIN: 0
CHOKING: 0
WHEEZING: 0
VOMITING: 0
COUGH: 0
ABDOMINAL PAIN: 0
CHEST TIGHTNESS: 0
RHINORRHEA: 0
SHORTNESS OF BREATH: 0
DIARRHEA: 0
CONSTIPATION: 0
SINUS PRESSURE: 0
NAUSEA: 0
VOICE CHANGE: 0

## 2020-02-13 NOTE — PROGRESS NOTES
Discharge instruction given to patient, pt verbalized understanding of discharge given, pt discharged home with stable condition, care plan met

## 2020-02-13 NOTE — PROGRESS NOTES
RUBI NICOLAS, [facility]. NSTEMI (non-ST elevated myocardial infarction) ([facility] [address] [I21.4] . Vitals:    02/13/20 1043   BP: 95/69   Pulse: 84   Resp: 20   Temp: 97.3 °F (36.3 °C)   SpO2: 94%   . Patients home meds are   Prior to Admission medications    Medication Sig Start Date End Date Taking? Authorizing Provider   nitroGLYCERIN (NITROSTAT) 0.4 MG SL tablet up to max of 3 total doses. If no relief after 1 dose, call 911. 2/13/20  Yes CAM Feliz CNP   furosemide (LASIX) 20 MG tablet Take 1 tablet by mouth 2 times daily 2/13/20  Yes CAM Feliz CNP   atorvastatin (LIPITOR) 40 MG tablet Take 40 mg by mouth daily 1/14/20  Yes Historical Provider, MD   doxepin (SINEQUAN) 50 MG capsule Take 50 mg by mouth 1/8/20  Yes Historical Provider, MD   losartan (COZAAR) 25 MG tablet Take 0.5 tablets by mouth daily 1/20/20  Yes Deepika Brooks MD   metoprolol tartrate (LOPRESSOR) 25 MG tablet Take 0.5 tablets by mouth 2 times daily 11/26/19  Yes Deepika Brooks MD   clopidogrel (PLAVIX) 75 MG tablet Take 1 tablet by mouth daily 10/2/19  Yes Deepika Brooks MD   aspirin 81 MG EC tablet Take 1 tablet by mouth daily 9/20/19  Yes CAM Feliz CNP   benztropine (COGENTIN) 1 MG tablet Take 1 mg by mouth 2 times daily   Yes Historical Provider, MD   lurasidone (LATUDA) 40 MG TABS tablet Take 40 mg by mouth daily    Yes Historical Provider, MD   DULoxetine (CYMBALTA) 20 MG capsule Take 60 mg by mouth daily    Yes Historical Provider, MD   divalproex (DEPAKOTE) 500 MG EC tablet Take 1,500 mg by mouth daily.      Yes Historical Provider, MD   OLANZapine zydis (ZYPREXA ZYDIS) 15 MG disintegrating tablet Take 20 mg by mouth nightly    Yes Historical Provider, MD   famotidine (PEPCID) 20 MG tablet Take 1 tablet by mouth 2 times daily 9/19/19   CAM Feliz CNP   nicotine (NICODERM CQ) 21 MG/24HR Place 1 patch onto the skin daily 9/20/19   Shayan Hilliard APRN - secretions []  Copius, Viscious Yellow/ Secretions   CXR as reported by physician []  clear  []  Unavailable []  Infiltrates and/or consolidation  []  Unavailable []  Mucus Plugging and or lobar consolidation  []  Unavailable   Cough []  Strong, productive cough []  Weak productive cough []  No cough or weak non-productive cough   RUBI GRAHAMON  12:45 PM                            FEMALE                                  MALE                            FEV1 Predicted Normal Values                        FEV1 Predicted Normal Values          Age                                     Height in Feet and Inches       Age                                     Height in Feet and Inches       4' 11\" 5' 1\" 5' 3\" 5' 5\" 5' 7\" 5' 9\" 5' 11\" 6' 1\"  4' 11\" 5' 1\" 5' 3\" 5' 5\" 5' 7\" 5' 9\" 5' 11\" 6' 1\"   42 - 45 2.49 2.66 2.84 3.03 3.22 3.42 3.62 3.83 42 - 45 2.82 3.03 3.26 3.49 3.72 3.96 4.22 4.47   46 - 49 2.40 2.57 2.76 2.94 3.14 3.33 3.54 3.75 46 - 49 2.70 2.92 3.14 3.37 3.61 3.85 4.10 4.36   50 - 53 2.31 2.48 2.66 2.85 3.04 3.24 3.45 3.66 50 - 53 2.58 2.80 3.02 3.25 3.49 3.73 3.98 4.24   54 - 57 2.21 2.38 2.57 2.75 2.95 3.14 3.35 3.56 54 - 57 2.46 2.67 2.89 3.12 3.36 3.60 3.85 4.11   58 - 61 2.10 2.28 2.46 2.65 2.84 3.04 3.24 3.45 58 - 61 2.32 2.54 2.76 2.99 3.23 3.47 3.72 3.98   62 - 65 1.99 2.17 2.35 2.54 2.73 2.93 3.13 3.34 62 - 65 2.19 2.40 2.62 2.85 3.09 3.33 3.58 3.84   66 - 69 1.88 2.05 2.23 2.42 2.61 2.81 3.02 3.23 66 - 69 2.04 2.26 2.48 2.71 2.95 3.19 3.44 3.70   70+ 1.82 1.99 2.17 2.36 2.55 2.75 2.95 3.16 70+ 1.97 2.19 2.41 2.64 2.87 3.12 3.37 3.62             Predicted Peak Expiratory Flow Rate                                       Height (in)  Female       Height (in) Male           Age 64 62 64 63 57 71 78 74 Age            20 173 815 591 184 029 238 275 517  37 78 80 88 35 08 59 77 09   25 337 352 366 381 396 411 426 441 25 447 476 505 533 562 [phone]   30 329 344 359 374 389 404 419 434 30 437 466 [phone]

## 2020-02-13 NOTE — PLAN OF CARE
1815- report recv'd from cath lab RN. Pt arrived via stretcher from cath lab, on RA, right femoral sheath in place. Transferred to hospital bed with slide sheet. Placed on bedside monitor. Patient instructed to lay flat, not to bend legs or lift head, risks identified. Art line connected and zero'd to bedside monitor. family at bedside, questions and concerns addressed.
Problem: Falls - Risk of:  Goal: Will remain free from falls  Description  Will remain free from falls  2/11/2020 2159 by Obdulio Jeong RN  Outcome: Ongoing  2/11/2020 1654 by Charlett Denver, RN  Outcome: Ongoing  Goal: Absence of physical injury  Description  Absence of physical injury  2/11/2020 2159 by Obdulio Jeong RN  Outcome: Ongoing  2/11/2020 1654 by Charlett Denver, RN  Outcome: Ongoing     Problem: Nutrition  Goal: Optimal nutrition therapy  2/11/2020 2159 by Obdulio Jeong RN  Outcome: Ongoing  2/11/2020 1654 by Charlett Denver, RN  Outcome: Ongoing  2/11/2020 1345 by Florentin Ugarte RD, LD  Outcome: Ongoing  Note:   Nutrition Problem: Predicted suboptimal energy intake  Intervention: Food and/or Nutrient Delivery: Continue current diet, Start ONS  Nutritional Goals: Pt to meet % of est'd needs via PO       Problem: Cardiac Output - Decreased:  Goal: Hemodynamic stability will improve  Description  Hemodynamic stability will improve  2/11/2020 2159 by Obdulio Jeong RN  Outcome: Ongoing  2/11/2020 1654 by Charlett Denver, RN  Outcome: Ongoing
Pt. With no falls this shift. No breaks skin integrity. Pt. Calls out appropriately. Eating well. No complaints of pain or discomfort. Will cont. POC.
Fluid and electrolyte balance are achieved/maintained  Outcome: Ongoing     Problem: ACTIVITY INTOLERANCE/IMPAIRED MOBILITY  Goal: Mobility/activity is maintained at optimum level for patient  Outcome: Ongoing    Electronically signed by Tamara Conti RN on 2/13/2020 at 1:26 AM

## 2020-02-13 NOTE — PROGRESS NOTES
CLINICAL PHARMACY NOTE: MEDS TO [address] Select Patient?: No  Total # of Prescriptions Filled: 2   The following medications were delivered to the patient:  · Nitroglycerin 0.4 mg sl tabs  · Furosemide 20 mg tabs  Total # of Interventions Completed: 1  Time Spent (min): 60    Additional Documentation:Medications delivered to the room.

## 2020-02-13 NOTE — PROGRESS NOTES
02/12/20  0619   CHOL 122   HDL 44     INR: No results for input(s): INR in the last 72 hours. ECHO 12/18/19  Summary  Global left ventricular systolic function appears severely reduced with an  estimated ejection fraction of 15-20%. The left ventricular cavity size is severely enlarged and the left  ventricular wall thickness is moderately increased. Global hypokinesis with more severe inferior and inferoseptal hypokinesis  noted. The left atrium is moderately dilated (34-39) with a left atrial volume  index of 34 ml/m2. Aortic leaflets show calcification without restriction of motion. No aortic  insufficiency. Normal mitral valve structure with moderate mitral regurgitation. Evidence of mild (grade I) diastolic dysfunction is seen. The aortic root is considered to be at the upper limits of normal in size  when corrected for body surface area. Objective:   Vitals: BP (!) 80/51   Pulse 78   Temp 97.4 °F (36.3 °C) (Oral)   Resp 16   Ht 6' (1.829 m)   Wt 242 lb 15.2 oz (110.2 kg)   SpO2 94%   BMI 32.95 kg/m²   General appearance: alert and cooperative with exam  HEENT: Head: Normocephalic, no lesions, without obvious abnormality. Neck: no JVD, trachea midline, no adenopathy  Lungs: Course rhonchi to auscultation throughout. No wheezing. No rales. Lying flat in bed without distress  Heart: Regular rate and rhythm, s1/s2 auscultated, no murmurs. SR 94 with PVCs  Abdomen: soft, non-tender, bowel sounds active  Extremities: no edema. Right femoral site CDI. No drainage or hematoma  Neurologic: not done    Cardiac Cath 1/12/2020  Findings:     LMCA: Mild irregularities 10-20%.     LAD: 100% proximal stenosis proximal to previous stent, not able to wire into LAD despite multiple attempts and wires, successful wiring into Diagonal. Multiple PTCA in proximal LAD and diagonal with no regain of flow.       Lesion on 1st Diag: Proximal subsection. 100% stenosis 12 mm length reduced to 100%       Lesion on Prox LAD: Proximal subsection. 100% stenosis 18 mm length reduced to 100%.     LCx: Mild irregularities 10-20%.     RCA: 100% occluded from before with left to right Collaterals.        Estimated Blood Loss: 10 mL     Conclusions:  1. % proximal stenosis proximal to previous stent, not able to wire into LAD despite multiple attempts and wires, successful wiring into Diagonal. Multiple PTCA in proximal LAD and diagonal with no regain of flow.     Recommendations:  1. Post-cath protocol  2. Continue optimal medical therapy  3. Risk factor modification      Dr. Huang Alegria note from 1/20/2020 regarding AICD:  · We had a discussion about the risk and benefits of having Bi-V ICD placed and told him that from a safety standpoint he really should strongly consider this to not only reduce his risk of sudden death, but if we were able to be a bi-ventricular ICD, potentially improve his EF and quality of life. Mr. Fernando Rodriguez verbalized understanding of the risks but says would like to try changing Lisinopril to Losartan and have repeat echo in one month. Assessment / Acute Cardiac Problems:   1. NSTEMI - CAD with LAD stenosis unable to regain flow  2. Acute on Chronic combined Systolic and Diastolic Congestive Heart Failure. 3. Alcoholic cirrhosis  4. Ischemic Cardiomyopathy  5. Portal Hypertension  6. Mutlivessel CAD s/p PCI   7. Acute Kidney Injury secondary to cardiorenal syndrome - ruled out.    8. Tobacco abuse    Patient Active Problem List:     Onychia and paronychia of toe     Other specified disease of nail     Acute on chronic diastolic CHF (congestive heart failure), NYHA class 3 (HCC)     Acute on chronic congestive heart failure (HCC)     Acute on chronic combined systolic and diastolic congestive heart failure, NYHA class 4 (HCC)     Portal hypertension (HCC)     Alcoholic cirrhosis of liver without ascites (HCC)     Idiopathic cardiomyopathy ([facility] [address]     Acute heart failure (HCC)     Ischemic cardiomyopathy     CAD, multiple vessel     CAD in native artery     Medication side effect, initial encounter     Idiopathic hypotension     NSTEMI (non-ST elevated myocardial infarction) ([facility] [address]      Plan of Treatment:   4. Acute systolic CHF. Improving. Will switch lasix to PO and increase home dose. Continue BB and ARB   5. NSTEMI with hx of CAD. Cardiac cath as above. Continue optimal med management with PO BB, ARB, ASA, statin, & Plavix. If symptoms return can consider nitrate or CCB however BP on lower side. May benefit from Ranexa  6. Discussed with patient option of Lifevest with patient. Questions/concerns addressed. He states his doctor talked to him about something like this and he wants to [place] and recheck it. \" Discussed in detail with patient importance of medication compliance, diet, exercise, and tobacco & ETOH abuse cessation. Questions/concerns addressed. Verb understanding. 7. OK for discharge from CV standpoint on present medications. I spoke with Dr. Chapman Pulling office and  Updated on hospital stay. F/U with Dr. Reina Franks next Tuesday as scheduled.      Electronically signed by CAM Golden CNP on 2/13/2020 at 9:58 AM  [address]  [phone]

## 2020-02-13 NOTE — PROGRESS NOTES
[address] [facility]      Daily Progress Note     Admit Date: 2/11/2020  Bed/Room No.  3006/3006-01  Admitting Physician : Maikel Sosa MD  Code Status :[address] Day:  LOS: 2 days   Chief Complaint:   Chest pain    Principal Problem:    NSTEMI (non-ST elevated myocardial infarction) ([address]  Active Problems:    Acute on chronic combined systolic and diastolic congestive heart failure, NYHA class 4 (HCC)    Portal hypertension ([address]    Alcoholic cirrhosis of liver without ascites ([address]    Idiopathic cardiomyopathy ([address]  Resolved Problems:    * No resolved hospital problems. *    Subjective : Interval History/Significant events :  02/13/20    Patient underwent cardiac cath yesterday and was found to have multiple vessel disease. Medical management was recommended. Patient remains chest pain-free. Breathing is stable. Vitals - Stable afebrile  Labs - hypernatremia . Nursing notes , Consults notes reviewed. Overnight events and updates discussed with Nursing staff . Background History:         Brad Councilman is 49 y.o. male  Who was admitted to the hospital on 2/11/2020 for treatment of NSTEMI (non-ST elevated myocardial infarction) ([address] Patient called EMS for acute worsening of breathing and was taken to [facility] emergency room. He denies any chest pain, dizziness, lightheadedness, palpitations, extremity edema. She reported compliance with medication. Evaluation in [facility] ED showed elevated troponin 3977, proBNP 11,127, normal WBC. Chest x-ray showed cardiomegaly with central vascular congestion. Patient has underlying history of bipolar 1 disorder, seizure disorder, CAD with LAD stent. Patient underwent cardiac catheter was found to have multivessel disease, PCI was not possible. Medical management was recommended.   PMH:  Past Medical History:   Diagnosis Date    Alcoholic cirrhosis ([address]     Back pain     Bipolar 1 disorder ([address]     CAD (coronary artery disease) 09/2019    s/p proximal LAD stent    Idiopathic cardiomyopathy ([facility] [address]     Seizures ([facility]     LAST SEIZURE 9-4-19      Allergies: Allergies   Allergen Reactions    Ticagrelor Shortness Of Breath    Pcn [Penicillins]       Medications :  aspirin, 81 mg, Oral, Daily  atorvastatin, 40 mg, Oral, Daily  benztropine, 1 mg, Oral, BID  clopidogrel, 75 mg, Oral, Daily  divalproex, 1,500 mg, Oral, Daily  doxepin, 50 mg, Oral, Nightly  DULoxetine, 60 mg, Oral, Daily  losartan, 12.5 mg, Oral, Daily  lurasidone, 40 mg, Oral, Daily  metoprolol tartrate, 12.5 mg, Oral, BID  OLANZapine zydis, 20 mg, Oral, Nightly  sodium chloride flush, 10 mL, Intravenous, 2 times per day  nicotine, 1 patch, Transdermal, Daily  furosemide, 40 mg, Intravenous, BID  famotidine, 20 mg, Oral, BID        Review of Systems   Review of Systems   Constitutional: Negative for activity change, appetite change, fatigue, fever and unexpected weight change. HENT: Negative for congestion, nosebleeds, rhinorrhea, sinus pressure, sneezing and voice change. Respiratory: Negative for cough, choking, chest tightness, shortness of breath and wheezing. Cardiovascular: Negative for chest pain, palpitations and leg swelling. Gastrointestinal: Negative for abdominal pain, constipation, diarrhea, nausea and vomiting. Genitourinary: Negative for difficulty urinating, discharge, dysuria, frequency and testicular pain. Musculoskeletal: Negative for back pain. Skin: Negative for rash. Neurological: Negative for dizziness, weakness, light-headedness and headaches. Hematological: Does not bruise/bleed easily. Psychiatric/Behavioral: Negative for agitation, behavioral problems, confusion, self-injury, sleep disturbance and suicidal ideas.      Objective :      Current Vitals : Temp: 97.4 °F (36.3 °C),  Pulse: 87, Resp: 16, BP: 96/60, SpO2: 95 %  Last 24 Hrs Vitals   Patient Vitals for the past 24 hrs:   BP Temp Temp src Pulse Resp SpO2 Weight 02/13/20 0600 -- -- -- -- -- -- 242 lb 15.2 oz (110.2 kg)   02/13/20 0353 96/60 -- -- 87 -- 95 % --   02/13/20 0346 (!) 80/50 97.4 °F (36.3 °C) Oral 90 16 96 % --   02/13/20 0115 -- -- -- 84 -- 94 % --   02/13/20 0100 -- -- -- 82 -- 94 % --   02/13/20 0045 -- -- -- 84 -- 93 % --   02/13/20 0030 -- -- -- 91 -- 96 % --   02/13/20 0015 -- -- -- 84 -- 93 % --   02/13/20 0000 -- -- -- 86 -- 94 % --   02/12/20 2345 -- -- -- 83 -- 92 % --   02/12/20 2330 -- -- -- 89 -- 94 % --   02/12/20 2315 -- -- -- 90 -- 94 % --   02/12/20 2245 -- -- -- 87 -- 98 % --   02/12/20 2230 -- -- -- 92 -- 95 % --   02/12/20 2215 -- -- -- 92 -- 97 % --   02/12/20 2202 104/68 97.9 °F (36.6 °C) Oral 86 22 96 % --   02/12/20 2200 -- -- -- 91 -- 92 % --   02/12/20 2145 -- -- -- 94 -- 97 % --   02/12/20 2130 -- -- -- 93 -- 95 % --   02/12/20 2115 -- -- -- 99 -- 93 % --   02/12/20 2100 -- -- -- 100 -- 99 % --   02/12/20 2045 117/75 -- -- 95 -- 97 % --   02/12/20 2030 97/62 -- -- 88 -- 97 % --   02/12/20 2015 -- -- -- 87 -- 96 % --   02/12/20 2000 99/61 97.7 °F (36.5 °C) Oral 88 22 97 % --   02/12/20 1945 -- -- -- 90 -- 98 % --   02/12/20 1930 (!) 97/55 -- -- 89 -- 99 % --   02/12/20 1915 -- -- -- 87 -- 97 % --   02/12/20 1900 (!) 97/55 -- -- 88 23 92 % --   02/12/20 1820 107/72 -- -- 93 20 95 % --   02/12/20 1415 (!) 96/56 -- -- 84 -- 96 % --   02/12/20 1230 (!) 112/96 97.6 °F (36.4 °C) Oral 87 23 94 % --   02/12/20 1109 94/65 97.5 °F (36.4 °C) Oral 94 20 98 % --   02/12/20 0930 101/74 97.6 °F (36.4 °C) Oral 88 22 95 % --     Intake / output   02/12 0701 - 02/13 0700  In: 740 [P.O.:640; I.V.:100]  Out: 1581 [Urine:1581]  Physical Exam:  Physical Exam  Vitals signs and nursing note reviewed. Constitutional:       General: He is not in acute distress. Appearance: He is not diaphoretic. Interventions: Nasal cannula in place. HENT:      Head: Normocephalic and atraumatic.       Nose:      Right Sinus: No maxillary sinus tenderness or frontal sinus tenderness. Left Sinus: No maxillary sinus tenderness or frontal sinus tenderness. Mouth/Throat:      Pharynx: No oropharyngeal exudate. Eyes:      General: No scleral icterus. Conjunctiva/sclera: Conjunctivae normal.      Pupils: Pupils are equal, round, and reactive to light. Neck:      Musculoskeletal: Full passive range of motion without pain and neck supple. Thyroid: No thyromegaly. Vascular: No JVD. Cardiovascular:      Rate and Rhythm: Normal rate and regular rhythm. Pulses:           Dorsalis pedis pulses are 2+ on the right side and 2+ on the left side. Heart sounds: Normal heart sounds. No murmur. Pulmonary:      Effort: Pulmonary effort is normal.      Breath sounds: Normal breath sounds. No wheezing or rales. Abdominal:      Palpations: Abdomen is soft. There is no mass. Tenderness: There is no abdominal tenderness. Lymphadenopathy:      Head:      Right side of head: No submandibular adenopathy. Left side of head: No submandibular adenopathy. Cervical: No cervical adenopathy. Skin:     General: Skin is warm. Neurological:      Mental Status: He is alert and oriented to person, place, and time. Motor: No tremor. Psychiatric:         Behavior: Behavior is cooperative.            Laboratory findings:    Recent Labs     02/11/20  0138 02/12/20  0619 02/13/20  0020 02/13/20  0133   WBC 7.0 5.6  --  5.8   HGB 13.5 13.6  --  13.3   HCT 43.6 43.1  --  42.1    195 191 188     Recent Labs     02/10/20  1328 02/12/20  0619 02/13/20  0133    145* 137   K 4.4 3.8 3.7    102 100   CO2 22 27 25   GLUCOSE 106* 87 111*   BUN 29* 24* 23*   CREATININE 1.26* 0.99 0.99   MG  --  2.5  --    CALCIUM 9.2 9.0 9.0     Recent Labs     02/12/20  0619   PROT 6.1*   LABALBU 3.4*   AST 13   ALT 10   ALKPHOS 67   BILITOT 0.44   CHOL 122   HDL 44   LDLCHOLESTEROL 65   CHOLHDLRATIO 2.8   TRIG 66   VLDL NOT REPORTED          Specific Gravity, UA   Date Value Ref Range Status   09/12/2019 1.010 1.010 - 1.020 Final     Protein, UA   Date Value Ref Range Status   09/12/2019 2+ (A) NEGATIVE Final     RBC, UA   Date Value Ref Range Status   09/12/2019 0 TO 2 0 - 2 /HPF Final     Bacteria, UA   Date Value Ref Range Status   09/12/2019 1+ (A) None Final     Nitrite, Urine   Date Value Ref Range Status   09/12/2019 NEGATIVE NEGATIVE Final     WBC, UA   Date Value Ref Range Status   09/12/2019 0 TO 2 0 - 5 /HPF Final     Leukocyte Esterase, Urine   Date Value Ref Range Status   09/12/2019 NEGATIVE NEGATIVE Final       Imaging / Clinical Data :-   Xr Chest Portable    Result Date: 2/10/2020  Enlarged cardiac silhouette and mild central vascular congestion, similar to 9/12/2019. Clinical Course : stable  Assessment and Plan  :        1. Acute on chronic systolic and diastolic CHF -treated with IV diuretics. Breathing improved. 2. NSTEMI -treated with heparin infusion. Underwent cardiac cath and was found to have multivessel disease, PCI was not possible. Medical management was recommended. .   3. CAD s/p stent - continue aspirin, Plavix, Lipitor  4. Nonischemic cardiomyopathy - EF 20%  5. H/o alcoholism -   6. Alcoholic cirrhosis with portal hypertension      Discharge home and follow-up with primary cardiology  Plan and updates discussed with patient ,  answers  explained to satisfaction.    Plan discussed with Staff  RN     (Please note that portions of this note were completed with a voice recognition program. Efforts were made to edit the dictations but occasionally words are mis-transcribed.)      Vida Harris MD  2/13/2020

## 2020-02-14 NOTE — DISCHARGE SUMMARY
[facility]      Discharge Summary     Patient ID: Tien Duarte  :  1965   MRN: [id number]     ACCOUNT:  [de-identified]   Patient Location : [facility]/8917-  Patient's PCP: CAM Ramírez CNP  Admit Date: 2020   Discharge Date: 2020     Length of Stay: 2  Code Status:  Prior  Admitting Physician: Jasvir Ansari MD  Discharge Physician: Jasvir Ansari MD     Active Discharge Diagnosis :     Primary Problem  NSTEMI (non-ST elevated myocardial infarction) [facility])      [facility] Problems    Diagnosis Date Noted    Idiopathic cardiomyopathy ([facility] [I42.8]      Priority: High    Acute on chronic combined systolic and diastolic congestive heart failure, NYHA class 4 (HCC) [I50.43]      Priority: High    Portal hypertension ([facility] [K76.6]      Priority: High    Alcoholic cirrhosis of liver without ascites ([facility] [K70.30]      Priority: High    NSTEMI (non-ST elevated myocardial infarction) ([facility] [I21.4] 02/10/2020       Admission Condition:  fair     Discharged Condition: stable    Hospital Stay:     Hospital Course:  Tien Duarte is a 49 y.o. male who was admitted for the management of   NSTEMI (non-ST elevated myocardial infarction) ([facility] , presented to ER with Chest pain. Patient called EMS for acute worsening of breathing and was taken to [place] emergency room.  He denies any chest pain, dizziness, lightheadedness, palpitations, extremity edema.  She reported compliance with medication. Evaluation in [place] ED showed elevated troponin 3977, proBNP 11,127, normal WBC.  Chest x-ray showed cardiomegaly with central vascular congestion. Patient has underlying history of bipolar 1 disorder, seizure disorder, CAD with LAD stent.   Patient underwent cardiac catheter was found to have multivessel disease, PCI was not possible. Medical management was recommended. Life vest was recommended but refused .  Patient advised to stop smoking and alcohol use. Medical compliance stressed. Significant therapeutic interventions:   1. Acute on chronic systolic and diastolic CHF -treated with IV diuretics. Breathing improved. 2. NSTEMI -treated with heparin infusion.    Underwent cardiac cath and was found to have multivessel disease, PCI was not possible. Medical management was recommended. .   3. CAD s/p stent - continue aspirin, Plavix, Lipitor  4. Nonischemic cardiomyopathy - EF 20%  5. H/o alcoholism -   6. Alcoholic cirrhosis with portal hypertension       Significant Diagnostic Studies:   Labs / Micro:/Radiology  Recent Labs     02/13/20  0133 02/13/20  0020 02/12/20  0619 02/11/20  0138   WBC 5.8  --  5.6 7.0   HGB 13.3  --  13.6 13.5   HCT 42.1  --  43.1 43.6   MCV 93.6  --  94.1 96.5    191 195 223     Labs Renal Latest Ref Rng & Units 2/13/2020 2/12/2020 2/10/2020 10/16/2019 10/10/2019   BUN 6 - 20 mg/dL 23(H) 24(H) 29(H) 10 17   Cr 0.70 - 1.20 mg/dL 0.99 0.99 1.26(H) 1.03 0.98   K 3.7 - 5.3 mmol/L 3.7 3.8 4.4 4.7 4.2   Na 135 - 144 mmol/L 137 145(H) 140 135 136     Lab Results   Component Value Date    ALT 10 02/12/2020    AST 13 02/12/2020    ALKPHOS 67 02/12/2020    BILITOT 0.44 02/12/2020     Lab Results   Component Value Date    TSH 2.37 09/13/2019     No results found for: HAV, HEPAIGM, HEPBIGM, HEPBCAB, HBEAG, HEPCAB  Lab Results   Component Value Date    COLORU YELLOW 09/12/2019    NITRU NEGATIVE 09/12/2019    GLUCOSEU NEGATIVE 09/12/2019    KETUA NEGATIVE 09/12/2019    UROBILINOGEN Normal 09/12/2019    BILIRUBINUR SMALL 09/12/2019     No results found for: LABA1C  No results found for: EAG  Lab Results   Component Value Date    INR 1.0 10/16/2019    INR 0.9 03/28/2013    PROTIME 10.3 10/16/2019    PROTIME 10.1 03/28/2013       Xr Chest Portable    Result Date: 2/10/2020  Enlarged cardiac silhouette and mild central vascular congestion, similar to 9/12/2019.       Cath 2/12/20   Findings:     LMCA: Mild irregularities 10-20%.     LAD: 100% proximal stenosis proximal to previous stent, not able to wire into LAD despite multiple attempts and wires, successful wiring into Diagonal. Multiple PTCA in proximal LAD and diagonal with no regain of flow.       Lesion on 1st Diag: Proximal subsection. 100% stenosis 12 mm length reduced to 100%       Lesion on Prox LAD: Proximal subsection. 100% stenosis 18 mm length reduced to 100%.     LCx: Mild irregularities 10-20%.     RCA: 100% occluded from before with left to right Collaterals. Consultations:    Consults:     Final Specialist Recommendations/Findings:   IP CONSULT TO CARDIOLOGY      The patient was seen and examined on day of discharge and this discharge summary is in conjunction with any daily progress note from day of discharge. Discharge plan:     Disposition: Home    Physician Follow Up:     Danielle Sanders MD  [address] [place] [address]  [phone]    On 2/18/2020  3:20pm for hospital f/u       Requiring Further Evaluation/Follow Up POST HOSPITALIZATION/Incidental Findings: follow up with Dr Louise Shepherd. Diet: cardiac diet    Activity: As tolerated. Instructions to Patient: stop smoking . Discharge Medications:      Medication List      START taking these medications    nitroGLYCERIN 0.4 MG SL tablet  Commonly known as:  NITROSTAT  up to max of 3 total doses. If no relief after 1 dose, call 911.         CHANGE how you take these medications    furosemide 20 MG tablet  Commonly known as:  LASIX  Take 1 tablet by mouth 2 times daily  What changed:    · how much to take  · when to take this        CONTINUE taking these medications    aspirin 81 MG EC tablet  Take 1 tablet by mouth daily     atorvastatin 40 MG tablet  Commonly known as:  LIPITOR     benztropine 1 MG tablet  Commonly known as:  COGENTIN     clopidogrel 75 MG tablet  Commonly known as:  PLAVIX  Take 1 tablet by mouth daily     Depakote 500 MG DR tablet  Generic drug:  divalproex     doxepin 50 MG capsule  Commonly known as:  SINEQUAN     DULoxetine 20 MG extended release capsule  Commonly known as:  CYMBALTA     famotidine 20 MG tablet  Commonly known as:  PEPCID  Take 1 tablet by mouth 2 times daily     losartan 25 MG tablet  Commonly known as:  COZAAR  Take 0.5 tablets by mouth daily     lurasidone 40 MG Tabs tablet  Commonly known as:  LATUDA     metoprolol tartrate 25 MG tablet  Commonly known as:  LOPRESSOR  Take 0.5 tablets by mouth 2 times daily     nicotine 21 MG/24HR  Commonly known as:  NICODERM CQ  Place 1 patch onto the skin daily     zyPREXA zydis 15 MG disintegrating tablet  Generic drug:  OLANZapine zydis           Where to Get Your Medications      These medications were sent to [facility] [address] [facility], [address], [address]  [address]    Phone:  [phone]   · furosemide 20 MG tablet  · nitroGLYCERIN 0.4 MG SL tablet         Time Spent on discharge is  31 mins in patient examination, evaluation, counseling as well as medication reconciliation, prescriptions for required medications, discharge plan and follow up. Electronically signed by   Michel Braden MD  2/14/2020        Thank you Dr. Nikita Iglesias, APRN - CNP for the opportunity to be involved in this patient's care.

## 2020-02-18 ENCOUNTER — OFFICE VISIT (OUTPATIENT)
Dept: CARDIOLOGY | Age: 55
End: 2020-02-18
Payer: MEDICARE

## 2020-02-18 VITALS
HEIGHT: 72 IN | OXYGEN SATURATION: 96 % | RESPIRATION RATE: 18 BRPM | DIASTOLIC BLOOD PRESSURE: 77 MMHG | WEIGHT: 247 LBS | HEART RATE: 102 BPM | SYSTOLIC BLOOD PRESSURE: 109 MMHG | BODY MASS INDEX: 33.46 KG/M2

## 2020-02-18 PROCEDURE — 99214 OFFICE O/P EST MOD 30 MIN: CPT | Performed by: FAMILY MEDICINE

## 2020-02-18 PROCEDURE — 1036F TOBACCO NON-USER: CPT | Performed by: FAMILY MEDICINE

## 2020-02-18 PROCEDURE — G8484 FLU IMMUNIZE NO ADMIN: HCPCS | Performed by: FAMILY MEDICINE

## 2020-02-18 PROCEDURE — 93010 ELECTROCARDIOGRAM REPORT: CPT | Performed by: FAMILY MEDICINE

## 2020-02-18 PROCEDURE — 3017F COLORECTAL CA SCREEN DOC REV: CPT | Performed by: FAMILY MEDICINE

## 2020-02-18 PROCEDURE — 93005 ELECTROCARDIOGRAM TRACING: CPT | Performed by: FAMILY MEDICINE

## 2020-02-18 PROCEDURE — G8427 DOCREV CUR MEDS BY ELIG CLIN: HCPCS | Performed by: FAMILY MEDICINE

## 2020-02-18 PROCEDURE — 1111F DSCHRG MED/CURRENT MED MERGE: CPT | Performed by: FAMILY MEDICINE

## 2020-02-18 PROCEDURE — G8417 CALC BMI ABV UP PARAM F/U: HCPCS | Performed by: FAMILY MEDICINE

## 2020-02-18 RX ORDER — NITROGLYCERIN 0.4 MG/1
TABLET SUBLINGUAL
Qty: 25 TABLET | Refills: 3 | Status: SHIPPED | OUTPATIENT
Start: 2020-02-18 | End: 2020-02-24 | Stop reason: SDUPTHER

## 2020-02-18 NOTE — PATIENT INSTRUCTIONS
SURVEY:    You may be receiving a survey from [employer] regarding your visit today. Please complete the survey to enable us to provide the highest quality of care to you and your family. If you cannot score us a very good on any question, please call the office to discuss how we could have made your experience a very good one. Thank you.

## 2020-02-18 NOTE — PROGRESS NOTES
or black tarry stools or blood in his urine. Exercise Tolerance: Mr. Darshan Gruber reports that he has a fairly good exercise tolerance. He says that he could ambulate a flight of stairs or walk a block without chest pain or significant shortness of breath. PAST MEDICAL HISTORY:        Past Medical History:   Diagnosis Date    [facility]olic [facility] ([email or web address]     Back pain     Bipolar 1 disorder ([email or web address]     CAD (coronary artery disease) 09/2019    s/p proximal LAD stent    Idiopathic cardiomyopathy ([email or web address]     Seizures ([email or web address]     LAST SEIZURE 9-4-19       CURRENT ALLERGIES: Ticagrelor and Pcn [penicillins] REVIEW OF SYSTEMS: 14 systems were reviewed. Pertinent positives and negatives as above, all else negative. Past Surgical History:   Procedure Laterality Date    CARDIAC CATHETERIZATION Left 09/17/2019    DR Valerio/[facility]/right radial-Severe two vessel coronary artery disease involving involving a 100% proximal probably co-dominant RCA, 90% stenosis in the distal part of the proximal LAD at a bifircation with a moderate sized D1 branch that also has an 80% stenosis. Mildly elevated left ventricular end diastolic pressure. Consult to cardiothoracic surgery for consideration of multi-vess    TOE SURGERY      TOOTH EXTRACTION      Social History:  Social History     Tobacco Use    Smoking status: Former Smoker     Packs/day: 1.00     Types: Cigarettes    Smokeless tobacco: Never Used   Substance Use Topics    Alcohol use: Yes     Comment: bottle of vodka a week    Drug use: Yes     Frequency: 1.0 times per week     Types: Marijuana        CURRENT MEDICATIONS:        Outpatient Medications Marked as Taking for the 2/18/20 encounter (Office Visit) with Deepika Brooks MD   Medication Sig Dispense Refill    nitroGLYCERIN (NITROSTAT) 0.4 MG SL tablet up to max of 3 total doses.  If no relief after 1 dose, call [phone]. 45 tablet 3    furosemide (LASIX) 20 MG tablet Take 1 tablet by mouth 2 times daily 60 and carotid pulses. Distal extremity pulses: 2+ bilaterally. Neurological: He is alert and oriented to person, place, and time. No evidence of gross cranial nerve deficit. Coordination appeared normal.   Skin: Skin is warm and dry. There is no rash or diaphoresis. Psychiatric: He has a normal mood and affect. His speech is normal and behavior is normal.      MOST RECENT LABS ON RECORD:   Lab Results   Component Value Date    WBC 5.8 02/13/2020    HGB 13.3 02/13/2020    HCT 42.1 02/13/2020     02/13/2020    CHOL 122 02/12/2020    TRIG 66 02/12/2020    HDL 44 02/12/2020    ALT 10 02/12/2020    AST 13 02/12/2020     02/13/2020    K 3.7 02/13/2020     02/13/2020    CREATININE 0.99 02/13/2020    BUN 23 (H) 02/13/2020    CO2 25 02/13/2020    TSH 2.37 09/13/2019    PSA 0.78 05/16/2019    INR 1.0 10/16/2019    LABMICR CANNOT BE CALCULATED 12/17/2014       ASSESSMENT:      Diagnosis Orders   1. ASHD (arteriosclerotic heart disease)     2. Chronic combined systolic and diastolic congestive heart failure (HCC)       PLAN:        Atherosclerotic Heart Disease: S/P Stenting x2 9/18/19: Appears to be doing well. No angina  Antiplatelet Agent: Continue aspirin 81 mg daily and Plavix 75 mg daily. Beta Blocker: Continue Metoprolol tartrate (Lopressor) 25 mg 1/2 tab bid. Anti-anginal medications: Continue nitroglycerin 0.4 mg tablets as needed for chest pain. Cholesterol Reduction Therapy: Continue Atorvastatin (Lipitor) 40 mg daily.  Chronic combined heart failure: New York Heart Association Class: IIb, Ischemic Cardiomyopathy, Echo done on 12/2019 showed an EF of 15-20%.  Beta Blocker: Continue Metoprolol tartrate (Lopressor) 25 mg 1/2 tab bid.  ACE Inibitor/ARB: Continue losartan (Cozaar) 25 mg, 1/2 tab daily.  Diuretics: Continue furosemide (Lasix) 10 mg daily.  Heart failure counseling: I advised them to try and keep their legs up whenever possible and to limit salt in their diet.  We again discussion about the risk and benefits of having Bi-V ICD placed and told him that from a safety standpoint he really should strongly consider this to not only reduce his risk of sudden death, but if we were able to be a bi-ventricular ICD, potentially improve his EF and quality of life. He agreed to recheck his EF and pending the results may consider an ICD at that time. I am hopeful this will not be necessary.  Additional Testing: I Ordered an Echocardiogram to assess Mr. Padron's ejection fraction and to re-assess his EF. In the meantime, I encouraged Mr. Yannick Malik to continue to take his other medications. FOLLOW UP:   I told Mr. Yannick Malik to call my office if he had any problems, but otherwise I asked him to Return in about 4 weeks (around 3/17/2020). However, I would be happy to see him sooner should the need arise. Sincerely,  Natalie Valerio MD, MS, F.A.C.C. [facility] Cardiology Specialist    [address] [place] [address]  Phone: [phone], Fax: [phone]     I believe that the risk of significant morbidity and mortality related to the patient's current medical conditions are: Intermediate. The documentation recorded by the scribe, accurately and completely reflects the services I personally performed and the decisions made by me. Celina Vora MD, MS, F.A.C.C.  February 18, 2020

## 2020-02-21 ENCOUNTER — TELEPHONE (OUTPATIENT)
Dept: CARDIOLOGY | Age: 55
End: 2020-02-21

## 2020-02-22 NOTE — TELEPHONE ENCOUNTER
I told Mr. Tabatha Berry not to take the nitro unless he had symptoms lasting > at least 1 minute which he says it never lasted >1 minute. He can have a refill but he cannot keep taking so many. Thanks.

## 2020-02-24 RX ORDER — NITROGLYCERIN 0.4 MG/1
TABLET SUBLINGUAL
Qty: 25 TABLET | Refills: 3 | Status: ON HOLD
Start: 2020-02-24 | End: 2020-04-11 | Stop reason: HOSPADM

## 2020-02-26 ENCOUNTER — HOSPITAL ENCOUNTER (INPATIENT)
Age: 55
LOS: 7 days | Discharge: NURSING, REHAB OR LONG-TERM CARE | DRG: 280 | End: 2020-03-04
Attending: FAMILY MEDICINE | Admitting: FAMILY MEDICINE
Payer: MEDICARE

## 2020-02-26 ENCOUNTER — HOSPITAL ENCOUNTER (EMERGENCY)
Age: 55
Discharge: ANOTHER HOSPITAL | End: 2020-02-26
Attending: EMERGENCY MEDICINE
Payer: MEDICARE

## 2020-02-26 ENCOUNTER — APPOINTMENT (OUTPATIENT)
Dept: GENERAL RADIOLOGY | Age: 55
End: 2020-02-26
Payer: MEDICARE

## 2020-02-26 VITALS
WEIGHT: 250 LBS | DIASTOLIC BLOOD PRESSURE: 79 MMHG | TEMPERATURE: 96.4 F | OXYGEN SATURATION: 96 % | SYSTOLIC BLOOD PRESSURE: 109 MMHG | HEART RATE: 99 BPM | BODY MASS INDEX: 33.9 KG/M2 | RESPIRATION RATE: 30 BRPM

## 2020-02-26 PROBLEM — I50.9 CHF WITH UNKNOWN LVEF (HCC): Status: ACTIVE | Noted: 2020-02-26

## 2020-02-26 LAB
-: ABNORMAL
ABSOLUTE EOS #: <0.03 K/UL (ref 0–0.44)
ABSOLUTE IMMATURE GRANULOCYTE: 0.05 K/UL (ref 0–0.3)
ABSOLUTE LYMPH #: 1.05 K/UL (ref 1.1–3.7)
ABSOLUTE MONO #: 1.01 K/UL (ref 0.1–1.2)
ALBUMIN SERPL-MCNC: 4.5 G/DL (ref 3.5–5.2)
ALBUMIN/GLOBULIN RATIO: 1.8 (ref 1–2.5)
ALP BLD-CCNC: 110 U/L (ref 40–129)
ALT SERPL-CCNC: 889 U/L (ref 5–41)
AMORPHOUS: ABNORMAL
ANION GAP SERPL CALCULATED.3IONS-SCNC: 20 MMOL/L (ref 9–17)
AST SERPL-CCNC: 963 U/L
BACTERIA: ABNORMAL
BASOPHILS # BLD: 0 % (ref 0–2)
BASOPHILS ABSOLUTE: <0.03 K/UL (ref 0–0.2)
BILIRUB SERPL-MCNC: 1.18 MG/DL (ref 0.3–1.2)
BILIRUBIN URINE: NEGATIVE
BNP INTERPRETATION: ABNORMAL
BUN BLDV-MCNC: 26 MG/DL (ref 6–20)
BUN/CREAT BLD: 25 (ref 9–20)
CALCIUM SERPL-MCNC: 9.3 MG/DL (ref 8.6–10.4)
CASTS UA: ABNORMAL /LPF
CHLORIDE BLD-SCNC: 77 MMOL/L (ref 98–107)
CO2: 19 MMOL/L (ref 20–31)
COLOR: YELLOW
COMMENT UA: NORMAL
CREAT SERPL-MCNC: 1.03 MG/DL (ref 0.7–1.2)
CRYSTALS, UA: ABNORMAL /HPF
DIFFERENTIAL TYPE: ABNORMAL
EKG ATRIAL RATE: 112 BPM
EKG P AXIS: 77 DEGREES
EKG P-R INTERVAL: 202 MS
EKG Q-T INTERVAL: 316 MS
EKG QRS DURATION: 116 MS
EKG QTC CALCULATION (BAZETT): 431 MS
EKG R AXIS: 147 DEGREES
EKG T AXIS: 50 DEGREES
EKG VENTRICULAR RATE: 112 BPM
EOSINOPHILS RELATIVE PERCENT: 0 % (ref 1–4)
EPITHELIAL CELLS UA: ABNORMAL /HPF (ref 0–5)
ETHANOL PERCENT: <0.01 %
ETHANOL: <10 MG/DL
GFR AFRICAN AMERICAN: >60 ML/MIN
GFR NON-AFRICAN AMERICAN: >60 ML/MIN
GFR SERPL CREATININE-BSD FRML MDRD: ABNORMAL ML/MIN/{1.73_M2}
GFR SERPL CREATININE-BSD FRML MDRD: ABNORMAL ML/MIN/{1.73_M2}
GLUCOSE BLD-MCNC: 105 MG/DL (ref 70–99)
GLUCOSE URINE: NEGATIVE
HCT VFR BLD CALC: 37.3 % (ref 40.7–50.3)
HCT VFR BLD CALC: 39.5 % (ref 40.7–50.3)
HEMOGLOBIN: 12.5 G/DL (ref 13–17)
HEMOGLOBIN: 13.4 G/DL (ref 13–17)
IMMATURE GRANULOCYTES: 1 %
INR BLD: 1.5 (ref 0.9–1.2)
KETONES, URINE: NEGATIVE
LEUKOCYTE ESTERASE, URINE: NEGATIVE
LIPASE: 9 U/L (ref 13–60)
LYMPHOCYTES # BLD: 14 % (ref 24–43)
MCH RBC QN AUTO: 29.2 PG (ref 25.2–33.5)
MCH RBC QN AUTO: 29.3 PG (ref 25.2–33.5)
MCHC RBC AUTO-ENTMCNC: 33.5 G/DL (ref 28.4–34.8)
MCHC RBC AUTO-ENTMCNC: 33.9 G/DL (ref 28.4–34.8)
MCV RBC AUTO: 86.4 FL (ref 82.6–102.9)
MCV RBC AUTO: 87.1 FL (ref 82.6–102.9)
MONOCYTES # BLD: 14 % (ref 3–12)
MUCUS: ABNORMAL
MYOGLOBIN: 168 NG/ML (ref 28–72)
MYOGLOBIN: 303 NG/ML (ref 28–72)
NITRITE, URINE: NEGATIVE
NRBC AUTOMATED: 1.7 PER 100 WBC
NRBC AUTOMATED: 3.6 PER 100 WBC
OTHER OBSERVATIONS UA: ABNORMAL
PARTIAL THROMBOPLASTIN TIME: 23.7 SEC (ref 20.5–30.5)
PARTIAL THROMBOPLASTIN TIME: 25.4 SEC (ref 20.5–30.5)
PARTIAL THROMBOPLASTIN TIME: 26.7 SEC (ref 23.2–34.4)
PDW BLD-RTO: 15.2 % (ref 11.8–14.4)
PDW BLD-RTO: 15.5 % (ref 11.8–14.4)
PH UA: 6 (ref 5–9)
PLATELET # BLD: 226 K/UL (ref 138–453)
PLATELET # BLD: 263 K/UL (ref 138–453)
PLATELET ESTIMATE: ABNORMAL
PMV BLD AUTO: 10.3 FL (ref 8.1–13.5)
PMV BLD AUTO: 10.4 FL (ref 8.1–13.5)
POTASSIUM SERPL-SCNC: 5.1 MMOL/L (ref 3.7–5.3)
PRO-BNP: 7518 PG/ML
PROTEIN UA: NEGATIVE
PROTHROMBIN TIME: 15.6 SEC (ref 9.7–12.2)
RBC # BLD: 4.28 M/UL (ref 4.21–5.77)
RBC # BLD: 4.57 M/UL (ref 4.21–5.77)
RBC # BLD: ABNORMAL 10*6/UL
RBC UA: ABNORMAL /HPF (ref 0–2)
RENAL EPITHELIAL, UA: ABNORMAL /HPF
SEG NEUTROPHILS: 71 % (ref 36–65)
SEGMENTED NEUTROPHILS ABSOLUTE COUNT: 5.35 K/UL (ref 1.5–8.1)
SODIUM BLD-SCNC: 113 MMOL/L (ref 135–144)
SODIUM BLD-SCNC: 116 MMOL/L (ref 135–144)
SPECIFIC GRAVITY UA: 1.01 (ref 1.01–1.02)
TOTAL PROTEIN: 7 G/DL (ref 6.4–8.3)
TRICHOMONAS: ABNORMAL
TROPONIN INTERP: ABNORMAL
TROPONIN T: ABNORMAL NG/ML
TROPONIN, HIGH SENSITIVITY: 209 NG/L (ref 0–22)
TROPONIN, HIGH SENSITIVITY: 212 NG/L (ref 0–22)
TROPONIN, HIGH SENSITIVITY: 219 NG/L (ref 0–22)
TROPONIN, HIGH SENSITIVITY: 311 NG/L (ref 0–22)
TURBIDITY: CLEAR
URINE HGB: NEGATIVE
UROBILINOGEN, URINE: NORMAL
WBC # BLD: 5.5 K/UL (ref 3.5–11.3)
WBC # BLD: 7.5 K/UL (ref 3.5–11.3)
WBC # BLD: ABNORMAL 10*3/UL
WBC UA: ABNORMAL /HPF (ref 0–5)
YEAST: ABNORMAL

## 2020-02-26 PROCEDURE — 85730 THROMBOPLASTIN TIME PARTIAL: CPT

## 2020-02-26 PROCEDURE — 99285 EMERGENCY DEPT VISIT HI MDM: CPT

## 2020-02-26 PROCEDURE — 99223 1ST HOSP IP/OBS HIGH 75: CPT | Performed by: FAMILY MEDICINE

## 2020-02-26 PROCEDURE — 6360000002 HC RX W HCPCS: Performed by: EMERGENCY MEDICINE

## 2020-02-26 PROCEDURE — 93010 ELECTROCARDIOGRAM REPORT: CPT | Performed by: INTERNAL MEDICINE

## 2020-02-26 PROCEDURE — 84484 ASSAY OF TROPONIN QUANT: CPT

## 2020-02-26 PROCEDURE — 36415 COLL VENOUS BLD VENIPUNCTURE: CPT

## 2020-02-26 PROCEDURE — G0480 DRUG TEST DEF 1-7 CLASSES: HCPCS

## 2020-02-26 PROCEDURE — 6370000000 HC RX 637 (ALT 250 FOR IP): Performed by: NURSE PRACTITIONER

## 2020-02-26 PROCEDURE — 2580000003 HC RX 258: Performed by: NURSE PRACTITIONER

## 2020-02-26 PROCEDURE — 85027 COMPLETE CBC AUTOMATED: CPT

## 2020-02-26 PROCEDURE — 84295 ASSAY OF SERUM SODIUM: CPT

## 2020-02-26 PROCEDURE — 83690 ASSAY OF LIPASE: CPT

## 2020-02-26 PROCEDURE — 71045 X-RAY EXAM CHEST 1 VIEW: CPT

## 2020-02-26 PROCEDURE — 96375 TX/PRO/DX INJ NEW DRUG ADDON: CPT

## 2020-02-26 PROCEDURE — 83880 ASSAY OF NATRIURETIC PEPTIDE: CPT

## 2020-02-26 PROCEDURE — 85610 PROTHROMBIN TIME: CPT

## 2020-02-26 PROCEDURE — 6370000000 HC RX 637 (ALT 250 FOR IP): Performed by: FAMILY MEDICINE

## 2020-02-26 PROCEDURE — 80307 DRUG TEST PRSMV CHEM ANLYZR: CPT

## 2020-02-26 PROCEDURE — 2060000000 HC ICU INTERMEDIATE R&B

## 2020-02-26 PROCEDURE — 80053 COMPREHEN METABOLIC PANEL: CPT

## 2020-02-26 PROCEDURE — 93005 ELECTROCARDIOGRAM TRACING: CPT | Performed by: EMERGENCY MEDICINE

## 2020-02-26 PROCEDURE — 81001 URINALYSIS AUTO W/SCOPE: CPT

## 2020-02-26 PROCEDURE — 6370000000 HC RX 637 (ALT 250 FOR IP): Performed by: EMERGENCY MEDICINE

## 2020-02-26 PROCEDURE — 83874 ASSAY OF MYOGLOBIN: CPT

## 2020-02-26 PROCEDURE — 96374 THER/PROPH/DIAG INJ IV PUSH: CPT

## 2020-02-26 PROCEDURE — 85025 COMPLETE CBC W/AUTO DIFF WBC: CPT

## 2020-02-26 RX ORDER — FOLIC ACID 1 MG/1
1 TABLET ORAL DAILY
Status: DISCONTINUED | OUTPATIENT
Start: 2020-02-26 | End: 2020-03-04 | Stop reason: HOSPADM

## 2020-02-26 RX ORDER — ONDANSETRON 4 MG/1
4 TABLET, ORALLY DISINTEGRATING ORAL EVERY 8 HOURS PRN
Status: DISCONTINUED | OUTPATIENT
Start: 2020-02-26 | End: 2020-03-04 | Stop reason: HOSPADM

## 2020-02-26 RX ORDER — POLYETHYLENE GLYCOL 3350 17 G/17G
17 POWDER, FOR SOLUTION ORAL DAILY PRN
Status: DISCONTINUED | OUTPATIENT
Start: 2020-02-26 | End: 2020-03-04 | Stop reason: HOSPADM

## 2020-02-26 RX ORDER — ASPIRIN 81 MG/1
324 TABLET, CHEWABLE ORAL ONCE
Status: COMPLETED | OUTPATIENT
Start: 2020-02-26 | End: 2020-02-26

## 2020-02-26 RX ORDER — DIVALPROEX SODIUM 500 MG/1
1500 TABLET, DELAYED RELEASE ORAL DAILY
Status: DISCONTINUED | OUTPATIENT
Start: 2020-02-26 | End: 2020-03-04 | Stop reason: HOSPADM

## 2020-02-26 RX ORDER — OLANZAPINE 10 MG/1
20 TABLET, ORALLY DISINTEGRATING ORAL NIGHTLY
Status: DISCONTINUED | OUTPATIENT
Start: 2020-02-27 | End: 2020-03-04 | Stop reason: HOSPADM

## 2020-02-26 RX ORDER — CLOPIDOGREL BISULFATE 75 MG/1
75 TABLET ORAL DAILY
Status: DISCONTINUED | OUTPATIENT
Start: 2020-02-26 | End: 2020-03-04 | Stop reason: HOSPADM

## 2020-02-26 RX ORDER — LORAZEPAM 1 MG/1
1 TABLET ORAL
Status: DISCONTINUED | OUTPATIENT
Start: 2020-02-26 | End: 2020-02-26

## 2020-02-26 RX ORDER — HEPARIN SODIUM 1000 [USP'U]/ML
4000 INJECTION, SOLUTION INTRAVENOUS; SUBCUTANEOUS PRN
Status: DISCONTINUED | OUTPATIENT
Start: 2020-02-26 | End: 2020-02-26 | Stop reason: HOSPADM

## 2020-02-26 RX ORDER — ACETAMINOPHEN 325 MG/1
650 TABLET ORAL EVERY 6 HOURS PRN
Status: DISCONTINUED | OUTPATIENT
Start: 2020-02-26 | End: 2020-03-04 | Stop reason: HOSPADM

## 2020-02-26 RX ORDER — FAMOTIDINE 20 MG/1
20 TABLET, FILM COATED ORAL 2 TIMES DAILY
Status: DISCONTINUED | OUTPATIENT
Start: 2020-02-26 | End: 2020-03-04 | Stop reason: HOSPADM

## 2020-02-26 RX ORDER — THIAMINE MONONITRATE (VIT B1) 100 MG
100 TABLET ORAL DAILY
Status: DISCONTINUED | OUTPATIENT
Start: 2020-02-26 | End: 2020-03-04 | Stop reason: HOSPADM

## 2020-02-26 RX ORDER — NITROGLYCERIN 0.4 MG/1
0.4 TABLET SUBLINGUAL EVERY 5 MIN PRN
Status: DISCONTINUED | OUTPATIENT
Start: 2020-02-26 | End: 2020-03-04 | Stop reason: HOSPADM

## 2020-02-26 RX ORDER — LORAZEPAM 2 MG/ML
1 INJECTION INTRAMUSCULAR ONCE
Status: COMPLETED | OUTPATIENT
Start: 2020-02-27 | End: 2020-02-27

## 2020-02-26 RX ORDER — NITROGLYCERIN 0.4 MG/1
0.4 TABLET SUBLINGUAL ONCE
Status: COMPLETED | OUTPATIENT
Start: 2020-02-26 | End: 2020-02-26

## 2020-02-26 RX ORDER — HEPARIN SODIUM 1000 [USP'U]/ML
4000 INJECTION, SOLUTION INTRAVENOUS; SUBCUTANEOUS ONCE
Status: COMPLETED | OUTPATIENT
Start: 2020-02-26 | End: 2020-02-26

## 2020-02-26 RX ORDER — ACETAMINOPHEN 650 MG/1
650 SUPPOSITORY RECTAL EVERY 6 HOURS PRN
Status: DISCONTINUED | OUTPATIENT
Start: 2020-02-26 | End: 2020-03-04 | Stop reason: HOSPADM

## 2020-02-26 RX ORDER — BENZTROPINE MESYLATE 1 MG/1
1 TABLET ORAL 2 TIMES DAILY
Status: DISCONTINUED | OUTPATIENT
Start: 2020-02-26 | End: 2020-03-04 | Stop reason: HOSPADM

## 2020-02-26 RX ORDER — NICOTINE 21 MG/24HR
1 PATCH, TRANSDERMAL 24 HOURS TRANSDERMAL DAILY PRN
Status: DISCONTINUED | OUTPATIENT
Start: 2020-02-26 | End: 2020-02-26

## 2020-02-26 RX ORDER — HEPARIN SODIUM 1000 [USP'U]/ML
4000 INJECTION, SOLUTION INTRAVENOUS; SUBCUTANEOUS ONCE
Status: DISCONTINUED | OUTPATIENT
Start: 2020-02-26 | End: 2020-02-26

## 2020-02-26 RX ORDER — FUROSEMIDE 20 MG/1
10 TABLET ORAL DAILY
Status: DISCONTINUED | OUTPATIENT
Start: 2020-02-27 | End: 2020-02-27

## 2020-02-26 RX ORDER — MULTIVITAMIN WITH FOLIC ACID 400 MCG
1 TABLET ORAL DAILY
Status: DISCONTINUED | OUTPATIENT
Start: 2020-02-26 | End: 2020-03-04 | Stop reason: HOSPADM

## 2020-02-26 RX ORDER — HEPARIN SODIUM 1000 [USP'U]/ML
2000 INJECTION, SOLUTION INTRAVENOUS; SUBCUTANEOUS PRN
Status: DISCONTINUED | OUTPATIENT
Start: 2020-02-26 | End: 2020-02-26 | Stop reason: HOSPADM

## 2020-02-26 RX ORDER — LORAZEPAM 2 MG/ML
2 INJECTION INTRAMUSCULAR
Status: DISCONTINUED | OUTPATIENT
Start: 2020-02-26 | End: 2020-02-26

## 2020-02-26 RX ORDER — LORAZEPAM 2 MG/ML
3 INJECTION INTRAMUSCULAR
Status: DISCONTINUED | OUTPATIENT
Start: 2020-02-26 | End: 2020-02-26

## 2020-02-26 RX ORDER — ONDANSETRON 2 MG/ML
4 INJECTION INTRAMUSCULAR; INTRAVENOUS EVERY 6 HOURS PRN
Status: DISCONTINUED | OUTPATIENT
Start: 2020-02-26 | End: 2020-03-04 | Stop reason: HOSPADM

## 2020-02-26 RX ORDER — DOXEPIN HYDROCHLORIDE 25 MG/1
50 CAPSULE ORAL NIGHTLY
Status: DISCONTINUED | OUTPATIENT
Start: 2020-02-26 | End: 2020-03-04 | Stop reason: HOSPADM

## 2020-02-26 RX ORDER — LORAZEPAM 2 MG/ML
1 INJECTION INTRAMUSCULAR
Status: DISCONTINUED | OUTPATIENT
Start: 2020-02-26 | End: 2020-02-26

## 2020-02-26 RX ORDER — LOSARTAN POTASSIUM 25 MG/1
12.5 TABLET ORAL DAILY
Status: DISCONTINUED | OUTPATIENT
Start: 2020-02-26 | End: 2020-02-27

## 2020-02-26 RX ORDER — NICOTINE 21 MG/24HR
1 PATCH, TRANSDERMAL 24 HOURS TRANSDERMAL DAILY
Status: DISCONTINUED | OUTPATIENT
Start: 2020-02-26 | End: 2020-03-04 | Stop reason: HOSPADM

## 2020-02-26 RX ORDER — HEPARIN SODIUM 1000 [USP'U]/ML
4000 INJECTION, SOLUTION INTRAVENOUS; SUBCUTANEOUS PRN
Status: DISCONTINUED | OUTPATIENT
Start: 2020-02-26 | End: 2020-02-26

## 2020-02-26 RX ORDER — ASPIRIN 81 MG/1
81 TABLET ORAL DAILY
Status: DISCONTINUED | OUTPATIENT
Start: 2020-02-26 | End: 2020-03-04 | Stop reason: HOSPADM

## 2020-02-26 RX ORDER — MIDODRINE HYDROCHLORIDE 2.5 MG/1
2.5 TABLET ORAL
Status: DISCONTINUED | OUTPATIENT
Start: 2020-02-26 | End: 2020-02-27

## 2020-02-26 RX ORDER — LORAZEPAM 2 MG/ML
4 INJECTION INTRAMUSCULAR
Status: DISCONTINUED | OUTPATIENT
Start: 2020-02-26 | End: 2020-02-26

## 2020-02-26 RX ORDER — FUROSEMIDE 10 MG/ML
40 INJECTION INTRAMUSCULAR; INTRAVENOUS ONCE
Status: COMPLETED | OUTPATIENT
Start: 2020-02-26 | End: 2020-02-26

## 2020-02-26 RX ORDER — HEPARIN SODIUM 10000 [USP'U]/100ML
1000 INJECTION, SOLUTION INTRAVENOUS CONTINUOUS
Status: DISCONTINUED | OUTPATIENT
Start: 2020-02-26 | End: 2020-02-26

## 2020-02-26 RX ORDER — LORAZEPAM 2 MG/1
2 TABLET ORAL
Status: DISCONTINUED | OUTPATIENT
Start: 2020-02-26 | End: 2020-02-26

## 2020-02-26 RX ORDER — FUROSEMIDE 20 MG/1
20 TABLET ORAL 2 TIMES DAILY
Status: DISCONTINUED | OUTPATIENT
Start: 2020-02-26 | End: 2020-02-26

## 2020-02-26 RX ORDER — HEPARIN SODIUM 10000 [USP'U]/100ML
1000 INJECTION, SOLUTION INTRAVENOUS CONTINUOUS
Status: DISCONTINUED | OUTPATIENT
Start: 2020-02-26 | End: 2020-02-26 | Stop reason: HOSPADM

## 2020-02-26 RX ORDER — HEPARIN SODIUM 1000 [USP'U]/ML
2000 INJECTION, SOLUTION INTRAVENOUS; SUBCUTANEOUS PRN
Status: DISCONTINUED | OUTPATIENT
Start: 2020-02-26 | End: 2020-02-26

## 2020-02-26 RX ORDER — ATORVASTATIN CALCIUM 40 MG/1
40 TABLET, FILM COATED ORAL DAILY
Status: DISCONTINUED | OUTPATIENT
Start: 2020-02-26 | End: 2020-03-04 | Stop reason: HOSPADM

## 2020-02-26 RX ORDER — DULOXETIN HYDROCHLORIDE 30 MG/1
60 CAPSULE, DELAYED RELEASE ORAL DAILY
Status: DISCONTINUED | OUTPATIENT
Start: 2020-02-26 | End: 2020-03-03

## 2020-02-26 RX ORDER — SODIUM CHLORIDE 0.9 % (FLUSH) 0.9 %
10 SYRINGE (ML) INJECTION EVERY 12 HOURS SCHEDULED
Status: DISCONTINUED | OUTPATIENT
Start: 2020-02-26 | End: 2020-03-04 | Stop reason: HOSPADM

## 2020-02-26 RX ORDER — PROMETHAZINE HYDROCHLORIDE 25 MG/1
12.5 TABLET ORAL EVERY 6 HOURS PRN
Status: DISCONTINUED | OUTPATIENT
Start: 2020-02-26 | End: 2020-03-04 | Stop reason: HOSPADM

## 2020-02-26 RX ORDER — SODIUM CHLORIDE 0.9 % (FLUSH) 0.9 %
10 SYRINGE (ML) INJECTION PRN
Status: DISCONTINUED | OUTPATIENT
Start: 2020-02-26 | End: 2020-03-04 | Stop reason: HOSPADM

## 2020-02-26 RX ORDER — LORAZEPAM 2 MG/1
4 TABLET ORAL
Status: DISCONTINUED | OUTPATIENT
Start: 2020-02-26 | End: 2020-02-26

## 2020-02-26 RX ADMIN — CLOPIDOGREL 75 MG: 75 TABLET, FILM COATED ORAL at 18:11

## 2020-02-26 RX ADMIN — HEPARIN SODIUM 4000 UNITS: 1000 INJECTION, SOLUTION INTRAVENOUS; SUBCUTANEOUS at 12:20

## 2020-02-26 RX ADMIN — Medication 100 MG: at 18:12

## 2020-02-26 RX ADMIN — SODIUM CHLORIDE, PRESERVATIVE FREE 10 ML: 5 INJECTION INTRAVENOUS at 22:50

## 2020-02-26 RX ADMIN — NITROGLYCERIN 0.4 MG: 0.4 TABLET SUBLINGUAL at 09:15

## 2020-02-26 RX ADMIN — DOXEPIN HYDROCHLORIDE 50 MG: 25 CAPSULE ORAL at 19:49

## 2020-02-26 RX ADMIN — FOLIC ACID 1 MG: 1 TABLET ORAL at 18:11

## 2020-02-26 RX ADMIN — FAMOTIDINE 20 MG: 20 TABLET, FILM COATED ORAL at 19:50

## 2020-02-26 RX ADMIN — DULOXETINE HYDROCHLORIDE 60 MG: 30 CAPSULE, DELAYED RELEASE ORAL at 18:12

## 2020-02-26 RX ADMIN — METOPROLOL TARTRATE 12.5 MG: 25 TABLET ORAL at 19:50

## 2020-02-26 RX ADMIN — DESMOPRESSIN ACETATE 40 MG: 0.2 TABLET ORAL at 18:11

## 2020-02-26 RX ADMIN — ASPIRIN 81 MG 324 MG: 81 TABLET ORAL at 09:07

## 2020-02-26 RX ADMIN — LURASIDONE HYDROCHLORIDE 40 MG: 40 TABLET, FILM COATED ORAL at 18:12

## 2020-02-26 RX ADMIN — MIDODRINE HYDROCHLORIDE 2.5 MG: 2.5 TABLET ORAL at 19:01

## 2020-02-26 RX ADMIN — THERA TABS 1 TABLET: TAB at 18:11

## 2020-02-26 RX ADMIN — FUROSEMIDE 40 MG: 10 INJECTION, SOLUTION INTRAMUSCULAR; INTRAVENOUS at 09:07

## 2020-02-26 RX ADMIN — Medication 81 MG: at 18:11

## 2020-02-26 RX ADMIN — HEPARIN SODIUM AND DEXTROSE 1000 UNITS/HR: 10000; 5 INJECTION INTRAVENOUS at 12:21

## 2020-02-26 RX ADMIN — DIVALPROEX SODIUM 1500 MG: 500 TABLET, DELAYED RELEASE ORAL at 20:37

## 2020-02-26 RX ADMIN — BENZTROPINE MESYLATE 1 MG: 1 TABLET ORAL at 19:50

## 2020-02-26 ASSESSMENT — ENCOUNTER SYMPTOMS
WHEEZING: 0
CHEST TIGHTNESS: 0
VOICE CHANGE: 0
ABDOMINAL PAIN: 0
VOMITING: 0
ABDOMINAL PAIN: 0
SINUS PRESSURE: 0
CONSTIPATION: 0
BACK PAIN: 0
NAUSEA: 0
RHINORRHEA: 0
CHOKING: 0
SHORTNESS OF BREATH: 1
COUGH: 0
DIARRHEA: 0
EYE PAIN: 0
SHORTNESS OF BREATH: 1

## 2020-02-26 ASSESSMENT — PAIN DESCRIPTION - PAIN TYPE: TYPE: ACUTE PAIN

## 2020-02-26 ASSESSMENT — PAIN - FUNCTIONAL ASSESSMENT
PAIN_FUNCTIONAL_ASSESSMENT: 0-10
PAIN_FUNCTIONAL_ASSESSMENT: 0-10

## 2020-02-26 ASSESSMENT — PAIN DESCRIPTION - DESCRIPTORS: DESCRIPTORS: ACHING;DULL

## 2020-02-26 ASSESSMENT — PAIN SCALES - GENERAL: PAINLEVEL_OUTOF10: 7

## 2020-02-26 NOTE — ED PROVIDER NOTES
[facility] ED  eMERGENCY dEPARTMENT eNCOUnter      Pt Name: Lucian Coates  MRN: [id number]  [place] 1965  Date of evaluation: 2/26/2020  Provider: Donte Urrutia MD    CHIEF COMPLAINT     Chief Complaint   Patient presents with    Shortness of Breath     pt states onset x 2 days. Getting worse today       HISTORY OF PRESENT ILLNESS    Lucian Coates is a 49 y.o. male who presents to the emergency department for evaluation of shortness of breath. Patient states symptoms started approximately 2 days ago and is gradually getting worse. Patient has history of congestive heart failure and coronary artery disease. Patient states he stopped taking his medication 2 days ago because he thought it was making his shortness of breath worse. Patient was recently admitted at [facility] and had cardiac cath performed which showed severe diffuse coronary artery disease not amenable by stenting. Patient denies any associated chest pain with this. Patient denies any cough or fevers. PAST MEDICAL HISTORY     Past Medical History:   Diagnosis Date    Alcoholic cirrhosis ([address]     Back pain     Bipolar 1 disorder ([address]     CAD (coronary artery disease) 09/2019    s/p proximal LAD stent    Idiopathic cardiomyopathy ([address]     Seizures ([address]     LAST SEIZURE 9-4-19       SURGICAL HISTORY       Past Surgical History:   Procedure Laterality Date    CARDIAC CATHETERIZATION Left 09/17/2019    DR Valerio/[facility]/right radial-Severe two vessel coronary artery disease involving involving a 100% proximal probably co-dominant RCA, 90% stenosis in the distal part of the proximal LAD at a bifircation with a moderate sized D1 branch that also has an 80% stenosis. Mildly elevated left ventricular end diastolic pressure.  Consult to cardiothoracic surgery for consideration of multi-vess    TOE SURGERY      TOOTH EXTRACTION         CURRENT MEDICATIONS       Discharge Medication List as of 2/26/2020 insecurity:     Worry: None     Inability: None    Transportation needs:     Medical: None     Non-medical: None   Tobacco Use    Smoking status: Former Smoker     Types: Cigarettes    Smokeless tobacco: Never Used   Substance and Sexual Activity    Alcohol use: Yes     Comment: bottle of vodka a week    Drug use: Yes     Frequency: 1.0 times per week     Types: Marijuana    Sexual activity: None   Lifestyle    Physical activity:     Days per week: None     Minutes per session: None    Stress: None   Relationships    Social connections:     Talks on phone: None     Gets together: None     Attends Zoroastrian service: None     Active member of club or organization: None     Attends meetings of clubs or organizations: None     Relationship status: None    Intimate partner violence:     Fear of current or ex partner: None     Emotionally abused: None     Physically abused: None     Forced sexual activity: None   Other Topics Concern    None   Social History Narrative    None       REVIEW OF SYSTEMS       Review of Systems   Constitutional: Negative for fever. HENT: Negative for congestion. Eyes: Negative for pain. Respiratory: Positive for shortness of breath. Cardiovascular: Negative for chest pain. Gastrointestinal: Negative for abdominal pain. Genitourinary: Negative for dysuria. Skin: Negative for rash. Allergic/Immunologic: Negative for immunocompromised state. Neurological: Negative for headaches. PHYSICAL EXAM    (up to 7 for level 4, 8 or more for level 5)     ED Triage Vitals [02/26/20 0836]   BP Temp Temp Source Pulse Resp SpO2 Height Weight   130/85 96.4 °F (35.8 °C) Tympanic 120 26 92 % -- 250 lb (113.4 kg)       Physical Exam  Vitals signs and nursing note reviewed. Constitutional:       General: He is not in acute distress. Appearance: He is well-developed. HENT:      Head: Normocephalic and atraumatic. Eyes:      General:         Right eye: No discharge. Left eye: No discharge. Conjunctiva/sclera: Conjunctivae normal.   Neck:      Musculoskeletal: Neck supple. Cardiovascular:      Rate and Rhythm: Regular rhythm. Tachycardia present. Pulmonary:      Effort: Pulmonary effort is normal. No respiratory distress. Breath sounds: No stridor. Abdominal:      General: There is no distension. Palpations: Abdomen is soft. Tenderness: There is no abdominal tenderness. Musculoskeletal:         General: No tenderness. Right lower leg: No edema. Left lower leg: No edema. Skin:     General: Skin is warm and dry. Findings: No erythema. Neurological:      Mental Status: He is alert and oriented to person, place, and time.          DIAGNOSTIC RESULTS     RADIOLOGY: (none if blank)   Interpretation per theRadiologist below, if available at the time of this note:    XR CHEST PORTABLE   Final Result   Mild CHF, similar to the previous exam.             LABS:  Labs Reviewed   CBC WITH AUTO DIFFERENTIAL - Abnormal; Notable for the following components:       Result Value    Hematocrit 39.5 (*)     RDW 15.2 (*)     NRBC Automated 1.7 (*)     Seg Neutrophils 71 (*)     Lymphocytes 14 (*)     Monocytes 14 (*)     Eosinophils % 0 (*)     Immature Granulocytes 1 (*)     Absolute Lymph # 1.05 (*)     All other components within normal limits   COMPREHENSIVE METABOLIC PANEL W/ REFLEX TO MG FOR LOW K - Abnormal; Notable for the following components:    Glucose 105 (*)     BUN 26 (*)     Bun/Cre Ratio 25 (*)     Sodium 116 (*)     Chloride 77 (*)     CO2 19 (*)     Anion Gap 20 (*)      (*)      (*)     All other components within normal limits   LIPASE - Abnormal; Notable for the following components:    Lipase 9 (*)     All other components within normal limits   TROPONIN - Abnormal; Notable for the following components:    Troponin, High Sensitivity 219 (*)     All other components within normal limits   BRAIN NATRIURETIC PEPTIDE - Abnormal; Notable for the following components:    Pro-BNP 7,518 (*)     All other components within normal limits   PROTIME-INR - Abnormal; Notable for the following components:    Protime 15.6 (*)     INR 1.5 (*)     All other components within normal limits   MICROSCOPIC URINALYSIS - Abnormal; Notable for the following components:    Bacteria, UA TRACE (*)     All other components within normal limits   TROPONIN - Abnormal; Notable for the following components:    Troponin, High Sensitivity 209 (*)     All other components within normal limits   URINE RT REFLEX TO CULTURE   APTT       All other labs were within normal range or not returned as of this dictation. EMERGENCY DEPARTMENT COURSE and Medical Decision Making: On evaluation, patient has oxygen saturation 92% on room air. Patient does have rales throughout lung fields. Patient is afebrile. Patient has been noncompliant with his medications. Records were reviewed. Patient had cardiac cath performed last week which showed diffuse coronary artery disease not amendable by stenting. Medical management was recommended. Patient has ejection fraction of 20%. LifeVest was recommended last week at admission and patient refused at that time. Due to medical noncompliance and patient's severe heart disease, EKG and laboratory studies were obtained. EKG does show sinus tachycardia rate of 112 beats a minute. There is minimal ST elevation in leads V3 and V4. Less than 2 mm. There is no significant change when compared to prior EKG on February 11. There is no STEMI criteria. Patient was given Lasix, aspirin, and nitroglycerin for symptomatic relief. Initial troponin is elevated to 219. This is decreased from approximately 400 last week. 2-hour troponin is further decreased to 209. Patient also has hyponatremia with sodium of 116.     Due to patient's significant coronary artery disease and symptoms, patient was started on heparin until he can be further evaluated by cardiology. Patient was discussed with hospitalist service at [facility] who did agree to accept the patient for transfer. Patient was transferred via ground ambulance  for higher level of care    ED Medications administered this visit:    Medications   furosemide (LASIX) injection 40 mg (40 mg Intravenous Given 2/26/20 0907)   aspirin chewable tablet 324 mg (324 mg Oral Given 2/26/20 0907)   nitroGLYCERIN (NITROSTAT) SL tablet 0.4 mg (0.4 mg Sublingual Given 2/26/20 0915)   heparin (porcine) injection 4,000 Units (4,000 Units Intravenous Given 2/26/20 1220)       CLINICAL IMPRESSION      1. Noncompliance with medications    2. Acute on chronic congestive heart failure, unspecified heart failure type ([address]    3. Hyponatremia          DISPOSITION/PLAN   DISPOSITION Decision To Transfer 02/26/2020 09:38:53 AM      PATIENT REFERRED TO:  No follow-up provider specified.     DISCHARGE MEDICATIONS:  Discharge Medication List as of 2/26/2020  4:27 PM               Oral Pritchett MD (electronically signed)  AttendingEmergency Department Provider           Oral Pritchett MD  02/26/20 429

## 2020-02-26 NOTE — H&P
[facility]      HISTORY AND PHYSICAL EXAMINATION            Date:   2/26/2020  Patient name:  eBn Saleh  Date of admission:  2/26/2020  5:34 PM  MRN:   [id number]  Account:  [de-identified]  YOB: 1965  PCP:    CAM Busby CNP  Room:   2627/6470-02  Code Status:    Full Code    Chief Complaint:     Dyspnea    History Obtained From:     patient, electronic medical record    History of Present Illness: The patient is a 49 y.o. Non-/non  male who presents with No chief complaint on file. and he is admitted to the hospital for the management of  Acute on chronic combined systolic and diastolic congestive heart failure, NYHA class 4 ([facility] Patient was sent from [facility] emergency room where he presented with dyspnea for last 2 weeks. Patient reported that he had been having worsening of his dyspnea since he was discharged from the hospital 2 weeks before. This time got worse for last 2 days. Patient states that he has been drinking a lot of fluid to \"flush his system\". He said that he took medication for a while but threw medication in trash when they were not working. Patient has history of severe cardiomyopathy with EF 15 to 20%, and had known ischemic heart disease with stent to LAD. Patient has history of alcohol abuse but denies any current use. He has chronic hypotension. Patient denies chest pain, palpitations, fever, chills. Evaluation at [facility] emergency room showed elevated troponin II 09, elevated proBNP 7000 518, , , glucose 105, WBC 7.5. Chest x-ray showed pulmonary edema.       Past Medical History:     Past Medical History:   Diagnosis Date    Alcoholic cirrhosis ([address]     Back pain     Bipolar 1 disorder ([facility]     CAD (coronary artery disease) 09/2019    s/p proximal LAD stent    Idiopathic cardiomyopathy ([facility]     Seizures ([facility] [address]     LAST SEIZURE 9-4-19        Past Surgical History:     Past (36.6 °C)    No results for input(s): POCGLU in the last 72 hours. No intake or output data in the 24 hours ending 02/26/20 6950  Physical Exam  Vitals signs and nursing note reviewed. Constitutional:       General: He is not in acute distress. Appearance: He is not diaphoretic. Interventions: Nasal cannula in place. HENT:      Head: Normocephalic and atraumatic. Nose:      Right Sinus: No maxillary sinus tenderness or frontal sinus tenderness. Left Sinus: No maxillary sinus tenderness or frontal sinus tenderness. Mouth/Throat:      Pharynx: No oropharyngeal exudate. Eyes:      General: No scleral icterus. Conjunctiva/sclera: Conjunctivae normal.      Pupils: Pupils are equal, round, and reactive to light. Neck:      Musculoskeletal: Full passive range of motion without pain and neck supple. Thyroid: No thyromegaly. Vascular: No JVD. Cardiovascular:      Rate and Rhythm: Normal rate and regular rhythm. Pulses:           Dorsalis pedis pulses are 2+ on the right side and 2+ on the left side. Heart sounds: Normal heart sounds. No murmur. Pulmonary:      Effort: Pulmonary effort is normal.      Breath sounds: Rales present. No wheezing or rhonchi. Abdominal:      Palpations: Abdomen is soft. There is no mass. Tenderness: There is no abdominal tenderness. Musculoskeletal:      Right lower leg: Edema present. Left lower leg: Edema present. Lymphadenopathy:      Head:      Right side of head: No submandibular adenopathy. Left side of head: No submandibular adenopathy. Cervical: No cervical adenopathy. Skin:     General: Skin is warm. Neurological:      Mental Status: He is alert and oriented to person, place, and time. Motor: No tremor. Psychiatric:         Behavior: Behavior is cooperative.      Cath 2/12/20   Findings:     LMCA: Mild irregularities 10-20%.     LAD: 100% proximal stenosis proximal to previous stent, not able Nitrite, Urine NEGATIVE NEGATIVE    Leukocyte Esterase, Urine NEGATIVE NEGATIVE    Urinalysis Comments NOT REPORTED    Microscopic Urinalysis    Collection Time: 02/26/20 10:05 AM   Result Value Ref Range    -          WBC, UA None 0 - 5 /HPF    RBC, UA None 0 - 2 /HPF    Casts UA 0 TO 2 HYALINE /LPF    Crystals UA NOT REPORTED None /HPF    Epithelial Cells UA 0 TO 2 0 - 5 /HPF    Renal Epithelial, Urine NOT REPORTED 0 /HPF    Bacteria, UA TRACE (A) None    Mucus, UA NOT REPORTED None    Trichomonas, UA NOT REPORTED None    Amorphous, UA NOT REPORTED None    Other Observations UA NOT REPORTED NOT REQ. Yeast, UA NOT REPORTED None   Troponin    Collection Time: 02/26/20 12:53 PM   Result Value Ref Range    Troponin, High Sensitivity 209 (HH) 0 - 22 ng/L    Troponin T NOT REPORTED <0.03 ng/mL    Troponin Interp NOT REPORTED        Imaging/Diagonstics:    Xr Chest Portable    Result Date: 2/26/2020  Mild CHF, similar to the previous exam.        Assessment :      Primary Problem  Acute on chronic combined systolic and diastolic congestive heart failure, NYHA class 4 ([address]    Active Hospital Problems    Diagnosis Date Noted    Idiopathic hypotension [I95.0] 01/20/2020     Priority: High    Acute on chronic combined systolic and diastolic congestive heart failure, NYHA class 4 (HCC) [I50.43]      Priority: High    Portal hypertension (HCC) [K76.6]      Priority: High    Alcoholic cirrhosis of liver without ascites (HCC) [K70.30]      Priority: High    CHF with unknown LVEF ([facility] [I50.9] 02/26/2020    Ischemic cardiomyopathy [I25.5] 09/18/2019       Plan:     Patient status Admit as inpatient in the  Progressive Unit/Step down    1. Acute on chronic systolic and diastolic CHF -IV Lasix, fluid restriction  2. Elevated troponin secondary to decompensated CHF. Trend troponin. Cardiology consult . Underwent cardiac cath last admission was found to have multivessel disease and PCI was not possible.   3. Ischemic cardiomyopathy with EF 20%   4. CAD with known LAD stent  5. History of alcoholism. Denies use. 6. Cirrhosis liver with portal hypertension  7. Chronic idiopathic hypertension-start midodrine. Consultations:   IP CONSULT TO HEART FAILURE NURSE/COORDINATOR  IP CONSULT TO DIETITIAN    Patient is admitted as inpatient status because of co-morbidities listed above, severity of signs and symptoms as outlined, requirement for current medical therapies and most importantly because of direct risk to patient if care not provided in a hospital setting.     Marcela Holcomb MD  2/26/2020    Copy sent to Dr. Stephen Mercado, APRN - CNP    (Please note that portions of this note were completed with a voice recognition program. Efforts were made to edit the dictations but occasionally words are mis-transcribed.)

## 2020-02-26 NOTE — ED NOTES
Called [facility] to get bed update, bed number given [phone].  [address] [place] calling Johnathon Niño for transport     [employer]  02/26/20 3038

## 2020-02-26 NOTE — PLAN OF CARE
Problem: Risk for Impaired Skin Integrity  Goal: Tissue integrity - skin and mucous membranes  Description  Structural intactness and normal physiological function of skin and  mucous membranes. Outcome: Ongoing     Problem: Falls - Risk of:  Goal: Will remain free from falls  Description  Will remain free from falls  Outcome: Ongoing  Goal: Absence of physical injury  Description  Absence of physical injury  Outcome: Ongoing     Problem: SAFETY  Goal: Free from accidental physical injury  Outcome: Ongoing  Goal: Free from intentional harm  Outcome: Ongoing     Problem: DAILY CARE  Goal: Daily care needs are met  Outcome: Ongoing     Problem: PAIN  Goal: Patient's pain/discomfort is manageable  Outcome: Ongoing     Problem: SKIN INTEGRITY  Goal: Skin integrity is maintained or improved  Outcome: Ongoing     Problem: KNOWLEDGE DEFICIT  Goal: Patient/S.O. demonstrates understanding of disease process, treatment plan, medications, and discharge instructions.   Outcome: Ongoing     Problem: DISCHARGE BARRIERS  Goal: Patient's continuum of care needs are met  Outcome: Ongoing

## 2020-02-27 ENCOUNTER — APPOINTMENT (OUTPATIENT)
Dept: GENERAL RADIOLOGY | Age: 55
DRG: 280 | End: 2020-02-27
Attending: FAMILY MEDICINE
Payer: MEDICARE

## 2020-02-27 ENCOUNTER — APPOINTMENT (OUTPATIENT)
Dept: ULTRASOUND IMAGING | Age: 55
DRG: 280 | End: 2020-02-27
Attending: FAMILY MEDICINE
Payer: MEDICARE

## 2020-02-27 LAB
AMPHETAMINE SCREEN URINE: NEGATIVE
BARBITURATE SCREEN URINE: NEGATIVE
BENZODIAZEPINE SCREEN, URINE: NEGATIVE
BNP INTERPRETATION: ABNORMAL
BUN BLDV-MCNC: 36 MG/DL (ref 6–20)
BUPRENORPHINE URINE: NORMAL
CANNABINOID SCREEN URINE: NEGATIVE
CHLORIDE BLD-SCNC: 79 MMOL/L (ref 98–107)
COCAINE METABOLITE, URINE: NEGATIVE
CREAT SERPL-MCNC: 1.8 MG/DL (ref 0.7–1.2)
CREATININE URINE: 79.3 MG/DL (ref 39–259)
GFR AFRICAN AMERICAN: 48 ML/MIN
GFR NON-AFRICAN AMERICAN: 40 ML/MIN
GFR SERPL CREATININE-BSD FRML MDRD: ABNORMAL ML/MIN/{1.73_M2}
GFR SERPL CREATININE-BSD FRML MDRD: ABNORMAL ML/MIN/{1.73_M2}
GLUCOSE BLD-MCNC: 102 MG/DL (ref 75–110)
GLUCOSE BLD-MCNC: 103 MG/DL (ref 70–99)
HCT VFR BLD CALC: 36.8 % (ref 40.7–50.3)
HEMOGLOBIN: 12.1 G/DL (ref 13–17)
MAGNESIUM: 2.6 MG/DL (ref 1.6–2.6)
MCH RBC QN AUTO: 29.5 PG (ref 25.2–33.5)
MCHC RBC AUTO-ENTMCNC: 32.9 G/DL (ref 28.4–34.8)
MCV RBC AUTO: 89.8 FL (ref 82.6–102.9)
MDMA URINE: NORMAL
METHADONE SCREEN, URINE: NEGATIVE
METHAMPHETAMINE, URINE: NORMAL
MYOGLOBIN: 104 NG/ML (ref 28–72)
MYOGLOBIN: 115 NG/ML (ref 28–72)
MYOGLOBIN: 152 NG/ML (ref 28–72)
MYOGLOBIN: 153 NG/ML (ref 28–72)
MYOGLOBIN: 155 NG/ML (ref 28–72)
MYOGLOBIN: 333 NG/ML (ref 28–72)
NRBC AUTOMATED: 2.1 PER 100 WBC
OPIATES, URINE: NEGATIVE
OSMOLALITY URINE: 436 MOSM/KG (ref 80–1300)
OXYCODONE SCREEN URINE: NEGATIVE
PARTIAL THROMBOPLASTIN TIME: 25.1 SEC (ref 20.5–30.5)
PARTIAL THROMBOPLASTIN TIME: 31 SEC (ref 20.5–30.5)
PARTIAL THROMBOPLASTIN TIME: 37.2 SEC (ref 20.5–30.5)
PARTIAL THROMBOPLASTIN TIME: 57.7 SEC (ref 20.5–30.5)
PDW BLD-RTO: 15.5 % (ref 11.8–14.4)
PHENCYCLIDINE, URINE: NEGATIVE
PLATELET # BLD: 216 K/UL (ref 138–453)
PMV BLD AUTO: 10.2 FL (ref 8.1–13.5)
POTASSIUM SERPL-SCNC: 4.9 MMOL/L (ref 3.7–5.3)
PRO-BNP: 9098 PG/ML
PROPOXYPHENE, URINE: NORMAL
RBC # BLD: 4.1 M/UL (ref 4.21–5.77)
SODIUM BLD-SCNC: 115 MMOL/L (ref 135–144)
SODIUM BLD-SCNC: 118 MMOL/L (ref 135–144)
SODIUM BLD-SCNC: 120 MMOL/L (ref 135–144)
SODIUM,UR: 20 MMOL/L
TEST INFORMATION: NORMAL
TOTAL PROTEIN, URINE: 15 MG/DL
TRICYCLIC ANTIDEPRESSANTS, UR: NORMAL
TROPONIN INTERP: ABNORMAL
TROPONIN T: ABNORMAL NG/ML
TROPONIN, HIGH SENSITIVITY: 244 NG/L (ref 0–22)
TROPONIN, HIGH SENSITIVITY: 245 NG/L (ref 0–22)
TROPONIN, HIGH SENSITIVITY: 252 NG/L (ref 0–22)
TROPONIN, HIGH SENSITIVITY: 255 NG/L (ref 0–22)
TROPONIN, HIGH SENSITIVITY: 259 NG/L (ref 0–22)
TROPONIN, HIGH SENSITIVITY: 338 NG/L (ref 0–22)
WBC # BLD: 6.1 K/UL (ref 3.5–11.3)

## 2020-02-27 PROCEDURE — 2700000000 HC OXYGEN THERAPY PER DAY

## 2020-02-27 PROCEDURE — 6360000002 HC RX W HCPCS: Performed by: INTERNAL MEDICINE

## 2020-02-27 PROCEDURE — 99233 SBSQ HOSP IP/OBS HIGH 50: CPT | Performed by: FAMILY MEDICINE

## 2020-02-27 PROCEDURE — 6370000000 HC RX 637 (ALT 250 FOR IP): Performed by: FAMILY MEDICINE

## 2020-02-27 PROCEDURE — 6360000002 HC RX W HCPCS: Performed by: NURSE PRACTITIONER

## 2020-02-27 PROCEDURE — 84132 ASSAY OF SERUM POTASSIUM: CPT

## 2020-02-27 PROCEDURE — 51702 INSERT TEMP BLADDER CATH: CPT

## 2020-02-27 PROCEDURE — 87086 URINE CULTURE/COLONY COUNT: CPT

## 2020-02-27 PROCEDURE — 36415 COLL VENOUS BLD VENIPUNCTURE: CPT

## 2020-02-27 PROCEDURE — 84156 ASSAY OF PROTEIN URINE: CPT

## 2020-02-27 PROCEDURE — 82570 ASSAY OF URINE CREATININE: CPT

## 2020-02-27 PROCEDURE — 6370000000 HC RX 637 (ALT 250 FOR IP): Performed by: NURSE PRACTITIONER

## 2020-02-27 PROCEDURE — 97530 THERAPEUTIC ACTIVITIES: CPT

## 2020-02-27 PROCEDURE — 76775 US EXAM ABDO BACK WALL LIM: CPT

## 2020-02-27 PROCEDURE — 82947 ASSAY GLUCOSE BLOOD QUANT: CPT

## 2020-02-27 PROCEDURE — 84484 ASSAY OF TROPONIN QUANT: CPT

## 2020-02-27 PROCEDURE — 82565 ASSAY OF CREATININE: CPT

## 2020-02-27 PROCEDURE — 97166 OT EVAL MOD COMPLEX 45 MIN: CPT

## 2020-02-27 PROCEDURE — 2060000000 HC ICU INTERMEDIATE R&B

## 2020-02-27 PROCEDURE — 84295 ASSAY OF SERUM SODIUM: CPT

## 2020-02-27 PROCEDURE — 82435 ASSAY OF BLOOD CHLORIDE: CPT

## 2020-02-27 PROCEDURE — 2580000003 HC RX 258: Performed by: NURSE PRACTITIONER

## 2020-02-27 PROCEDURE — 83874 ASSAY OF MYOGLOBIN: CPT

## 2020-02-27 PROCEDURE — 84520 ASSAY OF UREA NITROGEN: CPT

## 2020-02-27 PROCEDURE — 84300 ASSAY OF URINE SODIUM: CPT

## 2020-02-27 PROCEDURE — 85027 COMPLETE CBC AUTOMATED: CPT

## 2020-02-27 PROCEDURE — 97162 PT EVAL MOD COMPLEX 30 MIN: CPT

## 2020-02-27 PROCEDURE — 83735 ASSAY OF MAGNESIUM: CPT

## 2020-02-27 PROCEDURE — 6370000000 HC RX 637 (ALT 250 FOR IP): Performed by: INTERNAL MEDICINE

## 2020-02-27 PROCEDURE — 6360000002 HC RX W HCPCS: Performed by: FAMILY MEDICINE

## 2020-02-27 PROCEDURE — 83935 ASSAY OF URINE OSMOLALITY: CPT

## 2020-02-27 PROCEDURE — 97535 SELF CARE MNGMENT TRAINING: CPT

## 2020-02-27 PROCEDURE — 94660 CPAP INITIATION&MGMT: CPT

## 2020-02-27 PROCEDURE — 85730 THROMBOPLASTIN TIME PARTIAL: CPT

## 2020-02-27 PROCEDURE — 83880 ASSAY OF NATRIURETIC PEPTIDE: CPT

## 2020-02-27 PROCEDURE — 71045 X-RAY EXAM CHEST 1 VIEW: CPT

## 2020-02-27 RX ORDER — HEPARIN SODIUM 1000 [USP'U]/ML
2000 INJECTION, SOLUTION INTRAVENOUS; SUBCUTANEOUS PRN
Status: DISCONTINUED | OUTPATIENT
Start: 2020-02-27 | End: 2020-02-28

## 2020-02-27 RX ORDER — HEPARIN SODIUM 1000 [USP'U]/ML
4000 INJECTION, SOLUTION INTRAVENOUS; SUBCUTANEOUS ONCE
Status: COMPLETED | OUTPATIENT
Start: 2020-02-27 | End: 2020-02-27

## 2020-02-27 RX ORDER — HEPARIN SODIUM 10000 [USP'U]/100ML
1000 INJECTION, SOLUTION INTRAVENOUS CONTINUOUS
Status: DISCONTINUED | OUTPATIENT
Start: 2020-02-27 | End: 2020-02-27

## 2020-02-27 RX ORDER — TOLVAPTAN 15 MG/1
7.5 TABLET ORAL DAILY
Status: DISCONTINUED | OUTPATIENT
Start: 2020-02-27 | End: 2020-02-27

## 2020-02-27 RX ORDER — HEPARIN SODIUM 10000 [USP'U]/100ML
8.8 INJECTION, SOLUTION INTRAVENOUS CONTINUOUS
Status: DISCONTINUED | OUTPATIENT
Start: 2020-02-27 | End: 2020-02-28

## 2020-02-27 RX ORDER — FUROSEMIDE 10 MG/ML
40 INJECTION INTRAMUSCULAR; INTRAVENOUS 2 TIMES DAILY
Status: DISCONTINUED | OUTPATIENT
Start: 2020-02-27 | End: 2020-02-29

## 2020-02-27 RX ORDER — TOLVAPTAN 15 MG/1
7.5 TABLET ORAL ONCE
Status: COMPLETED | OUTPATIENT
Start: 2020-02-27 | End: 2020-02-27

## 2020-02-27 RX ORDER — MIDODRINE HYDROCHLORIDE 5 MG/1
10 TABLET ORAL
Status: DISCONTINUED | OUTPATIENT
Start: 2020-02-27 | End: 2020-02-27

## 2020-02-27 RX ORDER — LORAZEPAM 2 MG/ML
1 INJECTION INTRAMUSCULAR ONCE
Status: COMPLETED | OUTPATIENT
Start: 2020-02-27 | End: 2020-02-27

## 2020-02-27 RX ORDER — MIDODRINE HYDROCHLORIDE 5 MG/1
5 TABLET ORAL
Status: DISCONTINUED | OUTPATIENT
Start: 2020-02-27 | End: 2020-02-29

## 2020-02-27 RX ORDER — MIDODRINE HYDROCHLORIDE 5 MG/1
5 TABLET ORAL
Status: DISCONTINUED | OUTPATIENT
Start: 2020-02-27 | End: 2020-02-27

## 2020-02-27 RX ORDER — HEPARIN SODIUM 1000 [USP'U]/ML
4000 INJECTION, SOLUTION INTRAVENOUS; SUBCUTANEOUS PRN
Status: DISCONTINUED | OUTPATIENT
Start: 2020-02-27 | End: 2020-02-28

## 2020-02-27 RX ORDER — FUROSEMIDE 10 MG/ML
20 INJECTION INTRAMUSCULAR; INTRAVENOUS 2 TIMES DAILY
Status: DISCONTINUED | OUTPATIENT
Start: 2020-02-27 | End: 2020-02-27

## 2020-02-27 RX ADMIN — FOLIC ACID 1 MG: 1 TABLET ORAL at 10:25

## 2020-02-27 RX ADMIN — LORAZEPAM 1 MG: 2 INJECTION INTRAMUSCULAR; INTRAVENOUS at 23:08

## 2020-02-27 RX ADMIN — HEPARIN SODIUM 2000 UNITS: 1000 INJECTION, SOLUTION INTRAVENOUS; SUBCUTANEOUS at 18:21

## 2020-02-27 RX ADMIN — DESMOPRESSIN ACETATE 40 MG: 0.2 TABLET ORAL at 12:10

## 2020-02-27 RX ADMIN — Medication 81 MG: at 10:25

## 2020-02-27 RX ADMIN — FUROSEMIDE 40 MG: 10 INJECTION, SOLUTION INTRAMUSCULAR; INTRAVENOUS at 17:38

## 2020-02-27 RX ADMIN — FUROSEMIDE 20 MG: 10 INJECTION, SOLUTION INTRAMUSCULAR; INTRAVENOUS at 10:27

## 2020-02-27 RX ADMIN — MIDODRINE HYDROCHLORIDE 5 MG: 2.5 TABLET ORAL at 10:26

## 2020-02-27 RX ADMIN — OLANZAPINE 20 MG: 10 TABLET, ORALLY DISINTEGRATING ORAL at 20:14

## 2020-02-27 RX ADMIN — LORAZEPAM 1 MG: 2 INJECTION INTRAMUSCULAR; INTRAVENOUS at 00:45

## 2020-02-27 RX ADMIN — TOLVAPTAN 7.5 MG: 15 TABLET ORAL at 07:05

## 2020-02-27 RX ADMIN — THERA TABS 1 TABLET: TAB at 10:25

## 2020-02-27 RX ADMIN — HEPARIN SODIUM AND DEXTROSE 8.8 UNITS/KG/HR: 10000; 5 INJECTION INTRAVENOUS at 12:09

## 2020-02-27 RX ADMIN — HEPARIN SODIUM 2000 UNITS: 1000 INJECTION, SOLUTION INTRAVENOUS; SUBCUTANEOUS at 23:58

## 2020-02-27 RX ADMIN — SODIUM CHLORIDE, PRESERVATIVE FREE 10 ML: 5 INJECTION INTRAVENOUS at 21:10

## 2020-02-27 RX ADMIN — MIDODRINE HYDROCHLORIDE 5 MG: 5 TABLET ORAL at 17:38

## 2020-02-27 RX ADMIN — CLOPIDOGREL 75 MG: 75 TABLET, FILM COATED ORAL at 10:25

## 2020-02-27 RX ADMIN — DOXEPIN HYDROCHLORIDE 50 MG: 25 CAPSULE ORAL at 20:33

## 2020-02-27 RX ADMIN — BENZTROPINE MESYLATE 1 MG: 1 TABLET ORAL at 20:15

## 2020-02-27 RX ADMIN — DIVALPROEX SODIUM 1500 MG: 500 TABLET, DELAYED RELEASE ORAL at 10:25

## 2020-02-27 RX ADMIN — BENZTROPINE MESYLATE 1 MG: 1 TABLET ORAL at 10:26

## 2020-02-27 RX ADMIN — DULOXETINE HYDROCHLORIDE 60 MG: 30 CAPSULE, DELAYED RELEASE ORAL at 10:25

## 2020-02-27 RX ADMIN — HEPARIN SODIUM 4000 UNITS: 1000 INJECTION, SOLUTION INTRAVENOUS; SUBCUTANEOUS at 12:09

## 2020-02-27 RX ADMIN — FAMOTIDINE 20 MG: 20 TABLET, FILM COATED ORAL at 10:26

## 2020-02-27 RX ADMIN — FAMOTIDINE 20 MG: 20 TABLET, FILM COATED ORAL at 20:14

## 2020-02-27 ASSESSMENT — ENCOUNTER SYMPTOMS
WHEEZING: 0
DIARRHEA: 0
VOICE CHANGE: 0
SHORTNESS OF BREATH: 1
ABDOMINAL PAIN: 0
RHINORRHEA: 0
NAUSEA: 0
CONSTIPATION: 0
BACK PAIN: 0
VOMITING: 0
COUGH: 0
SINUS PRESSURE: 0
CHEST TIGHTNESS: 0
CHOKING: 0

## 2020-02-27 ASSESSMENT — PAIN SCALES - GENERAL
PAINLEVEL_OUTOF10: 5
PAINLEVEL_OUTOF10: 5
PAINLEVEL_OUTOF10: 0
PAINLEVEL_OUTOF10: 5

## 2020-02-27 ASSESSMENT — PAIN DESCRIPTION - LOCATION
LOCATION: RIB CAGE;ABDOMEN
LOCATION: ABDOMEN
LOCATION: ABDOMEN

## 2020-02-27 ASSESSMENT — PAIN DESCRIPTION - PAIN TYPE
TYPE: ACUTE PAIN
TYPE: ACUTE PAIN

## 2020-02-27 ASSESSMENT — PAIN - FUNCTIONAL ASSESSMENT: PAIN_FUNCTIONAL_ASSESSMENT: 0-10

## 2020-02-27 NOTE — CARE COORDINATION
Case Management Initial Discharge Plan  Adonay Padron,             Met with:patient to discuss discharge plans. Information verified: address, contacts, phone number, , insurance Yes  PCP: CAM Mcgee CNP  Date of last visit: 2019    Insurance Provider: Medicare/Medicaid    Discharge Planning    Living Arrangements:  Alone   Support Systems:  Family Members    Home has 1 story  3 stairs to climb into front door  Location of bedroom/bathroom in home main    Patient able to perform ADL's:Independent    Current Services (outpatient & in home) none  DME equipment: none  DME provider: n/a      Potential Assistance Needed:  N/A    Patient agreeable to home care: No  [place] of choice provided:  n/a    Prior SNF/Rehab Placement and Facility: none  Agreeable to SNF/Rehab: No  [place] of choice provided: n/a   Evaluation: n/a    Expected Discharge date:  20  Patient expects to be discharged to:  Home  Follow Up Appointment: Best Day/ Time: Wednesday AM    Transportation provider: family  Transportation arrangements needed for discharge: No    Readmission Risk              Risk of Unplanned Readmission:        35             Does patient have a readmission risk score greater than 14?: Yes  If yes, follow-up appointment must be made within 7 days of discharge.      Goals of Care: breathe easier      Discharge Plan: Home independent          Electronically signed by Lucia Bustillos RN on 20 at 5:05 PM

## 2020-02-27 NOTE — PROGRESS NOTES
Negative for back pain. Skin: Negative for rash. Neurological: Negative for dizziness, weakness, light-headedness and headaches. Hematological: Does not bruise/bleed easily. Psychiatric/Behavioral: Negative for agitation, behavioral problems, confusion, self-injury, sleep disturbance and suicidal ideas. Objective :      Current Vitals : Temp: 98.6 °F (37 °C),  Pulse: 91, Resp: 24, BP: (!) 114/52, SpO2: 100 %  Last 24 Hrs Vitals   Patient Vitals for the past 24 hrs:   BP Temp Temp src Pulse Resp SpO2 Height Weight   02/27/20 0700 (!) 114/52 -- -- 91 24 100 % -- --   02/27/20 0645 89/68 -- -- 84 23 97 % -- --   02/27/20 0455 94/68 98.6 °F (37 °C) Oral 84 19 -- -- --   02/27/20 0058 -- -- -- -- 21 -- -- --   02/26/20 2314 (!) 96/50 97.8 °F (36.6 °C) Oral 89 (!) 33 91 % -- --   02/26/20 1953 -- 97.7 °F (36.5 °C) -- -- -- -- -- --   02/26/20 1949 113/83 97.7 °F (36.5 °C) Oral 112 (!) 31 96 % -- --   02/26/20 1753 -- -- -- -- -- -- 6' (1.829 m) 251 lb 5.2 oz (114 kg)   02/26/20 1742 108/83 97.8 °F (36.6 °C) Oral 101 29 100 % -- --     Intake / output   02/26 0701 - 02/27 0700  In: 5 [P.O.:720]  Out: 100 [Urine:100]  Physical Exam:  Physical Exam  Vitals signs and nursing note reviewed. Constitutional:       General: He is not in acute distress. Appearance: He is not diaphoretic. HENT:      Head: Normocephalic and atraumatic. Nose:      Right Sinus: No maxillary sinus tenderness or frontal sinus tenderness. Left Sinus: No maxillary sinus tenderness or frontal sinus tenderness. Mouth/Throat:      Pharynx: No oropharyngeal exudate. Eyes:      General: No scleral icterus. Conjunctiva/sclera: Conjunctivae normal.      Pupils: Pupils are equal, round, and reactive to light. Neck:      Musculoskeletal: Full passive range of motion without pain and neck supple. Thyroid: No thyromegaly. Vascular: No JVD. Cardiovascular:      Rate and Rhythm: Normal rate and regular rhythm.

## 2020-02-27 NOTE — FLOWSHEET NOTE
02/27/20 1353   Provider Notification   Reason for Communication Critical Value (comment)  (Na 115)   Provider Name Dr Maria Guadalupe Florentino   Provider Notification Physician   Method of Communication Secure Message   Response Waiting for response   Notification Time      Electronically signed by Ann Jimenez RN on 2/27/2020 at 1:54 PM

## 2020-02-27 NOTE — PLAN OF CARE
Problem: Risk for Impaired Skin Integrity  Goal: Tissue integrity - skin and mucous membranes  Description  Structural intactness and normal physiological function of skin and  mucous membranes. 2/26/2020 1856 by Mario Murillo RN  Outcome: Ongoing     Problem: Falls - Risk of:  Goal: Will remain free from falls  Description  Will remain free from falls  2/27/2020 0554 by Ishmael Mena RN  Outcome: Met This Shift  2/26/2020 1856 by Mario Murillo RN  Outcome: Ongoing  Goal: Absence of physical injury  Description  Absence of physical injury  2/27/2020 0554 by Ishmael Mena RN  Outcome: Met This Shift  2/26/2020 1856 by Mario Murillo RN  Outcome: Ongoing     Problem: SAFETY  Goal: Free from accidental physical injury  2/27/2020 0554 by Ishmael Mena RN  Outcome: Met This Shift  2/26/2020 1856 by Mario Murillo RN  Outcome: Ongoing  Goal: Free from intentional harm  2/27/2020 0554 by Ishmael Mena RN  Outcome: Met This Shift  2/26/2020 1856 by Mario Murillo RN  Outcome: Ongoing    Pt assessed as a fall risk this shift. Remains free from falls and accidental injury. Fall precautions in place. Floor free from obstacles, and bed is locked and in lowest position. Adequate lighting provided. Pt encouraged to call before getting out of bed for any need. Bed alarm activated. Will continue to monitor needs during hourly rounding, and reinforce education on use of call light.

## 2020-02-27 NOTE — PROGRESS NOTES
Inpatient Mobility Raw Score : 17 (02/27/20 1142)  AM-PAC Inpatient T-Scale Score : 42.13 (02/27/20 1142)  Mobility Inpatient CMS 0-100% Score: 50.57 (02/27/20 1142)  Mobility Inpatient CMS G-Code Modifier : CK (02/27/20 1142)          Goals  Short term goals  Time Frame for Short term goals: 10 visits  Short term goal 1: transfers with SBA  Short term goal 2: amb 150 ft with a RW x SBA  Short term goal 3: ascend/descend 4 steps with SBA  Short term goal 4: exercise program x SBA  Patient Goals   Patient goals : Return home     Therapy Time   Individual Concurrent Group Co-treatment   Time In 0820         Time Out 0845         Minutes 25             [address] of [address] [place] [address] Drive, PT

## 2020-02-27 NOTE — CONSULTS
Renal Consult Note    Patient :  Lida Baird; 49 y.o. MRN# [id number]  Location:  8222/4347-52  Attending:  Meet Thornton MD  Admit Date:  2/26/2020   Hospital Day: 1    Reason for Consult:     Asked by Dr Meet Thornton MD to see for GREGORY/Elevated Creatinine. History Obtained From:     Patient and chart review    History of Present Illness:     Lida Case; 49 y.o. male with past medical history as mentioned below. Patient was a transfer from [place] emergency room, patient had chief complaint of shortness of breath for the past 2 weeks, progressively getting worse, also noticed fatigue and decreased energy and decreased appetite. Patient has a history of CHF, EF of 15 to 20%, CAD status post PCI to LAD and cirrhosis, from passive congestion and alcohol use. He was discharged from the hospital about 2 weeks ago after treatment of decompensated biventricular failure. He was sent home on oral diuretics. It took him for a while however in the last week or so discontinued them as he tells me they were not working. In addition he was drinking large volume of water to flush his system. He also noticed that his urine output has declined. He was also complaining of some dizziness. Patient is a vague historian is not able to accurately tell me all his history. On presentation, patient was found to have sodium of 116, potassium of 5.1, chloride 77, bicarb 19, BUN 26, creatinine 1.03. Chest x-ray showing enlarged cardiac silhouette and mild central vascular congestion. During his previous admissions, the patient underwent a cardiac work-up including a cath that showed 100% proximal occlusion to previous stent in LAD that was unable to be stented. Patient does have history of COPD, quit smoking many years ago, does not wear oxygen at home but is requiring nasal cannula 2 L on this admission. Since admission his dyspnea has persisted. Sodium has fluctuated between 115-118.   Chest x-ray shows improved vascular congestion with loop diuretics which were started intravenously on admission, however creatinine has gone up to 1.8 which is higher than baseline. .  Blood pressure still been low in the  range, ProAmatine restarted. Nephrology has been consulted for acute kidney injury and hyponatremia. Past History/Allergies? Social History:     Past Medical History:   Diagnosis Date    [facility]     Back pain     Bipolar 1 disorder ([facility]     CAD (coronary artery disease) 09/2019    s/p proximal LAD stent    Idiopathic cardiomyopathy ([facility]     Seizures ([facility]     LAST SEIZURE 9-4-19       Allergies   Allergen Reactions    Ticagrelor Shortness Of Breath    Pcn [Penicillins]        Social History     Socioeconomic History    Marital status: Single     Spouse name: Not on file    Number of children: Not on file    Years of education: Not on file    Highest education level: Not on file   Occupational History    Not on file   Social Needs    Financial resource strain: Not on file    Food insecurity:     Worry: Not on file     Inability: Not on file    Transportation needs:     Medical: Not on file     Non-medical: Not on file   Tobacco Use    Smoking status: Former Smoker     Types: Cigarettes    Smokeless tobacco: Never Used   Substance and Sexual Activity    Alcohol use: Yes     Comment: bottle of vodka a week    Drug use: Yes     Frequency: 1.0 times per week     Types: Marijuana    Sexual activity: Not on file   Lifestyle    Physical activity:     Days per week: Not on file     Minutes per session: Not on file    Stress: Not on file   Relationships    Social connections:     Talks on phone: Not on file     Gets together: Not on file     Attends Alevism service: Not on file     Active member of club or organization: Not on file     Attends meetings of clubs or organizations: Not on file     Relationship status: Not on file    Intimate partner violence:     Fear of sodium chloride flush  10 mL Intravenous 2 times per day    thiamine  100 mg Oral Daily    folic acid  1 mg Oral Daily    multivitamin  1 tablet Oral Daily    enoxaparin  40 mg Subcutaneous Daily     Continuous Infusions:    heparin (porcine)       PRN Meds:  heparin (porcine), heparin (porcine), nitroGLYCERIN, sodium chloride flush, acetaminophen, acetaminophen, polyethylene glycol, ondansetron, ondansetron, promethazine    Review of Systems:     Constitutional: No fever, no chills, no lethargy, no weakness. HEENT:  No headache, otalgia, itchy eyes, nasal discharge or sore throat. Cardiac:  No chest pain  Chest:   Positive for shortness of breath and orthopnea and decreased effort tolerance and decreased urine output  Abdomen:  No abdominal pain, nausea or vomiting. Neuro:  No focal weakness, abnormal movements orseizure like activity. Skin:   No rashes, no itching. :   No hematuria, no pyuria, no dysuria, no flank pain. Extremities:  Bilateral lower extremity swelling no joint pains. ROS was otherwise negative except as mentioned in the [address] Input/Output:       I/O last 3 completed shifts: In: 5 [P.O.:720]  Out: 100 [Urine:100]  Patient Vitals for the past 96 hrs (Last 3 readings):   Weight   20 1753 251 lb 5.2 oz (114 kg)       Vital Signs:   Temperature:  Temp: 96.8 °F (36 °C)  TMax:   Temp (24hrs), Av.7 °F (36.5 °C), Min:96.8 °F (36 °C), Max:98.6 °F (37 °C)    Respirations:  Resp: 16  Pulse:   Pulse: 96  BP:    BP: 125/74  BP Range: Systolic (30BGJ), BAW:473 , Min:89 , SYX:180       Diastolic (43OZH), BXB:26, Min:50, Max:91      Physical Examination:     General:  AAO x 3, short of breath, dyspneic on lying flat  HEENT: Atraumatic, normocephalic. Eyes:    EOMI. Neck:   No JVD, no thyromegaly, no lymphadenopathy. Chest:   Mild crackles in lower lung bases bilaterally  Cardiac:  S1 S2 audible.  No S3, heart sounds very soft  Abdomen: Soft, non-tender, no masses or organomegaly, BS audible. :   No suprapubic or flank tenderness. Neuro:  AAO x 3, No FND. SKIN:  No rashes, good skin turgor. Extremities:  Trace Bilateral lower extremity edema. Labs:       Recent Labs     02/26/20  0900 02/26/20  1933 02/27/20  0645   WBC 7.5 5.5 6.1   RBC 4.57 4.28 4.10*   HGB 13.4 12.5* 12.1*   HCT 39.5* 37.3* 36.8*   MCV 86.4 87.1 89.8   MCH 29.3 29.2 29.5   MCHC 33.9 33.5 32.9   RDW 15.2* 15.5* 15.5*    226 216   MPV 10.4 10.3 10.2      BMP:   Recent Labs     02/26/20  0900 02/26/20  2210 02/27/20  0645   * 113* 118*   K 5.1  --  4.9   CL 77*  --  79*   CO2 19*  --   --    BUN 26*  --  36*   CREATININE 1.03  --  1.80*   GLUCOSE 105*  --  103*   CALCIUM 9.3  --   --       Phosphorus:   No results for input(s): PHOS in the last 72 hours.   Magnesium:    Recent Labs     02/27/20  0645   MG 2.6     Albumin:    Recent Labs     02/26/20  0900   LABALBU 4.5     BNP:    No results found for: BNP  VIC:      Lab Results   Component Value Date    VIC POSITIVE 12/17/2014     SPEP:  Lab Results   Component Value Date    PROT 7.0 02/26/2020     UPEP:   No results found for: LABPE  C3:   No results found for: C3  C4:   No results found for: C4  MPO ANCA:   No results found for: MPO  PR3 ANCA:   No results found for: PR3  Anti-GBM:   No results found for: GBMABIGG  Hep BsAg:       No results found for: HEPBSAG  Hep C AB:        No results found for: HEPCAB    Urinalysis/Chemistries:      Lab Results   Component Value Date    NITRU NEGATIVE 02/26/2020    COLORU YELLOW 02/26/2020    PHUR 6.0 02/26/2020    WBCUA None 02/26/2020    RBCUA None 02/26/2020    MUCUS NOT REPORTED 02/26/2020    TRICHOMONAS NOT REPORTED 02/26/2020    YEAST NOT REPORTED 02/26/2020    BACTERIA TRACE 02/26/2020    SPECGRAV 1.010 02/26/2020    LEUKOCYTESUR NEGATIVE 02/26/2020    UROBILINOGEN Normal 02/26/2020    BILIRUBINUR NEGATIVE 02/26/2020    GLUCOSEU NEGATIVE 02/26/2020    KETUA NEGATIVE 02/26/2020    AMORPHOUS NOT REPORTED 02/26/2020     Urine Sodium:   No results found for: VICENTA  Urine Potassium:  No results found for: KUR  Urine Chloride:  No results found for: CLUR  Urine Osmolarity: No results found for: OSMOU  Urine Protein:   No results found for: TPU  Urine Creatinine:     Lab Results   Component Value Date    LABCREA 128.6 12/17/2014     UPC:     Urine Eosinophils:  No components found for: UEOS    Radiology:     Xr Chest Portable    Result Date: 2/26/2020  EXAMINATION: ONE XRAY VIEW OF THE CHEST 2/26/2020 8:48 am COMPARISON: 02/10/2020 HISTORY: ORDERING SYSTEM PROVIDED HISTORY: SOB TECHNOLOGIST PROVIDED HISTORY: SOB Acute shortness of breath. Initial study. FINDINGS: The heart is enlarged and there is mild pulmonary vascular congestion. No pleural effusion or pneumothorax is seen. The osseous structures are unremarkable. Mild CHF, similar to the previous exam.     Xr Chest Portable    Result Date: 2/10/2020  EXAMINATION: ONE XRAY VIEW OF THE CHEST 2/10/2020 1:15 pm COMPARISON: 9/12/2019 HISTORY: ORDERING SYSTEM PROVIDED HISTORY: dyspnea TECHNOLOGIST PROVIDED HISTORY: dyspnea FINDINGS: Enlarged cardiac silhouette is unchanged. Mild central vascular congestion is unchanged. There is mild diffuse interstitial thickening, which is also unchanged. No new airspace consolidation. No pneumothorax or evidence of pleural effusion. No free air beneath the diaphragm. No evidence of acute osseous abnormality. Enlarged cardiac silhouette and mild central vascular congestion, similar to 9/12/2019. Assessment:     1. Hypervolemic hyponatremia, secondary to decompensated left heart failure as evidenced by chest x-ray, noncompliance with medication in the setting of severe cardiomyopathy, sodium was 133 about a week ago now down to 116, further aggravated by excessive free water intake  2. Decompensated left heart failure from severe cardiomyopathy, noncompliance with diuretics and fluid abuse  2.  GREGORY likely prerenal

## 2020-02-27 NOTE — PROGRESS NOTES
Smoking Cessation - topics covered   []  Health Risks  []  Benefits of Quitting   []  Smoking Cessation  []  Patient has no history of tobacco use per note in significant history. [x]  Patient is former smoker per note in significant history. [x]  No need for tobacco cessation education. []  Booklet given  []  Patient verbalizes understanding. []  Patient denies need for tobacco cessation education. []  Unable to meet with patient today. Will follow up as able.   Kaylene Novoa  9:31 AM

## 2020-02-27 NOTE — PROGRESS NOTES
Conservation;Transfer Training  Patient Education: good return   REQUIRES OT FOLLOW UP: Yes  Activity Tolerance  Activity Tolerance: Patient limited by fatigue;Treatment limited secondary to decreased cognition  Safety Devices  Safety Devices in place: Yes  Type of devices: Left in chair;Call light within reach;Nurse notified; Chair alarm in place;Gait belt;Patient at risk for falls  Restraints  Initially in place: No         Patient Diagnosis(es): There were no encounter diagnoses. has a past medical history of Alcoholic cirrhosis ([facility] Back pain, Bipolar 1 disorder ([facility] CAD (coronary artery disease), Idiopathic cardiomyopathy ([facility] and Seizures ([facility] has a past surgical history that includes Toe Surgery; Tooth Extraction; and Cardiac catheterization (Left, 09/17/2019).        Restrictions  Restrictions/Precautions  Restrictions/Precautions: General Precautions, Fall Risk, Up as Tolerated  Required Braces or Orthoses?: No    Subjective   General  Patient assessed for rehabilitation services?: Yes  Family / Caregiver Present: No     Patient Currently in Pain: Yes  Pain Assessment  Pain Assessment: 0-10  Pain Level: 5  Pain Type: Acute pain  Pain Location: Abdomen  Vital Signs  Patient Currently in Pain: Yes     Social/Functional History  Social/Functional History  Lives With: Alone  Type of Home: Apartment(1st floor apartment )  Home Layout: One level  Home Access: Stairs to enter with rails  Entrance Stairs - Number of Steps: 4 CARY  Entrance Stairs - Rails: Right  Bathroom Shower/Tub: Tub only  Bathroom Toilet: Standard  Bathroom Equipment: (none)  Home Equipment: (none)  ADL Assistance: Independent  Homemaking Assistance: Independent  Homemaking Responsibilities: Yes  Meal Prep Responsibility: Primary  Laundry Responsibility: Primary  Cleaning Responsibility: Primary  Shopping Responsibility: Primary  Ambulation Assistance: Independent  Transfer Assistance: Independent  Active : No  Patient's  Info: friends and family assist   Occupation: On disability    Objective   Vision: Impaired  Vision Exceptions: Wears glasses at all times  Hearing: Within functional limits    Orientation  Overall Orientation Status: Within Functional Limits  Observation/Palpation  Posture: Fair  Observation: forward flexed posture on standing   Balance  Sitting Balance: Contact guard assistance(seated EOB )  Standing Balance: Minimal assistance(use of RW )  Functional Mobility  Functional - Mobility Device: Rolling Walker  Activity: Other  Assist Level: Minimal assistance  Functional Mobility Comments: VCs for hand placement on RW. Pt demo increased unsteadiness throughout session and decreased safety awareness. Pt attempting to push away RW prior to sitting in chair      ADL  Feeding: Independent  Grooming: Contact guard assistance;Setup(to wash face seated EOB )  UE Bathing: Minimal assistance;Setup  LE Bathing: Minimal assistance;Setup  UE Dressing: Minimal assistance;Setup  LE Dressing: Minimal assistance;Setup  Toileting: Minimal assistance;Setup  Additional Comments: Pt supine in bed on arrival. Pt completed bed mobility and sat at EOB. Pt required increased time and effort to complete activities, increased SOB noted. Pt required VCs for proper pursed lipped breathing, Pt assisted to complete sit > stand transfer and functional mobility to chair with use of RW. Pt required VCs for hand placement on RW during session. Pt demo increased unsteadiness throughout session with decreased safety awareness. Pt transferred to chair, call light in reach and RN notified on therapist exit. Chair alarm armed.       Tone RUE  RUE Tone: Normotonic  Tone LUE  LUE Tone: Normotonic  Coordination  Movements Are Fluid And Coordinated: Yes        Bed mobility  Rolling to Left: Stand by assistance  Rolling to Right: Stand by assistance  Supine to Sit: Stand by assistance  Sit to Supine: Unable to assess(Pt left in chair on exit )  Scooting: Stand

## 2020-02-27 NOTE — CONSULTS
Attestation signed by      Attending Physician Statement:    I have discussed the care of  Violeta Perez , including pertinent history and exam findings, with the Cardiology fellow/resident. I have seen and examined the patient and the key elements of all parts of the encounter have been performed by me. I agree with the assessment, plan and orders as documented by the fellow/resident, after I modified exam findings and plan of treatments, and the final version is my approved version of the assessment. Additional Comments:   HFrF , with 100% LAD un solvageble, LVEF 15-20%  NON-compliance   Hyponatremia , nephrology on board   diuretetic as per Nephro  Low BP due low cardiac out put , will restart BB with parameters   High risk for LV clot   NSTEMI , agree with Heparin   Limited echo for rule out clot   Pt had been refusing Lifevest , candidate for AICD after repeat echo   Pt is a candiadte for LVAD ,? Compliance   Still smokes and drinks   Talked in details about compliance , daily wt , intake , out put   Dr. Ellyn Ham Cardiology Cardiology    Consult / H&P               Today's Date: 2/27/2020  Patient Name: Violeta Perez  Date of admission: 2/26/2020  5:34 PM  Patient's age: 49 y.o., 1965  Admission Dx: CHF with unknown LVEF ([address] [I50.9]    Reason for Consult:  Cardiac evaluation    Requesting Physician: Sveta Shah MD    CHIEF COMPLAINT:  Shortness of breath     History Obtained From:  patient, electronic medical record    HISTORY OF PRESENT ILLNESS:      The patient is a 49 y.o.  male with past medical history of seizures, idiopathic cardiomyopathy EF 15-20%, CAD s/p PCI to LAD, cirrhosis who is admitted to the hospital for shortness of breath.  Patient states that he was having increasing shortness of breath over the past several days, prompting him to come to the hospital. He was recently admitted for similar symptoms from 2/11 to 2/13, treated with IV diuretics which brought improvement of his symptoms and he was discharged with prescription for Lasix 20 mg BID. The patient additionally underwent a cardiac work up during that admission in which is found that he had 100% proximal occlusion to previous stent in his LAD that was unable to be stented. He was treated with heparin infusion and medical management was recommended. Patient reports that about 2 days ago he stopped taking his medications as \"they weren't working\". He was not getting great urine output with his Lasix at home. Recent ECHO was performed 12/2019 showing EF of 15-20%, MR, and LA dilation; it was recommended that patient get an AICD, however he refused this and continues to refuse this admission. Troponins continue to be elevated, increasing from 2019-338 (last admission troponins were 350-400). Patient is hyponatremic at 116 with otherwise normal electrolytes. He is denying any chest pain, abdominal pain, back pain, or other concerns at this time. Past Medical History:   has a past medical history of Alcoholic cirrhosis ([facility] Back pain, Bipolar 1 disorder ([facility] CAD (coronary artery disease), Idiopathic cardiomyopathy ([facility] and Seizures ([facility] Past Surgical History:   has a past surgical history that includes Toe Surgery; Tooth Extraction; and Cardiac catheterization (Left, 09/17/2019). Home Medications:    Prior to Admission medications    Medication Sig Start Date End Date Taking? Authorizing Provider   nitroGLYCERIN (NITROSTAT) 0.4 MG SL tablet up to max of 3 total doses.  If no relief after 1 dose, call 911. 2/24/20   Daniela Espinoza MD   furosemide (LASIX) 20 MG tablet Take 1 tablet by mouth 2 times daily 2/13/20   CAM Madison - CNP   atorvastatin (LIPITOR) 40 MG tablet Take 40 mg by mouth daily 1/14/20   Historical Provider, MD   doxepin (SINEQUAN) 50 MG capsule Take 50 mg by mouth 1/8/20   Historical Provider, MD   losartan (COZAAR) 25 MG tablet Take 12.5 mg, 12.5 mg, Oral, Daily  metoprolol tartrate (LOPRESSOR) tablet 12.5 mg, 12.5 mg, Oral, BID  nicotine (NICODERM CQ) 21 MG/24HR 1 patch, 1 patch, Transdermal, Daily  nitroGLYCERIN (NITROSTAT) SL tablet 0.4 mg, 0.4 mg, Sublingual, Q5 Min PRN  OLANZapine zydis (ZYPREXA) disintegrating tablet 20 mg, 20 mg, Oral, Nightly  sodium chloride flush 0.9 % injection 10 mL, 10 mL, Intravenous, 2 times per day  sodium chloride flush 0.9 % injection 10 mL, 10 mL, Intravenous, PRN  acetaminophen (TYLENOL) tablet 650 mg, 650 mg, Oral, Q6H PRN  acetaminophen (TYLENOL) suppository 650 mg, 650 mg, Rectal, Q6H PRN  polyethylene glycol (GLYCOLAX) packet 17 g, 17 g, Oral, Daily PRN  ondansetron (ZOFRAN) injection 4 mg, 4 mg, Intravenous, Q6H PRN  ondansetron (ZOFRAN-ODT) disintegrating tablet 4 mg, 4 mg, Oral, Q8H PRN  vitamin B-1 (THIAMINE) tablet 100 mg, 100 mg, Oral, Daily  folic acid (FOLVITE) tablet 1 mg, 1 mg, Oral, Daily  multivitamin 1 tablet, 1 tablet, Oral, Daily  promethazine (PHENERGAN) tablet 12.5 mg, 12.5 mg, Oral, Q6H PRN  enoxaparin (LOVENOX) injection 40 mg, 40 mg, Subcutaneous, Daily    Allergies:  Ticagrelor and Pcn [penicillins]    Social History:   reports that he has quit smoking. His smoking use included cigarettes. He has never used smokeless tobacco. He reports current alcohol use. He reports current drug use. Frequency: 1.00 time per week. Drug: Marijuana. Family History: family history is not on file. No h/o sudden cardiac death. No for premature CAD    REVIEW OF SYSTEMS:    · Constitutional: there has been no unanticipated weight loss. There's been No change in energy level, No change in activity level. · Eyes: No visual changes or diplopia. No scleral icterus. · ENT: No Headaches  · Cardiovascular: CAD s/p PCI, idiopathic cardiomyopathy. Negative for chest pain  · Respiratory: No previous pulmonary problems, No cough. Positive for shortness of breath   · Gastrointestinal: No abdominal pain.   No 12/2019 EF 15-20%, MR, left atrial dilation. Cardiac Angiography:   02/2020 showing 100% LAD occlusion proximal to previous stent. Labs:     CBC:   Recent Labs     02/26/20  1933 02/27/20  0645   WBC 5.5 6.1   HGB 12.5* 12.1*   HCT 37.3* 36.8*    216     BMP:   Recent Labs     02/26/20  0900 02/26/20  2210 02/27/20  0645   * 113* 118*   K 5.1  --   --    CO2 19*  --   --    BUN 26*  --  PENDING   CREATININE 1.03  --  PENDING   LABGLOM >60  --  PENDING   GLUCOSE 105*  --   --      BNP: No results for input(s): BNP in the last 72 hours. PT/INR:   Recent Labs     02/26/20  0900   PROTIME 15.6*   INR 1.5*     APTT:  Recent Labs     02/26/20 2210 02/27/20  0645   APTT 23.7 25.1     CARDIAC ENZYMES:No results for input(s): CKTOTAL, CKMB, CKMBINDEX, TROPONINI in the last 72 hours. FASTING LIPID PANEL:  Lab Results   Component Value Date    HDL 44 02/12/2020    TRIG 66 02/12/2020     LIVER PROFILE:  Recent Labs     02/26/20  0900   *   *   LABALBU 4.5     Patient Active Problem List   Diagnosis    Onychia and paronychia of toe    Other specified disease of nail    Acute on chronic diastolic CHF (congestive heart failure), NYHA class 3 (HCC)    Acute on chronic congestive heart failure (HCC)    Acute on chronic combined systolic and diastolic congestive heart failure, NYHA class 4 (HCC)    Portal hypertension (HCC)    Alcoholic cirrhosis of liver without ascites (HCC)    Idiopathic cardiomyopathy ([address]    Acute heart failure ([address]    Ischemic cardiomyopathy    CAD, multiple vessel    CAD in native artery    Medication side effect, initial encounter    Idiopathic hypotension    NSTEMI (non-ST elevated myocardial infarction) ([address]    CHF with unknown LVEF ([facility]     IMPRESSION:    1. CHF exacerbation, likely secondary to medication noncompliance and known ACS   2. Known CAD not amenable to PCI   3.  Hyponatremia - follow up nephrology recommendations

## 2020-02-28 LAB
ANION GAP SERPL CALCULATED.3IONS-SCNC: 18 MMOL/L (ref 9–17)
BUN BLDV-MCNC: 37 MG/DL (ref 6–20)
BUN/CREAT BLD: ABNORMAL (ref 9–20)
CALCIUM SERPL-MCNC: 8.7 MG/DL (ref 8.6–10.4)
CHLORIDE BLD-SCNC: 83 MMOL/L (ref 98–107)
CO2: 22 MMOL/L (ref 20–31)
CREAT SERPL-MCNC: 1.46 MG/DL (ref 0.7–1.2)
CULTURE: NO GROWTH
GFR AFRICAN AMERICAN: >60 ML/MIN
GFR NON-AFRICAN AMERICAN: 51 ML/MIN
GFR SERPL CREATININE-BSD FRML MDRD: ABNORMAL ML/MIN/{1.73_M2}
GFR SERPL CREATININE-BSD FRML MDRD: ABNORMAL ML/MIN/{1.73_M2}
GLUCOSE BLD-MCNC: 105 MG/DL (ref 70–99)
Lab: NORMAL
MYOGLOBIN: 109 NG/ML (ref 28–72)
MYOGLOBIN: 147 NG/ML (ref 28–72)
MYOGLOBIN: 56 NG/ML (ref 28–72)
MYOGLOBIN: 59 NG/ML (ref 28–72)
MYOGLOBIN: 60 NG/ML (ref 28–72)
MYOGLOBIN: 65 NG/ML (ref 28–72)
MYOGLOBIN: 76 NG/ML (ref 28–72)
MYOGLOBIN: 88 NG/ML (ref 28–72)
PARTIAL THROMBOPLASTIN TIME: 62.8 SEC (ref 20.5–30.5)
PARTIAL THROMBOPLASTIN TIME: 64.5 SEC (ref 20.5–30.5)
POTASSIUM SERPL-SCNC: 4 MMOL/L (ref 3.7–5.3)
SODIUM BLD-SCNC: 118 MMOL/L (ref 135–144)
SODIUM BLD-SCNC: 123 MMOL/L (ref 135–144)
SODIUM BLD-SCNC: 126 MMOL/L (ref 135–144)
SODIUM BLD-SCNC: 134 MMOL/L (ref 135–144)
SPECIMEN DESCRIPTION: NORMAL
TROPONIN INTERP: ABNORMAL
TROPONIN T: ABNORMAL NG/ML
TROPONIN, HIGH SENSITIVITY: 187 NG/L (ref 0–22)
TROPONIN, HIGH SENSITIVITY: 199 NG/L (ref 0–22)
TROPONIN, HIGH SENSITIVITY: 202 NG/L (ref 0–22)
TROPONIN, HIGH SENSITIVITY: 204 NG/L (ref 0–22)
TROPONIN, HIGH SENSITIVITY: 204 NG/L (ref 0–22)
TROPONIN, HIGH SENSITIVITY: 224 NG/L (ref 0–22)
TROPONIN, HIGH SENSITIVITY: 225 NG/L (ref 0–22)
TROPONIN, HIGH SENSITIVITY: 240 NG/L (ref 0–22)

## 2020-02-28 PROCEDURE — 6360000002 HC RX W HCPCS: Performed by: INTERNAL MEDICINE

## 2020-02-28 PROCEDURE — 84295 ASSAY OF SERUM SODIUM: CPT

## 2020-02-28 PROCEDURE — 97110 THERAPEUTIC EXERCISES: CPT

## 2020-02-28 PROCEDURE — 51702 INSERT TEMP BLADDER CATH: CPT

## 2020-02-28 PROCEDURE — 6370000000 HC RX 637 (ALT 250 FOR IP): Performed by: INTERNAL MEDICINE

## 2020-02-28 PROCEDURE — 84484 ASSAY OF TROPONIN QUANT: CPT

## 2020-02-28 PROCEDURE — 93308 TTE F-UP OR LMTD: CPT

## 2020-02-28 PROCEDURE — 6370000000 HC RX 637 (ALT 250 FOR IP): Performed by: FAMILY MEDICINE

## 2020-02-28 PROCEDURE — 36415 COLL VENOUS BLD VENIPUNCTURE: CPT

## 2020-02-28 PROCEDURE — 83874 ASSAY OF MYOGLOBIN: CPT

## 2020-02-28 PROCEDURE — 6360000002 HC RX W HCPCS: Performed by: NURSE PRACTITIONER

## 2020-02-28 PROCEDURE — 97530 THERAPEUTIC ACTIVITIES: CPT

## 2020-02-28 PROCEDURE — 99232 SBSQ HOSP IP/OBS MODERATE 35: CPT | Performed by: FAMILY MEDICINE

## 2020-02-28 PROCEDURE — 6360000002 HC RX W HCPCS: Performed by: FAMILY MEDICINE

## 2020-02-28 PROCEDURE — 2580000003 HC RX 258: Performed by: NURSE PRACTITIONER

## 2020-02-28 PROCEDURE — 6370000000 HC RX 637 (ALT 250 FOR IP): Performed by: NURSE PRACTITIONER

## 2020-02-28 PROCEDURE — 80048 BASIC METABOLIC PNL TOTAL CA: CPT

## 2020-02-28 PROCEDURE — 94660 CPAP INITIATION&MGMT: CPT

## 2020-02-28 PROCEDURE — 93005 ELECTROCARDIOGRAM TRACING: CPT

## 2020-02-28 PROCEDURE — 2060000000 HC ICU INTERMEDIATE R&B

## 2020-02-28 PROCEDURE — 85730 THROMBOPLASTIN TIME PARTIAL: CPT

## 2020-02-28 RX ORDER — LORAZEPAM 2 MG/ML
1 INJECTION INTRAMUSCULAR
Status: DISCONTINUED | OUTPATIENT
Start: 2020-02-28 | End: 2020-02-28

## 2020-02-28 RX ORDER — LORAZEPAM 2 MG/1
4 TABLET ORAL
Status: DISCONTINUED | OUTPATIENT
Start: 2020-02-28 | End: 2020-02-28

## 2020-02-28 RX ORDER — LORAZEPAM 1 MG/1
1 TABLET ORAL
Status: DISCONTINUED | OUTPATIENT
Start: 2020-02-28 | End: 2020-02-28

## 2020-02-28 RX ORDER — SODIUM CHLORIDE 0.9 % (FLUSH) 0.9 %
10 SYRINGE (ML) INJECTION PRN
Status: DISCONTINUED | OUTPATIENT
Start: 2020-02-28 | End: 2020-03-04 | Stop reason: HOSPADM

## 2020-02-28 RX ORDER — LORAZEPAM 2 MG/1
2 TABLET ORAL
Status: DISCONTINUED | OUTPATIENT
Start: 2020-02-28 | End: 2020-02-28

## 2020-02-28 RX ORDER — LORAZEPAM 2 MG/ML
4 INJECTION INTRAMUSCULAR
Status: DISCONTINUED | OUTPATIENT
Start: 2020-02-28 | End: 2020-02-28

## 2020-02-28 RX ORDER — SODIUM CHLORIDE 0.9 % (FLUSH) 0.9 %
10 SYRINGE (ML) INJECTION EVERY 12 HOURS SCHEDULED
Status: DISCONTINUED | OUTPATIENT
Start: 2020-02-28 | End: 2020-03-04 | Stop reason: HOSPADM

## 2020-02-28 RX ORDER — LORAZEPAM 2 MG/ML
2 INJECTION INTRAMUSCULAR
Status: DISCONTINUED | OUTPATIENT
Start: 2020-02-28 | End: 2020-02-28

## 2020-02-28 RX ORDER — LORAZEPAM 2 MG/ML
3 INJECTION INTRAMUSCULAR
Status: DISCONTINUED | OUTPATIENT
Start: 2020-02-28 | End: 2020-02-28

## 2020-02-28 RX ORDER — HALOPERIDOL 5 MG/ML
5 INJECTION INTRAMUSCULAR ONCE
Status: COMPLETED | OUTPATIENT
Start: 2020-02-28 | End: 2020-02-28

## 2020-02-28 RX ADMIN — LORAZEPAM 2 MG: 2 INJECTION INTRAMUSCULAR; INTRAVENOUS at 03:57

## 2020-02-28 RX ADMIN — FUROSEMIDE 40 MG: 10 INJECTION, SOLUTION INTRAMUSCULAR; INTRAVENOUS at 09:39

## 2020-02-28 RX ADMIN — HEPARIN SODIUM AND DEXTROSE 12.8 UNITS/KG/HR: 10000; 5 INJECTION INTRAVENOUS at 05:46

## 2020-02-28 RX ADMIN — ENOXAPARIN SODIUM 40 MG: 40 INJECTION SUBCUTANEOUS at 15:52

## 2020-02-28 RX ADMIN — FUROSEMIDE 40 MG: 10 INJECTION, SOLUTION INTRAMUSCULAR; INTRAVENOUS at 21:15

## 2020-02-28 RX ADMIN — OLANZAPINE 20 MG: 10 TABLET, ORALLY DISINTEGRATING ORAL at 21:12

## 2020-02-28 RX ADMIN — LORAZEPAM 2 MG: 2 INJECTION INTRAMUSCULAR; INTRAVENOUS at 01:43

## 2020-02-28 RX ADMIN — SODIUM CHLORIDE, PRESERVATIVE FREE 10 ML: 5 INJECTION INTRAVENOUS at 21:13

## 2020-02-28 RX ADMIN — SODIUM CHLORIDE, PRESERVATIVE FREE 10 ML: 5 INJECTION INTRAVENOUS at 09:52

## 2020-02-28 RX ADMIN — FAMOTIDINE 20 MG: 20 TABLET, FILM COATED ORAL at 21:13

## 2020-02-28 RX ADMIN — BENZTROPINE MESYLATE 1 MG: 1 TABLET ORAL at 21:13

## 2020-02-28 RX ADMIN — MIDODRINE HYDROCHLORIDE 5 MG: 5 TABLET ORAL at 18:08

## 2020-02-28 RX ADMIN — DOXEPIN HYDROCHLORIDE 50 MG: 25 CAPSULE ORAL at 21:12

## 2020-02-28 RX ADMIN — HALOPERIDOL LACTATE 5 MG: 5 INJECTION, SOLUTION INTRAMUSCULAR at 00:47

## 2020-02-28 RX ADMIN — METOPROLOL TARTRATE 12.5 MG: 25 TABLET ORAL at 21:12

## 2020-02-28 ASSESSMENT — ENCOUNTER SYMPTOMS
CONSTIPATION: 0
RHINORRHEA: 0
SINUS PRESSURE: 0
VOMITING: 0
CHOKING: 0
CHEST TIGHTNESS: 0
NAUSEA: 0
WHEEZING: 0
DIARRHEA: 0
VOICE CHANGE: 0
COUGH: 0
SHORTNESS OF BREATH: 1
ABDOMINAL PAIN: 0
BACK PAIN: 0

## 2020-02-28 NOTE — PROGRESS NOTES
--   --   --   --    *  --   --   --   --   --   --     < > = values in this interval not displayed. Last 3 CBC:  Recent Labs     02/26/20  0900 02/26/20  1933 02/27/20  0645   WBC 7.5 5.5 6.1   RBC 4.57 4.28 4.10*   HGB 13.4 12.5* 12.1*   HCT 39.5* 37.3* 36.8*   MCV 86.4 87.1 89.8   MCH 29.3 29.2 29.5   MCHC 33.9 33.5 32.9   RDW 15.2* 15.5* 15.5*    226 216   MPV 10.4 10.3 10.2       ASSESSMENT   1. Hypervolemic hyponatremia, secondary to decompensated left heart failure as evidenced by chest x-ray, noncompliance with medication in the setting of severe cardiomyopathy, sodium was 133 about a week ago now down to 116 on admission, further aggravated by excessive free water intake and also complicated by use of Cymbalta. 2.  Decompensated left heart failure from severe cardiomyopathy, noncompliance with diuretics and fluid abuse  2. GREGORY likely prerenal insult/early ATN from decompensated left heart failure  3. Acute hepatitis in setting of chronic liver disease secondary to above  3. CHF EF 15-20% (refused AICD)  4. CAD not amenable to PCI  5. Liver cirrhosis, both congestive and from alcoholic cirrhosis  6. Seizure disorder  7. Bipolar disorder    PLAN   Continue Lasix to IV 40 mg twice daily. Continue with Khan for now. Strict I and O, daily weights  Continue sodium checks Q6H  Renal ultrasound reviewed no hydronephrosis  Hold Cozaar  BMP daily   Hold Cymbalta, can cause hyponatremia  Continue ProAmatine 5 mg 3 times daily  Fluid restriction 1.2 L a day  Will follow. Patient's nurse was updated. Please do not hesitate to call with questions    Víctor Reyes MD, PGY-3  Internal Medicine  [address]  2/28/20  1:29 PM     Attending Physician Statement  I have discussed the care of this patient, including pertinent history and exam findings, with the Resident/CNP.  I have reviewed and edited the key elements of all parts of the encounter with the Resident/CNP. I agree with the assessment, plan and orders as documented by the Resident/CNP. Christopher Mauricio MD   Nephrology [address] [place] [address] Drive    This note is created with the assistance of a speech-recognition program. While intending to generate a document that actually reflects the content of the visit, no guarantees can be provided that every mistake has been identified and corrected by editing.

## 2020-02-28 NOTE — PLAN OF CARE
Cheryl [facility]    Second Visit Note  For more detailed information please refer to the progress note of the day      2/28/2020    5:30 PM    Name:   Tito Frey  MRN:     [id number]     Acct:      [de-identified]   Room:   [facility] Day:  2  Admit Date:  2/26/2020  5:34 PM    PCP:   CAM Hernandez CNP  Code Status:  Full Code      Pt vitals were reviewed   New labs were reviewed   Patient was not seen as gone for echo. Met with father and stepmother and sister in room. Updated plan :     1. Discussed with family about prognosis and care. Continue current care  . Family shared with me about patient's drinking behavior as he has been hiding from family. He drinks about fifth of vodka many times per week.       Nafisa Morrissey MD  2/28/2020  5:30 PM

## 2020-02-28 NOTE — PROGRESS NOTES
Fair  Sitting - Dynamic: Poor  Comments: Pt sat EOB with L side lean, has difficulty regaining and maintaining upright sitting position w/mod cueing, keeps B eyes closed and opens briefly upon vc. Unable to locate L handle of RW and seems to become more obtunded as treatement progresses. Exercises  Comments: BLE AAROM SLR's x10, BLE heel slides x10      Comment: Pt became more obtunded and impulsive as tx progressed, assist of RN requested to return pt to supine along with line management in order to keep pt safe from himself.       Goals  Short term goals  Time Frame for Short term goals: 10 visits  Short term goal 1: transfers with SBA  Short term goal 2: amb 150 ft with a RW x SBA  Short term goal 3: ascend/descend 4 steps with SBA  Short term goal 4: exercise program x SBA  Patient Goals   Patient goals : Return home    Plan  Times per week: 5-6x wk  Current Treatment Recommendations: Strengthening, Transfer Training, Endurance Training, Gait Training, Functional Mobility Training, Stair training, Safety Education & Training  Safety Devices  Type of devices: Left in chair, Call light within reach, Chair alarm in place, Patient at risk for falls, Nurse notified     Therapy Time   Individual Concurrent Group Co-treatment   Time In  1015         Time Out  1045         Minutes  30                 [id number]

## 2020-02-28 NOTE — PROGRESS NOTES
wheezing. Cardiovascular: Positive for leg swelling. Negative for chest pain and palpitations. Gastrointestinal: Negative for abdominal pain, constipation, diarrhea, nausea and vomiting. Genitourinary: Negative for difficulty urinating, discharge, dysuria, frequency and testicular pain. Musculoskeletal: Negative for back pain. Skin: Negative for rash. Neurological: Negative for dizziness, weakness, light-headedness and headaches. Hematological: Does not bruise/bleed easily. Psychiatric/Behavioral: Positive for decreased concentration and sleep disturbance. Negative for agitation, behavioral problems, confusion, self-injury and suicidal ideas. Objective :      Current Vitals : Temp: 97.7 °F (36.5 °C),  Pulse: 101, Resp: 21, BP: 109/89, SpO2: 99 %  Last 24 Hrs Vitals   Patient Vitals for the past 24 hrs:   BP Temp Temp src Pulse Resp SpO2 Weight   02/28/20 0353 109/89 -- -- 101 21 99 % 245 lb 9.5 oz (111.4 kg)   02/28/20 0334 -- -- -- -- 21 -- --   02/27/20 2331 -- -- -- -- 20 -- --   02/27/20 2312 95/74 97.7 °F (36.5 °C) Oral 104 21 99 % --   02/27/20 2013 100/76 98 °F (36.7 °C) Oral 98 25 98 % --   02/27/20 1600 (!) 110/93 98 °F (36.7 °C) Axillary 95 21 100 % --   02/27/20 1210 -- -- -- -- -- 100 % --   02/27/20 1115 105/77 97.3 °F (36.3 °C) Oral 86 24 100 % --   02/27/20 0744 -- -- -- -- 16 -- --   02/27/20 0740 125/74 96.8 °F (36 °C) Axillary 96 21 98 % --     Intake / output   02/27 0701 - 02/28 0700  In: 2684 [P.O.:1260; I.V.:179]  Out: 2300 [Urine:2300]  Physical Exam:  Physical Exam  Vitals signs and nursing note reviewed. Constitutional:       General: He is not in acute distress. Appearance: He is obese. He is not diaphoretic. HENT:      Head: Normocephalic and atraumatic. Nose:      Right Sinus: No maxillary sinus tenderness or frontal sinus tenderness. Left Sinus: No maxillary sinus tenderness or frontal sinus tenderness.       Mouth/Throat:      Pharynx: No interval not displayed. Recent Labs     02/26/20  0900   PROT 7.0   LABALBU 4.5   *   *   ALKPHOS 110   BILITOT 1.18   LIPASE 9*          Specific Gravity, UA   Date Value Ref Range Status   02/26/2020 1.010 1.010 - 1.020 Final     Protein, UA   Date Value Ref Range Status   02/26/2020 NEGATIVE NEGATIVE Final     RBC, UA   Date Value Ref Range Status   02/26/2020 None 0 - 2 /HPF Final     Bacteria, UA   Date Value Ref Range Status   02/26/2020 TRACE (A) None Final     Nitrite, Urine   Date Value Ref Range Status   02/26/2020 NEGATIVE NEGATIVE Final     WBC, UA   Date Value Ref Range Status   02/26/2020 None 0 - 5 /HPF Final     Leukocyte Esterase, Urine   Date Value Ref Range Status   02/26/2020 NEGATIVE NEGATIVE Final       Imaging / Clinical Data :-   Xr Chest Portable    Result Date: 2/26/2020  Mild CHF, similar to the previous exam.        Clinical Course : unchanged  Assessment and Plan  :        1. Acute on chronic systolic and diastolic CHF - continue IV Lasix, fluid restriction. 2. Elevated troponin secondary to decompensated CHF. Trend troponin. .  Underwent cardiac cath last admission was found to have multivessel disease and PCI was not possible. Start heparin infusion. 3. Hypervolemic Hyponatremia - continue diuresis . Consult Nephrology   4. Ischemic cardiomyopathy with EF 20%. -Cardiology following. May need ICD. 5. CAD with known LAD stent. 6. History of alcoholism -no acute withdrawal symptoms. Avoid Ativan use. 7. Cirrhosis liver with portal hypertension  8. Chronic idiopathic hypertension-continue midodrine 5 mg 3 times daily. 9. Acute resp failure with hypoxia  - BPAP as needed . Continue oxygen. 10. Acute kidney injury secondary decompensated CHF-continue Lasix, monitor kidney function. Continue to hold ACE        Continue to monitor vitals , Intake / output ,  Cell count , HGB , Kidney function, oxygenation  as indicated .    Plan and updates discussed with

## 2020-02-28 NOTE — PROGRESS NOTES
02/27/20  2321 02/28/20  0134 02/28/20  0457   TROPHS 240* 225* 224*     BNP: No results for input(s): BNP in the last 72 hours. Lipids: No results for input(s): CHOL, HDL in the last 72 hours. Invalid input(s): LDLCALCU  INR:   Recent Labs     02/26/20  0900   INR 1.5*       Objective:   Vitals: /89   Pulse 101   Temp 97.7 °F (36.5 °C) (Oral)   Resp 21   Ht 6' (1.829 m)   Wt 245 lb 9.5 oz (111.4 kg)   SpO2 99%   BMI 33.31 kg/m²   General appearance: drowsy   HEENT: Head: Normocephalic, no lesions, without obvious abnormality. Neck: no JVD, trachea midline, no adenopathy  Lungs: Clear to auscultation  Heart: Regular rate and rhythm, s1/s2 auscultated, no murmurs  Abdomen: soft, non-tender, bowel sounds active  Extremities: no edema  Neurologic: not done        Assessment / Acute Cardiac Problems:   1. HFrF with 100% LAD- Unable to PCI   2. Noncompliance   3. Hyponatremia   4. Hypotension  5. NSTEMI     Patient Active Problem List:     Onychia and paronychia of toe     Other specified disease of nail     Acute on chronic diastolic CHF (congestive heart failure), NYHA class 3 (HCC)     Acute on chronic congestive heart failure (HCC)     Acute on chronic combined systolic and diastolic congestive heart failure, NYHA class 4 (HCC)     Portal hypertension (HCC)     Alcoholic cirrhosis of liver without ascites (HCC)     Idiopathic cardiomyopathy (HCC)     Acute heart failure (HCC)     Ischemic cardiomyopathy     CAD, multiple vessel     CAD in native artery     Medication side effect, initial encounter     Idiopathic hypotension     NSTEMI (non-ST elevated myocardial infarction) ([facility]     CHF with unknown LVEF ([facility]      Plan of Treatment:   1. Awaiting ECHO. Rule out clot. If EF <35%, will discuss AICD again, which he has refused in the past.  On heparin gtt. 2. Hypotension improved. On midodrine   3. CHF. Diuretics per nephro recs. Continue BB. No ACE currently. 4. CAD.   Continue ASA, STatin, plavix, BB. ACE on hold due to hypotension and GREGROY.       Electronically signed by CAM Steward CNP on 2/28/2020 at 8:23 [address] [facility].  [phone]

## 2020-02-29 LAB
ALLEN TEST: POSITIVE
AMMONIA: 36 UMOL/L (ref 16–60)
ANION GAP SERPL CALCULATED.3IONS-SCNC: 18 MMOL/L (ref 9–17)
BNP INTERPRETATION: ABNORMAL
BUN BLDV-MCNC: 23 MG/DL (ref 6–20)
BUN/CREAT BLD: ABNORMAL (ref 9–20)
CALCIUM SERPL-MCNC: 8.8 MG/DL (ref 8.6–10.4)
CHLORIDE BLD-SCNC: 93 MMOL/L (ref 98–107)
CO2: 25 MMOL/L (ref 20–31)
CREAT SERPL-MCNC: 0.99 MG/DL (ref 0.7–1.2)
FIO2: 36
GFR AFRICAN AMERICAN: >60 ML/MIN
GFR NON-AFRICAN AMERICAN: >60 ML/MIN
GFR SERPL CREATININE-BSD FRML MDRD: ABNORMAL ML/MIN/{1.73_M2}
GFR SERPL CREATININE-BSD FRML MDRD: ABNORMAL ML/MIN/{1.73_M2}
GLUCOSE BLD-MCNC: 92 MG/DL (ref 70–99)
MODE: ABNORMAL
MYOGLOBIN: 30 NG/ML (ref 28–72)
MYOGLOBIN: 30 NG/ML (ref 28–72)
MYOGLOBIN: 31 NG/ML (ref 28–72)
MYOGLOBIN: 32 NG/ML (ref 28–72)
MYOGLOBIN: 38 NG/ML (ref 28–72)
MYOGLOBIN: 43 NG/ML (ref 28–72)
MYOGLOBIN: 43 NG/ML (ref 28–72)
NEGATIVE BASE EXCESS, ART: ABNORMAL (ref 0–2)
O2 DEVICE/FLOW/%: ABNORMAL
PATIENT TEMP: ABNORMAL
PLATELET # BLD: 189 K/UL (ref 138–453)
POC HCO3: 27.4 MMOL/L (ref 21–28)
POC O2 SATURATION: 99 % (ref 94–98)
POC PCO2 TEMP: ABNORMAL MM HG
POC PCO2: 33.3 MM HG (ref 35–48)
POC PH TEMP: ABNORMAL
POC PH: 7.52 (ref 7.35–7.45)
POC PO2 TEMP: ABNORMAL MM HG
POC PO2: 116.3 MM HG (ref 83–108)
POSITIVE BASE EXCESS, ART: 5 (ref 0–3)
POTASSIUM SERPL-SCNC: 3.5 MMOL/L (ref 3.7–5.3)
PRO-BNP: 6969 PG/ML
SAMPLE SITE: ABNORMAL
SODIUM BLD-SCNC: 135 MMOL/L (ref 135–144)
SODIUM BLD-SCNC: 136 MMOL/L (ref 135–144)
SODIUM BLD-SCNC: 136 MMOL/L (ref 135–144)
SODIUM BLD-SCNC: 137 MMOL/L (ref 135–144)
SODIUM BLD-SCNC: 140 MMOL/L (ref 135–144)
TCO2 (CALC), ART: 28 MMOL/L (ref 22–29)
TROPONIN INTERP: ABNORMAL
TROPONIN T: ABNORMAL NG/ML
TROPONIN, HIGH SENSITIVITY: 192 NG/L (ref 0–22)
TROPONIN, HIGH SENSITIVITY: 193 NG/L (ref 0–22)
TROPONIN, HIGH SENSITIVITY: 193 NG/L (ref 0–22)
TROPONIN, HIGH SENSITIVITY: 204 NG/L (ref 0–22)
TROPONIN, HIGH SENSITIVITY: 209 NG/L (ref 0–22)
TROPONIN, HIGH SENSITIVITY: 213 NG/L (ref 0–22)
TROPONIN, HIGH SENSITIVITY: 220 NG/L (ref 0–22)

## 2020-02-29 PROCEDURE — 83880 ASSAY OF NATRIURETIC PEPTIDE: CPT

## 2020-02-29 PROCEDURE — 6370000000 HC RX 637 (ALT 250 FOR IP): Performed by: NURSE PRACTITIONER

## 2020-02-29 PROCEDURE — 2580000003 HC RX 258: Performed by: NURSE PRACTITIONER

## 2020-02-29 PROCEDURE — 6360000002 HC RX W HCPCS

## 2020-02-29 PROCEDURE — 82140 ASSAY OF AMMONIA: CPT

## 2020-02-29 PROCEDURE — 6370000000 HC RX 637 (ALT 250 FOR IP): Performed by: INTERNAL MEDICINE

## 2020-02-29 PROCEDURE — 36600 WITHDRAWAL OF ARTERIAL BLOOD: CPT

## 2020-02-29 PROCEDURE — 51798 US URINE CAPACITY MEASURE: CPT

## 2020-02-29 PROCEDURE — 85049 AUTOMATED PLATELET COUNT: CPT

## 2020-02-29 PROCEDURE — 2060000000 HC ICU INTERMEDIATE R&B

## 2020-02-29 PROCEDURE — 6360000002 HC RX W HCPCS: Performed by: FAMILY MEDICINE

## 2020-02-29 PROCEDURE — 6370000000 HC RX 637 (ALT 250 FOR IP): Performed by: FAMILY MEDICINE

## 2020-02-29 PROCEDURE — 84484 ASSAY OF TROPONIN QUANT: CPT

## 2020-02-29 PROCEDURE — 83874 ASSAY OF MYOGLOBIN: CPT

## 2020-02-29 PROCEDURE — 99232 SBSQ HOSP IP/OBS MODERATE 35: CPT | Performed by: FAMILY MEDICINE

## 2020-02-29 PROCEDURE — 2700000000 HC OXYGEN THERAPY PER DAY

## 2020-02-29 PROCEDURE — 36415 COLL VENOUS BLD VENIPUNCTURE: CPT

## 2020-02-29 PROCEDURE — 84295 ASSAY OF SERUM SODIUM: CPT

## 2020-02-29 PROCEDURE — 80048 BASIC METABOLIC PNL TOTAL CA: CPT

## 2020-02-29 PROCEDURE — 82803 BLOOD GASES ANY COMBINATION: CPT

## 2020-02-29 RX ORDER — FUROSEMIDE 10 MG/ML
INJECTION INTRAMUSCULAR; INTRAVENOUS
Status: COMPLETED
Start: 2020-02-29 | End: 2020-02-29

## 2020-02-29 RX ORDER — POTASSIUM CHLORIDE 7.45 MG/ML
10 INJECTION INTRAVENOUS PRN
Status: DISCONTINUED | OUTPATIENT
Start: 2020-02-29 | End: 2020-03-04 | Stop reason: HOSPADM

## 2020-02-29 RX ORDER — LOSARTAN POTASSIUM 25 MG/1
12.5 TABLET ORAL DAILY
Status: DISCONTINUED | OUTPATIENT
Start: 2020-03-01 | End: 2020-03-04 | Stop reason: HOSPADM

## 2020-02-29 RX ORDER — POTASSIUM CHLORIDE 20 MEQ/1
40 TABLET, EXTENDED RELEASE ORAL PRN
Status: DISCONTINUED | OUTPATIENT
Start: 2020-02-29 | End: 2020-03-04 | Stop reason: HOSPADM

## 2020-02-29 RX ORDER — METOPROLOL TARTRATE 50 MG/1
50 TABLET, FILM COATED ORAL 2 TIMES DAILY
Status: DISCONTINUED | OUTPATIENT
Start: 2020-02-29 | End: 2020-02-29

## 2020-02-29 RX ADMIN — POTASSIUM CHLORIDE 40 MEQ: 1500 TABLET, EXTENDED RELEASE ORAL at 08:32

## 2020-02-29 RX ADMIN — Medication 100 MG: at 08:32

## 2020-02-29 RX ADMIN — DOXEPIN HYDROCHLORIDE 50 MG: 25 CAPSULE ORAL at 22:30

## 2020-02-29 RX ADMIN — METOPROLOL TARTRATE 25 MG: 25 TABLET ORAL at 22:30

## 2020-02-29 RX ADMIN — THERA TABS 1 TABLET: TAB at 08:32

## 2020-02-29 RX ADMIN — FAMOTIDINE 20 MG: 20 TABLET, FILM COATED ORAL at 08:32

## 2020-02-29 RX ADMIN — DIVALPROEX SODIUM 1500 MG: 500 TABLET, DELAYED RELEASE ORAL at 08:32

## 2020-02-29 RX ADMIN — FUROSEMIDE 40 MG: 10 INJECTION, SOLUTION INTRAMUSCULAR; INTRAVENOUS at 18:29

## 2020-02-29 RX ADMIN — CLOPIDOGREL 75 MG: 75 TABLET, FILM COATED ORAL at 08:32

## 2020-02-29 RX ADMIN — OLANZAPINE 20 MG: 10 TABLET, ORALLY DISINTEGRATING ORAL at 22:29

## 2020-02-29 RX ADMIN — Medication 81 MG: at 08:32

## 2020-02-29 RX ADMIN — FUROSEMIDE 40 MG: 10 INJECTION, SOLUTION INTRAMUSCULAR; INTRAVENOUS at 08:36

## 2020-02-29 RX ADMIN — SODIUM CHLORIDE, PRESERVATIVE FREE 10 ML: 5 INJECTION INTRAVENOUS at 22:33

## 2020-02-29 RX ADMIN — BENZTROPINE MESYLATE 1 MG: 1 TABLET ORAL at 08:32

## 2020-02-29 RX ADMIN — ENOXAPARIN SODIUM 40 MG: 40 INJECTION SUBCUTANEOUS at 08:34

## 2020-02-29 RX ADMIN — FAMOTIDINE 20 MG: 20 TABLET, FILM COATED ORAL at 22:30

## 2020-02-29 RX ADMIN — FOLIC ACID 1 MG: 1 TABLET ORAL at 08:32

## 2020-02-29 RX ADMIN — BENZTROPINE MESYLATE 1 MG: 1 TABLET ORAL at 22:30

## 2020-02-29 RX ADMIN — ENOXAPARIN SODIUM 30 MG: 30 INJECTION SUBCUTANEOUS at 22:29

## 2020-02-29 RX ADMIN — METOPROLOL TARTRATE 25 MG: 25 TABLET ORAL at 08:32

## 2020-02-29 RX ADMIN — SODIUM CHLORIDE, PRESERVATIVE FREE 10 ML: 5 INJECTION INTRAVENOUS at 08:36

## 2020-02-29 RX ADMIN — SODIUM CHLORIDE, PRESERVATIVE FREE 10 ML: 5 INJECTION INTRAVENOUS at 22:32

## 2020-02-29 RX ADMIN — DESMOPRESSIN ACETATE 40 MG: 0.2 TABLET ORAL at 08:32

## 2020-02-29 ASSESSMENT — ENCOUNTER SYMPTOMS
DIARRHEA: 0
CHOKING: 0
WHEEZING: 0
SHORTNESS OF BREATH: 1
BACK PAIN: 0
CONSTIPATION: 0
ABDOMINAL PAIN: 0
CHEST TIGHTNESS: 0
VOICE CHANGE: 0
RHINORRHEA: 0
VOMITING: 0
NAUSEA: 0
COUGH: 0
SINUS PRESSURE: 0

## 2020-02-29 NOTE — PROGRESS NOTES
displayed. Last 3 CBC:  Recent Labs     02/26/20  1933 02/27/20  0645 02/29/20  0400   WBC 5.5 6.1  --    RBC 4.28 4.10*  --    HGB 12.5* 12.1*  --    HCT 37.3* 36.8*  --    MCV 87.1 89.8  --    MCH 29.2 29.5  --    MCHC 33.5 32.9  --    RDW 15.5* 15.5*  --     216 189   MPV 10.3 10.2  --        ASSESSMENT   1. Hypervolemic hyponatremia, secondary to decompensated left heart failure as evidenced by chest x-ray, noncompliance with medication in the setting of severe cardiomyopathy, sodium was 133 about a week ago and then down to 116 on admission, further aggravated by excessive free water intake and also complicated by use of Cymbalta. Serum sodium has now normalized. 2.  Decompensated left heart failure from severe cardiomyopathy, noncompliance with diuretics and fluid abuse, appears to be about compensated at this point  3. GREGORY likely prerenal insult/early ATN from decompensated left heart failure, resolving  4. Acute hepatitis in setting of chronic liver disease secondary to above  5. CHF EF 15-20% (refused AICD)  6. CAD not amenable to PCI  7. Liver cirrhosis, both congestive and from alcoholic cirrhosis  8. Seizure disorder  9. Bipolar disorder  10.  Borderline low potassium level of 3.5, replacement scale    PLAN   Discontinue IV Lasix for the present but will start loop diuretic therapy and likely within 24 hours or so  Watch basic metabolic panel daily  Strict I and O, daily weights  Continue sodium checks Q6H  Renal ultrasound reviewed no hydronephrosis  We'll restart losartan in the morning at 12.5 mg daily, home  BMP daily   Hold Cymbalta, can cause hyponatremia  Continue ProAmatine 5 mg 3 times daily  Fluid restriction relaxed to 1500 mL a day       Please do not hesitate to call with questions     Oralia Hunter MD   Nephrology [address] [place] [address] Drive    This note is created with the assistance of a speech-recognition program. While intending to generate a document that actually reflects

## 2020-02-29 NOTE — PLAN OF CARE
Problem: Risk for Impaired Skin Integrity  Goal: Tissue integrity - skin and mucous membranes  Description  Structural intactness and normal physiological function of skin and  mucous membranes. Outcome: Ongoing  Note:   Q2 turns maintained, adequate nutrition maintained, skin kept clean, dry, and intact,and heels elevated. Problem: Falls - Risk of:  Goal: Will remain free from falls  Description  Will remain free from falls  Outcome: Ongoing  Note:   Bed locked in lowest position, bed alarm on, telesitter in room, education provided, side rails x3, non-skid footwear on, call light and personal belongings within reach, floor kept clear of clutter, adequate lighting maintained, and hourly rounding continued. Goal: Absence of physical injury  Description  Absence of physical injury  Outcome: Ongoing     Problem: SAFETY  Goal: Free from accidental physical injury  Outcome: Ongoing  Goal: Free from intentional harm  Outcome: Ongoing     Problem: DAILY CARE  Goal: Daily care needs are met  Outcome: Ongoing     Problem: PAIN  Goal: Patient's pain/discomfort is manageable  Outcome: Ongoing     Problem: SKIN INTEGRITY  Goal: Skin integrity is maintained or improved  Outcome: Ongoing     Problem: KNOWLEDGE DEFICIT  Goal: Patient/S.O. demonstrates understanding of disease process, treatment plan, medications, and discharge instructions. Outcome: Ongoing     Problem: DISCHARGE BARRIERS  Goal: Patient's continuum of care needs are met  Outcome: Ongoing     Problem: Pain:  Description  Pain management should include both nonpharmacologic and pharmacologic interventions.   Goal: Pain level will decrease  Description  Pain level will decrease  Outcome: Ongoing  Goal: Control of acute pain  Description  Control of acute pain  Outcome: Ongoing  Goal: Control of chronic pain  Description  Control of chronic pain  Outcome: Ongoing     Problem: Musculor/Skeletal Functional Status  Goal: Highest potential functional

## 2020-02-29 NOTE — PROGRESS NOTES
[facility] [place] [facility]\""      Daily Progress Note     Admit Date: 2/26/2020  Bed/Room No.  5898/5558-17  Admitting Physician : Nafisa Morrissey MD  Code Status :[address] Day:  LOS: 3 days   Chief Complaint:     Shortness of breath     Principal Problem:    Acute on chronic combined systolic and diastolic congestive heart failure, NYHA class 4 (HCC)  Active Problems:    Portal hypertension (HCC)    Alcoholic cirrhosis of liver without ascites (HCC)    Idiopathic hypotension    Ischemic cardiomyopathy    NSTEMI (non-ST elevated myocardial infarction) ([facility]    CHF with unknown LVEF ([facility]  Resolved Problems:    * No resolved hospital problems. *    Subjective : Interval History/Significant events :  02/29/20    Patient continues to be lethargic . He is still having dyspnea at rest . He is on supplemental oxygen at 4 LPM. Patient denies any chest pain. No wheezing , cough , fever or chills. Vitals - Unstable afebrile  Labs - Elevated trops       Nursing notes , Consults notes reviewed. Overnight events and updates discussed with Nursing staff . Background History:         Tito Frey is 49 y.o. male  Who was admitted to the hospital on 2/26/2020 for treatment of Acute on chronic combined systolic and diastolic congestive heart failure, NYHA class 4 ([facility] [address] Patient was sent from [facility] emergency room where he presented with dyspnea for last 2 weeks. Patient reported that he had been having worsening of his dyspnea since he was discharged from the hospital 2 weeks before. This time got worse for last 2 days. Patient states that he has been drinking a lot of fluid to \"flush his system\". He said that he took medication for a while but threw medication in trash when they were not working. Patient has history of severe cardiomyopathy with EF 15 to 20%, and had known ischemic heart disease with stent to LAD. Patient has history of alcohol abuse and drinks vodka at home.   He 02/26/20  0900   PROT 7.0   LABALBU 4.5   *   *   ALKPHOS 110   BILITOT 1.18   LIPASE 9*          Specific Gravity, UA   Date Value Ref Range Status   02/26/2020 1.010 1.010 - 1.020 Final     Protein, UA   Date Value Ref Range Status   02/26/2020 NEGATIVE NEGATIVE Final     RBC, UA   Date Value Ref Range Status   02/26/2020 None 0 - 2 /HPF Final     Bacteria, UA   Date Value Ref Range Status   02/26/2020 TRACE (A) None Final     Nitrite, Urine   Date Value Ref Range Status   02/26/2020 NEGATIVE NEGATIVE Final     WBC, UA   Date Value Ref Range Status   02/26/2020 None 0 - 5 /HPF Final     Leukocyte Esterase, Urine   Date Value Ref Range Status   02/26/2020 NEGATIVE NEGATIVE Final       Imaging / Clinical Data :-   Xr Chest Portable    Result Date: 2/26/2020  Mild CHF, similar to the previous exam.        Clinical Course : unchanged  Assessment and Plan  :        1. Acute on chronic systolic and diastolic CHF - continue IV Lasix, fluid restriction. 2. Elevated troponin secondary to decompensated CHF. Trend troponin. Underwent cardiac cath last admission was found to have multivessel disease and PCI was not possible. Heparin stopped. Continue DVT prophylaxis. 3. Hypervolemic Hyponatremia - continue diuresis . Nephrology following. 4. Ischemic Cardiomyopathy with EF 20%. - Cardiology following. May need ICD. 5. CAD with known LAD stent. 6. History of alcoholism -no acute withdrawal symptoms. Avoid Ativan use. 7. Cirrhosis liver with portal hypertension  8. Chronic idiopathic hypertension-continue midodrine 5 mg 3 times daily. 9. Acute resp failure with hypoxia  - BPAP as needed . Continue oxygen. 10. Acute kidney injury secondary decompensated CHF-continue Lasix, monitor kidney function. Continue to hold ACE  11. Alcohol abuse - continue Zyprexa. Check ammonia . Continue to monitor vitals , Intake / output ,  Cell count , HGB , Kidney function, oxygenation  as indicated .    Plan

## 2020-03-01 ENCOUNTER — APPOINTMENT (OUTPATIENT)
Dept: GENERAL RADIOLOGY | Age: 55
DRG: 280 | End: 2020-03-01
Attending: FAMILY MEDICINE
Payer: MEDICARE

## 2020-03-01 LAB
ANION GAP SERPL CALCULATED.3IONS-SCNC: 15 MMOL/L (ref 9–17)
BUN BLDV-MCNC: 23 MG/DL (ref 6–20)
BUN/CREAT BLD: ABNORMAL (ref 9–20)
CALCIUM SERPL-MCNC: 9 MG/DL (ref 8.6–10.4)
CHLORIDE BLD-SCNC: 93 MMOL/L (ref 98–107)
CO2: 29 MMOL/L (ref 20–31)
CREAT SERPL-MCNC: 0.91 MG/DL (ref 0.7–1.2)
GFR AFRICAN AMERICAN: >60 ML/MIN
GFR NON-AFRICAN AMERICAN: >60 ML/MIN
GFR SERPL CREATININE-BSD FRML MDRD: ABNORMAL ML/MIN/{1.73_M2}
GFR SERPL CREATININE-BSD FRML MDRD: ABNORMAL ML/MIN/{1.73_M2}
GLUCOSE BLD-MCNC: 107 MG/DL (ref 70–99)
HCT VFR BLD CALC: 40.9 % (ref 40.7–50.3)
HEMOGLOBIN: 12.5 G/DL (ref 13–17)
MAGNESIUM: 2.5 MG/DL (ref 1.6–2.6)
MCH RBC QN AUTO: 28.7 PG (ref 25.2–33.5)
MCHC RBC AUTO-ENTMCNC: 30.6 G/DL (ref 28.4–34.8)
MCV RBC AUTO: 94 FL (ref 82.6–102.9)
NRBC AUTOMATED: 0.5 PER 100 WBC
PDW BLD-RTO: 16.1 % (ref 11.8–14.4)
PHOSPHORUS: 3.8 MG/DL (ref 2.5–4.5)
PLATELET # BLD: 189 K/UL (ref 138–453)
PMV BLD AUTO: 9.1 FL (ref 8.1–13.5)
POTASSIUM SERPL-SCNC: 3.9 MMOL/L (ref 3.7–5.3)
RBC # BLD: 4.35 M/UL (ref 4.21–5.77)
SODIUM BLD-SCNC: 136 MMOL/L (ref 135–144)
SODIUM BLD-SCNC: 137 MMOL/L (ref 135–144)
SODIUM BLD-SCNC: 137 MMOL/L (ref 135–144)
VALPROIC ACID LEVEL: 58 UG/ML (ref 50–125)
VALPROIC ACID, FREE: 6.2 UG/ML (ref 7–23)
VALPROIC DATE LAST DOSE: NORMAL
VALPROIC DOSE AMOUNT: NORMAL
VALPROIC TIME LAST DOSE: NORMAL
WBC # BLD: 6.6 K/UL (ref 3.5–11.3)

## 2020-03-01 PROCEDURE — 2500000003 HC RX 250 WO HCPCS: Performed by: INTERNAL MEDICINE

## 2020-03-01 PROCEDURE — 6370000000 HC RX 637 (ALT 250 FOR IP): Performed by: NURSE PRACTITIONER

## 2020-03-01 PROCEDURE — 85027 COMPLETE CBC AUTOMATED: CPT

## 2020-03-01 PROCEDURE — 97116 GAIT TRAINING THERAPY: CPT

## 2020-03-01 PROCEDURE — 84100 ASSAY OF PHOSPHORUS: CPT

## 2020-03-01 PROCEDURE — 99222 1ST HOSP IP/OBS MODERATE 55: CPT | Performed by: INTERNAL MEDICINE

## 2020-03-01 PROCEDURE — 6370000000 HC RX 637 (ALT 250 FOR IP): Performed by: INTERNAL MEDICINE

## 2020-03-01 PROCEDURE — 84295 ASSAY OF SERUM SODIUM: CPT

## 2020-03-01 PROCEDURE — 97530 THERAPEUTIC ACTIVITIES: CPT

## 2020-03-01 PROCEDURE — 6360000002 HC RX W HCPCS: Performed by: FAMILY MEDICINE

## 2020-03-01 PROCEDURE — 2060000000 HC ICU INTERMEDIATE R&B

## 2020-03-01 PROCEDURE — 80164 ASSAY DIPROPYLACETIC ACD TOT: CPT

## 2020-03-01 PROCEDURE — 2580000003 HC RX 258: Performed by: NURSE PRACTITIONER

## 2020-03-01 PROCEDURE — 83735 ASSAY OF MAGNESIUM: CPT

## 2020-03-01 PROCEDURE — 71045 X-RAY EXAM CHEST 1 VIEW: CPT

## 2020-03-01 PROCEDURE — 80165 DIPROPYLACETIC ACID FREE: CPT

## 2020-03-01 PROCEDURE — 99233 SBSQ HOSP IP/OBS HIGH 50: CPT | Performed by: FAMILY MEDICINE

## 2020-03-01 PROCEDURE — 80048 BASIC METABOLIC PNL TOTAL CA: CPT

## 2020-03-01 PROCEDURE — 36415 COLL VENOUS BLD VENIPUNCTURE: CPT

## 2020-03-01 PROCEDURE — 94660 CPAP INITIATION&MGMT: CPT

## 2020-03-01 RX ORDER — BUMETANIDE 0.25 MG/ML
2 INJECTION, SOLUTION INTRAMUSCULAR; INTRAVENOUS DAILY
Status: DISCONTINUED | OUTPATIENT
Start: 2020-03-01 | End: 2020-03-02

## 2020-03-01 RX ADMIN — ENOXAPARIN SODIUM 30 MG: 30 INJECTION SUBCUTANEOUS at 09:32

## 2020-03-01 RX ADMIN — LOSARTAN POTASSIUM 12.5 MG: 25 TABLET, FILM COATED ORAL at 09:33

## 2020-03-01 RX ADMIN — METOPROLOL TARTRATE 25 MG: 25 TABLET ORAL at 21:51

## 2020-03-01 RX ADMIN — Medication 100 MG: at 09:32

## 2020-03-01 RX ADMIN — DIVALPROEX SODIUM 1500 MG: 500 TABLET, DELAYED RELEASE ORAL at 09:32

## 2020-03-01 RX ADMIN — Medication 81 MG: at 09:32

## 2020-03-01 RX ADMIN — DESMOPRESSIN ACETATE 40 MG: 0.2 TABLET ORAL at 09:32

## 2020-03-01 RX ADMIN — METOPROLOL TARTRATE 25 MG: 25 TABLET ORAL at 09:32

## 2020-03-01 RX ADMIN — CLOPIDOGREL 75 MG: 75 TABLET, FILM COATED ORAL at 09:32

## 2020-03-01 RX ADMIN — THERA TABS 1 TABLET: TAB at 09:32

## 2020-03-01 RX ADMIN — SODIUM CHLORIDE, PRESERVATIVE FREE 10 ML: 5 INJECTION INTRAVENOUS at 09:00

## 2020-03-01 RX ADMIN — FOLIC ACID 1 MG: 1 TABLET ORAL at 09:32

## 2020-03-01 RX ADMIN — SODIUM CHLORIDE, PRESERVATIVE FREE 10 ML: 5 INJECTION INTRAVENOUS at 21:52

## 2020-03-01 RX ADMIN — FAMOTIDINE 20 MG: 20 TABLET, FILM COATED ORAL at 21:51

## 2020-03-01 RX ADMIN — BUMETANIDE 2 MG: 0.25 INJECTION INTRAMUSCULAR; INTRAVENOUS at 12:45

## 2020-03-01 RX ADMIN — ENOXAPARIN SODIUM 30 MG: 30 INJECTION SUBCUTANEOUS at 21:51

## 2020-03-01 RX ADMIN — FAMOTIDINE 20 MG: 20 TABLET, FILM COATED ORAL at 09:32

## 2020-03-01 RX ADMIN — BENZTROPINE MESYLATE 1 MG: 1 TABLET ORAL at 09:33

## 2020-03-01 ASSESSMENT — ENCOUNTER SYMPTOMS: SHORTNESS OF BREATH: 1

## 2020-03-01 NOTE — CONSULTS
PULMONARY CONSULT NOTE      Patient:  Lida Baird  MRN: [id number]    Consulting Physician: DR Jenna Leija  Reason for Consult: Hypoxic respiratory failure  Primacy Care Physician: CAM Zaidi - CNP    HISTORY OF PRESENT ILLNESS:   The patient is a 49 y.o. male   past medical history of acute on chronic systolic heart failure, reduced ejection fraction 17% on echo done in February 2020, ischemic cardiomyopathy, known coronary artery disease post status cardiac cath with 100% LAD, ptosis, heavy alcohol abuse, CKD [place] from [place] [address] where he presented with shortness of breath for past 2 weeks which was getting worse associated with decreased fatigue, decreased energy and appetite. Was recently discharged 2 weeks ago after treatment of decompensated biventricular failure on oral diuretics. He discontinued as he feels that they were not working and relation to he was drinking a large volume of water to flush his system. He also complained of dizziness. Chest x-ray was done on admission which showed mild central vascular congestion and enlarged cardiac silhouette. During his previous admission he underwent a cardiac work-up including a cath that showed 100% proximal occlusion to previous stent in LAD that was unable to be stented. Patient has a history of COPD he quitted smoking many years ago, no use of oxygen at home but requiring nasal cannula at present. Cardiology has been following up the patient and they have consulted LifeVest to discuss with patient and assess candidacy candidacy of LifeVest, holding off on AICD as he can discuss with his primary neurologist.  Nephrology has been consulted for GREGORY on CKD. Diuretics has been changed to Bumex 2 mg . Pulmonology Was consulted for hypoxic respiratory failure. Valuation patient is confused, answering some questions. Afebrile, VSS except blood pressure on lower side.   He is on nasal cannula at present, uses BiPAP at  Acute heart failure (HCC)    Ischemic cardiomyopathy    CAD, multiple vessel    CAD in native artery    Medication side effect, initial encounter    Idiopathic hypotension    [facility]STEMI (non-ST elevated myocardial infarction) ([facility] [address] [facility]    CHF with unknown LVEF ([facility]     1. Acute respiratory failure likely due to acute on chronic systolic and diastolic heart failure  2. Due to on chronic combined systolic and diastolic heart failure due to medication noncompliance  3. History of ischemic cardiomyopathy  4. History of alcoholism  5. History of liver cirrhosis with portal hypertension  6. Hypervolemic  hyponatremia, secondary to decompensated left heart failure  7. GREGORY likely prerenal insult/early ATN from decompensated left heart failure  8. seizure Disorder  9. Bipolar disorder    Plan   Continue nasal cannula, BiPAP as needed  Continue dialysis per nephrology  Strict  I's and O's, monitor urine output, BMP   Consider LifeVest before discharge    It was my pleasure to evaluate Anjel Padron today. Please call with questions.     Kane Rodriguez MD             3/1/2020, 11:57 AM

## 2020-03-01 NOTE — PROGRESS NOTES
 [Held by provider] DULoxetine  60 mg Oral Daily    famotidine  20 mg Oral BID    nicotine  1 patch Transdermal Daily    OLANZapine zydis  20 mg Oral Nightly    sodium chloride flush  10 mL Intravenous 2 times per day    thiamine  100 mg Oral Daily    folic acid  1 mg Oral Daily    multivitamin  1 tablet Oral Daily     Continuous Infusions:   PRN Meds:  potassium chloride **OR** potassium alternative oral replacement **OR** potassium chloride, sodium chloride flush, nitroGLYCERIN, sodium chloride flush, acetaminophen, acetaminophen, polyethylene glycol, ondansetron, ondansetron, promethazine  Home Meds:                Medications Prior to Admission: nitroGLYCERIN (NITROSTAT) 0.4 MG SL tablet, up to max of 3 total doses. If no relief after 1 dose, call 911.  furosemide (LASIX) 20 MG tablet, Take 1 tablet by mouth 2 times daily  atorvastatin (LIPITOR) 40 MG tablet, Take 40 mg by mouth daily  doxepin (SINEQUAN) 50 MG capsule, Take 50 mg by mouth  losartan (COZAAR) 25 MG tablet, Take 0.5 tablets by mouth daily  metoprolol tartrate (LOPRESSOR) 25 MG tablet, Take 0.5 tablets by mouth 2 times daily  clopidogrel (PLAVIX) 75 MG tablet, Take 1 tablet by mouth daily  aspirin 81 MG EC tablet, Take 1 tablet by mouth daily  famotidine (PEPCID) 20 MG tablet, Take 1 tablet by mouth 2 times daily  nicotine (NICODERM CQ) 21 MG/24HR, Place 1 patch onto the skin daily  benztropine (COGENTIN) 1 MG tablet, Take 1 mg by mouth 2 times daily  DULoxetine (CYMBALTA) 20 MG capsule, Take 60 mg by mouth daily   divalproex (DEPAKOTE) 500 MG EC tablet, Take 1,500 mg by mouth daily.     OLANZapine zydis (ZYPREXA ZYDIS) 15 MG disintegrating tablet, Take 20 mg by mouth nightly     INVESTIGATIONS     Last 3 CMP:    Recent Labs     02/28/20  0457  02/29/20  0400  02/29/20  1747 03/01/20  0121 03/01/20  0613   *   < > 136   < > 136 136 137   K 4.0  --  3.5*  --   --   --  3.9   CL 83*  --  93*  --   --   --  93*   CO2 22  --  25  --   -- actually reflects the content of the visit, no guarantees can be provided that every mistake has been identified and corrected by editing.

## 2020-03-01 NOTE — PROGRESS NOTES
[facility] [place]     Admit Date: 2/26/2020  Bed/Room No.  4224/7332-98  Admitting Physician : Marcela Holcomb MD  Code Status :[address] Day:  LOS: 4 days   Chief Complaint:     Shortness of breath     Principal Problem:    Acute on chronic combined systolic and diastolic congestive heart failure, NYHA class 4 (HCC)  Active Problems:    Portal hypertension (HCC)    Alcoholic cirrhosis of liver without ascites (HCC)    Idiopathic hypotension    Ischemic cardiomyopathy    NSTEMI (non-ST elevated myocardial infarction) ([facility] [address]    CHF with unknown LVEF ([facility] [address]  Resolved Problems:    * No resolved hospital problems. *    Subjective : Interval History/Significant events :  03/01/20    Patient continues to be lethargic . Remains on BPAP. Patient is responding to diuretics and is > 5 Ltrs negative since admission. Blood pressure continues to be low . Kidney function improved. Ammonia normal ,.   Vitals - Unstable afebrile  Labs - Elevated trops       Nursing notes , Consults notes reviewed. Overnight events and updates discussed with Nursing staff . Background History:         Ada Lester is 49 y.o. male  Who was admitted to the hospital on 2/26/2020 for treatment of Acute on chronic combined systolic and diastolic congestive heart failure, NYHA class 4 ([facility] [address] Patient was sent from [facility] emergency room where he presented with dyspnea for last 2 weeks. Patient reported that he had been having worsening of his dyspnea since he was discharged from the hospital 2 weeks before. This time got worse for last 2 days. Patient states that he has been drinking a lot of fluid to \"flush his system\". He said that he took medication for a while but threw medication in trash when they were not working. Patient has history of severe cardiomyopathy with EF 15 to 20%, and had known ischemic heart disease with stent to LAD.   Patient has history of alcohol abuse and drinks vodka at home. He has chronic hypotension. Patient denies chest pain, palpitations, fever, chills. Evaluation at [facility] emergency room showed elevated troponin II 09, elevated proBNP 7000 518, , , glucose 105, WBC 7.5. Chest x-ray showed pulmonary edema. He was treated with IV diuretics. Khan was placed for strict I's and O's monitoring. PMH:  Past Medical History:   Diagnosis Date    Alcoholic cirrhosis ([facility] [address]     Back pain     [facility]     CAD (coronary artery disease) 09/2019    s/p proximal LAD stent    [facility]     Seizures ([facility]     LAST SEIZURE 9-4-19      Allergies: Allergies   Allergen Reactions    Ticagrelor Shortness Of Breath    Pcn [Penicillins]       Medications :  metoprolol tartrate, 25 mg, Oral, BID  enoxaparin, 30 mg, Subcutaneous, BID  losartan, 12.5 mg, Oral, Daily  sodium chloride flush, 10 mL, Intravenous, 2 times per day  aspirin, 81 mg, Oral, Daily  atorvastatin, 40 mg, Oral, Daily  benztropine, 1 mg, Oral, BID  clopidogrel, 75 mg, Oral, Daily  divalproex, 1,500 mg, Oral, Daily  doxepin, 50 mg, Oral, Nightly  [Held by provider] DULoxetine, 60 mg, Oral, Daily  famotidine, 20 mg, Oral, BID  nicotine, 1 patch, Transdermal, Daily  OLANZapine zydis, 20 mg, Oral, Nightly  sodium chloride flush, 10 mL, Intravenous, 2 times per day  thiamine, 100 mg, Oral, Daily  folic acid, 1 mg, Oral, Daily  multivitamin, 1 tablet, Oral, Daily        Review of Systems   Review of Systems   Unable to perform ROS: Mental status change   Constitutional: Positive for activity change, appetite change and fatigue. Respiratory: Positive for shortness of breath. Neurological: Negative for tremors. Psychiatric/Behavioral: Positive for decreased concentration.      Objective :      Current Vitals : Temp: 97.7 °F (36.5 °C),  Pulse: 75, Resp: 16, BP: 94/68, SpO2: 100 %  Last 24 Hrs Vitals   Patient Vitals for the past 24 hrs:   BP Temp Temp is no mass. Tenderness: There is no abdominal tenderness. Lymphadenopathy:      Head:      Right side of head: No submandibular adenopathy. Left side of head: No submandibular adenopathy. Cervical: No cervical adenopathy. Skin:     General: Skin is warm. Neurological:      Mental Status: He is lethargic. Motor: No tremor. Comments: Responds to pain , localizing pain . Moves all 4 ext spontaneously   Psychiatric:         Behavior: Behavior is uncooperative. Laboratory findings:    Recent Labs     02/29/20  0400 03/01/20  0613   WBC  --  6.6   HGB  --  12.5*   HCT  --  40.9    189     Recent Labs     02/28/20  0457  02/29/20  0400  02/29/20  1747 03/01/20  0121 03/01/20  0613   *   < > 136   < > 136 136 137   K 4.0  --  3.5*  --   --   --  3.9   CL 83*  --  93*  --   --   --  93*   CO2 22  --  25  --   --   --  29   GLUCOSE 105*  --  92  --   --   --  107*   BUN 37*  --  23*  --   --   --  23*   CREATININE 1.46*  --  0.99  --   --   --  0.91   MG  --   --   --   --   --   --  2.5   CALCIUM 8.7  --  8.8  --   --   --  9.0   PHOS  --   --   --   --   --   --  3.8    < > = values in this interval not displayed.      Recent Labs     02/29/20  1747   AMMONIA 36          Specific Gravity, UA   Date Value Ref Range Status   02/26/2020 1.010 1.010 - 1.020 Final     Protein, UA   Date Value Ref Range Status   02/26/2020 NEGATIVE NEGATIVE Final     RBC, UA   Date Value Ref Range Status   02/26/2020 None 0 - 2 /HPF Final     Bacteria, UA   Date Value Ref Range Status   02/26/2020 TRACE (A) None Final     Nitrite, Urine   Date Value Ref Range Status   02/26/2020 NEGATIVE NEGATIVE Final     WBC, UA   Date Value Ref Range Status   02/26/2020 None 0 - 5 /HPF Final     Leukocyte Esterase, Urine   Date Value Ref Range Status   02/26/2020 NEGATIVE NEGATIVE Final       Imaging / Clinical Data :-   Xr Chest Portable    Result Date: 2/26/2020  Mild CHF, similar to the previous exam. Clinical Course : unchanged  Assessment and Plan  :        1. Acute on chronic systolic and diastolic CHF - continue IV Lasix, fluid restriction. Repeat CXR -   2. Acute resp failure secondary to acute decompensated systolic and diastolic CHF - BPAP. consult pulmonology , repeat CXR . High risk of intubation. 3. Elevated troponin secondary to decompensated CHF. Trend troponin. Underwent cardiac cath last admission was found to have multivessel disease and PCI was not possible. Heparin stopped. Continue DVT prophylaxis. 4. Hypervolemic Hyponatremia - continue diuresis . Nephrology following. 5. Ischemic Cardiomyopathy with EF 20%. - Cardiology following. Life vest at discharge   6. CAD with known LAD stent. 7. History of alcoholism -no acute withdrawal symptoms. Avoid Ativan use. 8. Cirrhosis liver with portal hypertension - ammonia normal.   9. Chronic idiopathic hypertension- continue midodrine 5 mg 3 times daily. 10. Acute resp failure with hypoxia  - continue BPAP. Continue oxygen. 11. Acute kidney injury secondary decompensated CHF- improved. continue Lasix, monitor kidney function. Continue to hold ACE  12. Acute metabolic encephalopathy  - Hold  Zyprexa, Doxepin, cogentin. Check Depakote level . Continue to monitor vitals , Intake / output ,  Cell count , HGB , Kidney function, oxygenation  as indicated . Plan and updates discussed with patient ,  answers  explained to satisfaction.    Plan discussed with Staff [place] AMAYA     (Please note that portions of this note were completed with a voice recognition program. Efforts were made to edit the dictations but occasionally words are mis-transcribed.)      Marcela Holcomb MD  3/1/2020

## 2020-03-01 NOTE — PROGRESS NOTES
[facility]   Progress Note                   Date:   3/1/2020  Patient name: Harland Bamberger  Date of admission:  2/26/2020  5:34 PM  MRN:   [id number]  YOB: 1965  PCP: CAM Duncan CNP    Reason for Admission: CHF with unknown LVEF ([facility] [address] [I50.9]    Subjective:       Clinical Changes / Abnormalities: Pt seen and examined in the room. On bipap. Does awaken but drowsy. In NSR. Medications:   Scheduled Meds:   metoprolol tartrate  25 mg Oral BID    enoxaparin  30 mg Subcutaneous BID    losartan  12.5 mg Oral Daily    sodium chloride flush  10 mL Intravenous 2 times per day    aspirin  81 mg Oral Daily    atorvastatin  40 mg Oral Daily    benztropine  1 mg Oral BID    clopidogrel  75 mg Oral Daily    divalproex  1,500 mg Oral Daily    doxepin  50 mg Oral Nightly    [Held by provider] DULoxetine  60 mg Oral Daily    famotidine  20 mg Oral BID    nicotine  1 patch Transdermal Daily    OLANZapine zydis  20 mg Oral Nightly    sodium chloride flush  10 mL Intravenous 2 times per day    thiamine  100 mg Oral Daily    folic acid  1 mg Oral Daily    multivitamin  1 tablet Oral Daily     Continuous Infusions:    CBC:   Recent Labs     02/29/20  0400 03/01/20  0613   WBC  --  6.6   HGB  --  12.5*    189     BMP:    Recent Labs     02/28/20  0457  02/29/20  0400  02/29/20  1747 03/01/20  0121 03/01/20  0613   *   < > 136   < > 136 136 137   K 4.0  --  3.5*  --   --   --  3.9   CL 83*  --  93*  --   --   --  93*   CO2 22  --  25  --   --   --  29   BUN 37*  --  23*  --   --   --  23*   CREATININE 1.46*  --  0.99  --   --   --  0.91   GLUCOSE 105*  --  92  --   --   --  107*    < > = values in this interval not displayed. Hepatic:   No results for input(s): AST, ALT, ALB, BILITOT, ALKPHOS in the last 72 hours.   Troponin:   Recent Labs     02/29/20  1042 02/29/20  1155 02/29/20  1747   TROPHS 213* 220* 209*     BNP: No results for input(s): BNP in the

## 2020-03-02 LAB
ANION GAP SERPL CALCULATED.3IONS-SCNC: 15 MMOL/L (ref 9–17)
BNP INTERPRETATION: ABNORMAL
BUN BLDV-MCNC: 27 MG/DL (ref 6–20)
BUN/CREAT BLD: ABNORMAL (ref 9–20)
CALCIUM SERPL-MCNC: 8.9 MG/DL (ref 8.6–10.4)
CHLORIDE BLD-SCNC: 92 MMOL/L (ref 98–107)
CO2: 30 MMOL/L (ref 20–31)
CREAT SERPL-MCNC: 0.99 MG/DL (ref 0.7–1.2)
GFR AFRICAN AMERICAN: >60 ML/MIN
GFR NON-AFRICAN AMERICAN: >60 ML/MIN
GFR SERPL CREATININE-BSD FRML MDRD: ABNORMAL ML/MIN/{1.73_M2}
GFR SERPL CREATININE-BSD FRML MDRD: ABNORMAL ML/MIN/{1.73_M2}
GLUCOSE BLD-MCNC: 97 MG/DL (ref 70–99)
MAGNESIUM: 2.5 MG/DL (ref 1.6–2.6)
PLATELET # BLD: 209 K/UL (ref 138–453)
POTASSIUM SERPL-SCNC: 3.8 MMOL/L (ref 3.7–5.3)
PRO-BNP: 3743 PG/ML
SODIUM BLD-SCNC: 137 MMOL/L (ref 135–144)

## 2020-03-02 PROCEDURE — 83735 ASSAY OF MAGNESIUM: CPT

## 2020-03-02 PROCEDURE — 36415 COLL VENOUS BLD VENIPUNCTURE: CPT

## 2020-03-02 PROCEDURE — 80048 BASIC METABOLIC PNL TOTAL CA: CPT

## 2020-03-02 PROCEDURE — 6370000000 HC RX 637 (ALT 250 FOR IP): Performed by: NURSE PRACTITIONER

## 2020-03-02 PROCEDURE — 6370000000 HC RX 637 (ALT 250 FOR IP): Performed by: INTERNAL MEDICINE

## 2020-03-02 PROCEDURE — 97535 SELF CARE MNGMENT TRAINING: CPT

## 2020-03-02 PROCEDURE — 99233 SBSQ HOSP IP/OBS HIGH 50: CPT | Performed by: INTERNAL MEDICINE

## 2020-03-02 PROCEDURE — 99232 SBSQ HOSP IP/OBS MODERATE 35: CPT | Performed by: INTERNAL MEDICINE

## 2020-03-02 PROCEDURE — 2580000003 HC RX 258: Performed by: NURSE PRACTITIONER

## 2020-03-02 PROCEDURE — 94660 CPAP INITIATION&MGMT: CPT

## 2020-03-02 PROCEDURE — 2060000000 HC ICU INTERMEDIATE R&B

## 2020-03-02 PROCEDURE — 83880 ASSAY OF NATRIURETIC PEPTIDE: CPT

## 2020-03-02 PROCEDURE — 2500000003 HC RX 250 WO HCPCS: Performed by: INTERNAL MEDICINE

## 2020-03-02 PROCEDURE — 6360000002 HC RX W HCPCS: Performed by: FAMILY MEDICINE

## 2020-03-02 PROCEDURE — 85049 AUTOMATED PLATELET COUNT: CPT

## 2020-03-02 RX ORDER — BUMETANIDE 1 MG/1
2 TABLET ORAL DAILY
Status: DISCONTINUED | OUTPATIENT
Start: 2020-03-03 | End: 2020-03-04 | Stop reason: HOSPADM

## 2020-03-02 RX ADMIN — SODIUM CHLORIDE, PRESERVATIVE FREE 10 ML: 5 INJECTION INTRAVENOUS at 08:45

## 2020-03-02 RX ADMIN — DESMOPRESSIN ACETATE 40 MG: 0.2 TABLET ORAL at 08:44

## 2020-03-02 RX ADMIN — Medication 100 MG: at 08:44

## 2020-03-02 RX ADMIN — LOSARTAN POTASSIUM 12.5 MG: 25 TABLET, FILM COATED ORAL at 08:44

## 2020-03-02 RX ADMIN — METOPROLOL TARTRATE 25 MG: 25 TABLET ORAL at 08:44

## 2020-03-02 RX ADMIN — FOLIC ACID 1 MG: 1 TABLET ORAL at 08:44

## 2020-03-02 RX ADMIN — ENOXAPARIN SODIUM 30 MG: 30 INJECTION SUBCUTANEOUS at 08:46

## 2020-03-02 RX ADMIN — PROMETHAZINE HYDROCHLORIDE 12.5 MG: 25 TABLET ORAL at 08:44

## 2020-03-02 RX ADMIN — BUMETANIDE 2 MG: 0.25 INJECTION INTRAMUSCULAR; INTRAVENOUS at 08:46

## 2020-03-02 RX ADMIN — FAMOTIDINE 20 MG: 20 TABLET, FILM COATED ORAL at 08:44

## 2020-03-02 RX ADMIN — SODIUM CHLORIDE, PRESERVATIVE FREE 10 ML: 5 INJECTION INTRAVENOUS at 20:16

## 2020-03-02 RX ADMIN — CLOPIDOGREL 75 MG: 75 TABLET, FILM COATED ORAL at 08:44

## 2020-03-02 RX ADMIN — DIVALPROEX SODIUM 1500 MG: 500 TABLET, DELAYED RELEASE ORAL at 08:44

## 2020-03-02 RX ADMIN — ENOXAPARIN SODIUM 30 MG: 30 INJECTION SUBCUTANEOUS at 20:16

## 2020-03-02 RX ADMIN — FAMOTIDINE 20 MG: 20 TABLET, FILM COATED ORAL at 20:16

## 2020-03-02 RX ADMIN — METOPROLOL TARTRATE 25 MG: 25 TABLET ORAL at 20:16

## 2020-03-02 RX ADMIN — Medication 81 MG: at 08:44

## 2020-03-02 RX ADMIN — THERA TABS 1 TABLET: TAB at 08:44

## 2020-03-02 ASSESSMENT — PAIN SCALES - GENERAL
PAINLEVEL_OUTOF10: 0
PAINLEVEL_OUTOF10: 0

## 2020-03-02 NOTE — PROGRESS NOTES
Cheryl Gustafson 20    Progress Note    3/2/2020    9:09 AM    Name:   Elvin Tanner  MRN:     [id number]     Acct:      [de-identified]   Room:   [address] [place] [address] Day:  5  Admit Date:  2/26/2020  5:34 PM    PCP:   CAM Jordan CNP  Code Status:  Full Code    Subjective:     C/C: Dyspnea    Interval History Status: improved. Patient drowsy sitting in bed. He is responsive to voice and answer questions appropriately. He still has a sitter. He denies much SOB. He has diuresed a total of 4.5L. Brief History:     Per my partner:  \"Hayes Hernandez is 49 y.o. male  Who was admitted to the hospital on 2/26/2020 for treatment of Acute on chronic combined systolic and diastolic congestive heart failure, NYHA class 4 ([address] Patient was sent from [facility] emergency room where he presented with dyspnea for last 2 weeks.  Patient reported that he had been having worsening of his dyspnea since he was discharged from the hospital 2 weeks before.  This time got worse for last 2 days.  Patient states that he has been drinking a lot of fluid to \"flush his system\".  He said that he took medication for a while but threw medication in trash when they were not working. Lilian Paget has history of severe cardiomyopathy with EF 15 to 20%, and had known ischemic heart disease with stent to LAD.  Patient has history of alcohol abuse and drinks vodka at home. [facility] has chronic hypotension.  Patient denies chest pain, palpitations, fever, chills. Evaluation at [facility] emergency room showed elevated troponin II 09, elevated proBNP 7000 518, , , glucose 105, WBC 7.5.  Chest x-ray showed pulmonary edema. He was treated with IV diuretics. Khan was placed for strict I's and O's monitoring. \"    Review of Systems:     Constitutional:  negative for chills, fevers, sweats  Respiratory:  negative for cough, dyspnea on exertion, shortness of breath, wheezing  Cardiovascular:  negative for chest pain, chest pressure/discomfort, lower extremity edema, palpitations  Gastrointestinal:  negative for abdominal pain, constipation, diarrhea, nausea, vomiting  Neurological:  negative for dizziness, headache    Medications: Allergies: Allergies   Allergen Reactions    Ticagrelor Shortness Of Breath    Pcn [Penicillins]        Current Meds:   Scheduled Meds:    bumetanide  2 mg Intravenous Daily    metoprolol tartrate  25 mg Oral BID    enoxaparin  30 mg Subcutaneous BID    losartan  12.5 mg Oral Daily    sodium chloride flush  10 mL Intravenous 2 times per day    aspirin  81 mg Oral Daily    atorvastatin  40 mg Oral Daily    [Held by provider] benztropine  1 mg Oral BID    clopidogrel  75 mg Oral Daily    divalproex  1,500 mg Oral Daily    [Held by provider] doxepin  50 mg Oral Nightly    [Held by provider] DULoxetine  60 mg Oral Daily    famotidine  20 mg Oral BID    nicotine  1 patch Transdermal Daily    [Held by provider] OLANZapine zydis  20 mg Oral Nightly    sodium chloride flush  10 mL Intravenous 2 times per day    thiamine  100 mg Oral Daily    folic acid  1 mg Oral Daily    multivitamin  1 tablet Oral Daily     Continuous Infusions:   PRN Meds: potassium chloride **OR** potassium alternative oral replacement **OR** potassium chloride, sodium chloride flush, nitroGLYCERIN, sodium chloride flush, acetaminophen, acetaminophen, polyethylene glycol, ondansetron, ondansetron, promethazine    Data:     Past Medical History:   has a past medical history of Alcoholic cirrhosis ([facility] Back pain, Bipolar 1 disorder ([facility] CAD (coronary artery disease), Idiopathic cardiomyopathy ([facility] and Seizures ([facility] Social History:   reports that he has quit smoking. His smoking use included cigarettes. He has never used smokeless tobacco. He reports current alcohol use. He reports current drug use. Frequency: 1.00 time per week. Drug: Marijuana. Family History: No family history on file. Vitals:  BP 92/64   Pulse 87   Temp 98 °F (36.7 °C) (Oral)   Resp 23   Ht 6' (1.829 m)   Wt 235 lb 14.3 oz (107 kg)   SpO2 91%   BMI 31.99 kg/m²   Temp (24hrs), Av.6 °F (36.4 °C), Min:96.9 °F (36.1 °C), Max:98.3 °F (36.8 °C)    No results for input(s): POCGLU in the last 72 hours. I/O (24Hr): Intake/Output Summary (Last 24 hours) at 3/2/2020 0909  Last data filed at 3/2/2020 0716  Gross per 24 hour   Intake 2430 ml   Output 1085 ml   Net 1345 ml       Labs:  Hematology:  Recent Labs     20  0400 20  0613 20  0620   WBC  --  6.6  --    RBC  --  4.35  --    HGB  --  12.5*  --    HCT  --  40.9  --    MCV  --  94.0  --    MCH  --  28.7  --    MCHC  --  30.6  --    RDW  --  16.1*  --     189 209   MPV  --  9.1  --      Chemistry:  Recent Labs     20  0400  20  1042 20  1155 20  1747  20  0613 20  0955 20  0620      < >  --  140 136   < > 137 137 137   K 3.5*  --   --   --   --   --  3.9  --  3.8   CL 93*  --   --   --   --   --  93*  --  92*   CO2 25  --   --   --   --   --  29  --  30   GLUCOSE 92  --   --   --   --   --  107*  --  97   BUN 23*  --   --   --   --   --  23*  --  27*   CREATININE 0.99  --   --   --   --   --  0.91  --  0.99   MG  --   --   --   --   --   --  2.5  --  2.5   ANIONGAP 18*  --   --   --   --   --  15  --  15   LABGLOM >60  --   --   --   --   --  >60  --  >60   GFRAA >60  --   --   --   --   --  >60  --  >60   CALCIUM 8.8  --   --   --   --   --  9.0  --  8.9   PHOS  --   --   --   --   --   --  3.8  --   --    PROBNP 6,969*  --   --   --   --   --   --   --  3,743*   TROPHS 193*   < > 213* 220* 209*  --   --   --   --    MYOGLOBIN 32   < > 43 43 30  --   --   --   --     < > = values in this interval not displayed.      Recent Labs     20  1747   AMMONIA 36     ABG:  Lab Results   Component Value Date    POCPH 7.523 2020    POCPCO2 33.3

## 2020-03-02 NOTE — PROGRESS NOTES
PULMONARY PROGRESS NOTE      Patient:  Yuliet Fitzgerald  YOB: 1965    MRN: [id number]     Acct: [de-identified]     Admit date: 2/26/2020    REASON FOR CONSULT:-     Pt seen and Chart reviewed. Subjective:   Afebrile, VSS except blood pressure on lower side  No complaint of chest pain, nausea, vomiting,   shortness of breath with ex still present   Patient is mildly confused  Urine output over last 24 hours is 1085 mL  comfortable on 2 l  o2   Used BiPAP at night   caught at the sink trying to drink water         Review of Systems -   CONSTITUTIONAL:  negative  EYES:  negative  HEENT:  negative  RESPIRATORY:  negative  CARDIOVASCULAR:  Orthopnea and pnd   GASTROINTESTINAL:  negative  GENITOURINARY:  negative  HEMATOLOGIC/LYMPHATIC:  negative  ALLERGIC/IMMUNOLOGIC:  negative  ENDOCRINE:  negative  MUSCULOSKELETAL:  negative  NEUROLOGICAL:  negative  BEHAVIOR/PSYCH:  negative        Physical Exam:  Vitals: BP 92/64   Pulse 87   Temp 98 °F (36.7 °C) (Oral)   Resp 23   Ht 6' (1.829 m)   Wt 235 lb 14.3 oz (107 kg)   SpO2 91%   BMI 31.99 kg/m²   24 hour intake/output:    Intake/Output Summary (Last 24 hours) at 3/2/2020 0902  Last data filed at 3/2/2020 0716  Gross per 24 hour   Intake 2430 ml   Output 1085 ml   Net 1345 ml     Last 3 weights:   Wt Readings from Last 3 Encounters:   03/02/20 235 lb 14.3 oz (107 kg)   02/26/20 250 lb (113.4 kg)   02/18/20 247 lb (112 kg)       General appearance: alert and cooperative with exam  Physical Examination:   General appearance - alert, well appearing, and in no distress  Mental status - alert, oriented to person, place, and time  Eyes - pupils equal and reactive, extraocular eye movements intact  Ears - bilateral TM's and external ear canals normal  Nose - normal and patent, no erythema, discharge or polyps  Mouth - mucous membranes moist, pharynx normal without lesions  Neck - supple, no significant adenopathy  Chest -crackles at base-b/l still present no wheeze no bb   Heart - normal rate, regular rhythm, normal S1, S2, no murmurs, rubs, clicks or gallops  Abdomen - soft, nontender, nondistended, no masses or organomegaly  Neurological - alert, oriented, normal speech, no focal findings or movement disorder noted}  Extremities - peripheral pulses normal, no pedal edema, no clubbing or cyanosis  Skin - normal coloration and turgor, no rashes, no suspicious skin lesions noted     Diet:  DIET CARDIAC;    Medications:Current Inpatient    Scheduled Meds:   bumetanide  2 mg Intravenous Daily    metoprolol tartrate  25 mg Oral BID    enoxaparin  30 mg Subcutaneous BID    losartan  12.5 mg Oral Daily    sodium chloride flush  10 mL Intravenous 2 times per day    aspirin  81 mg Oral Daily    atorvastatin  40 mg Oral Daily    [Held by provider] benztropine  1 mg Oral BID    clopidogrel  75 mg Oral Daily    divalproex  1,500 mg Oral Daily    [Held by provider] doxepin  50 mg Oral Nightly    [Held by provider] DULoxetine  60 mg Oral Daily    famotidine  20 mg Oral BID    nicotine  1 patch Transdermal Daily    [Held by provider] OLANZapine zydis  20 mg Oral Nightly    sodium chloride flush  10 mL Intravenous 2 times per day    thiamine  100 mg Oral Daily    folic acid  1 mg Oral Daily    multivitamin  1 tablet Oral Daily     Continuous Infusions:  PRN Meds:potassium chloride **OR** potassium alternative oral replacement **OR** potassium chloride, sodium chloride flush, nitroGLYCERIN, sodium chloride flush, acetaminophen, acetaminophen, polyethylene glycol, ondansetron, ondansetron, promethazine    Objective:    CBC:   Recent Labs     02/29/20  0400 03/01/20  0613 03/02/20  0620   WBC  --  6.6  --    HGB  --  12.5*  --     189 209     BMP:    Recent Labs     02/29/20  0400  03/01/20  0613 03/01/20  0955 03/02/20  0620      < > 137 137 137   K 3.5*  --  3.9  --  3.8   CL 93*  --  93*  --  92*   CO2 25  --  29  --  30   BUN 23*  --  23*  --  27* CREATININE 0.99  --  0.91  --  0.99   GLUCOSE 92  --  107*  --  97    < > = values in this interval not displayed. Calcium:  Recent Labs     03/02/20  0620   CALCIUM 8.9     Ionized Calcium:No results for input(s): IONCA in the last 72 hours. Magnesium:  Recent Labs     03/02/20  0620   MG 2.5     Phosphorus:  Recent Labs     03/01/20  0613   PHOS 3.8     BNP:No results for input(s): BNP in the last 72 hours. Glucose:No results for input(s): POCGLU in the last 72 hours. HgbA1C: No results for input(s): LABA1C in the last 72 hours. INR: No results for input(s): INR in the last 72 hours. Hepatic: No results for input(s): ALKPHOS, ALT, AST, PROT, BILITOT, BILIDIR, LABALBU in the last 72 hours. Amylase and Lipase:No results for input(s): LACTA, AMYLASE in the last 72 hours. Lactic Acid: No results for input(s): LACTA in the last 72 hours. CARDIAC ENZYMES:No results for input(s): CKTOTAL, CKMB, CKMBINDEX, TROPONINI in the last 72 hours. BNP: No results for input(s): BNP in the last 72 hours. Lipids: No results for input(s): CHOL, TRIG, HDL, LDLCALC in the last 72 hours. Invalid input(s): LDL  ABGs: No results found for: PH, PCO2, PO2, HCO3, O2SAT  Thyroid:   Lab Results   Component Value Date    TSH 2.37 09/13/2019      Urinalysis: No results for input(s): BACTERIA, BLOODU, CLARITYU, COLORU, PHUR, PROTEINU, RBCUA, SPECGRAV, BILIRUBINUR, NITRU, WBCUA, LEUKOCYTESUR, GLUCOSEU in the last 72 hours.     CULTURES:    CXR  States cardiomegaly without clear evidence for pulmonary edema    CT Scans      (See actual reports for details)      D echo on 28 February showed dilated left ventricle with severely reduced systolic function ejection fraction 17%, apical wall akinesis without definite evidence of clot     Cardiac cath showed % proximal stenosis proximal to previous stent, not able to wire into LAD despite multiple stents and wires, successful wiring into diagonal, multiple PTCA in proximal LAD and

## 2020-03-02 NOTE — PROGRESS NOTES
25  --  29  --  30   BUN 23*  --  23*  --  27*   CREATININE 0.99  --  0.91  --  0.99   CALCIUM 8.8  --  9.0  --  8.9    < > = values in this interval not displayed. Last 3 CBC:  Recent Labs     02/29/20  0400 03/01/20  0613 03/02/20  0620   WBC  --  6.6  --    RBC  --  4.35  --    HGB  --  12.5*  --    HCT  --  40.9  --    MCV  --  94.0  --    MCH  --  28.7  --    MCHC  --  30.6  --    RDW  --  16.1*  --     189 209   MPV  --  9.1  --        ASSESSMENT   1. Hypervolemic hyponatremia, secondary to decompensated left heart failure as evidenced by chest x-ray, noncompliance with medication in the setting of severe cardiomyopathy, sodium was 133 about a week ago and then down to 116 on admission, further aggravated by excessive free water intake and also complicated by use of Cymbalta. Serum sodium has now normalized at 137.  2.  Decompensated left heart failure from severe cardiomyopathy, noncompliance with diuretics and fluid abuse, appears to be  compensated at this point  3. Acute kidney injury likely prerenal insult/early ATN from decompensated left heart failure, resolving  4. Acute hepatitis in setting of chronic liver disease secondary to above  5. CHF EF 15-20% (refused AICD)  6. CAD not amenable to PCI  7. Liver cirrhosis, both congestive and from alcoholic cirrhosis  8. Seizure disorder  9. Bipolar disorder  10. Encephalopathy, improving       PLAN   Discontinue Bumex 2 mg IV daily   Begin Bumex 2 mg oral daily   Watch basic metabolic panel daily  Strict I and O, daily weights  Discontinue serial sodium checks as sodium has been stable  Renal ultrasound reviewed no hydronephrosis  Restarted losartan at 12.5 mg daily, home dose  Patient is stable for discharge any time from Nephrology stand point  Hold Cymbalta, can cause hyponatremia  Continue ProAmatine 5 mg oral 3 times daily  Fluid restriction relaxed to 1500 mL a day    Nephrology signing off. Please call if any questions.        Please do not hesitate to call with questions     Yuliya Gonzalez MD   Nephrology [address] [place] [address] Drive    This note is created with the assistance of a speech-recognition program. While intending to generate a document that actually reflects the content of the visit, no guarantees can be provided that every mistake has been identified and corrected by editing.

## 2020-03-02 NOTE — PROGRESS NOTES
Occupational Therapy  Facility/Department: [facility] 4A STEPDOWN  Daily Treatment Note  NAME: Tigist Padron  : 1965  MRN: [id number]    Date of Service: 3/2/2020    Discharge Recommendations: Further therapy recommended at discharge. Pt unsafe to return to residence sec to decreased balance, cognition, and safety. 1 LOB noted during standing. Assessment   Performance deficits / Impairments: Decreased functional mobility ; Decreased cognition;Decreased high-level IADLs;Decreased ADL status; Decreased endurance;Decreased balance;Decreased strength;Decreased safe awareness  Prognosis: Good  OT Education: OT Role;Plan of Care;Energy Conservation;Transfer Training;Orientation  Patient Education: purpose of OT; deep breathing; proper hand placement  Barriers to Learning: pt demo P carry over  REQUIRES OT FOLLOW UP: Yes  Activity Tolerance  Activity Tolerance: Patient limited by fatigue;Treatment limited secondary to decreased cognition  Safety Devices  Safety Devices in place: Yes  Type of devices: Patient at risk for falls; Left in bed;Call light within reach;Gait belt(sitter and telesitter present)         Patient Diagnosis(es): There were no encounter diagnoses. has a past medical history of Alcoholic cirrhosis ([facility] Back pain, Bipolar 1 disorder ([facility] CAD (coronary artery disease), Idiopathic cardiomyopathy ([facility] and Seizures ([facility] has a past surgical history that includes Toe Surgery; Tooth Extraction; and Cardiac catheterization (Left, 2019).     Restrictions  Restrictions/Precautions  Restrictions/Precautions: Fall Risk, General Precautions, Up as Tolerated  Required Braces or Orthoses?: No  Subjective   General  Patient assessed for rehabilitation services?: Yes  Family / Caregiver Present: No  Pain Assessment  Pain Assessment: 0-10  Pain Level: 0  Vital Signs  Patient Currently in Pain: No  Oxygen Therapy  SpO2: (!) 89 %(SPO2 flucutated throughout session maintaining above 89%)  O2 Device: None

## 2020-03-02 NOTE — CARE COORDINATION
Patient/family seen: yes       Informed patient/family of BPCI-A Medical Bundle Program with potential outreach by either Care Transitions Team or naviHealth Team based on hospital admission and location. BPCI-A Notification Letter given: Yes         Current discharge plan: home independently.

## 2020-03-02 NOTE — PLAN OF CARE
PROVIDE ADEQUATE OXYGENATION WITH ACCEPTABLE SP02/ABG'S    [x]  IDENTIFY APPROPRIATE OXYGEN THERAPY  [x]   MONITOR SP02/ABG'S AS NEEDED   [x]   PATIENT EDUCATION AS NEEDED    NON INVASIVE VENTILATION  PROVIDE OPTIMAL VENTILATION/ACCEPTABLE SP02  ASSESSMENT SKIN INTEGRITY  PATIENT EDUCATION AS NEEDED  BIPAP AS NEEDED

## 2020-03-02 NOTE — PROGRESS NOTES
[facility]   Progress Note                   Date:   3/2/2020  Patient name: Harland Bamberger  Date of admission:  2/26/2020  5:34 PM  MRN:   [id number]  YOB: 1965  PCP: CAM Duncan CNP    Reason for Admission: CHF with unknown LVEF ([facility] [I50.9]    Subjective:       Clinical Changes / Abnormalities: Pt seen and examined in the room. Pt drowsy. Sitter in room. NSR     -4.5 L since admission     Medications:   Scheduled Meds:   bumetanide  2 mg Intravenous Daily    metoprolol tartrate  25 mg Oral BID    enoxaparin  30 mg Subcutaneous BID    losartan  12.5 mg Oral Daily    sodium chloride flush  10 mL Intravenous 2 times per day    aspirin  81 mg Oral Daily    atorvastatin  40 mg Oral Daily    [Held by provider] benztropine  1 mg Oral BID    clopidogrel  75 mg Oral Daily    divalproex  1,500 mg Oral Daily    [Held by provider] doxepin  50 mg Oral Nightly    [Held by provider] DULoxetine  60 mg Oral Daily    famotidine  20 mg Oral BID    nicotine  1 patch Transdermal Daily    [Held by provider] OLANZapine zydis  20 mg Oral Nightly    sodium chloride flush  10 mL Intravenous 2 times per day    thiamine  100 mg Oral Daily    folic acid  1 mg Oral Daily    multivitamin  1 tablet Oral Daily     Continuous Infusions:    CBC:   Recent Labs     02/29/20  0400 03/01/20  0613 03/02/20  0620   WBC  --  6.6  --    HGB  --  12.5*  --     189 209     BMP:    Recent Labs     02/29/20  0400  03/01/20  0613 03/01/20  0955 03/02/20  0620      < > 137 137 137   K 3.5*  --  3.9  --  3.8   CL 93*  --  93*  --  92*   CO2 25  --  29  --  30   BUN 23*  --  23*  --  27*   CREATININE 0.99  --  0.91  --  0.99   GLUCOSE 92  --  107*  --  97    < > = values in this interval not displayed. Hepatic:   No results for input(s): AST, ALT, ALB, BILITOT, ALKPHOS in the last 72 hours.   Troponin:   Recent Labs     02/29/20  1042 02/29/20  1155 02/29/20  1747   TROPHS 213*

## 2020-03-03 PROBLEM — G93.41 ACUTE METABOLIC ENCEPHALOPATHY: Status: ACTIVE | Noted: 2020-03-03

## 2020-03-03 PROBLEM — F31.9 BIPOLAR DISORDER (HCC): Status: ACTIVE | Noted: 2020-03-03

## 2020-03-03 LAB
ANION GAP SERPL CALCULATED.3IONS-SCNC: 15 MMOL/L (ref 9–17)
BUN BLDV-MCNC: 24 MG/DL (ref 6–20)
BUN/CREAT BLD: ABNORMAL (ref 9–20)
CALCIUM SERPL-MCNC: 9.1 MG/DL (ref 8.6–10.4)
CHLORIDE BLD-SCNC: 90 MMOL/L (ref 98–107)
CO2: 30 MMOL/L (ref 20–31)
CREAT SERPL-MCNC: 1.04 MG/DL (ref 0.7–1.2)
GFR AFRICAN AMERICAN: >60 ML/MIN
GFR NON-AFRICAN AMERICAN: >60 ML/MIN
GFR SERPL CREATININE-BSD FRML MDRD: ABNORMAL ML/MIN/{1.73_M2}
GFR SERPL CREATININE-BSD FRML MDRD: ABNORMAL ML/MIN/{1.73_M2}
GLUCOSE BLD-MCNC: 99 MG/DL (ref 70–99)
POTASSIUM SERPL-SCNC: 4.2 MMOL/L (ref 3.7–5.3)
SODIUM BLD-SCNC: 135 MMOL/L (ref 135–144)

## 2020-03-03 PROCEDURE — 99232 SBSQ HOSP IP/OBS MODERATE 35: CPT | Performed by: INTERNAL MEDICINE

## 2020-03-03 PROCEDURE — 6370000000 HC RX 637 (ALT 250 FOR IP): Performed by: INTERNAL MEDICINE

## 2020-03-03 PROCEDURE — 6370000000 HC RX 637 (ALT 250 FOR IP): Performed by: NURSE PRACTITIONER

## 2020-03-03 PROCEDURE — 94761 N-INVAS EAR/PLS OXIMETRY MLT: CPT

## 2020-03-03 PROCEDURE — 2060000000 HC ICU INTERMEDIATE R&B

## 2020-03-03 PROCEDURE — 80048 BASIC METABOLIC PNL TOTAL CA: CPT

## 2020-03-03 PROCEDURE — 6360000002 HC RX W HCPCS: Performed by: FAMILY MEDICINE

## 2020-03-03 PROCEDURE — 2580000003 HC RX 258: Performed by: NURSE PRACTITIONER

## 2020-03-03 PROCEDURE — 36415 COLL VENOUS BLD VENIPUNCTURE: CPT

## 2020-03-03 PROCEDURE — 97116 GAIT TRAINING THERAPY: CPT

## 2020-03-03 PROCEDURE — 2700000000 HC OXYGEN THERAPY PER DAY

## 2020-03-03 PROCEDURE — 97110 THERAPEUTIC EXERCISES: CPT

## 2020-03-03 PROCEDURE — 99233 SBSQ HOSP IP/OBS HIGH 50: CPT | Performed by: INTERNAL MEDICINE

## 2020-03-03 RX ADMIN — Medication 81 MG: at 08:40

## 2020-03-03 RX ADMIN — ENOXAPARIN SODIUM 30 MG: 30 INJECTION SUBCUTANEOUS at 08:40

## 2020-03-03 RX ADMIN — ENOXAPARIN SODIUM 30 MG: 30 INJECTION SUBCUTANEOUS at 20:04

## 2020-03-03 RX ADMIN — CLOPIDOGREL 75 MG: 75 TABLET, FILM COATED ORAL at 08:40

## 2020-03-03 RX ADMIN — BUMETANIDE 2 MG: 1 TABLET ORAL at 08:40

## 2020-03-03 RX ADMIN — SODIUM CHLORIDE, PRESERVATIVE FREE 10 ML: 5 INJECTION INTRAVENOUS at 08:54

## 2020-03-03 RX ADMIN — DIVALPROEX SODIUM 1500 MG: 500 TABLET, DELAYED RELEASE ORAL at 08:40

## 2020-03-03 RX ADMIN — FOLIC ACID 1 MG: 1 TABLET ORAL at 08:39

## 2020-03-03 RX ADMIN — Medication 100 MG: at 08:39

## 2020-03-03 RX ADMIN — DOXEPIN HYDROCHLORIDE 50 MG: 25 CAPSULE ORAL at 20:04

## 2020-03-03 RX ADMIN — METOPROLOL TARTRATE 25 MG: 25 TABLET ORAL at 08:39

## 2020-03-03 RX ADMIN — BENZTROPINE MESYLATE 1 MG: 1 TABLET ORAL at 20:04

## 2020-03-03 RX ADMIN — DESMOPRESSIN ACETATE 40 MG: 0.2 TABLET ORAL at 08:40

## 2020-03-03 RX ADMIN — FAMOTIDINE 20 MG: 20 TABLET, FILM COATED ORAL at 20:04

## 2020-03-03 RX ADMIN — LOSARTAN POTASSIUM 12.5 MG: 25 TABLET, FILM COATED ORAL at 08:39

## 2020-03-03 RX ADMIN — THERA TABS 1 TABLET: TAB at 08:39

## 2020-03-03 RX ADMIN — SODIUM CHLORIDE, PRESERVATIVE FREE 10 ML: 5 INJECTION INTRAVENOUS at 20:09

## 2020-03-03 RX ADMIN — FAMOTIDINE 20 MG: 20 TABLET, FILM COATED ORAL at 08:40

## 2020-03-03 RX ADMIN — METOPROLOL TARTRATE 25 MG: 25 TABLET ORAL at 20:04

## 2020-03-03 ASSESSMENT — PAIN SCALES - GENERAL: PAINLEVEL_OUTOF10: 0

## 2020-03-03 NOTE — PROGRESS NOTES
PULMONARY PROGRESS NOTE      Patient:  Grady Jennings  YOB: 1965    MRN: [id number]     Acct: [de-identified]     Admit date: 2/26/2020    REASON FOR CONSULT:-     Pt seen and Chart reviewed. Subjective:   Afebrile, VSS except blood pressure on lower side  No complaint of chest pain, nausea, vomiting,   Patient is alert and oriented better as compared to yesterday  Urine output over last 24 hours is 2420 ml   comfortable on 2 l  o2   Used BiPAP at night   Bumex 2 mg IV changed to oral        Review of Systems -   CONSTITUTIONAL:  negative  EYES:  negative  HEENT:  negative  RESPIRATORY:  negative  CARDIOVASCULAR:  Orthopnea and pnd   GASTROINTESTINAL:  negative  GENITOURINARY:  negative  HEMATOLOGIC/LYMPHATIC:  negative  ALLERGIC/IMMUNOLOGIC:  negative  ENDOCRINE:  negative  MUSCULOSKELETAL:  negative  NEUROLOGICAL:  negative  BEHAVIOR/PSYCH:  negative        Physical Exam:  Vitals: /70   Pulse 87   Temp 97.9 °F (36.6 °C) (Oral)   Resp 15   Ht 6' (1.829 m)   Wt 238 lb 1.6 oz (108 kg)   SpO2 100%   BMI 32.29 kg/m²   24 hour intake/output:    Intake/Output Summary (Last 24 hours) at 3/3/2020 1409  Last data filed at 3/3/2020 1217  Gross per 24 hour   Intake 2164 ml   Output 2935 ml   Net -771 ml     Last 3 weights:   Wt Readings from Last 3 Encounters:   03/03/20 238 lb 1.6 oz (108 kg)   02/26/20 250 lb (113.4 kg)   02/18/20 247 lb (112 kg)       General appearance: alert and cooperative with exam  Physical Examination:   General appearance - alert, well appearing, and in no distress  Mental status - alert, oriented to person, place, and time  Eyes - pupils equal and reactive, extraocular eye movements intact  Ears - bilateral TM's and external ear canals normal  Nose - normal and patent, no erythema, discharge or polyps  Mouth - mucous membranes moist, pharynx normal without lesions  Neck - supple, no significant adenopathy  Chest -crackles at base-b/l still present- improved no wheeze no bb the last 72 hours. Magnesium:  Recent Labs     03/02/20  0620   MG 2.5     Phosphorus:  Recent Labs     03/01/20  0613   PHOS 3.8     BNP:No results for input(s): BNP in the last 72 hours. Glucose:No results for input(s): POCGLU in the last 72 hours. HgbA1C: No results for input(s): LABA1C in the last 72 hours. INR: No results for input(s): INR in the last 72 hours. Hepatic: No results for input(s): ALKPHOS, ALT, AST, PROT, BILITOT, BILIDIR, LABALBU in the last 72 hours. Amylase and Lipase:No results for input(s): LACTA, AMYLASE in the last 72 hours. Lactic Acid: No results for input(s): LACTA in the last 72 hours. CARDIAC ENZYMES:No results for input(s): CKTOTAL, CKMB, CKMBINDEX, TROPONINI in the last 72 hours. BNP: No results for input(s): BNP in the last 72 hours. Lipids: No results for input(s): CHOL, TRIG, HDL, LDLCALC in the last 72 hours. Invalid input(s): LDL  ABGs: No results found for: PH, PCO2, PO2, HCO3, O2SAT  Thyroid:   Lab Results   Component Value Date    TSH 2.37 09/13/2019      Urinalysis: No results for input(s): BACTERIA, BLOODU, CLARITYU, COLORU, PHUR, PROTEINU, RBCUA, SPECGRAV, BILIRUBINUR, NITRU, WBCUA, LEUKOCYTESUR, GLUCOSEU in the last 72 hours.     CULTURES:    CXR  States cardiomegaly without clear evidence for pulmonary edema    CT Scans      (See actual reports for details)      D echo on 28 February showed dilated left ventricle with severely reduced systolic function ejection fraction 17%, apical wall akinesis without definite evidence of clot     Cardiac cath showed % proximal stenosis proximal to previous stent, not able to wire into LAD despite multiple stents and wires, successful wiring into diagonal, multiple PTCA in proximal LAD and diagonal with no regain of flow       Assessment and Plan:    Patient Active Problem List   Diagnosis    Onychia and paronychia of toe    Other specified disease of nail    Acute on chronic diastolic CHF (congestive heart

## 2020-03-03 NOTE — PROGRESS NOTES
Family History: No family history on file. Vitals:  /74   Pulse 87   Temp 95.9 °F (35.5 °C) (Axillary)   Resp 15   Ht 6' (1.829 m)   Wt 238 lb 1.6 oz (108 kg)   SpO2 100%   BMI 32.29 kg/m²   Temp (24hrs), Av.1 °F (36.2 °C), Min:95.9 °F (35.5 °C), Max:98.1 °F (36.7 °C)    No results for input(s): POCGLU in the last 72 hours. I/O (24Hr): Intake/Output Summary (Last 24 hours) at 3/3/2020 1210  Last data filed at 3/3/2020 1104  Gross per 24 hour   Intake 1924 ml   Output 2710 ml   Net -786 ml       Labs:  Hematology:  Recent Labs     20  0620  0620   WBC 6.6  --    RBC 4.35  --    HGB 12.5*  --    HCT 40.9  --    MCV 94.0  --    MCH 28.7  --    MCHC 30.6  --    RDW 16.1*  --     209   MPV 9.1  --      Chemistry:  Recent Labs     20  1747  20  0613 20  0955 20  0620 20  0642      < > 137 137 137 135   K  --   --  3.9  --  3.8 4.2   CL  --   --  93*  --  92* 90*   CO2  --   --  29  --  30 30   GLUCOSE  --   --  107*  --  97 99   BUN  --   --  23*  --  27* 24*   CREATININE  --   --  0.91  --  0.99 1.04   MG  --   --  2.5  --  2.5  --    ANIONGAP  --   --  15  --  15 15   LABGLOM  --   --  >60  --  >60 >60   GFRAA  --   --  >60  --  >60 >60   CALCIUM  --   --  9.0  --  8.9 9.1   PHOS  --   --  3.8  --   --   --    PROBNP  --   --   --   --  3,743*  --    TROPHS 209*  --   --   --   --   --    MYOGLOBIN 30  --   --   --   --   --     < > = values in this interval not displayed.      Recent Labs     20  1747   AMMONIA 36     ABG:  Lab Results   Component Value Date    POCPH 7.523 2020    POCPCO2 33.3 2020    POCPO2 116.3 2020    POCHCO3 27.4 2020    NBEA NOT REPORTED 2020    PBEA 5 2020    EGI9WAU 28 2020    TOKB8SDH 99 2020    FIO2 36.0 2020     Lab Results   Component Value Date/Time    SPECIAL NOT REPORTED 2020 07:31 PM     Lab Results   Component Value Date/Time with Dr. Roque Guzman for possible AICD  4. Hx Etoh- avoid Ativan if possible  5. Cirrhosis- stable  6. Hypotension- stable, Midodrine sparingly  7. GREGORY- hold ACE-I  8. Acute Metabolic encephalopathy- Hold Zyprexa,  Resume Doxepin, Cogentin, Psych related? 9. GI/ DVT proph  10. PT/OT - need updated notes. Patient does not seem safe to go home at this time.   His parents are coming in to visit today, will touch base with them    dw nurse    Malvin Arizmendi MD  3/3/2020  12:10 PM

## 2020-03-03 NOTE — DISCHARGE INSTR - COC
Continuity of Care Form    Patient Name: Santos Rodriguez   :  1965  MRN:  [id number]    Admit date:  2020  Discharge date:  3/4/2020    Code Status Order: Full Code   Advance Directives:   [facility] Documentation     Date/Time Healthcare Directive Type of Healthcare Directive Copy in [address] [place] [address] [address] Agent's Name Healthcare Agent's Phone Number    20 1745  No, patient does not have an advance directive for healthcare treatment -- -- -- -- --          Admitting Physician:  Jacques Deluca MD  PCP: Nigel Talavera, APRN - CNP    Discharging Nurse: Ladarius Cabrera [facility] Unit/Room#: 6694/5608-18  Discharging Unit Phone Number: [phone]    Emergency Contact:   Extended Emergency Contact Information  Primary Emergency Contact: Madan Padron  Address: [facility], [address] [place] [address] Phone: [phone]  Relation: Parent    Past Surgical History:  Past Surgical History:   Procedure Laterality Date    CARDIAC CATHETERIZATION Left 2019    DR Valerio[facility] [place]/right radial-Severe two vessel coronary artery disease involving involving a 100% proximal probably co-dominant RCA, 90% stenosis in the distal part of the proximal LAD at a bifircation with a moderate sized D1 branch that also has an 80% stenosis. Mildly elevated left ventricular end diastolic pressure. Consult to cardiothoracic surgery for consideration of multi-vess    TOE SURGERY      TOOTH EXTRACTION         Immunization History: There is no immunization history on file for this patient.     Active Problems:  Patient Active Problem List   Diagnosis Code    Onychia and paronychia of toe L03.039    Other specified disease of nail L60.8    Acute on chronic diastolic CHF (congestive heart failure), NYHA class 3 (HCC) I50.33    Acute on chronic congestive heart failure (HCC) I50.9    Acute on chronic combined systolic and diastolic congestive heart failure, NYHA class 4 (HCC) I50.43    Portal hypertension (HCC) U67.4    Alcoholic cirrhosis of liver without ascites (HCC) K70.30    Idiopathic cardiomyopathy (HCC) I42.8    Acute heart failure (HCC) I50.9    Ischemic cardiomyopathy I25.5    CAD, multiple vessel I25.10    CAD in native artery I25.10    Medication side effect, initial encounter T50.905A    Idiopathic hypotension I95.0    NSTEMI (non-ST elevated myocardial infarction) (HCC) I21.4    CHF with unknown LVEF (HCC) I50.9    COPD, severity to be determined ([facility] [address] J44.9    Class 1 obesity due to excess calories with serious comorbidity and body mass index (BMI) of 31.0 to 31.9 in adult E66.09, Z68.31    Stopped smoking with greater than 40 pack year history Z87.891    Bipolar disorder (HCC) F31.9    Acute metabolic encephalopathy L99.21       Isolation/Infection:   Isolation          No Isolation        Patient Infection Status     None to display          Nurse Assessment:  Last Vital Signs: /74   Pulse 87   Temp 95.9 °F (35.5 °C) (Axillary)   Resp 15   Ht 6' (1.829 m)   Wt 238 lb 1.6 oz (108 kg)   SpO2 100%   BMI 32.29 kg/m²     Last documented pain score (0-10 scale): Pain Level: 0  Last Weight:   Wt Readings from Last 1 Encounters:   03/03/20 238 lb 1.6 oz (108 kg)     Mental Status:  oriented    IV Access:  - None    Nursing Mobility/ADLs:  Walking   Assisted     Transfer  Assisted     Bathing  Assisted  Dressing  Assisted  Toileting  Assisted  Feeding  Independent  Med Admin  Assisted  Med Delivery   whole    Wound Care Documentation and Therapy:        Elimination:  Continence:   · Bowel: yes  · Bladder: Yes  Urinary Catheter: None   Colostomy/Ileostomy/Ileal Conduit: No       Date of Last BM: 3/3/2020      Intake/Output Summary (Last 24 hours) at 3/3/2020 1230  Last data filed at 3/3/2020 1217  Gross per 24 hour   Intake 2164 ml   Output 2935 ml   Net -771 ml     I/O last 3 completed shifts: In: [facility] [P.O.:1370]  Out: [facility] [Urine:2420]    Safety Concerns: At Risk for Falls and cognative disabilities    Impairments/Disabilities:      cognative impairments    Nutrition Therapy:  Current Nutrition Therapy:   - Oral Diet:  Cardiac  - Oral Nutrition Supplement:  None    Routes of Feeding: Oral  Liquids: No Restrictions  Daily Fluid Restriction: yes - amount 1500  Last Modified Barium Swallow with Video (Video Swallowing Test): not done    Treatments at the Time of Hospital Discharge:   Respiratory Treatments:     Oxygen Therapy:  is on oxygen at 2 l/min L/min per nasal cannula. Ventilator:    - BiPAP   IPAP: 12 cmH20, CPAP/EPAP: 8 cmH2O only when sleeping and or if needed when napping    Rehab Therapies: Physical Therapy and Occupational Therapy  Weight Bearing Status/Restrictions: No weight bearing restirctions  Other Medical Equipment (for information only, NOT a DME order): Other Treatments:       Patient's personal belongings (please select all that are sent with patient):  Glasses, clothes, shoes, life vest    RN SIGNATURE: Electronically signed by Zacarias Neves on 3/4/20 at 12:55 PM   CASE MANAGEMENT/SOCIAL WORK SECTION    Inpatient Status Date: ***    Readmission Risk Assessment Score:  Readmission Risk              Risk of Unplanned Readmission:        39           Discharging to Facility/ Agency   Name:   [facility]          [address] [place] [address]       Phone: [phone]       Fax: [phone]                                   Dialysis Facility (if applicable)   · Name:  · Address:  · Dialysis Schedule:  · Phone:  · Fax:    / signature: Electronically signed by Juan Carlos Aviles RN on 3/3/20 at 12:31 PM    PHYSICIAN SECTION    Prognosis: Guarded    Condition at Discharge: Stable    Rehab Potential (if transferring to Rehab):  Fair    Recommended Labs or Other Treatments After Discharge: Life vest, check BMP in 3 days    Physician

## 2020-03-03 NOTE — CARE COORDINATION
[facility] declined. Spoke with family:  Referrals faxed to:  [facility] - spoke with Navdeep Cheng and faxed pt. Information- approved. Just need to contact medicare then d/c in the AM on 3/4    [facility] - Left voicemail. Mihai with Ryan [facility] and she is going to review. Al to do an onsite tomorrow morning. Need to call her cell at: [phone].- 3/4 called and left VM with al, not to come  To do onsite.     [facility]- referral.

## 2020-03-04 VITALS
SYSTOLIC BLOOD PRESSURE: 97 MMHG | HEART RATE: 89 BPM | OXYGEN SATURATION: 93 % | BODY MASS INDEX: 29.83 KG/M2 | TEMPERATURE: 97.7 F | RESPIRATION RATE: 26 BRPM | HEIGHT: 72 IN | DIASTOLIC BLOOD PRESSURE: 72 MMHG | WEIGHT: 220.24 LBS

## 2020-03-04 LAB
ANION GAP SERPL CALCULATED.3IONS-SCNC: 12 MMOL/L (ref 9–17)
BUN BLDV-MCNC: 24 MG/DL (ref 6–20)
BUN/CREAT BLD: ABNORMAL (ref 9–20)
CALCIUM SERPL-MCNC: 8.9 MG/DL (ref 8.6–10.4)
CHLORIDE BLD-SCNC: 94 MMOL/L (ref 98–107)
CO2: 32 MMOL/L (ref 20–31)
CREAT SERPL-MCNC: 0.99 MG/DL (ref 0.7–1.2)
EKG ATRIAL RATE: 110 BPM
EKG P AXIS: 75 DEGREES
EKG P-R INTERVAL: 174 MS
EKG Q-T INTERVAL: 364 MS
EKG QRS DURATION: 128 MS
EKG QTC CALCULATION (BAZETT): 492 MS
EKG R AXIS: -169 DEGREES
EKG T AXIS: 55 DEGREES
EKG VENTRICULAR RATE: 110 BPM
GFR AFRICAN AMERICAN: >60 ML/MIN
GFR NON-AFRICAN AMERICAN: >60 ML/MIN
GFR SERPL CREATININE-BSD FRML MDRD: ABNORMAL ML/MIN/{1.73_M2}
GFR SERPL CREATININE-BSD FRML MDRD: ABNORMAL ML/MIN/{1.73_M2}
GLUCOSE BLD-MCNC: 84 MG/DL (ref 70–99)
PLATELET # BLD: 193 K/UL (ref 138–453)
POTASSIUM SERPL-SCNC: 3.6 MMOL/L (ref 3.7–5.3)
SODIUM BLD-SCNC: 138 MMOL/L (ref 135–144)

## 2020-03-04 PROCEDURE — 97116 GAIT TRAINING THERAPY: CPT

## 2020-03-04 PROCEDURE — 6360000002 HC RX W HCPCS: Performed by: FAMILY MEDICINE

## 2020-03-04 PROCEDURE — 6370000000 HC RX 637 (ALT 250 FOR IP): Performed by: INTERNAL MEDICINE

## 2020-03-04 PROCEDURE — 80048 BASIC METABOLIC PNL TOTAL CA: CPT

## 2020-03-04 PROCEDURE — 99232 SBSQ HOSP IP/OBS MODERATE 35: CPT | Performed by: INTERNAL MEDICINE

## 2020-03-04 PROCEDURE — 6370000000 HC RX 637 (ALT 250 FOR IP): Performed by: NURSE PRACTITIONER

## 2020-03-04 PROCEDURE — 85049 AUTOMATED PLATELET COUNT: CPT

## 2020-03-04 PROCEDURE — 97110 THERAPEUTIC EXERCISES: CPT

## 2020-03-04 PROCEDURE — 99239 HOSP IP/OBS DSCHRG MGMT >30: CPT | Performed by: INTERNAL MEDICINE

## 2020-03-04 PROCEDURE — 36415 COLL VENOUS BLD VENIPUNCTURE: CPT

## 2020-03-04 RX ORDER — POLYETHYLENE GLYCOL 3350 17 G/17G
17 POWDER, FOR SOLUTION ORAL DAILY PRN
Qty: 527 G | Refills: 1 | Status: ON HOLD | DISCHARGE
Start: 2020-03-04 | End: 2020-04-04 | Stop reason: HOSPADM

## 2020-03-04 RX ORDER — LANOLIN ALCOHOL/MO/W.PET/CERES
100 CREAM (GRAM) TOPICAL DAILY
Qty: 30 TABLET | Refills: 3 | Status: SHIPPED | OUTPATIENT
Start: 2020-03-05

## 2020-03-04 RX ORDER — BUMETANIDE 2 MG/1
2 TABLET ORAL DAILY
Qty: 30 TABLET | Refills: 3 | Status: ON HOLD | OUTPATIENT
Start: 2020-03-05 | End: 2020-03-23 | Stop reason: HOSPADM

## 2020-03-04 RX ORDER — POLYETHYLENE GLYCOL 3350 17 G/17G
17 POWDER, FOR SOLUTION ORAL DAILY PRN
Qty: 527 G | Refills: 1 | Status: CANCELLED | OUTPATIENT
Start: 2020-03-04 | End: 2020-04-03

## 2020-03-04 RX ORDER — MULTIVITAMIN WITH FOLIC ACID 400 MCG
1 TABLET ORAL DAILY
Refills: 0 | DISCHARGE
Start: 2020-03-05

## 2020-03-04 RX ORDER — ONDANSETRON 4 MG/1
4 TABLET, ORALLY DISINTEGRATING ORAL EVERY 8 HOURS PRN
Qty: 30 TABLET | Refills: 1 | Status: SHIPPED | OUTPATIENT
Start: 2020-03-04

## 2020-03-04 RX ORDER — FOLIC ACID 1 MG/1
1 TABLET ORAL DAILY
Qty: 30 TABLET | Refills: 3 | Status: SHIPPED | OUTPATIENT
Start: 2020-03-05

## 2020-03-04 RX ORDER — MULTIVITAMIN WITH FOLIC ACID 400 MCG
1 TABLET ORAL DAILY
Refills: 0 | Status: CANCELLED | OUTPATIENT
Start: 2020-03-05

## 2020-03-04 RX ADMIN — BUMETANIDE 2 MG: 1 TABLET ORAL at 08:08

## 2020-03-04 RX ADMIN — Medication 100 MG: at 08:08

## 2020-03-04 RX ADMIN — METOPROLOL TARTRATE 25 MG: 25 TABLET ORAL at 08:08

## 2020-03-04 RX ADMIN — ENOXAPARIN SODIUM 30 MG: 30 INJECTION SUBCUTANEOUS at 09:09

## 2020-03-04 RX ADMIN — THERA TABS 1 TABLET: TAB at 08:11

## 2020-03-04 RX ADMIN — Medication 81 MG: at 10:14

## 2020-03-04 RX ADMIN — CLOPIDOGREL 75 MG: 75 TABLET, FILM COATED ORAL at 08:11

## 2020-03-04 RX ADMIN — DIVALPROEX SODIUM 1500 MG: 500 TABLET, DELAYED RELEASE ORAL at 09:08

## 2020-03-04 RX ADMIN — DESMOPRESSIN ACETATE 40 MG: 0.2 TABLET ORAL at 08:11

## 2020-03-04 RX ADMIN — FOLIC ACID 1 MG: 1 TABLET ORAL at 08:11

## 2020-03-04 RX ADMIN — FAMOTIDINE 20 MG: 20 TABLET, FILM COATED ORAL at 08:08

## 2020-03-04 RX ADMIN — BENZTROPINE MESYLATE 1 MG: 1 TABLET ORAL at 08:10

## 2020-03-04 RX ADMIN — LOSARTAN POTASSIUM 12.5 MG: 25 TABLET, FILM COATED ORAL at 08:11

## 2020-03-04 ASSESSMENT — PAIN SCALES - GENERAL
PAINLEVEL_OUTOF10: 0
PAINLEVEL_OUTOF10: 0

## 2020-03-04 NOTE — PROGRESS NOTES
· Current Body Wt: 220 lb 3.8 oz (99.9 kg)  · Admission Body Wt: 251 lb 5.2 oz (114 kg)(increased wt upon admit likely d/t drinking lots of fluids and not taking medications)  · Usual Body Wt: (228-242 per EHR review)  · % Weight Change:  ,  Wt loss since admission noted - likely d/t fluid losses and diuresis  · Ideal Body Wt: 177 lb 11.1 oz (80.6 kg), % Ideal Body 124% (current/ideal)  · BMI Classification: BMI 25.0 - 29.9 Overweight    Nutrition Interventions:   Continue current diet, Start ONS  Continued Inpatient Monitoring, Education not appropriate at this time(Handout on CHF/Low Sodium diet has been provided to pt with contact information.)    Nutrition Evaluation:   · Evaluation: Goals set   · Goals: Oral intakes to meet at least 75% of estimated nutrition needs.     · Monitoring: Meal Intake, Supplement Intake, Diet Tolerance, Mental Status/Confusion, Weight, Pertinent Labs, Skin Integrity, I&O, Monitor Hemodynamic Status    Electronically signed by Ferny Kenny RD, REHAN on 3/4/20 at 12:17 PM    Contact Number: [phone]

## 2020-03-04 NOTE — PROGRESS NOTES
Screening  Patient Currently in Pain: No  Vital Signs  Patient Currently in Pain: No       Orientation  Orientation  Overall Orientation Status: Within Functional Limits  Orientation Level: Oriented to place;Oriented to person  Cognition      Objective   Bed mobility  Supine to Sit: Stand by assistance  Sit to Supine: Stand by assistance  Scooting: Stand by assistance  Comment: HOB elevated and use of bed rails to perform bed mob  Transfers  Sit to Stand: Contact guard assistance  Stand to sit: Contact guard assistance  Comment: VC's for proper hand placement. Pt reports of dizziness upon stand, symptoms resolved after 15sec. Pt returned to bed per pt's request following therapy, call light in reach and alarm activated. Ambulation  Ambulation?: Yes  More Ambulation?: No  Ambulation 1  Surface: level tile  Device: Rolling Walker  Other Apparatus: O2(2L)  Assistance: Contact guard assistance  Quality of Gait: slow pace, decreased STEPHANI, slightly flexed posture, mild unsteadiness  Gait Deviations: Decreased step length;Decreased step height;Slow Senia  Distance: 200ft  Comments: VC's for upright posture with fair return. Pt with mild unsteadiness towards end of amb d/t fatigue. Stairs/Curb  Stairs?: No(Pt deferred d/t fatigue)     Balance  Posture: Fair  Sitting - Static: Fair  Sitting - Dynamic: Fair  Standing - Static: Fair;+  Standing - Dynamic: Fair;-  Comments: Standing balance assessed with RW. Pt with mild unsteadiness towards end of amb, no LOB throughout therapy. Exercises  Seated LE exercise program: Long Arc Quads, hip abduction/adduction, heel/toe raises, and marches. Reps: [id number] performed EOB  Comments: Pt required increased time to complete ex.s d/t fatigue.       Goals  Short term goals  Time Frame for Short term goals: 10 visits  Short term goal 1: transfers with SBA  Short term goal 2: amb 150 ft with a RW x SBA  Short term goal 3: ascend/descend 4 steps with SBA  Short term goal 4: exercise program x SBA  Patient Goals   Patient goals : Return home    Plan    Plan  Times per week: 5-6x wk  Current Treatment Recommendations: Strengthening, Transfer Training, Endurance Training, Gait Training, Functional Mobility Training, Stair training, Safety Education & Training  Safety Devices  Type of devices: Call light within reach, Patient at risk for falls, Nurse notified, Gait belt, Bed alarm in place, Left in bed, Telesitter in use     Therapy Time   Individual Concurrent Group Co-treatment   Time In 1039         Time Out 1109         Minutes 30         Timed Code Treatment Minutes: 30 Minutes     Treatment performed by Student PTA under the supervision of co-signing PTA who agrees with all treatment and documentation.    Neto [place], [place]

## 2020-03-04 NOTE — PROGRESS NOTES
PULMONARY PROGRESS NOTE      Patient:  Elena Cushing  YOB: 1965    MRN: [id number]     Acct: [de-identified]     Admit date: 2/26/2020    REASON FOR CONSULT:-     Pt seen and Chart reviewed. Subjective:   Afebrile, VSS except blood pressure on lower side, tachypneic  No complaint of chest pain, nausea, vomiting,   Patient is alert and oriented, answering all the questions  comfortable on 2 l  o2   Used his home CPAP machine overnight  Lab significant for potassium 3.6 today, carbonate 32  Urinary  output over last 24 hours 1640 ml      Review of Systems -   CONSTITUTIONAL:  negative  EYES:  negative  HEENT:  negative  RESPIRATORY:  negative  CARDIOVASCULAR:  Orthopnea and pnd   GASTROINTESTINAL:  negative  GENITOURINARY:  negative  HEMATOLOGIC/LYMPHATIC:  negative  ALLERGIC/IMMUNOLOGIC:  negative  ENDOCRINE:  negative  MUSCULOSKELETAL:  negative  NEUROLOGICAL:  negative  BEHAVIOR/PSYCH:  negative        Physical Exam:  Vitals: BP 96/77   Pulse 88   Temp 97.5 °F (36.4 °C) (Oral)   Resp 13   Ht 6' (1.829 m)   Wt 220 lb 3.8 oz (99.9 kg)   SpO2 97%   BMI 29.87 kg/m²   24 hour intake/output:    Intake/Output Summary (Last 24 hours) at 3/4/2020 0849  Last data filed at 3/4/2020 0537  Gross per 24 hour   Intake 1505 ml   Output 1640 ml   Net -135 ml     Last 3 weights:   Wt Readings from Last 3 Encounters:   03/04/20 220 lb 3.8 oz (99.9 kg)   02/26/20 250 lb (113.4 kg)   02/18/20 247 lb (112 kg)       General appearance: alert and cooperative with exam  Physical Examination:   General appearance - alert, well appearing, and in no distress  Mental status - alert, oriented to person, place, and time  Eyes - pupils equal and reactive, extraocular eye movements intact  Ears - bilateral TM's and external ear canals normal  Nose - normal and patent, no erythema, discharge or polyps  Mouth - mucous membranes moist, pharynx normal without lesions  Neck - supple, no significant adenopathy  Chest -crackles at chronic congestive heart failure (HCC)    Acute on chronic combined systolic and diastolic congestive heart failure, NYHA class 4 (HCC)    Portal hypertension (HCC)    Alcoholic cirrhosis of liver without ascites (HCC)    Idiopathic cardiomyopathy (HCC)    Acute heart failure (HCC)    Ischemic cardiomyopathy    CAD, multiple vessel    CAD in native artery    Medication side effect, initial encounter    Idiopathic hypotension    NSTEMI (non-ST elevated myocardial infarction) ([facility] [address]    CHF with unknown LVEF (HCC)    [facility]    Class 1 obesity due to excess calories with serious comorbidity and body mass index (BMI) of 31.0 to 31.9 in adult    Stopped smoking with greater than 40 pack year history    Bipolar disorder ([facility] [address]    Acute metabolic encephalopathy     1. Acute respiratory failure  due to acute on chronic systolic and diastolic heart failure,   2. Acute  on chronic combined systolic and diastolic heart failure due to medication noncompliance  3. History of ischemic cardiomyopathy  4. History of alcoholism  5. History of liver cirrhosis with portal hypertension- cardiac and alcohol related   6. Hypervolemic  hyponatremia, secondary to decompensated left heart failure  7. GREGORY likely prerenal insult/early ATN from decompensated left heart failure  8. seizure Disorder  9. Bipolar disorder  Copd ?      Plan   Continue nasal cannula- on 2 liters of oxygen, BiPAP as needed during day and use at night continuously   Will need out pt pft but his main problem is sever cardiomyopathy . Cont diuresis-2 mg p.o. Cont bb , arb  Discharge home with LifeVest  Fluid restriction  Plan to go to skilled nursing facility.     Jamie Garza MD      3/4/2020, 8:49 AM    Pulmonary & Critical Care

## 2020-03-04 NOTE — CARE COORDINATION
Discharge [facility] Case Management Department  Written by: Chavez James RN    Patient Name: Willaim Degree  Attending Provider: No att. providers found  Admit Date: 2020  5:34 PM  MRN: [id number]  Account: [de-identified]                     : 1965  Discharge Date: 3/4/2020      Disposition: SNF - Discharged to [facility]. Transported by [facility]. Family notified.     Chavez James RN

## 2020-03-04 NOTE — PROGRESS NOTES
Patient discharged to [facility] , report called to Mary Muhammad [phone], transferred by ground transport

## 2020-03-04 NOTE — PROGRESS NOTES
[facility]   Progress Note                   Date:   3/4/2020  Patient name: Yuliet Fitzgerald  Date of admission:  2/26/2020  5:34 PM  MRN:   [id number]  YOB: 1965  PCP: CAM Smith CNP    Reason for Admission: CHF with unknown LVEF ([facility] [address] [I50.9]    Subjective:       Clinical Changes / Abnormalities: Pt seen and examined in the room. Pt drowsy. No CV events overnight. LifeVest on   NSR     -5.6 L since admission     Medications:   Scheduled Meds:   bumetanide  2 mg Oral Daily    metoprolol tartrate  25 mg Oral BID    enoxaparin  30 mg Subcutaneous BID    losartan  12.5 mg Oral Daily    sodium chloride flush  10 mL Intravenous 2 times per day    aspirin  81 mg Oral Daily    atorvastatin  40 mg Oral Daily    benztropine  1 mg Oral BID    clopidogrel  75 mg Oral Daily    divalproex  1,500 mg Oral Daily    doxepin  50 mg Oral Nightly    famotidine  20 mg Oral BID    nicotine  1 patch Transdermal Daily    [Held by provider] OLANZapine zydis  20 mg Oral Nightly    sodium chloride flush  10 mL Intravenous 2 times per day    thiamine  100 mg Oral Daily    folic acid  1 mg Oral Daily    multivitamin  1 tablet Oral Daily     Continuous Infusions:    CBC:   Recent Labs     03/02/20  0620 03/04/20  0536    193     BMP:    Recent Labs     03/02/20  0620 03/03/20  0642 03/04/20  0536    135 138   K 3.8 4.2 3.6*   CL 92* 90* 94*   CO2 30 30 32*   BUN 27* 24* 24*   CREATININE 0.99 1.04 0.99   GLUCOSE 97 99 84     Hepatic:   No results for input(s): AST, ALT, ALB, BILITOT, ALKPHOS in the last 72 hours. Troponin:   No results for input(s): TROPHS in the last 72 hours. BNP: No results for input(s): BNP in the last 72 hours. Lipids: No results for input(s): CHOL, HDL in the last 72 hours. Invalid input(s): LDLCALCU  INR:   No results for input(s): INR in the last 72 hours.     Echo 2/28/20:  Dilated left ventricle with severely reduced systolic function. Calculated ejection fraction is 17%. Apical wall akinesis without definite evidence of clot. Objective:   Vitals: BP 96/77   Pulse 88   Temp 97.5 °F (36.4 °C) (Oral)   Resp 13   Ht 6' (1.829 m)   Wt 220 lb 3.8 oz (99.9 kg)   SpO2 97%   BMI 29.87 kg/m²   General appearance: drowsy   HEENT: Head: Normocephalic, no lesions, without obvious abnormality. Neck: no JVD, trachea midline, no adenopathy  Lungs: Clear to auscultation  Heart: Regular rate and rhythm, s1/s2 auscultated, no murmurs, LifeVest on   Abdomen: soft, non-tender, bowel sounds active  Extremities: no edema  Neurologic: not done        Assessment / Acute Cardiac Problems:   1. HFrF with 100% LAD- Unable to PCI   2. Noncompliance   3. Hyponatremia   4. Hypotension  5. NSTEMI     Patient Active Problem List:     Onychia and paronychia of toe     Other specified disease of nail     Acute on chronic diastolic CHF (congestive heart failure), NYHA class 3 (HCC)     Acute on chronic congestive heart failure (HCC)     Acute on chronic combined systolic and diastolic congestive heart failure, NYHA class 4 (HCC)     Portal hypertension (HCC)     Alcoholic cirrhosis of liver without ascites (HCC)     Idiopathic cardiomyopathy (HCC)     Acute heart failure (HCC)     Ischemic cardiomyopathy     CAD, multiple vessel     CAD in native artery     Medication side effect, initial encounter     Idiopathic hypotension     NSTEMI (non-ST elevated myocardial infarction) ([facility] [address]     CHF with unknown LVEF ([facility] [address]      Plan of Treatment:   1. ECHO reviewed. LifeVest ordered for Ischemic Cardiomyopathy. .  Reviewed with pt and he is agreeable to go home with vest. He does not want AICD at this time and will follow up  with Dr. Deepti Otto to discuss. 2. Hypotension. Will decrease BB to 12.5mg BID  Hold off on Midodrine if able due to cardiomyopathy    3. CHF. Diuretics per nephro recs. Continue BB/ARB   4. CAD. Continue ASA, Statin, plavix, BB, ARB    5.  Will plan

## 2020-03-04 NOTE — PROGRESS NOTES
Cheryl Gustafson 20    Progress Note    3/4/2020    7:55 AM    Name:   Rene Zavala  MRN:     [id number]     Acct:      [de-identified]   Room:   [address] [place] [address] Day:  7  Admit Date:  2/26/2020  5:34 PM    PCP:   CAM Willoughby CNP  Code Status:  Full Code    Subjective:     C/C: Dyspnea    Interval History Status: improved. Patient is more alert today. He is resting in bed, denies CP or SOB. He had his life vest placed. Waiting for DC to SNF. Brief History:     Per my partner:  \"Hayes Padron is 49 y. o. male  Who was admitted to the hospital on 2/26/2020 for treatment of Acute on chronic combined systolic and diastolic congestive heart failure, NYHA class 4 ([address] Patient was sent from [facility] emergency room where he presented with dyspnea for last 2 weeks.  Patient reported that he had been having worsening of his dyspnea since he was discharged from the hospital 2 weeks before.  This time got worse for last 2 days.  Patient states that he has been drinking a lot of fluid to \"flush his system\".  He said that he took medication for a while but threw medication in trash when they were not working. Olena Reyes has history of severe cardiomyopathy with EF 15 to 20%, and had known ischemic heart disease with stent to LAD.  Patient has history of alcohol abuse and drinks vodka at home. [facility] has chronic hypotension.  Patient denies chest pain, palpitations, fever, chills. Evaluation at [facility] emergency room showed elevated troponin II 09, elevated proBNP 7000 518, , , glucose 105, WBC 7.5.  Chest x-ray showed pulmonary edema. He was treated with IV diuretics.  Khan was placed for strict I's and O's monitoring. \"    Review of Systems:     Constitutional:  negative for chills, fevers, sweats  Respiratory:  negative for cough, dyspnea on exertion, shortness of breath, wheezing  Cardiovascular:  negative for chest pain, uropathy. Physical Examination:        General appearance:  awake, cooperative and no distress  Mental Status:  oriented to person, place and time and normal affect  Lungs:  + crackles bibasilar,  normal effort  Heart:  regular rate and rhythm, no murmur  Abdomen:  soft, nontender, nondistended, normal bowel sounds, no masses, hepatomegaly, splenomegaly  Extremities:  no edema, redness, tenderness in the calves  Skin:  no gross lesions, rashes, induration    Assessment:        Hospital Problems           Last Modified POA    * (Principal) Acute on chronic combined systolic and diastolic congestive heart failure, NYHA class 4 ([address] 2/26/2020 Yes    Portal hypertension ([address] 8/93/6579 Yes    Alcoholic cirrhosis of liver without ascites ([address] 2/26/2020 Yes    Idiopathic hypotension 2/26/2020 Yes    Ischemic cardiomyopathy 2/26/2020 Yes    NSTEMI (non-ST elevated myocardial infarction) ([address] 2/27/2020 Yes    CHF with unknown LVEF ([address] 2/26/2020 Yes    COPD, severity to be determined ([facility] 3/1/2020 Yes    Class 1 obesity due to excess calories with serious comorbidity and body mass index (BMI) of 31.0 to 31.9 in adult 3/1/2020 Yes    Stopped smoking with greater than 40 pack year history 3/1/2020 Yes    Acute metabolic encephalopathy 1/8/3051 Yes          Plan:        1. Acute on chronic systolic and diastolic CHF- fluid restriction 1500 ml, Bumex 2mg daily, over 5.7L diuresed  2. Acute resp failure from #1 and COPD? Bipap per Pulm  3. Ischemic CMP- EF 20%, going to SNF with Life vest, f/u with Dr. Roque Guzman for possible AICD  4. Hx Etoh- avoid Ativan if possible  5. Cirrhosis- stable  6. Hypotension- stable, Midodrine sparingly  7. GREGORY- hold ACE-I  8. Acute Metabolic encephalopathy- Hold Zyprexa,  Resume Doxepin, Cogentin, Psych related? Improved  9. GI/ DVT proph  10.  PT/OT -     Okay to DC to Snf today    dw nurse    Malvin Arizmendi MD  3/4/2020  7:55 AM

## 2020-03-04 NOTE — DISCHARGE SUMMARY
that he has been drinking a lot of fluid to \"flush his system\".  He said that he took medication for a while but threw medication in trash when they were not working. Stephani Davis has history of severe cardiomyopathy with EF 15 to 20%, and had known ischemic heart disease with stent to LAD.  Patient has history of alcohol abuse and drinks vodka at home. Micah Bustamante has chronic hypotension.  Patient denies chest pain, palpitations, fever, chills. Evaluation at [place] emergency room showed elevated troponin II 09, elevated proBNP 7000 518, , , glucose 105, WBC 7.5.  Chest x-ray showed pulmonary edema. He was treated with IV diuretics.  Khan was placed for strict I's and O's monitoring. \"    Cardiology was consulted as well as nephrology for hypervolemia. Nephrology managed IV diuretics, and hyponatremia. He hyponatremia was likely, complicated by his Cymbalta use as well. This medication was stopped. He diuresed 5.7 L during his hospital stay. He did undergo acute metabolic encephalopathy with confusion, which is improving. He can go on Bumex 2 mg p.o. daily. He refused an AICD at present, and was discharged with a LifeVest.  He was recommended to follow-up with Dr. Barney Dubin outpatient to discuss further management. He is not reliable taking medications by himself. He lives alone, and physical therapy is still recommending further rehab upon discharge he was agreeable to going to acute rehab for further PT/OT. He was discharged in stable condition. Significant therapeutic interventions: IV diuresis    Significant Diagnostic Studies:     Radiology:  Xr Chest Portable    Result Date: 3/1/2020  Cardiomegaly and mild vascular congestion without overt edema or bilateral effusions. Xr Chest Portable    Result Date: 2/27/2020  Slight interval improvement mild CHF. Cardiomegaly. Us Retroperitoneal Limited    Result Date: 2/27/2020  No evidence of obstructive uropathy.        Consultations: Consults:     Final Specialist Recommendations/Findings:   IP CONSULT TO HEART FAILURE NURSE/COORDINATOR  IP CONSULT TO DIETITIAN  IP CONSULT TO CARDIOLOGY  IP CONSULT TO NEPHROLOGY  IP CONSULT TO PULMONOLOGY      The patient was seen and examined on day of discharge and this discharge summary is in conjunction with any daily progress note from day of discharge. Discharge plan:     [facility]  [facility]    Physician Follow Up:     CNP Cotton 1-2 weeks,     Dr. Talat Blanc, 1 to 2 weeks    Requiring Further Evaluation/Follow Up POST HOSPITALIZATION/Incidental Findings: LifeVest, cardiomyopathy    Diet: cardiac diet    Activity: As tolerated    Instructions to Patient: Stop drinking EtOH    Discharge Medications:      Medication List      START taking these medications    bumetanide 2 MG tablet  Commonly known as:  BUMEX  Take 1 tablet by mouth daily  Start taking on:  March 5, 2020     enoxaparin 30 MG/0.3ML injection  Commonly known as:  LOVENOX  Inject 0.3 mLs into the skin 2 times daily     folic acid 1 MG tablet  Commonly known as:  FOLVITE  Take 1 tablet by mouth daily  Start taking on:  March 5, 2020     multivitamin tablet  Take 1 tablet by mouth daily  Start taking on:  March 5, 2020     ondansetron 4 MG disintegrating tablet  Commonly known as:  ZOFRAN-ODT  Take 1 tablet by mouth every 8 hours as needed for Nausea or Vomiting     polyethylene glycol packet  Commonly known as:  GLYCOLAX  Take 17 g by mouth daily as needed for Constipation     thiamine 100 MG tablet  Take 1 tablet by mouth daily  Start taking on:  March 5, 2020        CONTINUE taking these medications    aspirin 81 MG EC tablet  Take 1 tablet by mouth daily     atorvastatin 40 MG tablet  Commonly known as:  LIPITOR     benztropine 1 MG tablet  Commonly known as:  COGENTIN     clopidogrel 75 MG tablet  Commonly known as:  PLAVIX  Take 1 tablet by mouth daily     Depakote 500 MG DR tablet  Generic drug:  divalproex

## 2020-03-18 ENCOUNTER — TELEPHONE (OUTPATIENT)
Dept: OTHER | Facility: CLINIC | Age: 55
End: 2020-03-18

## 2020-03-18 ENCOUNTER — APPOINTMENT (OUTPATIENT)
Dept: GENERAL RADIOLOGY | Age: 55
DRG: 698 | End: 2020-03-18
Payer: MEDICARE

## 2020-03-18 ENCOUNTER — HOSPITAL ENCOUNTER (INPATIENT)
Age: 55
LOS: 1 days | Discharge: ANOTHER HOSPITAL | DRG: 698 | End: 2020-03-19
Attending: EMERGENCY MEDICINE | Admitting: FAMILY MEDICINE
Payer: MEDICARE

## 2020-03-18 PROBLEM — E87.5 HYPERKALEMIA: Status: ACTIVE | Noted: 2020-03-18

## 2020-03-18 PROBLEM — N04.9 ANASARCA ASSOCIATED WITH DISORDER OF KIDNEY: Status: ACTIVE | Noted: 2020-03-18

## 2020-03-18 PROBLEM — Z72.0 TOBACCO ABUSE DISORDER: Status: ACTIVE | Noted: 2020-03-18

## 2020-03-18 PROBLEM — E87.1 HYPONATREMIA: Status: ACTIVE | Noted: 2020-03-18

## 2020-03-18 PROBLEM — R23.0 PERIPHERAL CYANOSIS: Status: ACTIVE | Noted: 2020-03-18

## 2020-03-18 PROBLEM — F10.939 ALCOHOL WITHDRAWAL (HCC): Status: ACTIVE | Noted: 2020-03-18

## 2020-03-18 LAB
ABSOLUTE EOS #: <0.03 K/UL (ref 0–0.44)
ABSOLUTE IMMATURE GRANULOCYTE: 0.03 K/UL (ref 0–0.3)
ABSOLUTE LYMPH #: 1.52 K/UL (ref 1.1–3.7)
ABSOLUTE MONO #: 0.58 K/UL (ref 0.1–1.2)
ANION GAP SERPL CALCULATED.3IONS-SCNC: 18 MMOL/L (ref 9–17)
BASOPHILS # BLD: 0 % (ref 0–2)
BASOPHILS ABSOLUTE: <0.03 K/UL (ref 0–0.2)
BNP INTERPRETATION: ABNORMAL
BUN BLDV-MCNC: 51 MG/DL (ref 6–20)
BUN/CREAT BLD: 26 (ref 9–20)
CALCIUM SERPL-MCNC: 9.4 MG/DL (ref 8.6–10.4)
CHLORIDE BLD-SCNC: 77 MMOL/L (ref 98–107)
CO2: 25 MMOL/L (ref 20–31)
CREAT SERPL-MCNC: 1.98 MG/DL (ref 0.7–1.2)
DIFFERENTIAL TYPE: ABNORMAL
EKG ATRIAL RATE: 73 BPM
EKG P AXIS: 82 DEGREES
EKG P-R INTERVAL: 210 MS
EKG Q-T INTERVAL: 428 MS
EKG QRS DURATION: 122 MS
EKG QTC CALCULATION (BAZETT): 471 MS
EKG R AXIS: 132 DEGREES
EKG T AXIS: 36 DEGREES
EKG VENTRICULAR RATE: 73 BPM
EOSINOPHILS RELATIVE PERCENT: 0 % (ref 1–4)
GFR AFRICAN AMERICAN: 43 ML/MIN
GFR NON-AFRICAN AMERICAN: 36 ML/MIN
GFR SERPL CREATININE-BSD FRML MDRD: ABNORMAL ML/MIN/{1.73_M2}
GFR SERPL CREATININE-BSD FRML MDRD: ABNORMAL ML/MIN/{1.73_M2}
GLUCOSE BLD-MCNC: 101 MG/DL (ref 70–99)
HCT VFR BLD CALC: 35.1 % (ref 40.7–50.3)
HEMOGLOBIN: 11.4 G/DL (ref 13–17)
IMMATURE GRANULOCYTES: 1 %
INR BLD: 1.6 (ref 0.9–1.2)
LYMPHOCYTES # BLD: 30 % (ref 24–43)
MAGNESIUM: 3 MG/DL (ref 1.6–2.6)
MCH RBC QN AUTO: 28.2 PG (ref 25.2–33.5)
MCHC RBC AUTO-ENTMCNC: 32.5 G/DL (ref 28.4–34.8)
MCV RBC AUTO: 86.9 FL (ref 82.6–102.9)
MONOCYTES # BLD: 11 % (ref 3–12)
NRBC AUTOMATED: 0.6 PER 100 WBC
PARTIAL THROMBOPLASTIN TIME: 32.8 SEC (ref 23.2–34.4)
PDW BLD-RTO: 17.6 % (ref 11.8–14.4)
PLATELET # BLD: 162 K/UL (ref 138–453)
PLATELET ESTIMATE: ABNORMAL
PMV BLD AUTO: 9.9 FL (ref 8.1–13.5)
POTASSIUM SERPL-SCNC: 6 MMOL/L (ref 3.7–5.3)
PRO-BNP: 6081 PG/ML
PROTHROMBIN TIME: 15.7 SEC (ref 9.7–12.2)
RBC # BLD: 4.04 M/UL (ref 4.21–5.77)
RBC # BLD: ABNORMAL 10*6/UL
SEG NEUTROPHILS: 58 % (ref 36–65)
SEGMENTED NEUTROPHILS ABSOLUTE COUNT: 2.93 K/UL (ref 1.5–8.1)
SODIUM BLD-SCNC: 120 MMOL/L (ref 135–144)
THYROXINE, FREE: 1.11 NG/DL (ref 0.93–1.7)
TROPONIN INTERP: ABNORMAL
TROPONIN INTERP: ABNORMAL
TROPONIN T: ABNORMAL NG/ML
TROPONIN T: ABNORMAL NG/ML
TROPONIN, HIGH SENSITIVITY: 176 NG/L (ref 0–22)
TROPONIN, HIGH SENSITIVITY: 207 NG/L (ref 0–22)
TSH SERPL DL<=0.05 MIU/L-ACNC: 9.96 MIU/L (ref 0.3–5)
WBC # BLD: 5.1 K/UL (ref 3.5–11.3)
WBC # BLD: ABNORMAL 10*3/UL

## 2020-03-18 PROCEDURE — 96374 THER/PROPH/DIAG INJ IV PUSH: CPT

## 2020-03-18 PROCEDURE — 84439 ASSAY OF FREE THYROXINE: CPT

## 2020-03-18 PROCEDURE — 2580000003 HC RX 258: Performed by: NURSE PRACTITIONER

## 2020-03-18 PROCEDURE — 71046 X-RAY EXAM CHEST 2 VIEWS: CPT

## 2020-03-18 PROCEDURE — 51702 INSERT TEMP BLADDER CATH: CPT

## 2020-03-18 PROCEDURE — 6370000000 HC RX 637 (ALT 250 FOR IP): Performed by: NURSE PRACTITIONER

## 2020-03-18 PROCEDURE — 6360000002 HC RX W HCPCS: Performed by: EMERGENCY MEDICINE

## 2020-03-18 PROCEDURE — 85730 THROMBOPLASTIN TIME PARTIAL: CPT

## 2020-03-18 PROCEDURE — 84443 ASSAY THYROID STIM HORMONE: CPT

## 2020-03-18 PROCEDURE — 6360000002 HC RX W HCPCS: Performed by: NURSE PRACTITIONER

## 2020-03-18 PROCEDURE — 36415 COLL VENOUS BLD VENIPUNCTURE: CPT

## 2020-03-18 PROCEDURE — 85025 COMPLETE CBC W/AUTO DIFF WBC: CPT

## 2020-03-18 PROCEDURE — 93005 ELECTROCARDIOGRAM TRACING: CPT | Performed by: EMERGENCY MEDICINE

## 2020-03-18 PROCEDURE — 84484 ASSAY OF TROPONIN QUANT: CPT

## 2020-03-18 PROCEDURE — 83735 ASSAY OF MAGNESIUM: CPT

## 2020-03-18 PROCEDURE — 99285 EMERGENCY DEPT VISIT HI MDM: CPT

## 2020-03-18 PROCEDURE — 93010 ELECTROCARDIOGRAM REPORT: CPT | Performed by: INTERNAL MEDICINE

## 2020-03-18 PROCEDURE — 83880 ASSAY OF NATRIURETIC PEPTIDE: CPT

## 2020-03-18 PROCEDURE — 80048 BASIC METABOLIC PNL TOTAL CA: CPT

## 2020-03-18 PROCEDURE — 2000000000 HC ICU R&B

## 2020-03-18 PROCEDURE — 85610 PROTHROMBIN TIME: CPT

## 2020-03-18 RX ORDER — ACETAMINOPHEN 325 MG/1
650 TABLET ORAL EVERY 6 HOURS PRN
Status: DISCONTINUED | OUTPATIENT
Start: 2020-03-18 | End: 2020-03-19 | Stop reason: HOSPADM

## 2020-03-18 RX ORDER — LEVOTHYROXINE SODIUM 0.05 MG/1
50 TABLET ORAL DAILY
Status: DISCONTINUED | OUTPATIENT
Start: 2020-03-19 | End: 2020-03-19 | Stop reason: HOSPADM

## 2020-03-18 RX ORDER — LORAZEPAM 2 MG/ML
1 INJECTION INTRAMUSCULAR EVERY 6 HOURS PRN
Status: DISCONTINUED | OUTPATIENT
Start: 2020-03-18 | End: 2020-03-19 | Stop reason: HOSPADM

## 2020-03-18 RX ORDER — SODIUM CHLORIDE 0.9 % (FLUSH) 0.9 %
10 SYRINGE (ML) INJECTION EVERY 12 HOURS SCHEDULED
Status: DISCONTINUED | OUTPATIENT
Start: 2020-03-18 | End: 2020-03-19 | Stop reason: HOSPADM

## 2020-03-18 RX ORDER — THIAMINE MONONITRATE (VIT B1) 100 MG
100 TABLET ORAL DAILY
Status: DISCONTINUED | OUTPATIENT
Start: 2020-03-18 | End: 2020-03-19 | Stop reason: HOSPADM

## 2020-03-18 RX ORDER — DIVALPROEX SODIUM 500 MG/1
1500 TABLET, DELAYED RELEASE ORAL DAILY
Status: DISCONTINUED | OUTPATIENT
Start: 2020-03-18 | End: 2020-03-19 | Stop reason: HOSPADM

## 2020-03-18 RX ORDER — NICOTINE 21 MG/24HR
1 PATCH, TRANSDERMAL 24 HOURS TRANSDERMAL DAILY
Status: DISCONTINUED | OUTPATIENT
Start: 2020-03-18 | End: 2020-03-19 | Stop reason: HOSPADM

## 2020-03-18 RX ORDER — CLOPIDOGREL BISULFATE 75 MG/1
75 TABLET ORAL DAILY
Status: DISCONTINUED | OUTPATIENT
Start: 2020-03-18 | End: 2020-03-19 | Stop reason: HOSPADM

## 2020-03-18 RX ORDER — PROMETHAZINE HYDROCHLORIDE 25 MG/1
12.5 TABLET ORAL EVERY 6 HOURS PRN
Status: DISCONTINUED | OUTPATIENT
Start: 2020-03-18 | End: 2020-03-19 | Stop reason: HOSPADM

## 2020-03-18 RX ORDER — FUROSEMIDE 10 MG/ML
40 INJECTION INTRAMUSCULAR; INTRAVENOUS 2 TIMES DAILY
Status: DISCONTINUED | OUTPATIENT
Start: 2020-03-18 | End: 2020-03-19 | Stop reason: HOSPADM

## 2020-03-18 RX ORDER — ACETAMINOPHEN 650 MG/1
650 SUPPOSITORY RECTAL EVERY 6 HOURS PRN
Status: DISCONTINUED | OUTPATIENT
Start: 2020-03-18 | End: 2020-03-19 | Stop reason: HOSPADM

## 2020-03-18 RX ORDER — ASPIRIN 81 MG/1
81 TABLET ORAL DAILY
Status: DISCONTINUED | OUTPATIENT
Start: 2020-03-18 | End: 2020-03-19 | Stop reason: HOSPADM

## 2020-03-18 RX ORDER — BENZTROPINE MESYLATE 1 MG/1
1 TABLET ORAL 2 TIMES DAILY
Status: DISCONTINUED | OUTPATIENT
Start: 2020-03-18 | End: 2020-03-19 | Stop reason: HOSPADM

## 2020-03-18 RX ORDER — LOSARTAN POTASSIUM 25 MG/1
12.5 TABLET ORAL DAILY
Status: DISCONTINUED | OUTPATIENT
Start: 2020-03-18 | End: 2020-03-19 | Stop reason: HOSPADM

## 2020-03-18 RX ORDER — SODIUM CHLORIDE 0.9 % (FLUSH) 0.9 %
10 SYRINGE (ML) INJECTION PRN
Status: DISCONTINUED | OUTPATIENT
Start: 2020-03-18 | End: 2020-03-19 | Stop reason: HOSPADM

## 2020-03-18 RX ORDER — POLYETHYLENE GLYCOL 3350 17 G/17G
17 POWDER, FOR SOLUTION ORAL DAILY PRN
Status: DISCONTINUED | OUTPATIENT
Start: 2020-03-18 | End: 2020-03-19 | Stop reason: HOSPADM

## 2020-03-18 RX ORDER — FUROSEMIDE 10 MG/ML
40 INJECTION INTRAMUSCULAR; INTRAVENOUS ONCE
Status: COMPLETED | OUTPATIENT
Start: 2020-03-18 | End: 2020-03-18

## 2020-03-18 RX ORDER — SODIUM POLYSTYRENE SULFONATE 15 G/60ML
15 SUSPENSION ORAL; RECTAL ONCE
Status: COMPLETED | OUTPATIENT
Start: 2020-03-18 | End: 2020-03-18

## 2020-03-18 RX ORDER — ATORVASTATIN CALCIUM 40 MG/1
40 TABLET, FILM COATED ORAL DAILY
Status: DISCONTINUED | OUTPATIENT
Start: 2020-03-18 | End: 2020-03-19 | Stop reason: HOSPADM

## 2020-03-18 RX ORDER — FOLIC ACID 1 MG/1
1 TABLET ORAL DAILY
Status: DISCONTINUED | OUTPATIENT
Start: 2020-03-18 | End: 2020-03-19 | Stop reason: HOSPADM

## 2020-03-18 RX ORDER — ONDANSETRON 2 MG/ML
4 INJECTION INTRAMUSCULAR; INTRAVENOUS EVERY 6 HOURS PRN
Status: DISCONTINUED | OUTPATIENT
Start: 2020-03-18 | End: 2020-03-19 | Stop reason: HOSPADM

## 2020-03-18 RX ORDER — MULTIVITAMIN WITH FOLIC ACID 400 MCG
1 TABLET ORAL DAILY
Status: DISCONTINUED | OUTPATIENT
Start: 2020-03-18 | End: 2020-03-19 | Stop reason: HOSPADM

## 2020-03-18 RX ORDER — FAMOTIDINE 20 MG/1
20 TABLET, FILM COATED ORAL 2 TIMES DAILY
Status: DISCONTINUED | OUTPATIENT
Start: 2020-03-18 | End: 2020-03-19 | Stop reason: HOSPADM

## 2020-03-18 RX ADMIN — SODIUM POLYSTYRENE SULFONATE 15 G: 15 SUSPENSION ORAL; RECTAL at 19:46

## 2020-03-18 RX ADMIN — FUROSEMIDE 40 MG: 10 INJECTION, SOLUTION INTRAMUSCULAR; INTRAVENOUS at 12:57

## 2020-03-18 RX ADMIN — FUROSEMIDE 40 MG: 10 INJECTION, SOLUTION INTRAMUSCULAR; INTRAVENOUS at 20:59

## 2020-03-18 RX ADMIN — Medication 10 ML: at 21:07

## 2020-03-18 ASSESSMENT — PAIN DESCRIPTION - ONSET: ONSET: GRADUAL

## 2020-03-18 ASSESSMENT — PAIN DESCRIPTION - PAIN TYPE
TYPE: ACUTE PAIN
TYPE: ACUTE PAIN

## 2020-03-18 ASSESSMENT — PAIN DESCRIPTION - LOCATION
LOCATION: PENIS
LOCATION: GENERALIZED;PENIS

## 2020-03-18 ASSESSMENT — PAIN DESCRIPTION - DESCRIPTORS
DESCRIPTORS: ACHING
DESCRIPTORS: STABBING

## 2020-03-18 ASSESSMENT — PAIN SCALES - GENERAL
PAINLEVEL_OUTOF10: 9
PAINLEVEL_OUTOF10: 10

## 2020-03-18 ASSESSMENT — PAIN DESCRIPTION - FREQUENCY: FREQUENCY: INTERMITTENT

## 2020-03-18 ASSESSMENT — PAIN DESCRIPTION - PROGRESSION: CLINICAL_PROGRESSION: NOT CHANGED

## 2020-03-18 NOTE — PROGRESS NOTES
Pedal and posterior tibial pulses obtained with doppler. Feet color from pale to purplish from toes to ball of foot intermittently.

## 2020-03-18 NOTE — PROGRESS NOTES
Patient noted to have pustules and scratches scattered on body. Penis swollen. Patient states has been cleaning his apartment, which is a mess and thought he may have been bitten by a bug on his penis. Patient does not acknowledge seeing any kind of bugs in his apartment.

## 2020-03-18 NOTE — H&P
03/03/2020    Acute metabolic encephalopathy 77/79/6903    COPD, severity to be determined ([facility]     Class 1 obesity due to excess calories with serious comorbidity and body mass index (BMI) of 31.0 to 31.9 in adult     Stopped smoking with greater than 40 pack year history     CHF with unknown LVEF ([facility] 02/26/2020    NSTEMI (non-ST elevated myocardial infarction) ([facility] 02/10/2020    CAD in native artery 09/19/2019    Ischemic cardiomyopathy 09/18/2019    CAD, multiple vessel 09/18/2019    Acute heart failure ([facility] 09/17/2019    Acute on chronic diastolic CHF (congestive heart failure), NYHA class 3 ([facility] 09/13/2019    Other specified disease of nail 03/25/2015       Plan:     · This patient requires inpatient admission because of anasarca rquiring diuresis  · Factors affecting the medical complexity of this patient include chf,hyperkalemia,ckd,cad  · Estimated length of stay is 3 days  ·   · High risk medication monitoring: none    CORE MEASURES  DVT prophylaxis: Lovenox  Decubitus ulcer present on admission: No  CODE STATUS: FULL CODE  Nutrition Status: good   Physical therapy: Yes   Old Charts reviewed: Yes  EKG Reviewed:  Yes  Advance Directive Addressed: Yes  Total time spent in evaluating this patient and formulating plan of care 45 minutes  Chitra Mills MD  3/18/2020, 5:15 PM

## 2020-03-19 ENCOUNTER — APPOINTMENT (OUTPATIENT)
Dept: GENERAL RADIOLOGY | Age: 55
DRG: 291 | End: 2020-03-19
Attending: INTERNAL MEDICINE
Payer: MEDICARE

## 2020-03-19 ENCOUNTER — HOSPITAL ENCOUNTER (INPATIENT)
Age: 55
LOS: 4 days | Discharge: HOME | DRG: 291 | End: 2020-03-23
Attending: INTERNAL MEDICINE | Admitting: INTERNAL MEDICINE
Payer: MEDICARE

## 2020-03-19 ENCOUNTER — APPOINTMENT (OUTPATIENT)
Dept: VASCULAR LAB | Age: 55
DRG: 698 | End: 2020-03-19
Payer: MEDICARE

## 2020-03-19 VITALS
WEIGHT: 267.8 LBS | DIASTOLIC BLOOD PRESSURE: 53 MMHG | OXYGEN SATURATION: 95 % | BODY MASS INDEX: 36.27 KG/M2 | HEIGHT: 72 IN | HEART RATE: 67 BPM | TEMPERATURE: 97.3 F | SYSTOLIC BLOOD PRESSURE: 81 MMHG | RESPIRATION RATE: 14 BRPM

## 2020-03-19 PROBLEM — N17.9 AKI (ACUTE KIDNEY INJURY) (HCC): Status: ACTIVE | Noted: 2020-03-19

## 2020-03-19 PROBLEM — R57.0 CARDIOGENIC SHOCK (HCC): Status: ACTIVE | Noted: 2020-03-19

## 2020-03-19 PROBLEM — I50.22 SYSTOLIC HEART FAILURE, CHRONIC (HCC): Status: ACTIVE | Noted: 2020-03-19

## 2020-03-19 LAB
ABSOLUTE EOS #: <0.03 K/UL (ref 0–0.44)
ABSOLUTE EOS #: <0.03 K/UL (ref 0–0.44)
ABSOLUTE IMMATURE GRANULOCYTE: 0.03 K/UL (ref 0–0.3)
ABSOLUTE IMMATURE GRANULOCYTE: <0.03 K/UL (ref 0–0.3)
ABSOLUTE LYMPH #: 0.94 K/UL (ref 1.1–3.7)
ABSOLUTE LYMPH #: 1.22 K/UL (ref 1.1–3.7)
ABSOLUTE MONO #: 0.6 K/UL (ref 0.1–1.2)
ABSOLUTE MONO #: 0.88 K/UL (ref 0.1–1.2)
ALBUMIN SERPL-MCNC: 3.5 G/DL (ref 3.5–5.2)
ALBUMIN/GLOBULIN RATIO: 1.3 (ref 1–2.5)
ALP BLD-CCNC: 193 U/L (ref 40–129)
ALT SERPL-CCNC: 329 U/L (ref 5–41)
ANION GAP SERPL CALCULATED.3IONS-SCNC: 14 MMOL/L (ref 9–17)
ANION GAP SERPL CALCULATED.3IONS-SCNC: 15 MMOL/L (ref 9–17)
AST SERPL-CCNC: 209 U/L
BASOPHILS # BLD: 0 % (ref 0–2)
BASOPHILS # BLD: 0 % (ref 0–2)
BASOPHILS ABSOLUTE: <0.03 K/UL (ref 0–0.2)
BASOPHILS ABSOLUTE: <0.03 K/UL (ref 0–0.2)
BILIRUB SERPL-MCNC: 0.68 MG/DL (ref 0.3–1.2)
BNP INTERPRETATION: ABNORMAL
BUN BLDV-MCNC: 50 MG/DL (ref 6–20)
BUN BLDV-MCNC: 52 MG/DL (ref 6–20)
BUN/CREAT BLD: 27 (ref 9–20)
BUN/CREAT BLD: ABNORMAL (ref 9–20)
CALCIUM SERPL-MCNC: 8.4 MG/DL (ref 8.6–10.4)
CALCIUM SERPL-MCNC: 8.5 MG/DL (ref 8.6–10.4)
CHLORIDE BLD-SCNC: 80 MMOL/L (ref 98–107)
CHLORIDE BLD-SCNC: 80 MMOL/L (ref 98–107)
CHOLESTEROL/HDL RATIO: 4.7
CHOLESTEROL: 85 MG/DL
CO2: 27 MMOL/L (ref 20–31)
CO2: 28 MMOL/L (ref 20–31)
CREAT SERPL-MCNC: 1.8 MG/DL (ref 0.7–1.2)
CREAT SERPL-MCNC: 1.92 MG/DL (ref 0.7–1.2)
DIFFERENTIAL TYPE: ABNORMAL
DIFFERENTIAL TYPE: ABNORMAL
EOSINOPHILS RELATIVE PERCENT: 0 % (ref 1–4)
EOSINOPHILS RELATIVE PERCENT: 0 % (ref 1–4)
GFR AFRICAN AMERICAN: 45 ML/MIN
GFR AFRICAN AMERICAN: 48 ML/MIN
GFR NON-AFRICAN AMERICAN: 37 ML/MIN
GFR NON-AFRICAN AMERICAN: 40 ML/MIN
GFR SERPL CREATININE-BSD FRML MDRD: ABNORMAL ML/MIN/{1.73_M2}
GLUCOSE BLD-MCNC: 71 MG/DL (ref 70–99)
GLUCOSE BLD-MCNC: 98 MG/DL (ref 70–99)
HCT VFR BLD CALC: 34.3 % (ref 40.7–50.3)
HCT VFR BLD CALC: 36 % (ref 40.7–50.3)
HDLC SERPL-MCNC: 18 MG/DL
HEMOGLOBIN: 10.9 G/DL (ref 13–17)
HEMOGLOBIN: 11.4 G/DL (ref 13–17)
IMMATURE GRANULOCYTES: 0 %
IMMATURE GRANULOCYTES: 1 %
LACTIC ACID, WHOLE BLOOD: 2.5 MMOL/L (ref 0.7–2.1)
LACTIC ACID: ABNORMAL MMOL/L
LDL CHOLESTEROL: 49 MG/DL (ref 0–130)
LYMPHOCYTES # BLD: 16 % (ref 24–43)
LYMPHOCYTES # BLD: 23 % (ref 24–43)
MAGNESIUM: 2.7 MG/DL (ref 1.6–2.6)
MCH RBC QN AUTO: 27.9 PG (ref 25.2–33.5)
MCH RBC QN AUTO: 28.4 PG (ref 25.2–33.5)
MCHC RBC AUTO-ENTMCNC: 31.7 G/DL (ref 28.4–34.8)
MCHC RBC AUTO-ENTMCNC: 31.8 G/DL (ref 28.4–34.8)
MCV RBC AUTO: 87.7 FL (ref 82.6–102.9)
MCV RBC AUTO: 89.6 FL (ref 82.6–102.9)
MONOCYTES # BLD: 11 % (ref 3–12)
MONOCYTES # BLD: 15 % (ref 3–12)
NRBC AUTOMATED: 4.7 PER 100 WBC
NRBC AUTOMATED: 5 PER 100 WBC
PDW BLD-RTO: 17.6 % (ref 11.8–14.4)
PDW BLD-RTO: 18 % (ref 11.8–14.4)
PLATELET # BLD: 131 K/UL (ref 138–453)
PLATELET # BLD: 151 K/UL (ref 138–453)
PLATELET ESTIMATE: ABNORMAL
PLATELET ESTIMATE: ABNORMAL
PMV BLD AUTO: 9.7 FL (ref 8.1–13.5)
PMV BLD AUTO: 9.9 FL (ref 8.1–13.5)
POTASSIUM SERPL-SCNC: 4.3 MMOL/L (ref 3.7–5.3)
POTASSIUM SERPL-SCNC: 5 MMOL/L (ref 3.7–5.3)
PRO-BNP: 4578 PG/ML
RBC # BLD: 3.91 M/UL (ref 4.21–5.77)
RBC # BLD: 4.02 M/UL (ref 4.21–5.77)
RBC # BLD: ABNORMAL 10*6/UL
RBC # BLD: ABNORMAL 10*6/UL
SEG NEUTROPHILS: 66 % (ref 36–65)
SEG NEUTROPHILS: 68 % (ref 36–65)
SEGMENTED NEUTROPHILS ABSOLUTE COUNT: 3.5 K/UL (ref 1.5–8.1)
SEGMENTED NEUTROPHILS ABSOLUTE COUNT: 4.16 K/UL (ref 1.5–8.1)
SERUM OSMOLALITY: 282 MOSM/KG (ref 275–295)
SODIUM BLD-SCNC: 122 MMOL/L (ref 135–144)
SODIUM BLD-SCNC: 122 MMOL/L (ref 135–144)
TOTAL PROTEIN: 6.2 G/DL (ref 6.4–8.3)
TRIGL SERPL-MCNC: 91 MG/DL
TROPONIN INTERP: ABNORMAL
TROPONIN INTERP: ABNORMAL
TROPONIN T: ABNORMAL NG/ML
TROPONIN T: ABNORMAL NG/ML
TROPONIN, HIGH SENSITIVITY: 168 NG/L (ref 0–22)
TROPONIN, HIGH SENSITIVITY: 173 NG/L (ref 0–22)
VLDLC SERPL CALC-MCNC: ABNORMAL MG/DL (ref 1–30)
WBC # BLD: 5.4 K/UL (ref 3.5–11.3)
WBC # BLD: 6 K/UL (ref 3.5–11.3)
WBC # BLD: ABNORMAL 10*3/UL
WBC # BLD: ABNORMAL 10*3/UL

## 2020-03-19 PROCEDURE — 6370000000 HC RX 637 (ALT 250 FOR IP): Performed by: STUDENT IN AN ORGANIZED HEALTH CARE EDUCATION/TRAINING PROGRAM

## 2020-03-19 PROCEDURE — 36415 COLL VENOUS BLD VENIPUNCTURE: CPT

## 2020-03-19 PROCEDURE — 2580000003 HC RX 258: Performed by: NURSE PRACTITIONER

## 2020-03-19 PROCEDURE — 85025 COMPLETE CBC W/AUTO DIFF WBC: CPT

## 2020-03-19 PROCEDURE — 71045 X-RAY EXAM CHEST 1 VIEW: CPT

## 2020-03-19 PROCEDURE — 6370000000 HC RX 637 (ALT 250 FOR IP): Performed by: NURSE PRACTITIONER

## 2020-03-19 PROCEDURE — 93923 UPR/LXTR ART STDY 3+ LVLS: CPT

## 2020-03-19 PROCEDURE — 80053 COMPREHEN METABOLIC PANEL: CPT

## 2020-03-19 PROCEDURE — 80061 LIPID PANEL: CPT

## 2020-03-19 PROCEDURE — 6360000002 HC RX W HCPCS: Performed by: NURSE PRACTITIONER

## 2020-03-19 PROCEDURE — 80048 BASIC METABOLIC PNL TOTAL CA: CPT

## 2020-03-19 PROCEDURE — 2500000003 HC RX 250 WO HCPCS: Performed by: NURSE PRACTITIONER

## 2020-03-19 PROCEDURE — 84484 ASSAY OF TROPONIN QUANT: CPT

## 2020-03-19 PROCEDURE — 83930 ASSAY OF BLOOD OSMOLALITY: CPT

## 2020-03-19 PROCEDURE — 83735 ASSAY OF MAGNESIUM: CPT

## 2020-03-19 PROCEDURE — 6370000000 HC RX 637 (ALT 250 FOR IP): Performed by: FAMILY MEDICINE

## 2020-03-19 PROCEDURE — 85610 PROTHROMBIN TIME: CPT

## 2020-03-19 PROCEDURE — 2500000003 HC RX 250 WO HCPCS: Performed by: STUDENT IN AN ORGANIZED HEALTH CARE EDUCATION/TRAINING PROGRAM

## 2020-03-19 PROCEDURE — 2000000000 HC ICU R&B

## 2020-03-19 PROCEDURE — 83880 ASSAY OF NATRIURETIC PEPTIDE: CPT

## 2020-03-19 PROCEDURE — 6360000002 HC RX W HCPCS

## 2020-03-19 PROCEDURE — 83605 ASSAY OF LACTIC ACID: CPT

## 2020-03-19 RX ORDER — ACETAMINOPHEN 650 MG/1
650 SUPPOSITORY RECTAL EVERY 6 HOURS PRN
Status: DISCONTINUED | OUTPATIENT
Start: 2020-03-19 | End: 2020-03-23 | Stop reason: HOSPADM

## 2020-03-19 RX ORDER — MIDODRINE HYDROCHLORIDE 5 MG/1
5 TABLET ORAL
Status: DISCONTINUED | OUTPATIENT
Start: 2020-03-19 | End: 2020-03-20

## 2020-03-19 RX ORDER — DOXEPIN HYDROCHLORIDE 25 MG/1
50 CAPSULE ORAL NIGHTLY
Status: DISCONTINUED | OUTPATIENT
Start: 2020-03-19 | End: 2020-03-23 | Stop reason: HOSPADM

## 2020-03-19 RX ORDER — DIVALPROEX SODIUM 500 MG/1
1500 TABLET, DELAYED RELEASE ORAL DAILY
Status: DISCONTINUED | OUTPATIENT
Start: 2020-03-20 | End: 2020-03-23 | Stop reason: HOSPADM

## 2020-03-19 RX ORDER — ATORVASTATIN CALCIUM 40 MG/1
40 TABLET, FILM COATED ORAL DAILY
Status: DISCONTINUED | OUTPATIENT
Start: 2020-03-20 | End: 2020-03-23 | Stop reason: HOSPADM

## 2020-03-19 RX ORDER — SODIUM CHLORIDE 9 MG/ML
INJECTION, SOLUTION INTRAVENOUS CONTINUOUS
Status: DISCONTINUED | OUTPATIENT
Start: 2020-03-19 | End: 2020-03-19 | Stop reason: HOSPADM

## 2020-03-19 RX ORDER — SODIUM CHLORIDE 0.9 % (FLUSH) 0.9 %
10 SYRINGE (ML) INJECTION EVERY 12 HOURS SCHEDULED
Status: DISCONTINUED | OUTPATIENT
Start: 2020-03-19 | End: 2020-03-23 | Stop reason: HOSPADM

## 2020-03-19 RX ORDER — THIAMINE MONONITRATE (VIT B1) 100 MG
100 TABLET ORAL DAILY
Status: DISCONTINUED | OUTPATIENT
Start: 2020-03-20 | End: 2020-03-23 | Stop reason: HOSPADM

## 2020-03-19 RX ORDER — FOLIC ACID 1 MG/1
1 TABLET ORAL DAILY
Status: DISCONTINUED | OUTPATIENT
Start: 2020-03-20 | End: 2020-03-23 | Stop reason: HOSPADM

## 2020-03-19 RX ORDER — CLOPIDOGREL BISULFATE 75 MG/1
75 TABLET ORAL DAILY
Status: DISCONTINUED | OUTPATIENT
Start: 2020-03-20 | End: 2020-03-23 | Stop reason: HOSPADM

## 2020-03-19 RX ORDER — FENTANYL CITRATE 50 UG/ML
INJECTION, SOLUTION INTRAMUSCULAR; INTRAVENOUS
Status: COMPLETED
Start: 2020-03-19 | End: 2020-03-19

## 2020-03-19 RX ORDER — ACETAMINOPHEN 325 MG/1
650 TABLET ORAL EVERY 6 HOURS PRN
Status: DISCONTINUED | OUTPATIENT
Start: 2020-03-19 | End: 2020-03-23 | Stop reason: HOSPADM

## 2020-03-19 RX ORDER — HEPARIN SODIUM 5000 [USP'U]/ML
5000 INJECTION, SOLUTION INTRAVENOUS; SUBCUTANEOUS EVERY 8 HOURS SCHEDULED
Status: DISCONTINUED | OUTPATIENT
Start: 2020-03-19 | End: 2020-03-23 | Stop reason: HOSPADM

## 2020-03-19 RX ORDER — FENTANYL CITRATE 50 UG/ML
50 INJECTION, SOLUTION INTRAMUSCULAR; INTRAVENOUS ONCE
Status: COMPLETED | OUTPATIENT
Start: 2020-03-19 | End: 2020-03-19

## 2020-03-19 RX ORDER — LEVOTHYROXINE SODIUM 0.05 MG/1
50 TABLET ORAL DAILY
Qty: 30 TABLET | Refills: 3 | DISCHARGE
Start: 2020-03-20

## 2020-03-19 RX ORDER — SODIUM CHLORIDE 0.9 % (FLUSH) 0.9 %
10 SYRINGE (ML) INJECTION PRN
Status: DISCONTINUED | OUTPATIENT
Start: 2020-03-19 | End: 2020-03-19 | Stop reason: HOSPADM

## 2020-03-19 RX ORDER — LEVOTHYROXINE SODIUM 0.05 MG/1
50 TABLET ORAL DAILY
Status: DISCONTINUED | OUTPATIENT
Start: 2020-03-20 | End: 2020-03-23 | Stop reason: HOSPADM

## 2020-03-19 RX ORDER — SODIUM CHLORIDE 0.9 % (FLUSH) 0.9 %
10 SYRINGE (ML) INJECTION PRN
Status: DISCONTINUED | OUTPATIENT
Start: 2020-03-19 | End: 2020-03-23 | Stop reason: HOSPADM

## 2020-03-19 RX ORDER — ASPIRIN 81 MG/1
81 TABLET ORAL DAILY
Status: DISCONTINUED | OUTPATIENT
Start: 2020-03-20 | End: 2020-03-23 | Stop reason: HOSPADM

## 2020-03-19 RX ORDER — POLYETHYLENE GLYCOL 3350 17 G/17G
17 POWDER, FOR SOLUTION ORAL DAILY PRN
Status: DISCONTINUED | OUTPATIENT
Start: 2020-03-19 | End: 2020-03-23 | Stop reason: HOSPADM

## 2020-03-19 RX ORDER — DOBUTAMINE HYDROCHLORIDE 400 MG/100ML
5 INJECTION INTRAVENOUS CONTINUOUS
Status: DISCONTINUED | OUTPATIENT
Start: 2020-03-19 | End: 2020-03-22

## 2020-03-19 RX ORDER — LIDOCAINE HYDROCHLORIDE 10 MG/ML
5 INJECTION, SOLUTION INFILTRATION; PERINEURAL ONCE
Status: DISCONTINUED | OUTPATIENT
Start: 2020-03-19 | End: 2020-03-19 | Stop reason: HOSPADM

## 2020-03-19 RX ORDER — PROMETHAZINE HYDROCHLORIDE 25 MG/1
12.5 TABLET ORAL EVERY 6 HOURS PRN
Status: DISCONTINUED | OUTPATIENT
Start: 2020-03-19 | End: 2020-03-23 | Stop reason: HOSPADM

## 2020-03-19 RX ORDER — ONDANSETRON 2 MG/ML
4 INJECTION INTRAMUSCULAR; INTRAVENOUS EVERY 6 HOURS PRN
Status: DISCONTINUED | OUTPATIENT
Start: 2020-03-19 | End: 2020-03-23 | Stop reason: HOSPADM

## 2020-03-19 RX ORDER — POTASSIUM CHLORIDE 7.45 MG/ML
10 INJECTION INTRAVENOUS PRN
Status: DISCONTINUED | OUTPATIENT
Start: 2020-03-19 | End: 2020-03-23 | Stop reason: HOSPADM

## 2020-03-19 RX ORDER — MULTIVITAMIN WITH FOLIC ACID 400 MCG
1 TABLET ORAL DAILY
Status: DISCONTINUED | OUTPATIENT
Start: 2020-03-20 | End: 2020-03-23 | Stop reason: HOSPADM

## 2020-03-19 RX ORDER — SODIUM CHLORIDE 0.9 % (FLUSH) 0.9 %
10 SYRINGE (ML) INJECTION EVERY 12 HOURS SCHEDULED
Status: DISCONTINUED | OUTPATIENT
Start: 2020-03-19 | End: 2020-03-19 | Stop reason: HOSPADM

## 2020-03-19 RX ORDER — FUROSEMIDE 10 MG/ML
80 INJECTION INTRAMUSCULAR; INTRAVENOUS ONCE
Status: COMPLETED | OUTPATIENT
Start: 2020-03-19 | End: 2020-03-20

## 2020-03-19 RX ADMIN — FUROSEMIDE 40 MG: 10 INJECTION, SOLUTION INTRAMUSCULAR; INTRAVENOUS at 17:06

## 2020-03-19 RX ADMIN — ACETAMINOPHEN 650 MG: 325 TABLET, FILM COATED ORAL at 07:15

## 2020-03-19 RX ADMIN — Medication 100 MG: at 08:53

## 2020-03-19 RX ADMIN — DOXEPIN HYDROCHLORIDE 50 MG: 25 CAPSULE ORAL at 22:51

## 2020-03-19 RX ADMIN — ASPIRIN 81 MG: 81 TABLET, COATED ORAL at 08:52

## 2020-03-19 RX ADMIN — FENTANYL CITRATE 50 MCG: 50 INJECTION INTRAMUSCULAR; INTRAVENOUS at 23:02

## 2020-03-19 RX ADMIN — Medication 5 MCG/MIN: at 22:38

## 2020-03-19 RX ADMIN — METOPROLOL TARTRATE 12.5 MG: 25 TABLET, FILM COATED ORAL at 08:53

## 2020-03-19 RX ADMIN — LEVOTHYROXINE SODIUM 50 MCG: 50 TABLET ORAL at 07:15

## 2020-03-19 RX ADMIN — SODIUM CHLORIDE: 9 INJECTION, SOLUTION INTRAVENOUS at 13:47

## 2020-03-19 RX ADMIN — DIVALPROEX SODIUM 1500 MG: 500 TABLET, DELAYED RELEASE ORAL at 08:52

## 2020-03-19 RX ADMIN — CLOPIDOGREL BISULFATE 75 MG: 75 TABLET ORAL at 08:53

## 2020-03-19 RX ADMIN — FENTANYL CITRATE 50 MCG: 50 INJECTION, SOLUTION INTRAMUSCULAR; INTRAVENOUS at 23:02

## 2020-03-19 RX ADMIN — ATORVASTATIN CALCIUM 40 MG: 40 TABLET, FILM COATED ORAL at 08:52

## 2020-03-19 RX ADMIN — FOLIC ACID 1 MG: 1 TABLET ORAL at 08:53

## 2020-03-19 RX ADMIN — ENOXAPARIN SODIUM 40 MG: 40 INJECTION, SOLUTION INTRAVENOUS; SUBCUTANEOUS at 08:54

## 2020-03-19 RX ADMIN — Medication 10 ML: at 08:54

## 2020-03-19 RX ADMIN — MIDODRINE HYDROCHLORIDE 5 MG: 5 TABLET ORAL at 22:52

## 2020-03-19 RX ADMIN — MULTIVITAMIN TABLET 1 TABLET: TABLET at 08:53

## 2020-03-19 RX ADMIN — NOREPINEPHRINE BITARTRATE 2 MCG/MIN: 1 INJECTION INTRAVENOUS at 13:47

## 2020-03-19 RX ADMIN — LOSARTAN POTASSIUM 12.5 MG: 25 TABLET, FILM COATED ORAL at 08:54

## 2020-03-19 RX ADMIN — FAMOTIDINE 20 MG: 20 TABLET ORAL at 08:53

## 2020-03-19 RX ADMIN — BENZTROPINE MESYLATE 1 MG: 1 TABLET ORAL at 08:53

## 2020-03-19 RX ADMIN — FUROSEMIDE 40 MG: 10 INJECTION, SOLUTION INTRAMUSCULAR; INTRAVENOUS at 08:54

## 2020-03-19 ASSESSMENT — ENCOUNTER SYMPTOMS
NAUSEA: 0
DIARRHEA: 0
RHINORRHEA: 0
EYES NEGATIVE: 1
COLOR CHANGE: 0
COUGH: 0
CHEST TIGHTNESS: 0
VOMITING: 0
SORE THROAT: 0
ABDOMINAL DISTENTION: 0
STRIDOR: 0
SHORTNESS OF BREATH: 0
CONSTIPATION: 0

## 2020-03-19 ASSESSMENT — PAIN SCALES - GENERAL
PAINLEVEL_OUTOF10: 9
PAINLEVEL_OUTOF10: 5

## 2020-03-19 ASSESSMENT — PAIN DESCRIPTION - FREQUENCY: FREQUENCY: CONTINUOUS

## 2020-03-19 ASSESSMENT — PAIN DESCRIPTION - ONSET: ONSET: ON-GOING

## 2020-03-19 ASSESSMENT — PAIN DESCRIPTION - PAIN TYPE: TYPE: ACUTE PAIN

## 2020-03-19 ASSESSMENT — PAIN DESCRIPTION - PROGRESSION: CLINICAL_PROGRESSION: NOT CHANGED

## 2020-03-19 ASSESSMENT — PAIN DESCRIPTION - DESCRIPTORS: DESCRIPTORS: ACHING

## 2020-03-19 NOTE — PROGRESS NOTES
Moderate amount of urine on bed pad from urine coming ou around escamilla. Escamilla emptied at this time.

## 2020-03-19 NOTE — PROGRESS NOTES
Transport to be provided per [facility], informed by [facility] that Lizet from [employer] [facility] will call with ETA

## 2020-03-19 NOTE — PROGRESS NOTES
Patient discharged via cart with Gabino Nobles at this time for transfer to [facility]. Althea

## 2020-03-19 NOTE — PLAN OF CARE
Problem: Pain:  Goal: Pain level will decrease  Description: Pain level will decrease  Outcome: Ongoing  Note: Patient denies any pain at this time. Problem: Falls - Risk of:  Goal: Will remain free from falls  Description: Will remain free from falls  Outcome: Ongoing  Note: Bed alarm in use, call light within reach. Patient educated on using the call light for assistance.      Problem: Gas Exchange - Impaired:  Goal: Levels of oxygenation will improve  Description: Levels of oxygenation will improve  Outcome: Ongoing

## 2020-03-19 NOTE — PROGRESS NOTES
Reference Range:    Magnesium   Result Value Ref Range    Magnesium 3.0 (H) 1.6 - 2.6 mg/dL   T4, free   Result Value Ref Range    Thyroxine, Free 1.11 0.93 - 1.70 ng/dL   TSH   Result Value Ref Range    TSH 9.96 (H) 0.30 - 5.00 mIU/L   Troponin   Result Value Ref Range    Troponin, High Sensitivity 207 (HH) 0 - 22 ng/L    Troponin T NOT REPORTED <0.03 ng/mL    Troponin Interp NOT REPORTED    Protime-INR   Result Value Ref Range    Protime 15.7 (H) 9.7 - 12.2 sec    INR 1.6 (H) 0.9 - 1.2   APTT   Result Value Ref Range    PTT 32.8 23.2 - 34.4 sec   Basic Metabolic Panel   Result Value Ref Range    Glucose 98 70 - 99 mg/dL    BUN 52 (H) 6 - 20 mg/dL    CREATININE 1.92 (H) 0.70 - 1.20 mg/dL    Bun/Cre Ratio 27 (H) 9 - 20    Calcium 8.4 (L) 8.6 - 10.4 mg/dL    Sodium 122 (L) 135 - 144 mmol/L    Potassium 5.0 3.7 - 5.3 mmol/L    Chloride 80 (LL) 98 - 107 mmol/L    CO2 27 20 - 31 mmol/L    Anion Gap 15 9 - 17 mmol/L    GFR Non-African American 37 (L) >60 mL/min    GFR  45 (L) >60 mL/min    GFR Comment          GFR Staging         Magnesium   Result Value Ref Range    Magnesium 2.7 (H) 1.6 - 2.6 mg/dL   Lipid panel - fasting   Result Value Ref Range    Cholesterol 85 <200 mg/dL    HDL 18 (L) >40 mg/dL    LDL Cholesterol 49 0 - 130 mg/dL    Chol/HDL Ratio 4.7 <5    Triglycerides 91 <150 mg/dL    VLDL NOT REPORTED 1 - 30 mg/dL   CBC auto differential   Result Value Ref Range    WBC 6.0 3.5 - 11.3 k/uL    RBC 3.91 (L) 4.21 - 5.77 m/uL    Hemoglobin 10.9 (L) 13.0 - 17.0 g/dL    Hematocrit 34.3 (L) 40.7 - 50.3 %    MCV 87.7 82.6 - 102.9 fL    MCH 27.9 25.2 - 33.5 pg    MCHC 31.8 28.4 - 34.8 g/dL    RDW 17.6 (H) 11.8 - 14.4 %    Platelets 085 (L) 508 - 453 k/uL    MPV 9.7 8.1 - 13.5 fL    NRBC Automated 4.7 (H) 0.0 per 100 WBC    Differential Type NOT REPORTED     Seg Neutrophils 68 (H) 36 - 65 %    Lymphocytes 16 (L) 24 - 43 %    Monocytes 15 (H) 3 - 12 %    Eosinophils % 0 (L) 1 - 4 %    Basophils 0 0 - 2 % Immature Granulocytes 1 (H) 0 %    Segs Absolute 4.16 1.50 - 8.10 k/uL    Absolute Lymph # 0.94 (L) 1.10 - 3.70 k/uL    Absolute Mono # 0.88 0.10 - 1.20 k/uL    Absolute Eos # <0.03 0.00 - 0.44 k/uL    Basophils Absolute <0.03 0.00 - 0.20 k/uL    Absolute Immature Granulocyte 0.03 0.00 - 0.30 k/uL    WBC Morphology NOT REPORTED     RBC Morphology NOT REPORTED     Platelet Estimate NOT REPORTED    Troponin   Result Value Ref Range    Troponin, High Sensitivity 176 (HH) 0 - 22 ng/L    Troponin T NOT REPORTED <0.03 ng/mL    Troponin Interp NOT REPORTED    Troponin   Result Value Ref Range    Troponin, High Sensitivity 168 (HH) 0 - 22 ng/L    Troponin T NOT REPORTED <0.03 ng/mL    Troponin Interp NOT REPORTED    EKG 12 Lead   Result Value Ref Range    Ventricular Rate 73 BPM    Atrial Rate 73 BPM    P-R Interval 210 ms    QRS Duration 122 ms    Q-T Interval 428 ms    QTc Calculation (Bazett) 471 ms    P Axis 82 degrees    R Axis 132 degrees    T Axis 36 degrees       ASSESSMENT:   Patient Active Problem List    Diagnosis Date Noted    Idiopathic hypotension 01/20/2020     Priority: High    Medication side effect, initial encounter 10/02/2019     Priority: High    Idiopathic cardiomyopathy (HCC)      Priority: High    Acute on chronic congestive heart failure (HCC)      Priority: High    Acute on chronic combined systolic and diastolic congestive heart failure, NYHA class 4 (HCC)      Priority: High    Portal hypertension (HCC)      Priority: High    Alcoholic cirrhosis of liver without ascites (HCC)      Priority: High    Onychia and paronychia of toe 03/25/2015     Priority: Medium    Hyperkalemia 03/18/2020    Hyponatremia 03/18/2020    Anasarca associated with disorder of kidney 03/18/2020    Alcohol withdrawal ([facility] 03/18/2020    Tobacco abuse disorder 03/18/2020    Peripheral cyanosis 03/18/2020    Bipolar disorder ([facility] 03/03/2020    Acute metabolic encephalopathy 45/63/4598    COPD, severity to

## 2020-03-19 NOTE — PROGRESS NOTES
independence/autonomy/control;improve medication compliance  Visit focus:  Routine meeting  Discuss goals of care  Listen to patient/family concerns  Interdiscplinary collaboration  Build trust  Poor prognosis is anticipated  Elicit patient's goals and values, and use these to establish or modify goals of care   ACP and DNR discussion    Norma Elaine.  Kailyn Chandler RN, Josiah Apodaca and Bruce Angeles Nurse Coordinator  3/19/2020 1:52 PM

## 2020-03-19 NOTE — PROGRESS NOTES
Pt called out that he was wet, writer entered room and checked and the bed pad was damp. There was urine in the escamilla tube. Bed pad changed, will continue to monitor escamilla output.

## 2020-03-19 NOTE — PROGRESS NOTES
None  · Anthropometric Measures:  · Ht: 6' (182.9 cm)   · Current Body Wt: 267 lb 12.8 oz (121.5 kg)  · Admission Body Wt: 250 lb (113.4 kg)  · Usual Body Wt: (242-250# lately with a 220# reported 3/4)  · % Weight Change:  ,  7.1% higher than recent highs  · Ideal Body Wt: 178 lb (80.7 kg), % Ideal Body 150%  · BMI Classification: BMI 35.0 - 39.9 Obese Class II    Lab Results   Component Value Date     (L) 03/19/2020    K 5.0 03/19/2020    CL 80 (LL) 03/19/2020    CO2 27 03/19/2020    BUN 52 (H) 03/19/2020    CREATININE 1.92 (H) 03/19/2020    GLUCOSE 98 03/19/2020    CALCIUM 8.4 (L) 03/19/2020    PROT 7.0 02/26/2020    LABALBU 4.5 02/26/2020    BILITOT 1.18 02/26/2020    ALKPHOS 110 02/26/2020     (H) 02/26/2020     (H) 02/26/2020    LABGLOM 37 (L) 03/19/2020    GFRAA 45 (L) 03/19/2020    GLOB NOT REPORTED 12/12/2019     No results found for: LABA1C  No results found for: EAG    Nutrition Interventions:   Continue current diet  Continued Inpatient Monitoring, Education Initiated, Coordination of Care    Nutrition Evaluation:   · Evaluation: Goals set   · Goals: PO >75% meals with low sodium diet    · Monitoring: Meal Intake, Pertinent Labs, Weight, I&O, Patient/Family Education    Electronically signed by Li Lopez RD, LD on 3/19/20 at 7:19 AM EDT    Contact Number: [phone]

## 2020-03-19 NOTE — DISCHARGE SUMMARY
Date Noted    Idiopathic hypotension 01/20/2020     Priority: High    Medication side effect, initial encounter 10/02/2019     Priority: High    Idiopathic cardiomyopathy (HCC)      Priority: High    Acute on chronic congestive heart failure (HCC)      Priority: High    Acute on chronic combined systolic and diastolic congestive heart failure, NYHA class 4 (HCC)      Priority: High    Portal hypertension (HCC)      Priority: High    Alcoholic cirrhosis of liver without ascites (HCC)      Priority: High    Onychia and paronychia of toe 03/25/2015     Priority: Medium    Hyperkalemia 03/18/2020    Hyponatremia 03/18/2020    Anasarca associated with disorder of kidney 03/18/2020    Alcohol withdrawal ([facility] 03/18/2020    Tobacco abuse disorder 03/18/2020    Peripheral cyanosis 03/18/2020    Bipolar disorder ([facility] 03/03/2020    Acute metabolic encephalopathy 90/07/5491    COPD, severity to be determined ([facility]     Class 1 obesity due to excess calories with serious comorbidity and body mass index (BMI) of 31.0 to 31.9 in adult     Stopped smoking with greater than 40 pack year history     CHF with unknown LVEF ([facility] 02/26/2020    NSTEMI (non-ST elevated myocardial infarction) ([facility] 02/10/2020    CAD in native artery 09/19/2019    Ischemic cardiomyopathy 09/18/2019    CAD, multiple vessel 09/18/2019    Acute heart failure ([facility] 09/17/2019    Acute on chronic diastolic CHF (congestive heart failure), NYHA class 3 ([facility] 09/13/2019    Other specified disease of nail 03/25/2015        Discharge Medications:       Zoila Muhammad   Home Medication Instructions VSN:[id number]    Printed on:03/19/20 1417   Medication Information                      aspirin 81 MG EC tablet  Take 1 tablet by mouth daily             atorvastatin (LIPITOR) 40 MG tablet  Take 40 mg by mouth daily             benztropine (COGENTIN) 1 MG tablet  Take 1 mg by mouth 2 times daily             bumetanide (BUMEX) 2 MG tablet  Take 1 tablet by mouth daily             clopidogrel (PLAVIX) 75 MG tablet  Take 1 tablet by mouth daily             divalproex (DEPAKOTE) 500 MG EC tablet  Take 1,500 mg by mouth daily. doxepin (SINEQUAN) 50 MG capsule  Take 50 mg by mouth             enoxaparin (LOVENOX) 30 MG/0.3ML injection  Inject 0.3 mLs into the skin 2 times daily             famotidine (PEPCID) 20 MG tablet  Take 1 tablet by mouth 2 times daily             folic acid (FOLVITE) 1 MG tablet  Take 1 tablet by mouth daily             levothyroxine (SYNTHROID) 50 MCG tablet  Take 1 tablet by mouth Daily             losartan (COZAAR) 25 MG tablet  Take 0.5 tablets by mouth daily             metoprolol tartrate (LOPRESSOR) 25 MG tablet  Take 0.5 tablets by mouth 2 times daily             Multiple Vitamin (MULTIVITAMIN) tablet  Take 1 tablet by mouth daily             nicotine (NICODERM CQ) 21 MG/24HR  Place 1 patch onto the skin daily             nitroGLYCERIN (NITROSTAT) 0.4 MG SL tablet  up to max of 3 total doses. If no relief after 1 dose, call 911. ondansetron (ZOFRAN-ODT) 4 MG disintegrating tablet  Take 1 tablet by mouth every 8 hours as needed for Nausea or Vomiting             polyethylene glycol (GLYCOLAX) packet  Take 17 g by mouth daily as needed for Constipation             vitamin B-1 100 MG tablet  Take 1 tablet by mouth daily                 Patient Instructions:    Activity: activity as tolerated  Diet: cardiac diet  Wound Care: none needed  Other: None     Disposition:   Transfer to [facility] for tertiary care    Follow up:  Patient will be followed by CAM Busby CNP in 1-2 weeks    CORE MEASURES on Discharge (if applicable)  ACE/ARB in CHF: Yes  Statin in [place]: NA  ASA in [place]: NA  Statin in CVA: NA  Antiplatelet in CVA: NA    Total time spent on discharge services: 45 minutes    Including the following activities:  Evaluation and Management of patient  Discussion with patient and/or surrogate

## 2020-03-20 LAB
ABSOLUTE EOS #: <0.03 K/UL (ref 0–0.44)
ABSOLUTE IMMATURE GRANULOCYTE: 0.03 K/UL (ref 0–0.3)
ABSOLUTE LYMPH #: 1.04 K/UL (ref 1.1–3.7)
ABSOLUTE MONO #: 0.57 K/UL (ref 0.1–1.2)
ANION GAP SERPL CALCULATED.3IONS-SCNC: 11 MMOL/L (ref 9–17)
ANION GAP SERPL CALCULATED.3IONS-SCNC: 12 MMOL/L (ref 9–17)
ANION GAP SERPL CALCULATED.3IONS-SCNC: 17 MMOL/L (ref 9–17)
BASOPHILS # BLD: 0 % (ref 0–2)
BASOPHILS ABSOLUTE: <0.03 K/UL (ref 0–0.2)
BUN BLDV-MCNC: 34 MG/DL (ref 6–20)
BUN BLDV-MCNC: 40 MG/DL (ref 6–20)
BUN BLDV-MCNC: 47 MG/DL (ref 6–20)
BUN/CREAT BLD: ABNORMAL (ref 9–20)
CALCIUM SERPL-MCNC: 7.6 MG/DL (ref 8.6–10.4)
CALCIUM SERPL-MCNC: 7.9 MG/DL (ref 8.6–10.4)
CALCIUM SERPL-MCNC: 7.9 MG/DL (ref 8.6–10.4)
CHLORIDE BLD-SCNC: 83 MMOL/L (ref 98–107)
CHLORIDE BLD-SCNC: 84 MMOL/L (ref 98–107)
CHLORIDE BLD-SCNC: 85 MMOL/L (ref 98–107)
CO2: 27 MMOL/L (ref 20–31)
CO2: 34 MMOL/L (ref 20–31)
CO2: 35 MMOL/L (ref 20–31)
CREAT SERPL-MCNC: 1.22 MG/DL (ref 0.7–1.2)
CREAT SERPL-MCNC: 1.45 MG/DL (ref 0.7–1.2)
CREAT SERPL-MCNC: 1.72 MG/DL (ref 0.7–1.2)
DIFFERENTIAL TYPE: ABNORMAL
EKG ATRIAL RATE: 80 BPM
EKG P AXIS: 73 DEGREES
EKG P-R INTERVAL: 194 MS
EKG Q-T INTERVAL: 414 MS
EKG QRS DURATION: 142 MS
EKG QTC CALCULATION (BAZETT): 477 MS
EKG R AXIS: 120 DEGREES
EKG T AXIS: 49 DEGREES
EKG VENTRICULAR RATE: 80 BPM
EOSINOPHILS RELATIVE PERCENT: 0 % (ref 1–4)
GFR AFRICAN AMERICAN: 51 ML/MIN
GFR AFRICAN AMERICAN: >60 ML/MIN
GFR AFRICAN AMERICAN: >60 ML/MIN
GFR NON-AFRICAN AMERICAN: 42 ML/MIN
GFR NON-AFRICAN AMERICAN: 51 ML/MIN
GFR NON-AFRICAN AMERICAN: >60 ML/MIN
GFR SERPL CREATININE-BSD FRML MDRD: ABNORMAL ML/MIN/{1.73_M2}
GLUCOSE BLD-MCNC: 102 MG/DL (ref 70–99)
GLUCOSE BLD-MCNC: 118 MG/DL (ref 70–99)
GLUCOSE BLD-MCNC: 123 MG/DL (ref 70–99)
HCT VFR BLD CALC: 34.5 % (ref 40.7–50.3)
HEMOGLOBIN: 10.5 G/DL (ref 13–17)
IMMATURE GRANULOCYTES: 1 %
INR BLD: 1.3
LYMPHOCYTES # BLD: 24 % (ref 24–43)
MCH RBC QN AUTO: 27.3 PG (ref 25.2–33.5)
MCHC RBC AUTO-ENTMCNC: 30.4 G/DL (ref 28.4–34.8)
MCV RBC AUTO: 89.6 FL (ref 82.6–102.9)
MONOCYTES # BLD: 13 % (ref 3–12)
NRBC AUTOMATED: 1.6 PER 100 WBC
OSMOLALITY URINE: 255 MOSM/KG (ref 80–1300)
PDW BLD-RTO: 17.8 % (ref 11.8–14.4)
PLATELET # BLD: ABNORMAL K/UL (ref 138–453)
PLATELET ESTIMATE: ABNORMAL
PLATELET, FLUORESCENCE: 127 K/UL (ref 138–453)
PLATELET, IMMATURE FRACTION: 3.2 % (ref 1.1–10.3)
PMV BLD AUTO: ABNORMAL FL (ref 8.1–13.5)
POTASSIUM SERPL-SCNC: 3.4 MMOL/L (ref 3.7–5.3)
POTASSIUM SERPL-SCNC: 3.4 MMOL/L (ref 3.7–5.3)
POTASSIUM SERPL-SCNC: 3.6 MMOL/L (ref 3.7–5.3)
PROTHROMBIN TIME: 13.5 SEC (ref 9–12)
RBC # BLD: 3.85 M/UL (ref 4.21–5.77)
RBC # BLD: ABNORMAL 10*6/UL
SEG NEUTROPHILS: 63 % (ref 36–65)
SEGMENTED NEUTROPHILS ABSOLUTE COUNT: 2.75 K/UL (ref 1.5–8.1)
SODIUM BLD-SCNC: 128 MMOL/L (ref 135–144)
SODIUM BLD-SCNC: 128 MMOL/L (ref 135–144)
SODIUM BLD-SCNC: 132 MMOL/L (ref 135–144)
SODIUM,UR: 56 MMOL/L
TROPONIN INTERP: ABNORMAL
TROPONIN INTERP: ABNORMAL
TROPONIN T: ABNORMAL NG/ML
TROPONIN T: ABNORMAL NG/ML
TROPONIN, HIGH SENSITIVITY: 150 NG/L (ref 0–22)
TROPONIN, HIGH SENSITIVITY: 161 NG/L (ref 0–22)
WBC # BLD: 4.4 K/UL (ref 3.5–11.3)
WBC # BLD: ABNORMAL 10*3/UL

## 2020-03-20 PROCEDURE — 6360000002 HC RX W HCPCS: Performed by: INTERNAL MEDICINE

## 2020-03-20 PROCEDURE — 83935 ASSAY OF URINE OSMOLALITY: CPT

## 2020-03-20 PROCEDURE — 6370000000 HC RX 637 (ALT 250 FOR IP): Performed by: STUDENT IN AN ORGANIZED HEALTH CARE EDUCATION/TRAINING PROGRAM

## 2020-03-20 PROCEDURE — 6360000002 HC RX W HCPCS: Performed by: STUDENT IN AN ORGANIZED HEALTH CARE EDUCATION/TRAINING PROGRAM

## 2020-03-20 PROCEDURE — 93010 ELECTROCARDIOGRAM REPORT: CPT | Performed by: INTERNAL MEDICINE

## 2020-03-20 PROCEDURE — 36620 INSERTION CATHETER ARTERY: CPT

## 2020-03-20 PROCEDURE — 94761 N-INVAS EAR/PLS OXIMETRY MLT: CPT

## 2020-03-20 PROCEDURE — 2580000003 HC RX 258: Performed by: INTERNAL MEDICINE

## 2020-03-20 PROCEDURE — 84484 ASSAY OF TROPONIN QUANT: CPT

## 2020-03-20 PROCEDURE — 85055 RETICULATED PLATELET ASSAY: CPT

## 2020-03-20 PROCEDURE — 80048 BASIC METABOLIC PNL TOTAL CA: CPT

## 2020-03-20 PROCEDURE — 99291 CRITICAL CARE FIRST HOUR: CPT | Performed by: INTERNAL MEDICINE

## 2020-03-20 PROCEDURE — 2000000000 HC ICU R&B

## 2020-03-20 PROCEDURE — 85025 COMPLETE CBC W/AUTO DIFF WBC: CPT

## 2020-03-20 PROCEDURE — 02HV33Z INSERTION OF INFUSION DEVICE INTO SUPERIOR VENA CAVA, PERCUTANEOUS APPROACH: ICD-10-PCS | Performed by: INTERNAL MEDICINE

## 2020-03-20 PROCEDURE — 93005 ELECTROCARDIOGRAM TRACING: CPT | Performed by: STUDENT IN AN ORGANIZED HEALTH CARE EDUCATION/TRAINING PROGRAM

## 2020-03-20 PROCEDURE — 6370000000 HC RX 637 (ALT 250 FOR IP): Performed by: INTERNAL MEDICINE

## 2020-03-20 PROCEDURE — 36415 COLL VENOUS BLD VENIPUNCTURE: CPT

## 2020-03-20 PROCEDURE — 2580000003 HC RX 258: Performed by: STUDENT IN AN ORGANIZED HEALTH CARE EDUCATION/TRAINING PROGRAM

## 2020-03-20 PROCEDURE — 2700000000 HC OXYGEN THERAPY PER DAY

## 2020-03-20 PROCEDURE — 84300 ASSAY OF URINE SODIUM: CPT

## 2020-03-20 RX ORDER — SPIRONOLACTONE 25 MG/1
25 TABLET ORAL DAILY
Status: DISCONTINUED | OUTPATIENT
Start: 2020-03-20 | End: 2020-03-23 | Stop reason: HOSPADM

## 2020-03-20 RX ORDER — POTASSIUM CHLORIDE 20 MEQ/1
40 TABLET, EXTENDED RELEASE ORAL ONCE
Status: COMPLETED | OUTPATIENT
Start: 2020-03-20 | End: 2020-03-20

## 2020-03-20 RX ORDER — MIDODRINE HYDROCHLORIDE 5 MG/1
10 TABLET ORAL
Status: DISCONTINUED | OUTPATIENT
Start: 2020-03-20 | End: 2020-03-22

## 2020-03-20 RX ADMIN — CLOPIDOGREL 75 MG: 75 TABLET, FILM COATED ORAL at 07:46

## 2020-03-20 RX ADMIN — FUROSEMIDE 80 MG: 10 INJECTION, SOLUTION INTRAMUSCULAR; INTRAVENOUS at 00:19

## 2020-03-20 RX ADMIN — Medication 81 MG: at 07:48

## 2020-03-20 RX ADMIN — POTASSIUM CHLORIDE 10 MEQ: 10 INJECTION, SOLUTION INTRAVENOUS at 17:28

## 2020-03-20 RX ADMIN — SODIUM CHLORIDE, PRESERVATIVE FREE 10 ML: 5 INJECTION INTRAVENOUS at 20:10

## 2020-03-20 RX ADMIN — Medication 100 MG: at 07:47

## 2020-03-20 RX ADMIN — POLYETHYLENE GLYCOL 3350 17 G: 17 POWDER, FOR SOLUTION ORAL at 13:37

## 2020-03-20 RX ADMIN — DOBUTAMINE HYDROCHLORIDE 5 MCG/KG/MIN: 400 INJECTION INTRAVENOUS at 00:30

## 2020-03-20 RX ADMIN — SODIUM CHLORIDE, PRESERVATIVE FREE 10 ML: 5 INJECTION INTRAVENOUS at 08:09

## 2020-03-20 RX ADMIN — ACETAMINOPHEN 650 MG: 325 TABLET ORAL at 20:51

## 2020-03-20 RX ADMIN — MIDODRINE HYDROCHLORIDE 10 MG: 5 TABLET ORAL at 17:32

## 2020-03-20 RX ADMIN — ACETAMINOPHEN 650 MG: 325 TABLET ORAL at 00:17

## 2020-03-20 RX ADMIN — POTASSIUM CHLORIDE 10 MEQ: 10 INJECTION, SOLUTION INTRAVENOUS at 18:15

## 2020-03-20 RX ADMIN — DIVALPROEX SODIUM 1500 MG: 500 TABLET, DELAYED RELEASE ORAL at 07:46

## 2020-03-20 RX ADMIN — HEPARIN SODIUM 5000 UNITS: 5000 INJECTION INTRAVENOUS; SUBCUTANEOUS at 13:37

## 2020-03-20 RX ADMIN — FOLIC ACID 1 MG: 1 TABLET ORAL at 07:46

## 2020-03-20 RX ADMIN — LEVOTHYROXINE SODIUM 50 MCG: 50 TABLET ORAL at 07:46

## 2020-03-20 RX ADMIN — FUROSEMIDE 10 MG/HR: 10 INJECTION, SOLUTION INTRAMUSCULAR; INTRAVENOUS at 01:14

## 2020-03-20 RX ADMIN — THERA TABS 1 TABLET: TAB at 07:47

## 2020-03-20 RX ADMIN — POTASSIUM CHLORIDE 10 MEQ: 10 INJECTION, SOLUTION INTRAVENOUS at 19:10

## 2020-03-20 RX ADMIN — MIDODRINE HYDROCHLORIDE 5 MG: 5 TABLET ORAL at 07:46

## 2020-03-20 RX ADMIN — DOXEPIN HYDROCHLORIDE 50 MG: 25 CAPSULE ORAL at 20:40

## 2020-03-20 RX ADMIN — POTASSIUM CHLORIDE 40 MEQ: 1500 TABLET, EXTENDED RELEASE ORAL at 07:50

## 2020-03-20 RX ADMIN — HEPARIN SODIUM 5000 UNITS: 5000 INJECTION INTRAVENOUS; SUBCUTANEOUS at 20:40

## 2020-03-20 RX ADMIN — HEPARIN SODIUM 5000 UNITS: 5000 INJECTION INTRAVENOUS; SUBCUTANEOUS at 06:23

## 2020-03-20 RX ADMIN — DESMOPRESSIN ACETATE 40 MG: 0.2 TABLET ORAL at 07:48

## 2020-03-20 RX ADMIN — POTASSIUM CHLORIDE 10 MEQ: 10 INJECTION, SOLUTION INTRAVENOUS at 20:15

## 2020-03-20 ASSESSMENT — ENCOUNTER SYMPTOMS
CONSTIPATION: 0
COUGH: 0
CHEST TIGHTNESS: 0
COLOR CHANGE: 0
RHINORRHEA: 0
VOMITING: 0
NAUSEA: 0
SORE THROAT: 0
SHORTNESS OF BREATH: 0
STRIDOR: 0
ABDOMINAL DISTENTION: 0
EYES NEGATIVE: 1
DIARRHEA: 0

## 2020-03-20 ASSESSMENT — PAIN DESCRIPTION - LOCATION: LOCATION: HEAD

## 2020-03-20 ASSESSMENT — PAIN SCALES - GENERAL
PAINLEVEL_OUTOF10: 3

## 2020-03-20 NOTE — PROGRESS NOTES
Smoking Cessation - topics covered   []  Health Risks  []  Benefits of Quitting   []  Smoking Cessation  []  Patient has no history of tobacco use per note in significant history. [x]  Patient is former smoker per note in significant history. [x]  No need for tobacco cessation education. []  Booklet given  []  Patient verbalizes understanding. []  Patient denies need for tobacco cessation education. []  Unable to meet with patient today. Will follow up as able.   Tarun Camarillo  8:36 AM

## 2020-03-20 NOTE — CONSULTS
[facility] Cardiology Cardiology    Consult / H&P               Today's Date: 3/20/2020  Patient Name: Ada Lester  Date of admission: 3/19/2020  7:06 PM  Patient's age: 49 y.o., 1965  Admission Dx: Systolic heart failure, chronic ([facility] [I50.22]    Reason for Admission / Consult: Hypotension    Requesting Physician: Deven Da Silva DO    CHIEF COMPLAINT: Shortness of breath    History Obtained From:  patient, electronic medical record    HISTORY OF PRESENT ILLNESS:      The patient is a 49 y.o.  male with past medical history of seizures, ischemic cardiomyopathy EF 15-20%, CAD s/p PCI to LAD, cirrhosis who presented initially to [facility] with a chief complaints of shortness of breath and increased scrotal swelling. He was started on IV diuresis at [facility] when he became hypotensive requiring pressors and was transferred to [facility] for further management. Patient states that he felt slightly lightheaded but denies any syncope, chest pain or palpitations. Upon arrival to [facility], he was started on Lasix drip and dobutamine drip and his Levophed was continued. Nephrology was consulted for the management of his dobutamine drip. Cardiology was consulted in the setting of his CHF and ischemic cardiomyopathy    Past Medical History:   has a past medical history of Alcoholic cirrhosis ([facility] [facility] Back pain, Bipolar 1 disorder ([facility] [facility] CAD (coronary artery disease), CHF (congestive heart failure) ([facility] Idiopathic cardiomyopathy ([facility] [facility] and Seizures ([facility] Past Surgical History:   has a past surgical history that includes Toe Surgery; Tooth Extraction; and Cardiac catheterization (Left, 09/17/2019). Home Medications:    Prior to Admission medications    Medication Sig Start Date End Date Taking?  Authorizing Provider   levothyroxine (SYNTHROID) 50 MCG tablet Take 1 tablet by mouth Daily 3/20/20   CAM Waller CNP   bumetanide (BUMEX) 2 MG tablet Take 1 tablet by mouth daily 3/5/20   Bernarda Healy MD   folic acid (FOLVITE) 1 MG tablet Take 1 tablet by mouth daily 3/5/20   Bernarda Healy MD   ondansetron (ZOFRAN-ODT) 4 MG disintegrating tablet Take 1 tablet by mouth every 8 hours as needed for Nausea or Vomiting 3/4/20   Bernarda Healy MD   vitamin B-1 100 MG tablet Take 1 tablet by mouth daily 3/5/20   Bernarda Healy MD   enoxaparin (LOVENOX) 30 MG/0.3ML injection Inject 0.3 mLs into the skin 2 times daily 3/4/20   Bernarda Healy MD   Multiple Vitamin (MULTIVITAMIN) tablet Take 1 tablet by mouth daily 3/5/20   Bernarda Healy MD   polyethylene glycol (GLYCOLAX) packet Take 17 g by mouth daily as needed for Constipation 3/4/20 4/3/20  Bernarda Healy MD   nitroGLYCERIN (NITROSTAT) 0.4 MG SL tablet up to max of 3 total doses. If no relief after 1 dose, call 911. 2/24/20   Betina Swann MD   atorvastatin (LIPITOR) 40 MG tablet Take 40 mg by mouth daily 1/14/20   Historical Provider, MD   doxepin (SINEQUAN) 50 MG capsule Take 50 mg by mouth 1/8/20   Historical Provider, MD   losartan (COZAAR) 25 MG tablet Take 0.5 tablets by mouth daily 1/20/20   Betina Swann MD   metoprolol tartrate (LOPRESSOR) 25 MG tablet Take 0.5 tablets by mouth 2 times daily 11/26/19   Betina Swann MD   clopidogrel (PLAVIX) 75 MG tablet Take 1 tablet by mouth daily 10/2/19   Betina Swann MD   aspirin 81 MG EC tablet Take 1 tablet by mouth daily 9/20/19   CAM Sanchez CNP   famotidine (PEPCID) 20 MG tablet Take 1 tablet by mouth 2 times daily 9/19/19   CAM Sanchez CNP   nicotine (NICODERM CQ) 21 MG/24HR Place 1 patch onto the skin daily 9/20/19   CAM Sanchez CNP   benztropine (COGENTIN) 1 MG tablet Take 1 mg by mouth 2 times daily    Historical Provider, MD   divalproex (DEPAKOTE) 500 MG EC tablet Take 1,500 mg by mouth daily.       Historical Provider, MD        Current Facility-Administered Medications: midodrine (PROAMATINE) tablet 10 mg, 10 mg, Oral, TID   spironolactone (ALDACTONE) tablet 25 mg, 25 mg, Oral, Daily  aspirin EC tablet 81 mg, 81 mg, Oral, Daily  atorvastatin (LIPITOR) tablet 40 mg, 40 mg, Oral, Daily  clopidogrel (PLAVIX) tablet 75 mg, 75 mg, Oral, Daily  divalproex (DEPAKOTE) DR tablet 1,500 mg, 1,500 mg, Oral, Daily  doxepin (SINEQUAN) capsule 50 mg, 50 mg, Oral, Nightly  folic acid (FOLVITE) tablet 1 mg, 1 mg, Oral, Daily  levothyroxine (SYNTHROID) tablet 50 mcg, 50 mcg, Oral, Daily  multivitamin 1 tablet, 1 tablet, Oral, Daily  vitamin B-1 (THIAMINE) tablet 100 mg, 100 mg, Oral, Daily  sodium chloride flush 0.9 % injection 10 mL, 10 mL, Intravenous, 2 times per day  sodium chloride flush 0.9 % injection 10 mL, 10 mL, Intravenous, PRN  acetaminophen (TYLENOL) tablet 650 mg, 650 mg, Oral, Q6H PRN **OR** acetaminophen (TYLENOL) suppository 650 mg, 650 mg, Rectal, Q6H PRN  polyethylene glycol (GLYCOLAX) packet 17 g, 17 g, Oral, Daily PRN  promethazine (PHENERGAN) tablet 12.5 mg, 12.5 mg, Oral, Q6H PRN **OR** ondansetron (ZOFRAN) injection 4 mg, 4 mg, Intravenous, Q6H PRN  potassium chloride 10 mEq/100 mL IVPB (Peripheral Line), 10 mEq, Intravenous, PRN  heparin (porcine) injection 5,000 Units, 5,000 Units, Subcutaneous, 3 times per day  norepinephrine (LEVOPHED) 16 mg in dextrose 5% 250 mL infusion, 2 mcg/min, Intravenous, Continuous  DOBUTamine (DOBUTREX) 1000 mg in dextrose 5 % 250 mL infusion, 5 mcg/kg/min, Intravenous, Continuous  furosemide (LASIX) 100 mg in dextrose 5 % 100 mL infusion, 10 mg/hr, Intravenous, Continuous    Allergies:  Ticagrelor and Pcn [penicillins]    Social History:   reports that he has quit smoking. His smoking use included cigarettes. He has never used smokeless tobacco. He reports current alcohol use. He reports current drug use. Frequency: 1.00 time per week. Drug: Marijuana. Family History: family history is not on file. No h/o sudden cardiac death. No for premature CAD    REVIEW OF SYSTEMS:    · Constitutional: there has been no unanticipated weight loss. There's been No change in energy level, No change in activity level. · Eyes: No visual changes or diplopia. No scleral icterus. · ENT: No Headaches  · Cardiovascular: Remaining as above  · Respiratory: No previous pulmonary problems, No cough  · Gastrointestinal: No abdominal pain. No change in bowel or bladder habits. · Genitourinary: No dysuria, trouble voiding, or hematuria. · Musculoskeletal:  No gait disturbance, No weakness or joint complaints. · Integumentary: No rash or pruritis. · Neurological: No headache, diplopia, change in muscle strength, numbness or tingling. No change in gait, balance, coordination, mood, affect, memory, mentation, behavior. · Psychiatric: No anxiety, or depression. · Endocrine: No temperature intolerance. No excessive thirst, fluid intake, or urination. No tremor. · Hematologic/Lymphatic: No abnormal bruising or bleeding, blood clots or swollen lymph nodes. · Allergic/Immunologic: No nasal congestion or hives. PHYSICAL EXAM:      /63   Pulse 89   Temp 97.4 °F (36.3 °C) (Oral)   Resp 16   Ht 6' 0.01\" (1.829 m)   Wt 259 lb 14.8 oz (117.9 kg)   SpO2 100%   BMI 35.24 kg/m²      Intake/Output Summary (Last 24 hours) at 3/20/2020 1304  Last data filed at 3/20/2020 1035  Gross per 24 hour   Intake 2019 ml   Output 4825 ml   Net -2806 ml         Constitutional and General Appearance: alert, cooperative, no distress and appears stated age  HEENT: PERRL, no cervical lymphadenopathy. No masses palpable. Normal oral mucosa  Respiratory:  · Normal excursion and expansion without use of accessory muscles  · Resp Auscultation: Good respiratory effort. No for increased work of breathing.  On auscultation: clear to auscultation bilaterally  Cardiovascular:  · The apical impulse is not displaced  · Heart tones are crisp and normal. regular S1 and S2.  · Jugular venous pulsation Normal  · The carotid upstroke is normal in LABALBU 3.5         Patient's Active Problem List  Principal Problem:    Cardiogenic shock ([address]  Active Problems:    Acute on chronic congestive heart failure (HCC)    Portal hypertension (HCC)    Alcoholic cirrhosis of liver without ascites (HCC)    Idiopathic cardiomyopathy (HCC)    CAD (coronary artery disease)    Bipolar disorder (HCC)    Hyponatremia    Anasarca associated with disorder of kidney    Systolic heart failure, chronic (HCC)    GREGORY (acute kidney injury) ([address]  Resolved Problems:    * No resolved hospital problems. *        IMPRESSION:    1. Acute on chronic combined systolic and diastolic heart failure  2. Ischemic cardiomyopathy with EF 17%  3. CAD s/p unsuccessful PTCA attempt to LAD on recent cath  4. GREGORY on CKD stage III  5. Alcohol use  6. Cirrhosis  7. Seizures    RECOMMENDATIONS:  1. Continue aspirin, Lipitor and Plavix  2. Currently on Lasix drip  3. Wean off midodrine the setting of ischemic cardiomyopathy, as it increases afterload. 4. Wean Levophed and dobutamine as able  5. Held spironolactone due to low BP      Discussed with patient and Nurse. Jade Blackburn M.D. Fellow, [address]        Attending Physician Statement  I have discussed the case of Ada Lester including pertinent history and exam findings with the resident. I have seen and examined the patient and the key elements of the encounter have been performed by me. I agree with the assessment, plan and orders as documented by the resident With changes made to the note.      Electronically signed by Maye Orozco MD on 3/20/2020 at [facility]      [phone]

## 2020-03-20 NOTE — CARE COORDINATION
Transitional plannning. Pt did not respond to calling name a couple of times. Jann Bence try to speak with family later.

## 2020-03-20 NOTE — PROGRESS NOTES
Occupational Therapy Not Seen Note    DATE: 3/20/2020  Name: Susana Emerson  : 1965  MRN: [id number]    Patient not available for Occupational Therapy due to:     Other: Per RN, pt on Levophed drip, not medically appropriate for OT evaluation    Next Scheduled Treatment: Re-check 3/22/2020    Electronically signed by [address] Court, OTR/L on 3/20/2020 at 8:42 AM

## 2020-03-20 NOTE — PROGRESS NOTES
[place] [facility]  Documentation Note                Admission Dx: Systolic heart failure, chronic ([facility] [I50.22]    Past Medical History:   has a past medical history of Alcoholic cirrhosis ([facility] Back pain, Bipolar 1 disorder ([facility] CAD (coronary artery disease), CHF (congestive heart failure) ([facility] Idiopathic cardiomyopathy ([facility] and Seizures ([facility] Previous Testing:     ECHO 2/28/2020: EF 17%, apical wall akinesis without definite evidence of clot. CATH 2/12/2020: % proximal stenosis proximal to previous stent, not able to wire  into LAD despite multiple attempts and wires, successful wiring into Diagonal. Multiple PTCA in proximal LAD and diagonal with no regain of flow. Previous office/hospital visit:   Hortencia Everett NP 3/4/2020:   1. HFrF with 100% LAD- Unable to PCI   2. Noncompliance   3. Hyponatremia   4. Hypotension  5. NSTEMI     Plan --  1. ECHO reviewed. LifeVest ordered for Ischemic Cardiomyopathy. .  Reviewed with pt and he is agreeable to go home with vest. He does not want AICD at this time and will follow up  with Dr. Jerald Gallegos to discuss. 2. Hypotension. Will decrease BB to 12.5mg BID  Hold off on Midodrine if able due to cardiomyopathy    3. CHF. Diuretics per nephro recs. Continue BB/ARB   4. CAD. Continue ASA, Statin, plavix, BB, ARB    5. Will plan to follow up with Dr. Jerald Gallegos, his primary cardiologist as outpt. Home Medications:      Prior to Admission medications    Medication Sig Start Date End Date Taking?  Authorizing Provider   levothyroxine (SYNTHROID) 50 MCG tablet Take 1 tablet by mouth Daily 3/20/20   CAM Oleary - CNP   bumetanide (BUMEX) 2 MG tablet Take 1 tablet by mouth daily 3/5/20   Daily Kline MD   folic acid (FOLVITE) 1 MG tablet Take 1 tablet by mouth daily 3/5/20   Daily Kline MD   ondansetron (ZOFRAN-ODT) 4 MG disintegrating tablet Take 1 tablet by mouth every 8 hours as needed for Nausea or Vomiting 3/4/20   David Landa Oral, Daily  levothyroxine (SYNTHROID) tablet 50 mcg, 50 mcg, Oral, Daily  multivitamin 1 tablet, 1 tablet, Oral, Daily  vitamin B-1 (THIAMINE) tablet 100 mg, 100 mg, Oral, Daily  sodium chloride flush 0.9 % injection 10 mL, 10 mL, Intravenous, 2 times per day  sodium chloride flush 0.9 % injection 10 mL, 10 mL, Intravenous, PRN  acetaminophen (TYLENOL) tablet 650 mg, 650 mg, Oral, Q6H PRN **OR** acetaminophen (TYLENOL) suppository 650 mg, 650 mg, Rectal, Q6H PRN  polyethylene glycol (GLYCOLAX) packet 17 g, 17 g, Oral, Daily PRN  promethazine (PHENERGAN) tablet 12.5 mg, 12.5 mg, Oral, Q6H PRN **OR** ondansetron (ZOFRAN) injection 4 mg, 4 mg, Intravenous, Q6H PRN  potassium chloride 10 mEq/100 mL IVPB (Peripheral Line), 10 mEq, Intravenous, PRN  heparin (porcine) injection 5,000 Units, 5,000 Units, Subcutaneous, 3 times per day  midodrine (PROAMATINE) tablet 5 mg, 5 mg, Oral, TID WC  norepinephrine (LEVOPHED) 16 mg in dextrose 5% 250 mL infusion, 2 mcg/min, Intravenous, Continuous  DOBUTamine (DOBUTREX) 1000 mg in dextrose 5 % 250 mL infusion, 5 mcg/kg/min, Intravenous, Continuous  furosemide (LASIX) 100 mg in dextrose 5 % 100 mL infusion, 10 mg/hr, Intravenous, Continuous    Labs:     CBC:   Recent Labs     03/19/20 2157 03/20/20  0403   WBC 5.4 4.4   HGB 11.4* 10.5*   HCT 36.0* 34.5*    See Reflexed IPF Result     BMP:   Recent Labs     03/19/20 2157 03/20/20  0403   * 128*   K 4.3 3.6*   CO2 28 27   BUN 50* 47*   CREATININE 1.80* 1.72*   LABGLOM 40* 42*   GLUCOSE 71 123*     PT/INR:   Recent Labs     03/18/20  1235 03/19/20  2346   PROTIME 15.7* 13.5*   INR 1.6* 1.3     APTT:  Recent Labs     03/18/20  1235   APTT 32.8     CARDIAC ENZYMES:  Recent Labs     03/19/20  2157 03/19/20  2346 03/20/20  0403   TROPHS 173* 161* 150*     FASTING LIPID PANEL:  Lab Results   Component Value Date    HDL 18 03/19/2020    TRIG 91 03/19/2020     LIVER PROFILE:  Recent Labs     03/19/20 2157   *   * LABALBU 3.5       Claudia Grullon, [facility]   Pager: [phone]

## 2020-03-20 NOTE — H&P
Critical Care - History and Physical Examination    Patient's name:  Kendrick Kelly  Medical Record Number: [id number]  Patient's account/billing number: [de-identified]  Patient's YOB: 1965  Age: 49 y.o. Date of Admission: 3/19/2020  7:06 PM  Date of History and Physical Examination: 3/20/2020      Primary Care Physician: CAM Pride CNP  Attending Physician:    Code Status: Full Code    Chief complaint: scrotal/penile swelling    HISTORY OF PRESENT ILLNESS:   History was obtained from chart review. Kendrick Kelly is a 49 y.o. male with past medical history of congestive heart failure ejection fraction around 17% in 5/2020, ischemic cardiomyopathy, coronary artery disease status post multiple stenting in the past.  Recent cardiac cath showing 100% LAD blockage, alcohol abuse, CKD, bipolar disorder, seizure disorder presented to the [facility] on 3/18/2020 worsening scrotal swelling. Patient was recently discharged from this hospital around 2 weeks back when he was admitted for decompensated heart failure. He also had hyponatremia at that point and was treated with diuretics and discharged to the nursing facility. At that time he was given LifeVest and he states that when he went home he just threw the LifeVest and trunk and never used it. He was admitted to different hospital, with acute on chronic congestive heart failure was diuresed with IV diuresis and decision was made to transfer to [facility] after his blood pressure started falling this morning and he was started on Levophed drip and transferred here. On arrival here he was on 8 mics of Levophed, mean blood pressure around 60s, he did not complain of any cough chest pain shortness of breath. He did complain of some scrotal swelling which he states that has improved over the last 2 days, he also had 2+ pitting edema.     His proBNP level on admission was 6081 in [facility] hospital, it was 4578 this evening. He also had hyponatremia with sodium of 122 had GREGORY with creatinine of 1.80. He also had transaminitis with ALT of 329 AST of 209. His troponin trended down to 161. Past Medical History:        Diagnosis Date    Alcoholic cirrhosis ([facility]     Back pain     Bipolar 1 disorder ([facility]     CAD (coronary artery disease) 09/2019    s/p proximal LAD stent    CHF (congestive heart failure) (HCC)     Idiopathic cardiomyopathy (HCC)     Seizures ([facility]     LAST SEIZURE 9-4-19       Past Surgical History:        Procedure Laterality Date    CARDIAC CATHETERIZATION Left 09/17/2019    DR Valerio/[facility]/right radial-Severe two vessel coronary artery disease involving involving a 100% proximal probably co-dominant RCA, 90% stenosis in the distal part of the proximal LAD at a bifircation with a moderate sized D1 branch that also has an 80% stenosis. Mildly elevated left ventricular end diastolic pressure. Consult to cardiothoracic surgery for consideration of multi-vess    TOE SURGERY      TOOTH EXTRACTION         Allergies: Allergies   Allergen Reactions    Ticagrelor Shortness Of Breath    Pcn [Penicillins]      Doesn't know reaction         Home Meds:   Prior to Admission medications    Medication Sig Start Date End Date Taking?  Authorizing Provider   levothyroxine (SYNTHROID) 50 MCG tablet Take 1 tablet by mouth Daily 3/20/20   CAM Mcneal - CNP   bumetanide (BUMEX) 2 MG tablet Take 1 tablet by mouth daily 3/5/20   Malvin Arizmendi MD   folic acid (FOLVITE) 1 MG tablet Take 1 tablet by mouth daily 3/5/20   Malvin Arizmendi MD   ondansetron (ZOFRAN-ODT) 4 MG disintegrating tablet Take 1 tablet by mouth every 8 hours as needed for Nausea or Vomiting 3/4/20   Malvin Arizmendi MD   vitamin B-1 100 MG tablet Take 1 tablet by mouth daily 3/5/20   Malvin Arizmendi MD   enoxaparin (LOVENOX) 30 MG/0.3ML injection Inject 0.3 mLs into the skin 2 times daily 3/4/20   Malvin Arizmendi MD Respiratory: Negative for cough, chest tightness, shortness of breath and stridor. Cardiovascular: Positive for leg swelling. Negative for chest pain and palpitations. Orthopnea   Gastrointestinal: Negative for abdominal distention, constipation, diarrhea, nausea and vomiting. Genitourinary: Positive for penile swelling and scrotal swelling. Negative for discharge, flank pain, frequency, hematuria, penile pain, testicular pain and urgency. Musculoskeletal: Negative for joint swelling. Skin: Negative for color change and wound. Neurological: Negative for dizziness, syncope, light-headedness and numbness. Physical Exam:    Vitals: /78   Pulse 85   Temp 97.4 °F (36.3 °C) (Oral)   Resp 16   Ht 6' 0.01\" (1.829 m)   Wt 259 lb 14.8 oz (117.9 kg)   SpO2 99%   BMI 35.24 kg/m²     Last Body weight:   Wt Readings from Last 3 Encounters:   03/19/20 259 lb 14.8 oz (117.9 kg)   03/19/20 267 lb 12.8 oz (121.5 kg)   03/04/20 220 lb 3.8 oz (99.9 kg)       Body Mass Index : Body mass index is 35.24 kg/m². PHYSICAL EXAMINATION :    Physical Exam  HENT:      Head: Normocephalic. Eyes:      Pupils: Pupils are equal, round, and reactive to light. Neck:      Comments: JVP elevated  Cardiovascular:      Rate and Rhythm: Normal rate. Pulses: Normal pulses. Pulmonary:      Effort: Pulmonary effort is normal. No respiratory distress. Breath sounds: No wheezing or rales. Abdominal:      General: Abdomen is flat. Tenderness: There is no abdominal tenderness. Genitourinary:     Comments: Scrotal swelling  Musculoskeletal: Normal range of motion. General: Swelling present. Right lower leg: Edema present. Left lower leg: Edema present. Comments: 2+ b/l pedal edema   Skin:     General: Skin is warm. Coloration: Skin is not jaundiced. Findings: No bruising or erythema. Neurological:      General: No focal deficit present.       Mental assigned to the patient will be following up the patient in the intensive care unit. I have discussed the current plan with the critical care attending. The above mentioned assessment and plan will be reviewed again in detail by the critical care attending at bedside, and can be further changed or modified accordingly by the attending physician. Abbie Parada M.D. Critical care resident,  Department of Internal Medicine/ Critical care  [address] [place] [address] ([place]             3/20/2020, 2:40 PM   Attending Physician Statement  I have discussed the care of Camille Elias, including pertinent history and exam findings,  with the resident. I have seen and examined the patient and the key elements of all parts of the encounter have been performed by me. I agree with the assessment, plan and orders as documented by the resident with additions . Patient admitted with cardiogenic shock, acute on chronic systolic congestive heart failure with severe ischemic cardiomyopathy, acute kidney injury. Hyponatremia hypervolemic,  Congestive hepatopathy  Plan  Arterial line placed  Continue dobutamine  Wean Levophed keep systolic greater than 90  Continue Lasix drip  Prognosis is guarded    Total critical care time caring for this patient with life threatening, unstable organ failure, including direct patient contact, management of life support systems, review of data including imaging and labs, discussions with other team members and physicians at least 28   Min so far today, excluding procedures. Treatment plan Discussed with nursing staff in detail , all questions answered . Electronically signed by Abbie Parada MD on   3/20/20 at 2:41 PM EDT    Please note that this chart was generated using voice recognition Dragon dictation software. Although every effort was made to ensure the accuracy of this automated transcription, some errors in transcription may have occurred.

## 2020-03-20 NOTE — PROGRESS NOTES
Unable to place arterial line. x2 attempts by writer @ R radial site. Allens test:+   Pressure gauze applied at sites of attempt. RN notified.

## 2020-03-20 NOTE — PLAN OF CARE
Insert Arterial Line  Date/Time:  20, 11:20 AM  Performed by: Holli Apgar    Patient identity confirmed: arm band and provided demographic data   Time out: Immediately prior to procedure a \"time out\" was called to verify the correct patient, procedure, equipment, support staff. Preparation: Patient was prepped and draped in the usual sterile fashion.     Location:right radial    Watson's test normal: yes  Needle gauge: 20     Number of attempts: 1  Post-procedure: transparent dressing applied and line secured    Patient tolerance: well

## 2020-03-20 NOTE — PROCEDURES
catheter then secured using silk suture and a temporary sterile dressing was applied. No immediate complication was evident. All sponge, instrument and needle counts were correct at the completion of the procedure. Postprocedural chest x-ray showed good position of the catheter with no evidence of hemothorax or pneumothorax. The patient tolerated the procedure well with no immediate complication evident.     Kendrick Parson MD  PGY-2, Department of Internal Medicine  [address] [place] [address]  3/20/2020 12:48 AM

## 2020-03-20 NOTE — PROGRESS NOTES
Patient seen and examined at bedside per Dr. Levin Curtis request. Patient undergoing CVL placement with critical care. He is on room air and has anasarca on exam.    Started on Levophed gtt for patient's hypotension at outlying facility, which is being weaned down. Currently patient is asymptomatic and denies any complaints. Plan:    Start Dobutamine drip at 5 mcg/kg/min    Started on Lasix 80 mg IV then Lasix drip at 10 mg/hr. Continue Levophed and wean drip as able    Monitor labs closely (BMP q6)    The above plan was made in conjunction with the on-call attending, Dr. Ky Chavira.     Electronically signed on 03/20/20 at 1:53 AM by:    Tammie Dunn MD   Fellow, [address]

## 2020-03-20 NOTE — CARE COORDINATION
Case Management Initial Discharge Plan  Geovanny Frankel,             Met with:family member   Stepmother and father per phone, to discuss discharge plans. Information verified: address, contacts, phone number, , insurance Yes  PCP: CAM Abraham - CNP  Date of last visit:     Insurance Provider: Medicare/Medicaid    Discharge Planning    Living Arrangements:  Alone   Support Systems:  Parent, Family Members    Home has 1 story  3 stairs to climb to get into front door    Patient able to perform ADL's:Independent    Current Services (outpatient & in home) none  DME equipment: lifevest pt wasn't wearing  DME provider: [employer]      Potential Assistance Needed:  N/A    Patient agreeable to home care: No  [place] of choice provided:  n/a    Prior SNF/Rehab Placement and Facility: [facility]-pt only stayed 1 day  Agreeable to SNF/Rehab: No  [place] of choice provided: n/a   Evaluation: no    Expected Discharge date:  20  Patient expects to be discharged to:  home, possible home care  Follow Up Appointment: Best Day/ Time:      Transportation provider: Dad and step mom  Transportation arrangements needed for discharge: No    Readmission Risk              Risk of Unplanned Readmission:        43             Does patient have a readmission risk score greater than 14?: Yes  If yes, follow-up appointment must be made within 7 days of discharge. Goals of Care: Return home with heart at baseline, pt more knowlegable about meds and lifevest      Discharge Plan: home with family support, may need home care. (TBD about SNF) Follow needs          Electronically signed by Johnathan Preston RN on 3/20/20 at 4:18 PM EDT

## 2020-03-20 NOTE — CONSULTS
Scheduled Meds:    midodrine  10 mg Oral TID     spironolactone  25 mg Oral Daily    aspirin  81 mg Oral Daily    atorvastatin  40 mg Oral Daily    clopidogrel  75 mg Oral Daily    divalproex  1,500 mg Oral Daily    doxepin  50 mg Oral Nightly    folic acid  1 mg Oral Daily    levothyroxine  50 mcg Oral Daily    multivitamin  1 tablet Oral Daily    thiamine  100 mg Oral Daily    sodium chloride flush  10 mL Intravenous 2 times per day    heparin (porcine)  5,000 Units Subcutaneous 3 times per day     Continuous Infusions:    norepinephrine 2 mcg/min (20)    DOBUTamine 5 mcg/kg/min (20)    furosemide (LASIX) 1mg/ml infusion 10 mg/hr (20 011)     PRN Meds:  sodium chloride flush, acetaminophen **OR** acetaminophen, polyethylene glycol, promethazine **OR** ondansetron, potassium chloride    Review of Systems:     Constitutional: No fever, no chills, no lethargy, no weakness. HEENT:  No headache, otalgia, itchy eyes, nasal discharge or sore throat. Cardiac:  No chest pain, dyspnea, orthopnea or PND. Chest:  No cough, phlegm or wheezing. Abdomen:  No abdominal pain, nausea or vomiting. Neuro:  No focal weakness, abnormal movements orseizure like activity. Skin:   No rashes, no itching. :   No hematuria, no pyuria, no dysuria, no flank pain. Extremities:  No swelling or joint pains. ROS was otherwise negative except as mentioned in the [address] Input/Output:       I/O last 3 completed shifts:   In:  [I.V.:2019]  Out: 6459 [[id number]]  Patient Vitals for the past 96 hrs (Last 3 readings):   Weight   20 1915 259 lb 14.8 oz (117.9 kg)     Vital Signs:   Temperature:  Temp: 97.4 °F (36.3 °C)  TMax:   Temp (24hrs), Av.4 °F (36.3 °C), Min:97.3 °F (36.3 °C), Max:97.5 °F (36.4 °C)    Respirations:  Resp: 16  Pulse:   Pulse: 89  BP:    BP: 101/63  BP Range: Systolic (79FJE), DJO:31 , Min:73 , DUS:458       Diastolic (84GFJ), UCI:60, Min:42, HEPCAB    Urinalysis/Chemistries:      Lab Results   Component Value Date    NITRU NEGATIVE 02/26/2020    COLORU YELLOW 02/26/2020    PHUR 6.0 02/26/2020    WBCUA None 02/26/2020    RBCUA None 02/26/2020    MUCUS NOT REPORTED 02/26/2020    TRICHOMONAS NOT REPORTED 02/26/2020    YEAST NOT REPORTED 02/26/2020    BACTERIA TRACE 02/26/2020    SPECGRAV 1.010 02/26/2020    LEUKOCYTESUR NEGATIVE 02/26/2020    UROBILINOGEN Normal 02/26/2020    BILIRUBINUR NEGATIVE 02/26/2020    GLUCOSEU NEGATIVE 02/26/2020    KETUA NEGATIVE 02/26/2020    AMORPHOUS NOT REPORTED 02/26/2020     Urine Sodium:     Lab Results   Component Value Date    VICENTA 56 03/20/2020     Urine Potassium:  No results found for: KUR  Urine Chloride:  No results found for: CLUR  Urine Osmolarity:   Lab Results   Component Value Date    OSMOU 255 03/20/2020     Urine Protein:   No results found for: TPU  Urine Creatinine:     Lab Results   Component Value Date    LABCREA 79.3 02/27/2020     UPC:     Urine Eosinophils:  No components found for: UEOS    Radiology:     CXR:     Assessment:     1. Acute Kidney Injury: Secondary to prerenal azotemia/ischemic ATN from hypotension, cardiogenic shock and low flow. Baseline creatinine 0.8-0.9 peaked up to 1.8 now resolving with diuresis and improved cardiac function  2. Cardiogenic shock from noncompliance in setting of severe cardiomyopathy  3. Decompensated biventricular failure  4. Hypervolemic hyponatremia  5. Congestive hepatopathy further aggravated by alcohol abuse  6. Bipolar disorder  7. Hypokalemia    Plan:   1. Agree with Lasix infusion, keep urine output at least 100 to 125 mL an hour  2. Agree with inotropic support  3. Wean pressors. 4.  Increase ProAmatine to 10 3 times daily  5. Start fluid restriction 1.2 L a day  6. Start Aldactone 25 daily  7 follow renal function. 8.  Intake output record daily weights and avoid nephrotoxic agents  9.   Prognosis guarded    Nutrition   Please ensure that patient is on a renal diet/TF. Avoid nephrotoxic drugs/contrast exposure. Thank you for the consultation. Please do not hesitate to contact us for any further questions/concerns. We will continue to follow along with you.

## 2020-03-20 NOTE — H&P
Critical Care - History and Physical Examination    Patient's name:  Yuliet Fitzgerald  Medical Record Number: [id number]  Patient's account/billing number: [de-identified]  Patient's YOB: 1965  Age: 49 y.o. Date of Admission: 3/19/2020  7:06 PM  Date of History and Physical Examination: 3/19/2020      Primary Care Physician: CAM Smith CNP  Attending Physician:    Code Status: Full Code    Chief complaint: scrotal/penile swelling    HISTORY OF PRESENT ILLNESS:   History was obtained from chart review. Yuliet Fitzgerald is a 49 y.o. male with past medical history of congestive heart failure ejection fraction around 17% in 5/2020, ischemic cardiomyopathy, coronary artery disease status post multiple stenting in the past.  Recent cardiac cath showing 100% LAD blockage, alcohol abuse, CKD, bipolar disorder, seizure disorder presented to the [facility] on 3/18/2020 worsening scrotal swelling. Patient was recently discharged from this hospital around 2 weeks back when he was admitted for decompensated heart failure. He also had hyponatremia at that point and was treated with diuretics and discharged to the nursing facility. At that time he was given LifeVest and he states that when he went home he just threw the LifeVest and trunk and never used it. He was admitted to different hospital, with acute on chronic congestive heart failure was diuresed with IV diuresis and decision was made to transfer to [facility] after his blood pressure started falling this morning and he was started on Levophed drip and transferred here. On arrival here he was on 8 mics of Levophed, mean blood pressure around 60s, he did not complain of any cough chest pain shortness of breath. He did complain of some scrotal swelling which he states that has improved over the last 2 days, he also had 2+ pitting edema.     His proBNP level on admission was 6081 in different hospital, it was 4578 this care unit. I have discussed the current plan with the critical care attending. The above mentioned assessment and plan will be reviewed again in detail by the critical care attending at bedside, and can be further changed or modified accordingly by the attending physician. Harinder Bearden M.D.   Critical care resident,  Department of Internal Medicine/ Critical care  [facility])             3/19/2020, 9:42 PM

## 2020-03-21 LAB
ABSOLUTE EOS #: <0.03 K/UL (ref 0–0.44)
ABSOLUTE IMMATURE GRANULOCYTE: <0.03 K/UL (ref 0–0.3)
ABSOLUTE LYMPH #: 0.81 K/UL (ref 1.1–3.7)
ABSOLUTE MONO #: 0.71 K/UL (ref 0.1–1.2)
ANION GAP SERPL CALCULATED.3IONS-SCNC: 12 MMOL/L (ref 9–17)
ANION GAP SERPL CALCULATED.3IONS-SCNC: 12 MMOL/L (ref 9–17)
ANION GAP SERPL CALCULATED.3IONS-SCNC: 13 MMOL/L (ref 9–17)
ANION GAP SERPL CALCULATED.3IONS-SCNC: 13 MMOL/L (ref 9–17)
ANION GAP SERPL CALCULATED.3IONS-SCNC: 14 MMOL/L (ref 9–17)
BASOPHILS # BLD: 0 % (ref 0–2)
BASOPHILS ABSOLUTE: <0.03 K/UL (ref 0–0.2)
BUN BLDV-MCNC: 20 MG/DL (ref 6–20)
BUN BLDV-MCNC: 20 MG/DL (ref 6–20)
BUN BLDV-MCNC: 21 MG/DL (ref 6–20)
BUN BLDV-MCNC: 23 MG/DL (ref 6–20)
BUN BLDV-MCNC: 27 MG/DL (ref 6–20)
BUN/CREAT BLD: ABNORMAL (ref 9–20)
CALCIUM SERPL-MCNC: 7.2 MG/DL (ref 8.6–10.4)
CALCIUM SERPL-MCNC: 7.4 MG/DL (ref 8.6–10.4)
CALCIUM SERPL-MCNC: 7.5 MG/DL (ref 8.6–10.4)
CALCIUM SERPL-MCNC: 7.5 MG/DL (ref 8.6–10.4)
CALCIUM SERPL-MCNC: 7.9 MG/DL (ref 8.6–10.4)
CHLORIDE BLD-SCNC: 82 MMOL/L (ref 98–107)
CHLORIDE BLD-SCNC: 84 MMOL/L (ref 98–107)
CHLORIDE BLD-SCNC: 85 MMOL/L (ref 98–107)
CHLORIDE BLD-SCNC: 86 MMOL/L (ref 98–107)
CHLORIDE BLD-SCNC: 86 MMOL/L (ref 98–107)
CO2: 33 MMOL/L (ref 20–31)
CO2: 34 MMOL/L (ref 20–31)
CO2: 34 MMOL/L (ref 20–31)
CO2: 35 MMOL/L (ref 20–31)
CO2: 35 MMOL/L (ref 20–31)
CREAT SERPL-MCNC: 0.96 MG/DL (ref 0.7–1.2)
CREAT SERPL-MCNC: 0.97 MG/DL (ref 0.7–1.2)
CREAT SERPL-MCNC: 1.04 MG/DL (ref 0.7–1.2)
CREAT SERPL-MCNC: 1.07 MG/DL (ref 0.7–1.2)
CREAT SERPL-MCNC: 1.13 MG/DL (ref 0.7–1.2)
DIFFERENTIAL TYPE: ABNORMAL
EOSINOPHILS RELATIVE PERCENT: 0 % (ref 1–4)
GFR AFRICAN AMERICAN: >60 ML/MIN
GFR NON-AFRICAN AMERICAN: >60 ML/MIN
GFR SERPL CREATININE-BSD FRML MDRD: ABNORMAL ML/MIN/{1.73_M2}
GLUCOSE BLD-MCNC: 155 MG/DL (ref 70–99)
GLUCOSE BLD-MCNC: 75 MG/DL (ref 70–99)
GLUCOSE BLD-MCNC: 78 MG/DL (ref 70–99)
GLUCOSE BLD-MCNC: 84 MG/DL (ref 70–99)
GLUCOSE BLD-MCNC: 94 MG/DL (ref 70–99)
HCT VFR BLD CALC: 37.3 % (ref 40.7–50.3)
HEMOGLOBIN: 11.2 G/DL (ref 13–17)
IMMATURE GRANULOCYTES: 0 %
LYMPHOCYTES # BLD: 15 % (ref 24–43)
MCH RBC QN AUTO: 27.3 PG (ref 25.2–33.5)
MCHC RBC AUTO-ENTMCNC: 30 G/DL (ref 28.4–34.8)
MCV RBC AUTO: 91 FL (ref 82.6–102.9)
MONOCYTES # BLD: 13 % (ref 3–12)
NRBC AUTOMATED: 0.4 PER 100 WBC
PDW BLD-RTO: 17.6 % (ref 11.8–14.4)
PLATELET # BLD: 127 K/UL (ref 138–453)
PLATELET ESTIMATE: ABNORMAL
PMV BLD AUTO: 9.4 FL (ref 8.1–13.5)
POTASSIUM SERPL-SCNC: 3.3 MMOL/L (ref 3.7–5.3)
POTASSIUM SERPL-SCNC: 3.3 MMOL/L (ref 3.7–5.3)
POTASSIUM SERPL-SCNC: 3.5 MMOL/L (ref 3.7–5.3)
POTASSIUM SERPL-SCNC: 3.6 MMOL/L (ref 3.7–5.3)
POTASSIUM SERPL-SCNC: 4.1 MMOL/L (ref 3.7–5.3)
RBC # BLD: 4.1 M/UL (ref 4.21–5.77)
RBC # BLD: ABNORMAL 10*6/UL
SEG NEUTROPHILS: 71 % (ref 36–65)
SEGMENTED NEUTROPHILS ABSOLUTE COUNT: 3.82 K/UL (ref 1.5–8.1)
SODIUM BLD-SCNC: 129 MMOL/L (ref 135–144)
SODIUM BLD-SCNC: 131 MMOL/L (ref 135–144)
SODIUM BLD-SCNC: 132 MMOL/L (ref 135–144)
SODIUM BLD-SCNC: 133 MMOL/L (ref 135–144)
SODIUM BLD-SCNC: 133 MMOL/L (ref 135–144)
WBC # BLD: 5.4 K/UL (ref 3.5–11.3)
WBC # BLD: ABNORMAL 10*3/UL

## 2020-03-21 PROCEDURE — 6370000000 HC RX 637 (ALT 250 FOR IP): Performed by: INTERNAL MEDICINE

## 2020-03-21 PROCEDURE — 2580000003 HC RX 258: Performed by: STUDENT IN AN ORGANIZED HEALTH CARE EDUCATION/TRAINING PROGRAM

## 2020-03-21 PROCEDURE — 85025 COMPLETE CBC W/AUTO DIFF WBC: CPT

## 2020-03-21 PROCEDURE — 97166 OT EVAL MOD COMPLEX 45 MIN: CPT | Performed by: OCCUPATIONAL THERAPIST

## 2020-03-21 PROCEDURE — 2000000000 HC ICU R&B

## 2020-03-21 PROCEDURE — 6360000002 HC RX W HCPCS: Performed by: STUDENT IN AN ORGANIZED HEALTH CARE EDUCATION/TRAINING PROGRAM

## 2020-03-21 PROCEDURE — 6360000002 HC RX W HCPCS: Performed by: INTERNAL MEDICINE

## 2020-03-21 PROCEDURE — 97535 SELF CARE MNGMENT TRAINING: CPT | Performed by: OCCUPATIONAL THERAPIST

## 2020-03-21 PROCEDURE — 36415 COLL VENOUS BLD VENIPUNCTURE: CPT

## 2020-03-21 PROCEDURE — 2580000003 HC RX 258: Performed by: INTERNAL MEDICINE

## 2020-03-21 PROCEDURE — 97530 THERAPEUTIC ACTIVITIES: CPT

## 2020-03-21 PROCEDURE — 80048 BASIC METABOLIC PNL TOTAL CA: CPT

## 2020-03-21 PROCEDURE — 6370000000 HC RX 637 (ALT 250 FOR IP): Performed by: STUDENT IN AN ORGANIZED HEALTH CARE EDUCATION/TRAINING PROGRAM

## 2020-03-21 PROCEDURE — 99291 CRITICAL CARE FIRST HOUR: CPT | Performed by: INTERNAL MEDICINE

## 2020-03-21 PROCEDURE — 97163 PT EVAL HIGH COMPLEX 45 MIN: CPT

## 2020-03-21 RX ORDER — BUMETANIDE 1 MG/1
2 TABLET ORAL 2 TIMES DAILY
Status: DISCONTINUED | OUTPATIENT
Start: 2020-03-21 | End: 2020-03-23 | Stop reason: HOSPADM

## 2020-03-21 RX ORDER — POTASSIUM CHLORIDE 20 MEQ/1
40 TABLET, EXTENDED RELEASE ORAL ONCE
Status: COMPLETED | OUTPATIENT
Start: 2020-03-21 | End: 2020-03-21

## 2020-03-21 RX ADMIN — CALCIUM GLUCONATE 1 G: 98 INJECTION, SOLUTION INTRAVENOUS at 20:45

## 2020-03-21 RX ADMIN — DESMOPRESSIN ACETATE 40 MG: 0.2 TABLET ORAL at 08:26

## 2020-03-21 RX ADMIN — SODIUM CHLORIDE, PRESERVATIVE FREE 10 ML: 5 INJECTION INTRAVENOUS at 21:15

## 2020-03-21 RX ADMIN — Medication 100 MG: at 08:27

## 2020-03-21 RX ADMIN — FUROSEMIDE 10 MG/HR: 10 INJECTION, SOLUTION INTRAMUSCULAR; INTRAVENOUS at 03:45

## 2020-03-21 RX ADMIN — HEPARIN SODIUM 5000 UNITS: 5000 INJECTION INTRAVENOUS; SUBCUTANEOUS at 15:00

## 2020-03-21 RX ADMIN — MIDODRINE HYDROCHLORIDE 10 MG: 5 TABLET ORAL at 17:55

## 2020-03-21 RX ADMIN — MIDODRINE HYDROCHLORIDE 10 MG: 5 TABLET ORAL at 11:45

## 2020-03-21 RX ADMIN — POTASSIUM CHLORIDE 40 MEQ: 1500 TABLET, EXTENDED RELEASE ORAL at 15:00

## 2020-03-21 RX ADMIN — DOXEPIN HYDROCHLORIDE 50 MG: 25 CAPSULE ORAL at 22:00

## 2020-03-21 RX ADMIN — MIDODRINE HYDROCHLORIDE 10 MG: 5 TABLET ORAL at 08:24

## 2020-03-21 RX ADMIN — CLOPIDOGREL 75 MG: 75 TABLET, FILM COATED ORAL at 08:26

## 2020-03-21 RX ADMIN — THERA TABS 1 TABLET: TAB at 08:26

## 2020-03-21 RX ADMIN — HEPARIN SODIUM 5000 UNITS: 5000 INJECTION INTRAVENOUS; SUBCUTANEOUS at 06:55

## 2020-03-21 RX ADMIN — BUMETANIDE 2 MG: 1 TABLET ORAL at 20:45

## 2020-03-21 RX ADMIN — SODIUM CHLORIDE, PRESERVATIVE FREE 10 ML: 5 INJECTION INTRAVENOUS at 08:27

## 2020-03-21 RX ADMIN — LEVOTHYROXINE SODIUM 50 MCG: 50 TABLET ORAL at 08:26

## 2020-03-21 RX ADMIN — POTASSIUM CHLORIDE 10 MEQ: 10 INJECTION, SOLUTION INTRAVENOUS at 02:35

## 2020-03-21 RX ADMIN — DOBUTAMINE HYDROCHLORIDE 5 MCG/KG/MIN: 400 INJECTION INTRAVENOUS at 02:33

## 2020-03-21 RX ADMIN — Medication 81 MG: at 08:26

## 2020-03-21 RX ADMIN — ACETAMINOPHEN 650 MG: 325 TABLET ORAL at 07:11

## 2020-03-21 RX ADMIN — POTASSIUM CHLORIDE 10 MEQ: 10 INJECTION, SOLUTION INTRAVENOUS at 03:44

## 2020-03-21 RX ADMIN — DIVALPROEX SODIUM 1500 MG: 500 TABLET, DELAYED RELEASE ORAL at 08:25

## 2020-03-21 RX ADMIN — FOLIC ACID 1 MG: 1 TABLET ORAL at 08:27

## 2020-03-21 RX ADMIN — ACETAMINOPHEN 650 MG: 325 TABLET ORAL at 17:58

## 2020-03-21 ASSESSMENT — PAIN SCALES - GENERAL
PAINLEVEL_OUTOF10: 3
PAINLEVEL_OUTOF10: 0
PAINLEVEL_OUTOF10: 8
PAINLEVEL_OUTOF10: 0
PAINLEVEL_OUTOF10: 0
PAINLEVEL_OUTOF10: 7

## 2020-03-21 ASSESSMENT — PAIN DESCRIPTION - ORIENTATION: ORIENTATION: LEFT;RIGHT

## 2020-03-21 ASSESSMENT — PAIN DESCRIPTION - LOCATION: LOCATION: FOOT

## 2020-03-21 ASSESSMENT — PAIN DESCRIPTION - PAIN TYPE: TYPE: ACUTE PAIN

## 2020-03-21 NOTE — PROGRESS NOTES
[facility]   Progress Note                   Date:   3/21/2020  Patient name: Geovanny Frankel  Date of admission:  3/19/2020  7:06 PM  MRN:   [id number]  YOB: 1965  PCP: CAM Abraham CNP    Reason for Admission:      Subjective:    Pt is on nasal canula at 2 liter. In frequent PVCs likely sec to dobutamine gtt  He is feeling better      Medications:   Scheduled Meds:   midodrine  10 mg Oral TID WC    [Held by provider] spironolactone  25 mg Oral Daily    aspirin  81 mg Oral Daily    atorvastatin  40 mg Oral Daily    clopidogrel  75 mg Oral Daily    divalproex  1,500 mg Oral Daily    doxepin  50 mg Oral Nightly    folic acid  1 mg Oral Daily    levothyroxine  50 mcg Oral Daily    multivitamin  1 tablet Oral Daily    thiamine  100 mg Oral Daily    sodium chloride flush  10 mL Intravenous 2 times per day    heparin (porcine)  5,000 Units Subcutaneous 3 times per day       Continuous Infusions:   norepinephrine Stopped (03/20/20 1842)    DOBUTamine 5 mcg/kg/min (03/21/20 0233)    furosemide (LASIX) 1mg/ml infusion 10 mg/hr (03/21/20 0345)       CBC:   Recent Labs     03/19/20  2157 03/20/20  0403 03/21/20  0631   WBC 5.4 4.4 5.4   HGB 11.4* 10.5* 11.2*    See Reflexed IPF Result 127*     BMP:    Recent Labs     03/20/20  2335 03/21/20  0400 03/21/20  0631   * 133* 132*   K 3.5* 3.6* 4.1   CL 86* 86* 85*   CO2 34* 33* 35*   BUN 27* 23* 21*   CREATININE 1.07 0.96 1.04   GLUCOSE 84 75 78     Hepatic:   Recent Labs     03/19/20  2157   *   *   BILITOT 0.68   ALKPHOS 193*     Troponin: No results for input(s): TROPONINI in the last 72 hours. BNP: No results for input(s): BNP in the last 72 hours.   Lipids:   Recent Labs     03/19/20  0535   CHOL 85   HDL 18*     INR:   Recent Labs     03/18/20  1235 03/19/20  2346   INR 1.6* 1.3       Objective:   Vitals: /78   Pulse 87   Temp 97.5 °F (36.4 °C) (Axillary)   Resp 16   Ht 6' 0.01\" (1.829 m)   Wt 259 lb 14.8 oz (117.9 kg)   SpO2 100%   BMI 35.24 kg/m²     General appearance: awake, alert, in no apparent respiratory distress   HEENT: Head: Normocephalic, no lesions, without obvious abnormality  Neck: minimum JVD elevation  Lungs: basal rales present  Heart: regular rate and rhythm, S1, S2 normal, no murmur, click, rub or gallop  Abdomen: soft, non-tender; bowel sounds normal  Extremities: b/l leg edema  Neurologic: Mental status: Alert, oriented. Motor and sensory not done. EKG:       Coronary Angiography:   ECHO 2/28/2020: EF 17%, apical wall akinesis without definite evidence of clot.      CATH 2/12/2020: % proximal stenosis proximal to previous stent, not able to wire  into LAD despite multiple attempts and wires, successful wiring into Diagonal. Multiple PTCA in proximal LAD and diagonal with no regain of flow. Assessment:   1. Acute on chronic combined systolic and diastolic heart failure  2. Ischemic cardiomyopathy with EF 17%  3. CAD s/p unsuccessful PTCA attempt to LAD on recent cath  4. GREGORY on CKD stage III  5. Alcohol use  6. Cirrhosis  7. Seizures            Treatment Plan:   1. Off levophed now. MAP goal is 70. Will continue with dobutamine gtt for now. Will recommend decreasing the lasix gtt rate to 5 mg/hr. Patient is improving. will reassess the lasix gtt in afternoon and adjust the medications. off the levophed at this time. Will switch to PO medications tomorrow. 2. Will continue to hold the BB for the patient right now. titrate down the midodrine to 5 mg TID. Will like to discontinue on discharge. 3. Will continue with DAPT. Discussed with patient and nursing.        Indiana Lao MD  Fellow cardiology

## 2020-03-21 NOTE — PROGRESS NOTES
Physical Therapy    Facility/Department: [facility] 1  Initial Assessment    NAME: Cherie Aldana  : 1965  MRN: [id number]    Date of Service: 3/21/2020    Discharge Recommendations:  Patient would benefit from continued therapy after discharge      Assessment   Assessment: Pt is 50 y/o male with general weakness and difficulty walking due to recent medical issues; will benefit from PT to address deficits. Treatment Diagnosis: general weakness  Prognosis: Fair  Decision Making: High Complexity  Patient Education: PT eval and POC  REQUIRES PT FOLLOW UP: Yes  Activity Tolerance  Activity Tolerance: Patient Tolerated treatment well       Patient Diagnosis(es): There were no encounter diagnoses. has a past medical history of Alcoholic cirrhosis ([facility] [address] Back pain, Bipolar 1 disorder ([facility] [address] CAD (coronary artery disease), CHF (congestive heart failure) ([facility] [address] Idiopathic cardiomyopathy ([facility] [address] and Seizures ([facility] [address] has a past surgical history that includes Toe Surgery; Tooth Extraction; and Cardiac catheterization (Left, 2019).     Restrictions  Restrictions/Precautions  Restrictions/Precautions: General Precautions, Fall Risk     Vision/Hearing  Vision Exceptions: Wears glasses at all times  Hearing: Within functional limits       Subjective  General  Chart Reviewed: Yes  Response To Previous Treatment: Not applicable  Family / Caregiver Present: No  Follows Commands: Within Functional Limits    Orientation  Orientation  Overall Orientation Status: Within Functional Limits     Social/Functional History  Social/Functional History  Lives With: Alone  Type of Home: Apartment  Home Layout: One level  Home Access: Stairs to enter with rails  Entrance Stairs - Number of Steps: 3  Receives Help From: Family, Friend(s)  ADL Assistance: Independent  Homemaking Assistance: Independent  Homemaking Responsibilities: Yes  Ambulation Assistance: Independent  Transfer Assistance: Independent  Active : No  Patient's ADL's.   Short term goal 4: Pt to perform all bed mob, transfers, and gait independently.    Patient Goals   Patient goals : home following discharge       Therapy Time   Individual Concurrent Group Co-treatment   Time In 0950         Time Out 1031         Minutes Jovan Carbone 23, PT, DPT, CMPT

## 2020-03-21 NOTE — PROGRESS NOTES
Daily    clopidogrel  75 mg Oral Daily    divalproex  1,500 mg Oral Daily    doxepin  50 mg Oral Nightly    folic acid  1 mg Oral Daily    levothyroxine  50 mcg Oral Daily    multivitamin  1 tablet Oral Daily    thiamine  100 mg Oral Daily    sodium chloride flush  10 mL Intravenous 2 times per day    heparin (porcine)  5,000 Units Subcutaneous 3 times per day     Continuous Infusions:   norepinephrine Stopped (03/20/20 1842)    DOBUTamine 5 mcg/kg/min (03/21/20 0233)    furosemide (LASIX) 1mg/ml infusion 10 mg/hr (03/21/20 9435)     PRN Meds:   sodium chloride flush, 10 mL, PRN  acetaminophen, 650 mg, Q6H PRN    Or  acetaminophen, 650 mg, Q6H PRN  polyethylene glycol, 17 g, Daily PRN  promethazine, 12.5 mg, Q6H PRN    Or  ondansetron, 4 mg, Q6H PRN  potassium chloride, 10 mEq, PRN          VENT SETTINGS (Comprehensive) (if applicable):      Additional Respiratory  Assessments  Pulse: 87  Resp: 16  SpO2: 100 %    ABGs:     Laboratory findings:    Complete Blood Count:   Recent Labs     03/19/20 2157 03/20/20  0403 03/21/20  0631   WBC 5.4 4.4 5.4   HGB 11.4* 10.5* 11.2*   HCT 36.0* 34.5* 37.3*    See Reflexed IPF Result 127*        Last 3 Blood Glucose:   Recent Labs     03/20/20 2335 03/21/20  0400 03/21/20  0631   GLUCOSE 84 75 78        PT/INR:    Lab Results   Component Value Date    PROTIME 13.5 03/19/2020    INR 1.3 03/19/2020     PTT:    Lab Results   Component Value Date    APTT 32.8 03/18/2020       Comprehensive Metabolic Profile:   Recent Labs     03/19/20 2157 03/20/20 2335 03/21/20  0400 03/21/20  0631   *   < > 133* 133* 132*   K 4.3   < > 3.5* 3.6* 4.1   CL 80*   < > 86* 86* 85*   CO2 28   < > 34* 33* 35*   BUN 50*   < > 27* 23* 21*   CREATININE 1.80*   < > 1.07 0.96 1.04   GLUCOSE 71   < > 84 75 78   CALCIUM 8.5*   < > 7.2* 7.5* 7.9*   PROT 6.2*  --   --   --   --    LABALBU 3.5  --   --   --   --    BILITOT 0.68  --   --   --   --    ALKPHOS 193*  --   --   --   --

## 2020-03-21 NOTE — CARE COORDINATION
Transition planning pt note reviewed met with patient today to discuss dc needs. Goal home with family & home care gave list    Patient/family seen: Yes       Informed patient/family of [facility] Medical Bundle Program with potential outreach by either Care Transitions Team or navealth Team based on hospital admission and location.        [facility]PCI-A Notification Letter given: Yes         Current discharge plan: return home w/ family and possibly home care

## 2020-03-21 NOTE — PROGRESS NOTES
Occupational Therapy   Occupational Therapy Initial Assessment  Date: 3/21/2020   Patient Name: Zoë Woodruff  MRN: [id number]     : 1965    Date of Service: 3/21/2020    Discharge Recommendations:  Further therapy recommended at discharge. Assessment   Performance deficits / Impairments: Decreased functional mobility ; Decreased endurance;Decreased ADL status; Decreased balance;Decreased high-level IADLs  Prognosis: Good  Decision Making: Medium Complexity  OT Education: OT Role;Plan of Care  REQUIRES OT FOLLOW UP: Yes  Activity Tolerance  Activity Tolerance: Patient Tolerated treatment well  Safety Devices  Safety Devices in place: Yes  Type of devices: Left in chair;Nurse notified;Gait belt; Chair alarm in place;Call light within reach; All fall risk precautions in place  Restraints  Initially in place: No         Patient Diagnosis(es): There were no encounter diagnoses. has a past medical history of Alcoholic cirrhosis ([facility] [address] Back pain, Bipolar 1 disorder ([facility] [address] CAD (coronary artery disease), CHF (congestive heart failure) ([facility] [address] [facility] ([facility] and Seizures ([facility] has a past surgical history that includes Toe Surgery; Tooth Extraction; and Cardiac catheterization (Left, 2019). Restrictions  Restrictions/Precautions  Restrictions/Precautions: General Precautions, Fall Risk  Position Activity Restriction  Other position/activity restrictions: up with assistance     Subjective   General  Patient assessed for rehabilitation services?: Yes  Family / Caregiver Present: No  Patient Currently in Pain: Yes  Pain Assessment  Pain Assessment: 0-10  Pain Level: 8  Pain Type: Acute pain  Pain Location: Foot  Pain Orientation: Left;Right  Non-Pharmaceutical Pain Intervention(s): Ambulation/Increased Activity; Emotional support  Response to Pain Intervention: Patient Satisfied  Vital Signs  Pulse: 93  Heart Rate Source: Monitor  Resp: 20  Patient Currently in Pain: Yes  Oxygen Therapy  SpO2: 98 %  Pulse Oximeter Device Mode: Continuous  Pulse Oximeter Device Location: Finger  O2 Device: Nasal cannula  O2 Flow Rate (L/min): 2 L/min     Social/Functional History  Social/Functional History  Lives With: Alone  Type of Home: Apartment  Home Layout: One level  Home Access: Stairs to enter with rails  Entrance Stairs - Number of Steps: 4  Entrance Stairs - Rails: Left  Bathroom Shower/Tub: Tub/Shower unit  Bathroom Toilet: Standard  Receives Help From: Family, Friend(s)  ADL Assistance: Independent  Homemaking Assistance: Independent  Homemaking Responsibilities: Yes  Ambulation Assistance: Independent  Transfer Assistance: Independent  Active : No  Patient's  Info: friends and family assist   Occupation: On disability  Leisure & Hobbies: TV, radio     Objective   Vision: Impaired  Vision Exceptions: Wears glasses at all times  Hearing: Within functional limits    Orientation  Overall Orientation Status: Within Functional Limits     Balance  Sitting Balance: Independent  Standing Balance: Contact guard assistance  Standing Balance  Time: ~2 minutes  Activity: functional mobility, static standing  Functional Mobility  Functional - Mobility Device: No device  Activity: Other(several steps by chair)  Assist Level: Contact guard assistance  ADL  Feeding: Independent  Grooming: Setup; Independent(pt brushing teeth, washing face, combing hair)  UE Bathing: Setup;Stand by assistance  LE Bathing: Setup;Stand by assistance  UE Dressing: Setup;Stand by assistance  LE Dressing: Setup;Stand by assistance  Toileting: Stand by assistance  Tone RUE  RUE Tone: Normotonic  Tone LUE  LUE Tone: Normotonic  Coordination  Movements Are Fluid And Coordinated: Yes     Bed mobility  Comment: pt up in chair upon arrival and exit  Transfers  Sit to stand: Stand by assistance  Stand to sit: Stand by assistance     Cognition  Overall Cognitive Status: [facility]  Perception  Overall Perceptual Status: [facility] Sensation  Overall Sensation Status: WFL        LUE AROM (degrees)  LUE AROM : WFL  Left Hand AROM (degrees)  Left Hand AROM: WFL  RUE AROM (degrees)  RUE AROM : WFL  Right Hand AROM (degrees)  Right Hand AROM: WFL  LUE Strength  Gross LUE Strength: WFL  RUE Strength  Gross RUE Strength: WFL         Plan   Plan  Times per week: 3-4x    AM-PAC Score   AM-PAC Inpatient Daily Activity Raw Score: 19 (03/21/20 1513)  AM-PAC Inpatient ADL T-Scale Score : 40.22 (03/21/20 1513)  ADL Inpatient CMS 0-100% Score: 42.8 (03/21/20 1513)  ADL Inpatient CMS G-Code Modifier : CK (03/21/20 1513)    Goals  Short term goals  Time Frame for Short term goals: by discharge pt amina Parmar Flies term goal 1: demo independent transfers  Short term goal 2: demo independent functional mobility  Short term goal 3: demo UB/LB ADL routine with set up independently  Short term goal 4: demo 10+ minutes standing balance to engage in functional tasks with supervision  Short term goal 5: demo 20+ minutes of activity tolerance        Therapy Time   Individual Concurrent Group Co-treatment   Time In 1200         Time Out 1226         Minutes 26         Timed Code Treatment Minutes: [address] OTR/L

## 2020-03-21 NOTE — PROGRESS NOTES
Renal Consult Note      Subjective:       Patient seen and examined. Off levophed, on dobutamine gtt  On lasix gtt 7.5 liters past 24 hours    Ate well, no emesis, no diarrhea  Cr stable, K 4.1 this am    Objective:     Current Medications:     Scheduled Meds:    midodrine  10 mg Oral TID WC    [Held by provider] spironolactone  25 mg Oral Daily    aspirin  81 mg Oral Daily    atorvastatin  40 mg Oral Daily    clopidogrel  75 mg Oral Daily    divalproex  1,500 mg Oral Daily    doxepin  50 mg Oral Nightly    folic acid  1 mg Oral Daily    levothyroxine  50 mcg Oral Daily    multivitamin  1 tablet Oral Daily    thiamine  100 mg Oral Daily    sodium chloride flush  10 mL Intravenous 2 times per day    heparin (porcine)  5,000 Units Subcutaneous 3 times per day     Continuous Infusions:    norepinephrine Stopped (20 184)    DOBUTamine 5 mcg/kg/min (20 0233)    furosemide (LASIX) 1mg/ml infusion 10 mg/hr (20 0345)     PRN Meds:  sodium chloride flush, acetaminophen **OR** acetaminophen, polyethylene glycol, promethazine **OR** ondansetron, potassium chloride      Input/Output:       I/O last 3 completed shifts: In: 7612 [P.O.:2210; I.V.:1258]  Out: 26 [Urine:7510]  Patient Vitals for the past 96 hrs (Last 3 readings):   Weight   20 1915 259 lb 14.8 oz (117.9 kg)     Vital Signs:   Temperature:  Temp: 97.5 °F (36.4 °C)  TMax:   Temp (24hrs), Av.4 °F (36.3 °C), Min:97.3 °F (36.3 °C), Max:97.5 °F (36.4 °C)    Respirations:  Resp: 16  Pulse:   Pulse: 87  BP:    BP: 102/78  BP Range: Systolic (89AMD), DCS:464 , Min:102 , LNL:169       Diastolic (64LLO), CJV:49, Min:78, Max:78      Physical Examination:     General:  AAO x 3, speaking in full sentences, no accessory muscle use  HEENT: Atraumatic, normocephalic, no throat congestion, moist mucosa. Eyes:   Pupils equal, round and reactive to light, EOMI.   Neck:   + JVD  Chest:    Bilateral vesicular breath sounds, no rales or 02/26/2020    BILIRUBINUR NEGATIVE 02/26/2020    GLUCOSEU NEGATIVE 02/26/2020    KETUA NEGATIVE 02/26/2020    AMORPHOUS NOT REPORTED 02/26/2020     Urine Sodium:     Lab Results   Component Value Date    VICENTA 56 03/20/2020     Urine Potassium:  No results found for: KUR  Urine Chloride:  No results found for: CLUR  Urine Osmolarity:   Lab Results   Component Value Date    OSMOU 255 03/20/2020     Urine Protein:   No results found for: TPU  Urine Creatinine:     Lab Results   Component Value Date    LABCREA 79.3 02/27/2020     UPC:     Urine Eosinophils:  No components found for: UEOS    Radiology:     CXR:     Assessment:     1. Acute Kidney Injury: Secondary to prerenal azotemia/ischemic ATN from hypotension, cardiogenic shock and low flow. Baseline creatinine 0.8-0.9 peaked up to 1.8 now resolving with diuresis and improved cardiac function  2. Cardiogenic shock from noncompliance in setting of severe cardiomyopathy  3. Decompensated biventricular failure. Ischemic CMP EF 17%  4. Hypervolemic hyponatremia  5. Cirrhosis on CT   7. Hypokalemia improving appropriately. Plan:     1. Recheck lytes in evening and inform  2. Switch to PO Bumex and  Continue Aldactone  3. Will follow    Nutrition   Please ensure that patient is on a renal diet/TF. Avoid nephrotoxic drugs/contrast exposure. Will discuss with Dr. Denise Emmanuel, CAM  [facility]    Attending Physician Statement  I have discussed the care of Elvin White, including pertinent history and exam findings,  with the CNP. I have reviewed the key elements of all parts of the encounter with the CNP. I agree with the assessment, plan and orders as documented.     Delano Gotti MD, MRCP Ena Smith, FACP   3/21/2020 1:27 PM    Nephrology [address] [place] [address]

## 2020-03-22 LAB
ANION GAP SERPL CALCULATED.3IONS-SCNC: 12 MMOL/L (ref 9–17)
ANION GAP SERPL CALCULATED.3IONS-SCNC: 13 MMOL/L (ref 9–17)
BUN BLDV-MCNC: 15 MG/DL (ref 6–20)
BUN BLDV-MCNC: 19 MG/DL (ref 6–20)
BUN/CREAT BLD: ABNORMAL (ref 9–20)
BUN/CREAT BLD: ABNORMAL (ref 9–20)
CALCIUM SERPL-MCNC: 7.7 MG/DL (ref 8.6–10.4)
CALCIUM SERPL-MCNC: 7.9 MG/DL (ref 8.6–10.4)
CHLORIDE BLD-SCNC: 83 MMOL/L (ref 98–107)
CHLORIDE BLD-SCNC: 84 MMOL/L (ref 98–107)
CO2: 33 MMOL/L (ref 20–31)
CO2: 35 MMOL/L (ref 20–31)
CREAT SERPL-MCNC: 0.76 MG/DL (ref 0.7–1.2)
CREAT SERPL-MCNC: 0.99 MG/DL (ref 0.7–1.2)
GFR AFRICAN AMERICAN: >60 ML/MIN
GFR AFRICAN AMERICAN: >60 ML/MIN
GFR NON-AFRICAN AMERICAN: >60 ML/MIN
GFR NON-AFRICAN AMERICAN: >60 ML/MIN
GFR SERPL CREATININE-BSD FRML MDRD: ABNORMAL ML/MIN/{1.73_M2}
GLUCOSE BLD-MCNC: 109 MG/DL (ref 70–99)
GLUCOSE BLD-MCNC: 71 MG/DL (ref 70–99)
MAGNESIUM: 1.9 MG/DL (ref 1.6–2.6)
POTASSIUM SERPL-SCNC: 3.3 MMOL/L (ref 3.7–5.3)
POTASSIUM SERPL-SCNC: 3.5 MMOL/L (ref 3.7–5.3)
SODIUM BLD-SCNC: 129 MMOL/L (ref 135–144)
SODIUM BLD-SCNC: 131 MMOL/L (ref 135–144)

## 2020-03-22 PROCEDURE — 6370000000 HC RX 637 (ALT 250 FOR IP): Performed by: INTERNAL MEDICINE

## 2020-03-22 PROCEDURE — 83735 ASSAY OF MAGNESIUM: CPT

## 2020-03-22 PROCEDURE — 6370000000 HC RX 637 (ALT 250 FOR IP): Performed by: STUDENT IN AN ORGANIZED HEALTH CARE EDUCATION/TRAINING PROGRAM

## 2020-03-22 PROCEDURE — 99233 SBSQ HOSP IP/OBS HIGH 50: CPT | Performed by: INTERNAL MEDICINE

## 2020-03-22 PROCEDURE — 2000000000 HC ICU R&B

## 2020-03-22 PROCEDURE — 80048 BASIC METABOLIC PNL TOTAL CA: CPT

## 2020-03-22 PROCEDURE — 36415 COLL VENOUS BLD VENIPUNCTURE: CPT

## 2020-03-22 PROCEDURE — 2580000003 HC RX 258: Performed by: STUDENT IN AN ORGANIZED HEALTH CARE EDUCATION/TRAINING PROGRAM

## 2020-03-22 PROCEDURE — 6360000002 HC RX W HCPCS: Performed by: STUDENT IN AN ORGANIZED HEALTH CARE EDUCATION/TRAINING PROGRAM

## 2020-03-22 RX ORDER — MIDODRINE HYDROCHLORIDE 5 MG/1
10 TABLET ORAL
Status: DISCONTINUED | OUTPATIENT
Start: 2020-03-23 | End: 2020-03-23 | Stop reason: HOSPADM

## 2020-03-22 RX ORDER — POTASSIUM CHLORIDE 20 MEQ/1
40 TABLET, EXTENDED RELEASE ORAL ONCE
Status: COMPLETED | OUTPATIENT
Start: 2020-03-22 | End: 2020-03-22

## 2020-03-22 RX ORDER — MIDODRINE HYDROCHLORIDE 5 MG/1
5 TABLET ORAL
Status: DISCONTINUED | OUTPATIENT
Start: 2020-03-22 | End: 2020-03-22

## 2020-03-22 RX ADMIN — CLOPIDOGREL 75 MG: 75 TABLET, FILM COATED ORAL at 09:45

## 2020-03-22 RX ADMIN — BUMETANIDE 2 MG: 1 TABLET ORAL at 21:17

## 2020-03-22 RX ADMIN — MIDODRINE HYDROCHLORIDE 5 MG: 5 TABLET ORAL at 09:45

## 2020-03-22 RX ADMIN — FOLIC ACID 1 MG: 1 TABLET ORAL at 09:46

## 2020-03-22 RX ADMIN — MIDODRINE HYDROCHLORIDE 10 MG: 5 TABLET ORAL at 18:00

## 2020-03-22 RX ADMIN — THERA TABS 1 TABLET: TAB at 09:46

## 2020-03-22 RX ADMIN — SODIUM CHLORIDE, PRESERVATIVE FREE 10 ML: 5 INJECTION INTRAVENOUS at 20:38

## 2020-03-22 RX ADMIN — POTASSIUM CHLORIDE 40 MEQ: 1500 TABLET, EXTENDED RELEASE ORAL at 10:16

## 2020-03-22 RX ADMIN — METOPROLOL TARTRATE 12.5 MG: 25 TABLET ORAL at 11:53

## 2020-03-22 RX ADMIN — DESMOPRESSIN ACETATE 40 MG: 0.2 TABLET ORAL at 09:45

## 2020-03-22 RX ADMIN — Medication 81 MG: at 09:45

## 2020-03-22 RX ADMIN — HEPARIN SODIUM 5000 UNITS: 5000 INJECTION INTRAVENOUS; SUBCUTANEOUS at 05:34

## 2020-03-22 RX ADMIN — Medication 100 MG: at 09:48

## 2020-03-22 RX ADMIN — HEPARIN SODIUM 5000 UNITS: 5000 INJECTION INTRAVENOUS; SUBCUTANEOUS at 21:18

## 2020-03-22 RX ADMIN — MIDODRINE HYDROCHLORIDE 10 MG: 5 TABLET ORAL at 11:53

## 2020-03-22 RX ADMIN — DIVALPROEX SODIUM 1500 MG: 500 TABLET, DELAYED RELEASE ORAL at 09:46

## 2020-03-22 RX ADMIN — DOXEPIN HYDROCHLORIDE 50 MG: 25 CAPSULE ORAL at 21:17

## 2020-03-22 RX ADMIN — LEVOTHYROXINE SODIUM 50 MCG: 50 TABLET ORAL at 05:34

## 2020-03-22 RX ADMIN — SODIUM CHLORIDE, PRESERVATIVE FREE 10 ML: 5 INJECTION INTRAVENOUS at 09:48

## 2020-03-22 RX ADMIN — BUMETANIDE 2 MG: 1 TABLET ORAL at 09:46

## 2020-03-22 ASSESSMENT — ENCOUNTER SYMPTOMS
VOMITING: 0
NAUSEA: 0
EYE DISCHARGE: 0
CHEST TIGHTNESS: 0
COUGH: 0
SHORTNESS OF BREATH: 0
DIARRHEA: 0
PHOTOPHOBIA: 0
BACK PAIN: 0
ABDOMINAL PAIN: 0
RHINORRHEA: 0

## 2020-03-22 NOTE — PROGRESS NOTES
[facility]   Progress Note                   Date:   3/22/2020  Patient name: Camille Elias  Date of admission:  3/19/2020  7:06 PM  MRN:   [id number]  YOB: 1965  PCP: CAM Lerma CNP    Reason for Admission:      Subjective:   Pt is lying on bed. Frequent PVCs noted  On dobutamine gtt  Still has pedal edema and rales        Medications:   Scheduled Meds:   potassium chloride  40 mEq Oral Once    midodrine  5 mg Oral TID WC    bumetanide  2 mg Oral BID    [Held by provider] spironolactone  25 mg Oral Daily    aspirin  81 mg Oral Daily    atorvastatin  40 mg Oral Daily    clopidogrel  75 mg Oral Daily    divalproex  1,500 mg Oral Daily    doxepin  50 mg Oral Nightly    folic acid  1 mg Oral Daily    levothyroxine  50 mcg Oral Daily    multivitamin  1 tablet Oral Daily    thiamine  100 mg Oral Daily    sodium chloride flush  10 mL Intravenous 2 times per day    heparin (porcine)  5,000 Units Subcutaneous 3 times per day       Continuous Infusions:   DOBUTamine Stopped (03/22/20 0528)       CBC:   Recent Labs     03/19/20  2157 03/20/20  0403 03/21/20  0631   WBC 5.4 4.4 5.4   HGB 11.4* 10.5* 11.2*    See Reflexed IPF Result 127*     BMP:    Recent Labs     03/21/20  1626 03/21/20  2327 03/22/20  0703   * 131* 129*   K 3.3* 3.5* 3.3*   CL 84* 83* 84*   CO2 35* 35* 33*   BUN 20 19 15   CREATININE 1.13 0.99 0.76   GLUCOSE 94 109* 71     Hepatic:   Recent Labs     03/19/20  2157   *   *   BILITOT 0.68   ALKPHOS 193*     Troponin: No results for input(s): TROPONINI in the last 72 hours. BNP: No results for input(s): BNP in the last 72 hours. Lipids:   No results for input(s): CHOL, HDL in the last 72 hours.     Invalid input(s): LDLCALCU  INR:   Recent Labs     03/19/20  2346   INR 1.3       Objective:   Vitals: BP (!) 104/58   Pulse 86   Temp 98.3 °F (36.8 °C)   Resp 18   Ht 6' 0.01\" (1.829 m)   Wt 248 lb 0.3 oz (112.5 kg) SpO2 94%   BMI 33.63 kg/m²     General appearance: awake, alert, in no apparent respiratory distress   HEENT: Head: Normocephalic, no lesions, without obvious abnormality  Neck: minimum JVD elevation  Lungs: basal rales present  Heart: regular rate and rhythm, S1, S2 normal, no murmur, click, rub or gallop  Abdomen: soft, non-tender; bowel sounds normal  Extremities: b/l leg edema  Neurologic: Mental status: Alert, oriented. Motor and sensory not done. EKG:       Coronary Angiography:   ECHO 2/28/2020: EF 17%, apical wall akinesis without definite evidence of clot.      CATH 2/12/2020: % proximal stenosis proximal to previous stent, not able to wire  into LAD despite multiple attempts and wires, successful wiring into Diagonal. Multiple PTCA in proximal LAD and diagonal with no regain of flow. Assessment:   1. Acute on chronic combined systolic and diastolic heart failure  2. Ischemic cardiomyopathy with EF 17%  3. CAD s/p unsuccessful PTCA attempt to LAD on recent cath  4. GREGORY on CKD stage III  5. Alcohol use  6. Cirrhosis  7. Seizures  8. Hyponatremia  9. Hypokalemia            Treatment Plan:   1. Off levophed( 3/21). Will stop dobutamine. Will decrease the midodrine to 5 mg TID. MAP goal is 65. Will need more diuresis. 2. K replacement. 3. Not on ACE or aldactone yet. 4. Will add lopressor 12.5 mg BID in afternoon if BP >100.  5. Will continue with DAPT and lipitor. Discussed with patient and nursing. Alanis Woods MD  [facility] cardiology    Attending Physician Statement  I have discussed the care of Harland Bamberger, including pertinent history and exam findings,  with the resident. I have seen and examined the patient and the key elements of all parts of the encounter have been performed by me. I agree with the assessment, plan and orders as documented by the resident.

## 2020-03-22 NOTE — PROGRESS NOTES
Critical Care Team - Daily Progress Note      Date and time: 3/22/2020 8:35 AM  Patient's name:  Fran Umana  Medical Record Number: [id number]  Patient's account/billing number: [de-identified]  Patient's YOB: 1965  Age: 49 y.o.   Date of Admission: 3/19/2020  7:06 PM  Length of stay during current admission: 3      Primary Care Physician: CAM Gomez CNP  ICU Attending Physician: Dr. Ina Nassar Status: Full Code    Reason for ICU admission: Cardiogenic shock      SUBJECTIVE:     OVERNIGHT EVENTS:         No acute issues overnight  Off Dobutamine and Lasix drip  Afebrile  Vitals stable  No nausea, vomiting, or diarrhea reported   No chest pain or shortness of breath   Edema improving        Intake/Output Summary (Last 24 hours) at 3/22/2020 0835  Last data filed at 3/22/2020 8693  Gross per 24 hour   Intake 610 ml   Output 3055 ml   Net -2445 ml        AWAKE & FOLLOWING COMMANDS:  [] No   [x] Yes    CURRENT VENTILATION STATUS:     [] Ventilator  [] BIPAP  [x] Nasal Cannula [] Room Air      IF INTUBATED, ET TUBE MARKING AT LOWER LIP:       cms    SECRETIONS Amount:  [] Small [] Moderate  [] Large  [x] None  Color:     [] White [] Colored  [] Bloody    SEDATION:  RAAS Score:  [] Propofol gtt  [] Versed gtt  [] Ativan gtt   [x] No Sedation    PARALYZED:  [x] No    [] Yes    DIARRHEA:                [x] No                [] Yes  (C. Difficile status: [] positive                                                                                                                       [] negative                                                                                                                     [] pending)    VASOPRESSORS:  [x] No    [] Yes    If yes -   [] Levophed       [] Dopamine     [] Vasopressin       [] Dobutamine  [] Phenylephrine         [] Epinephrine    CENTRAL LINES:     [] No   [] Yes   (Date of Insertion:   )           If yes -     [x] Right IJ     [] Left IJ [] Right Femoral [] Left Femoral                   [] Right Subclavian [] Left Subclavian       MOLINA'S CATHETER:   [] No   [x] Yes  (Date of Insertion:   )     URINE OUTPUT:            [x] Good   [] Low              [] Anuric      OBJECTIVE:     VITAL SIGNS:  BP (!) 104/58   Pulse 86   Temp 98.3 °F (36.8 °C)   Resp 18   Ht 6' 0.01\" (1.829 m)   Wt 248 lb 0.3 oz (112.5 kg)   SpO2 94%   BMI 33.63 kg/m²   Tmax over 24 hours:  Temp (24hrs), Av.5 °F (36.4 °C), Min:97 °F (36.1 °C), Max:98.3 °F (36.8 °C)      Patient Vitals for the past 6 hrs:   BP Temp Pulse SpO2 Weight   20 0600 (!) 104/58 -- 86 94 % --   20 0500 129/71 -- 88 99 % --   20 0400 132/83 98.3 °F (36.8 °C) 87 98 % 248 lb 0.3 oz (112.5 kg)   20 0300 -- -- 83 97 % --         Intake/Output Summary (Last 24 hours) at 3/22/2020 0835  Last data filed at 3/22/2020 0528  Gross per 24 hour   Intake 610 ml   Output 3055 ml   Net -2445 ml     Wt Readings from Last 2 Encounters:   20 248 lb 0.3 oz (112.5 kg)   20 267 lb 12.8 oz (121.5 kg)     Body mass index is 33.63 kg/m². PHYSICAL EXAMINATION:  HENT:      Head: Normocephalic. Eyes:      Pupils: Pupils are equal, round, and reactive to light. Neck:      Comments: JVP elevated  Cardiovascular:      Rate and Rhythm: Normal rate. Pulses: Normal pulses. Pulmonary:      Effort: Pulmonary effort is normal. No respiratory distress. Breath sounds: No wheezing or rales. Abdominal:      General: Abdomen is flat. Tenderness: There is no abdominal tenderness. Genitourinary:     Comments: Scrotal swelling  Musculoskeletal: Normal range of motion. General: Swelling present. Right lower leg: Edema present. Left lower leg: Edema present. Comments: 2+ b/l pedal edema   Skin:     General: Skin is warm. Coloration: Skin is not jaundiced. Findings: No bruising or erythema. Neurological:      General: No focal deficit present. hold BB and ACE  5. Heparin for DVT prophylaxis  6. Continue Folic acid and thiamine and MVI    Okay to transfer out of Freya Godoy M.D.              Critical care resident,  Department of Internal Medicine/ Critical care  [facility])             3/22/2020, 8:35 AM

## 2020-03-22 NOTE — PROGRESS NOTES
Senior Note    Patient 47M PMH severe CHF EF 17%, ischemic cardiomyopathy, severe CAD s/p stent with most recent cath showign 100% LAD block, seizure, bipolar presented as transfer from [place] for cardiogenic shock. Pt initially presented to [place] for scrotal swelling, Pt given diuretics for decompensated heart failure likely form medication noncompliance, noted hypotension, transferred to [facility] for further management. Pt arrived on levophed, bloodwork showed hyponatremia, improving BNP from previous admission, but worsened creatinine. Cardiology and nephrology consulted. Cardiology recommended starting dobutamine gtt, nephrology started Lasix gtt. BP improved, levophed weaned off and midodrine started. Urine output imrpoved, changed to oral Bumex. BP improved, weaned of dobutamine, aldactone restarted. Creatinine trended down. Hyponatremia improved but still somewhat low. At this point patient deemed stable enough for stepdown. 1. Cardiogenic shock: resolved, likely from decompensated CHF due to noncompliance with medication, diuresis with Lasix IV, improved, switched to Bumex PO, on dobutamine and Levophed, BP improved, weaned off and started on midodrine, BP stable at this time  2. Acute on chronic CHF: on Bumex PO, lopressor, u/o 3.1L in last 24hrs, cxr shows improvement, weight decreased   3. CAD s/p stent: aspirin, lipitor  4. Seizure: on depakote  5. Hypothyroidism: on synthroid  6.  HTN: on lopressor

## 2020-03-22 NOTE — PROGRESS NOTES
[facility]   Department of Internal Medicine - Staff Internal Medicine Teaching Service         ICU PATIENT TRANSFER NOTE    Date: 3/22/2020  Patient Name: Lucian Coates  MRN: [id number]  Acct: [de-identified]   Date of Admission: 3/19/2020  7:06 PM  YOB: 1965  Primary Care Physician: CAM Skelton - TENA  Attending Physician: Chon Jeffrey MD   History Obtained From:  Patient, Electronic Medical Records    Chief Complaint     Hypotension    Reason For ICU Admission     [facility]     Lucian Coates is a 49 y.o. male with past medical history significant for systolic congestive heart failure secondary to ischemic cardiomyopathy, coronary artery disease with multiple stenting in the past. Patient has had several recent admissions at [facility] and [facility]. Most recently he was admitted and managed for decompensated heart failure at the [facility]. He was diuresed and the decision was made to transfer him to the [facility] ICU after his blood pressure decreased. He was started on Norepinephrine infusion and transferred. On arrival to the ICU he had mean blood pressure around 60s, he did not complain of any cough, chest pain or shortness of breath. Patient was continued on Norepinephrine and Lasix infusion. Dobutamine was started. Central line was placed for pressor support. Nephrology was consulted for elevated creatinine. Cardiology was consulted due to his cardiac comorbidities and presentation with cardiogenic shock. The next day, patient was able to be weaned off pressors. Patient was switched to Bumex 2mg BiD from the Lasix infusion. He was transferred out of the ICU. Review of Systems:  Review of Systems   Constitutional: Negative for activity change, appetite change, chills, diaphoresis, fever and unexpected weight change. HENT: Negative for ear discharge, ear pain, rhinorrhea and tinnitus. Eyes: Negative for photophobia and discharge. Respiratory: Negative for cough, chest tightness and shortness of breath. Cardiovascular: Positive for leg swelling. Negative for chest pain. Gastrointestinal: Negative for abdominal pain, diarrhea, nausea and vomiting. Genitourinary: Positive for scrotal swelling. Negative for dysuria and frequency. Musculoskeletal: Negative for back pain and neck pain. Neurological: Negative for seizures, weakness and headaches. Psychiatric/Behavioral: Negative for agitation and confusion. Medications   Home:  Prior to Admission medications    Medication Sig Start Date End Date Taking? Authorizing Provider   levothyroxine (SYNTHROID) 50 MCG tablet Take 1 tablet by mouth Daily 3/20/20   CAM Waller - CNP   bumetanide (BUMEX) 2 MG tablet Take 1 tablet by mouth daily 3/5/20   Riki Maria MD   folic acid (FOLVITE) 1 MG tablet Take 1 tablet by mouth daily 3/5/20   Riki Maria MD   ondansetron (ZOFRAN-ODT) 4 MG disintegrating tablet Take 1 tablet by mouth every 8 hours as needed for Nausea or Vomiting 3/4/20   Riki Maria MD   vitamin B-1 100 MG tablet Take 1 tablet by mouth daily 3/5/20   Riki Maria MD   enoxaparin (LOVENOX) 30 MG/0.3ML injection Inject 0.3 mLs into the skin 2 times daily 3/4/20   Riki Maria MD   Multiple Vitamin (MULTIVITAMIN) tablet Take 1 tablet by mouth daily 3/5/20   Riki Maria MD   polyethylene glycol (GLYCOLAX) packet Take 17 g by mouth daily as needed for Constipation 3/4/20 4/3/20  Riki Maria MD   nitroGLYCERIN (NITROSTAT) 0.4 MG SL tablet up to max of 3 total doses.  If no relief after 1 dose, call 911. 2/24/20   Doris Caldwell MD   atorvastatin (LIPITOR) 40 MG tablet Take 40 mg by mouth daily 1/14/20   Historical Provider, MD   doxepin (SINEQUAN) 50 MG capsule Take 50 mg by mouth 1/8/20   Historical Provider, MD   losartan (COZAAR) 25 MG tablet Take 0.5 tablets by mouth daily 1/20/20   Elly Zhou MD Iman   metoprolol tartrate (LOPRESSOR) 25 MG tablet Take 0.5 tablets by mouth 2 times daily 11/26/19   Sheryl Villavicencio MD   clopidogrel (PLAVIX) 75 MG tablet Take 1 tablet by mouth daily 10/2/19   Sheryl Villavicencio MD   aspirin 81 MG EC tablet Take 1 tablet by mouth daily 9/20/19   CAM Crawley CNP   famotidine (PEPCID) 20 MG tablet Take 1 tablet by mouth 2 times daily 9/19/19   CAM Crawley CNP   nicotine (NICODERM CQ) 21 MG/24HR Place 1 patch onto the skin daily 9/20/19   CAM Crawley CNP   benztropine (COGENTIN) 1 MG tablet Take 1 mg by mouth 2 times daily    Historical Provider, MD   divalproex (DEPAKOTE) 500 MG EC tablet Take 1,500 mg by mouth daily.       Historical Provider, MD     Current Inpatient:  Scheduled Meds:   midodrine  5 mg Oral TID WC    metoprolol tartrate  12.5 mg Oral BID    bumetanide  2 mg Oral BID    spironolactone  25 mg Oral Daily    aspirin  81 mg Oral Daily    atorvastatin  40 mg Oral Daily    clopidogrel  75 mg Oral Daily    divalproex  1,500 mg Oral Daily    doxepin  50 mg Oral Nightly    folic acid  1 mg Oral Daily    levothyroxine  50 mcg Oral Daily    multivitamin  1 tablet Oral Daily    thiamine  100 mg Oral Daily    sodium chloride flush  10 mL Intravenous 2 times per day    heparin (porcine)  5,000 Units Subcutaneous 3 times per day     Continuous Infusions:   DOBUTamine Stopped (03/22/20 0528)     PRN Meds:  sodium chloride flush, acetaminophen **OR** acetaminophen, polyethylene glycol, promethazine **OR** ondansetron, potassium chloride    Physical Examination     Patient Vitals for the past 24 hrs:   BP Temp Temp src Pulse Resp SpO2 Weight   03/22/20 1300 98/69 97.3 °F (36.3 °C) Oral 97 -- 100 % --   03/22/20 1100 (!) 83/51 -- -- 87 -- -- --   03/22/20 1000 88/66 -- -- 90 -- -- --   03/22/20 0912 109/70 97.6 °F (36.4 °C) -- 86 -- -- --   03/22/20 0900 113/68 -- -- 87 -- -- --   03/22/20 0800 109/70 -- -- 94 -- 94 % -- General: Bowel sounds are normal. There is no distension. Palpations: Abdomen is soft. Musculoskeletal: Normal range of motion. General: No deformity. Right lower leg: Edema present. Left lower leg: Edema present. Skin:     General: Skin is warm and dry. Neurological:      Mental Status: He is alert. Laboratory Results   CBC:   Recent Labs     03/19/20  2157 03/20/20  0403 03/21/20  0631   WBC 5.4 4.4 5.4   RBC 4.02* 3.85* 4.10*   HGB 11.4* 10.5* 11.2*   HCT 36.0* 34.5* 37.3*   MCV 89.6 89.6 91.0   RDW 18.0* 17.8* 17.6*    See Reflexed IPF Result 127*     BMP:   Recent Labs     03/21/20  1626 03/21/20  2327 03/22/20  0703   * 131* 129*   K 3.3* 3.5* 3.3*   CL 84* 83* 84*   CO2 35* 35* 33*   BUN 20 19 15   CREATININE 1.13 0.99 0.76     INR:   Recent Labs     03/19/20  2346   INR 1.3     Hepatic:   Recent Labs     03/19/20 2157   ALKPHOS 193*   *   *   PROT 6.2*   BILITOT 0.68   LABALBU 3.5     Lactic Acid:   Recent Labs     03/19/20 2157   LACTA NOT REPORTED     ABGs: No results found for: PH, PCO2, PO2, HCO3, O2SAT  Urinalysis: No results for input(s): BACTERIA, BLOODU, CLARITYU, COLORU, PHUR, PROTEINU, RBCUA, SPECGRAV, BILIRUBINUR, NITRU, WBCUA, LEUKOCYTESUR, GLUCOSEU in the last 72 hours. Imaging Results   Xr Chest Standard (2 Vw)    Result Date: 3/18/2020  Cardiomegaly. No radiographic evidence of acute pulmonary disease. Xr Chest Portable    Result Date: 3/20/2020  Central line tip in the distal SVC with no pneumothorax. Stable cardiomegaly. Xr Chest Portable    Result Date: 3/19/2020  No acute process.      Assessment and Plan     Principal Problem:    Cardiogenic shock ([address]  Active Problems:    Acute on chronic congestive heart failure (HCC)    Portal hypertension (HCC)    Alcoholic cirrhosis of liver without ascites (HCC)    Idiopathic cardiomyopathy (HCC)    CAD (coronary artery disease)    Bipolar disorder ([facility]

## 2020-03-22 NOTE — PROGRESS NOTES
Renal Progress Note      Subjective:       Patient seen and examined. Remains off levophed, cardiology d/c dobutamine gtt  Off lasix gtt, Bumex 2mg BID started yesterday, 3.1 liters past 24 hours    Ate well, no emesis, no diarrhea  Cr stable/improved, K 3.3 this am - replaced    Objective:     Current Medications:     Scheduled Meds:    potassium chloride  40 mEq Oral Once    midodrine  5 mg Oral TID WC    bumetanide  2 mg Oral BID    [Held by provider] spironolactone  25 mg Oral Daily    aspirin  81 mg Oral Daily    atorvastatin  40 mg Oral Daily    clopidogrel  75 mg Oral Daily    divalproex  1,500 mg Oral Daily    doxepin  50 mg Oral Nightly    folic acid  1 mg Oral Daily    levothyroxine  50 mcg Oral Daily    multivitamin  1 tablet Oral Daily    thiamine  100 mg Oral Daily    sodium chloride flush  10 mL Intravenous 2 times per day    heparin (porcine)  5,000 Units Subcutaneous 3 times per day     Continuous Infusions:    DOBUTamine Stopped (20 0528)     PRN Meds:  sodium chloride flush, acetaminophen **OR** acetaminophen, polyethylene glycol, promethazine **OR** ondansetron, potassium chloride      Input/Output:       I/O last 3 completed shifts: In: 56 [P.O.:600; I.V.:10]  Out: 3155 [Urine:3155]  Patient Vitals for the past 96 hrs (Last 3 readings):   Weight   20 0400 248 lb 0.3 oz (112.5 kg)   20 1915 259 lb 14.8 oz (117.9 kg)     Vital Signs:   Temperature:  Temp: 98.3 °F (36.8 °C)  TMax:   Temp (24hrs), Av.5 °F (36.4 °C), Min:97 °F (36.1 °C), Max:98.3 °F (36.8 °C)    Respirations:  Resp: 18  Pulse:   Pulse: 86  BP:    BP: (!) 104/58  BP Range: Systolic (18ZRX), TKA:548 , Min:100 , KAU:780       Diastolic (37RJW), UCC:79, Min:57, Max:83      Physical Examination:     General:  AAO x 3, speaking in full sentences, no accessory muscle use  HEENT: Atraumatic, normocephalic, no throat congestion, moist mucosa.   Eyes:   Pupils equal, round and reactive to light, LEUKOCYTESUR NEGATIVE 02/26/2020    UROBILINOGEN Normal 02/26/2020    BILIRUBINUR NEGATIVE 02/26/2020    GLUCOSEU NEGATIVE 02/26/2020    KETUA NEGATIVE 02/26/2020    AMORPHOUS NOT REPORTED 02/26/2020     Urine Sodium:     Lab Results   Component Value Date    VICENTA 56 03/20/2020     Urine Potassium:  No results found for: KUR  Urine Chloride:  No results found for: CLUR  Urine Osmolarity:   Lab Results   Component Value Date    OSMOU 255 03/20/2020     Urine Protein:   No results found for: TPU  Urine Creatinine:     Lab Results   Component Value Date    LABCREA 79.3 02/27/2020     UPC:     Urine Eosinophils:  No components found for: UEOS    Radiology:     CXR:     Assessment:     1. Acute Kidney Injury: Secondary to prerenal azotemia/ischemic ATN from hypotension, cardiogenic shock and low flow. Baseline creatinine 0.8-0.9 peaked up to 1.8 now resolved with diuresis and improved cardiac function Cr 0.76 today. 2.  Cardiogenic shock from noncompliance in setting of severe cardiomyopathy  3. Decompensated biventricular failure. Ischemic CMP EF 17% - improving  4. Hypervolemic hyponatremia  5. Cirrhosis on CT   7. Hypokalemia improving appropriately. Plan:     1. Bumex 2mg PO BID  2. Continue Aldactone  3. Increase Midodrine  4. Keep MAP > 65  5. Replace K per scale  6. Fluid restriction 1.2L   7. Will sign off    Nutrition   Please ensure that patient is on a renal diet/TF. Avoid nephrotoxic drugs/contrast exposure. Will discuss with CAM Hawley  [facility]  Attending Physician Statement  I have discussed the care of Gaston Sosa, including pertinent history and exam findings,  with the CNP. I have reviewed the key elements of all parts of the encounter with the CNP. I agree with the assessment, plan and orders as documented.     Elvira Donato MD, MRCP [facility], [address] [place] [address]   3/22/2020 11:53 AM    Nephrology [address] [place] [address]

## 2020-03-22 NOTE — PROGRESS NOTES
Critical care team - Resident sign-out to medicine service      Date and time: 3/22/2020 10:06 AM  Patient's name:  Garland Ochoa Record Number: [id number]  Patient's account/billing number: [de-identified]  Patient's YOB: 1965  Age: 49 y.o. Date of Admission: 3/19/2020  7:06 PM  Length of stay during current admission: 3    Primary Care Physician: CAM Skelton CNP    Code Status: Full Code    Mode of physician to physician communication:        [] Via telephone   [] In person     Date and time of sign-out: 3/22/2020 10:06 AM    Accepting Internal Medicine resident: Dr. Ethan Almaguer team: IM Team 1    Accepting team's attending: Dr. Leticia Cox    Patient's current ICU Bed:  1024     Patient's assigned bed on floor:          [] Med-Surg Monitored [x] Step-down       [] Psychiatry ICU       [] Psych floor     Reason for ICU admission:     Lucian Coates is a 49 y.o. male with past medical history of congestive heart failure ejection fraction around 17% in 5/2020, ischemic cardiomyopathy, coronary artery disease status post multiple stenting in the past.  Recent cardiac cath showing 100% LAD blockage, alcohol abuse, CKD, bipolar disorder, seizure disorder presented to the [facility] on 3/18/2020 worsening scrotal swelling.     Patient was recently discharged from this hospital around 2 weeks back when he was admitted for decompensated heart failure. He also had hyponatremia at that point and was treated with diuretics and discharged to the nursing facility.   At that time he was given LifeVest and he states that when he went home he just threw the LifeVest and trunk and never used it.     He was admitted to different hospital, with acute on chronic congestive heart failure was diuresed with IV diuresis and decision was made to transfer to [place] after his blood pressure started falling this morning and he was started on Levophed drip and transferred here.     On arrival here he was on 8 mics of Levophed, mean blood pressure around 60s, he did not complain of any cough chest pain shortness of breath. He did complain of some scrotal swelling which he states that has improved over the last 2 days, he also had 2+ pitting edema.     His proBNP level on admission was 6081 in different hospital, it was 4578 this evening. He also had hyponatremia with sodium of 122 had GREGORY with creatinine of 1.80. He also had transaminitis with ALT of 329 AST of 209. His troponin trended down to 161    ICU course summary:     started on pressors, dobutamine, levophed ,weaned off Lasix drip switched to Bumex    Procedures during patient's ICU stay:     Central ;line    Current Vitals:     /70   Pulse 86   Temp 97.6 °F (36.4 °C)   Resp 18   Ht 6' 0.01\" (1.829 m)   Wt 248 lb 0.3 oz (112.5 kg)   SpO2 94%   BMI 33.63 kg/m²       Cultures:     Blood cultures:                 [] None drawn      [] Negative             []  Positive (Details:  )  Urine Culture:                   [] None drawn      [] Negative             []  Positive (Details:  )  Sputum Culture:               [] None drawn       [] Negative             []  Positive (Details:  )   Endotracheal aspirate:     [] None drawn       [] Negative             []  Positive (Details:  )       Consults:     1.  Cardiology  Nephrology    Assessment:     Patient Active Problem List    Diagnosis Date Noted    Idiopathic hypotension 01/20/2020     Priority: High    Medication side effect, initial encounter 10/02/2019     Priority: High    Idiopathic cardiomyopathy ([facility] [address]      Priority: High    Acute on chronic congestive heart failure (HCC)      Priority: High    Acute on chronic combined systolic and diastolic congestive heart failure, NYHA class 4 (HCC)      Priority: High    Portal hypertension (HCC)      Priority: High    Alcoholic cirrhosis of liver without ascites (HCC)      Priority: High    Onychia and paronychia

## 2020-03-23 VITALS
HEIGHT: 72 IN | TEMPERATURE: 98.1 F | RESPIRATION RATE: 18 BRPM | WEIGHT: 248.02 LBS | SYSTOLIC BLOOD PRESSURE: 122 MMHG | OXYGEN SATURATION: 93 % | DIASTOLIC BLOOD PRESSURE: 98 MMHG | BODY MASS INDEX: 33.59 KG/M2 | HEART RATE: 86 BPM

## 2020-03-23 LAB
ABSOLUTE EOS #: 0.06 K/UL (ref 0–0.44)
ABSOLUTE IMMATURE GRANULOCYTE: <0.03 K/UL (ref 0–0.3)
ABSOLUTE LYMPH #: 1.98 K/UL (ref 1.1–3.7)
ABSOLUTE MONO #: 0.84 K/UL (ref 0.1–1.2)
ANION GAP SERPL CALCULATED.3IONS-SCNC: 16 MMOL/L (ref 9–17)
BASOPHILS # BLD: 1 % (ref 0–2)
BASOPHILS ABSOLUTE: 0.03 K/UL (ref 0–0.2)
BUN BLDV-MCNC: 16 MG/DL (ref 6–20)
BUN/CREAT BLD: ABNORMAL (ref 9–20)
CALCIUM SERPL-MCNC: 8.4 MG/DL (ref 8.6–10.4)
CHLORIDE BLD-SCNC: 86 MMOL/L (ref 98–107)
CO2: 32 MMOL/L (ref 20–31)
CREAT SERPL-MCNC: 0.87 MG/DL (ref 0.7–1.2)
DIFFERENTIAL TYPE: ABNORMAL
EOSINOPHILS RELATIVE PERCENT: 1 % (ref 1–4)
GFR AFRICAN AMERICAN: >60 ML/MIN
GFR NON-AFRICAN AMERICAN: >60 ML/MIN
GFR SERPL CREATININE-BSD FRML MDRD: ABNORMAL ML/MIN/{1.73_M2}
GFR SERPL CREATININE-BSD FRML MDRD: ABNORMAL ML/MIN/{1.73_M2}
GLUCOSE BLD-MCNC: 70 MG/DL (ref 70–99)
HCT VFR BLD CALC: 37.1 % (ref 40.7–50.3)
HEMOGLOBIN: 11.1 G/DL (ref 13–17)
IMMATURE GRANULOCYTES: 0 %
LYMPHOCYTES # BLD: 32 % (ref 24–43)
MAGNESIUM: 2.1 MG/DL (ref 1.6–2.6)
MCH RBC QN AUTO: 27.1 PG (ref 25.2–33.5)
MCHC RBC AUTO-ENTMCNC: 29.9 G/DL (ref 28.4–34.8)
MCV RBC AUTO: 90.5 FL (ref 82.6–102.9)
MONOCYTES # BLD: 13 % (ref 3–12)
NRBC AUTOMATED: 0.3 PER 100 WBC
PDW BLD-RTO: 17.7 % (ref 11.8–14.4)
PLATELET # BLD: 110 K/UL (ref 138–453)
PLATELET ESTIMATE: ABNORMAL
PMV BLD AUTO: 8.8 FL (ref 8.1–13.5)
POTASSIUM SERPL-SCNC: 3.4 MMOL/L (ref 3.7–5.3)
RBC # BLD: 4.1 M/UL (ref 4.21–5.77)
RBC # BLD: ABNORMAL 10*6/UL
SEG NEUTROPHILS: 53 % (ref 36–65)
SEGMENTED NEUTROPHILS ABSOLUTE COUNT: 3.33 K/UL (ref 1.5–8.1)
SODIUM BLD-SCNC: 134 MMOL/L (ref 135–144)
WBC # BLD: 6.3 K/UL (ref 3.5–11.3)
WBC # BLD: ABNORMAL 10*3/UL

## 2020-03-23 PROCEDURE — 99233 SBSQ HOSP IP/OBS HIGH 50: CPT | Performed by: INTERNAL MEDICINE

## 2020-03-23 PROCEDURE — 6360000002 HC RX W HCPCS: Performed by: STUDENT IN AN ORGANIZED HEALTH CARE EDUCATION/TRAINING PROGRAM

## 2020-03-23 PROCEDURE — 2580000003 HC RX 258: Performed by: STUDENT IN AN ORGANIZED HEALTH CARE EDUCATION/TRAINING PROGRAM

## 2020-03-23 PROCEDURE — 36415 COLL VENOUS BLD VENIPUNCTURE: CPT

## 2020-03-23 PROCEDURE — 99232 SBSQ HOSP IP/OBS MODERATE 35: CPT | Performed by: INTERNAL MEDICINE

## 2020-03-23 PROCEDURE — 80048 BASIC METABOLIC PNL TOTAL CA: CPT

## 2020-03-23 PROCEDURE — 6370000000 HC RX 637 (ALT 250 FOR IP): Performed by: STUDENT IN AN ORGANIZED HEALTH CARE EDUCATION/TRAINING PROGRAM

## 2020-03-23 PROCEDURE — 6370000000 HC RX 637 (ALT 250 FOR IP): Performed by: INTERNAL MEDICINE

## 2020-03-23 PROCEDURE — 83735 ASSAY OF MAGNESIUM: CPT

## 2020-03-23 PROCEDURE — 85025 COMPLETE CBC W/AUTO DIFF WBC: CPT

## 2020-03-23 PROCEDURE — 97116 GAIT TRAINING THERAPY: CPT

## 2020-03-23 PROCEDURE — 97110 THERAPEUTIC EXERCISES: CPT

## 2020-03-23 RX ORDER — MIDODRINE HYDROCHLORIDE 10 MG/1
10 TABLET ORAL
Qty: 90 TABLET | Refills: 3 | Status: SHIPPED | OUTPATIENT
Start: 2020-03-23 | End: 2020-04-28

## 2020-03-23 RX ORDER — POTASSIUM CHLORIDE 1125 MG/1
30 TABLET, EXTENDED RELEASE ORAL DAILY
Qty: 60 TABLET | Refills: 0 | Status: SHIPPED | OUTPATIENT
Start: 2020-03-23 | End: 2020-03-23 | Stop reason: SDUPTHER

## 2020-03-23 RX ORDER — BUMETANIDE 2 MG/1
2 TABLET ORAL 2 TIMES DAILY
Qty: 30 TABLET | Refills: 3 | Status: SHIPPED | OUTPATIENT
Start: 2020-03-23 | End: 2020-04-28

## 2020-03-23 RX ORDER — POTASSIUM CHLORIDE 20 MEQ/1
40 TABLET, EXTENDED RELEASE ORAL DAILY
Qty: 60 TABLET | Refills: 0 | Status: SHIPPED | OUTPATIENT
Start: 2020-03-23 | End: 2020-03-24 | Stop reason: SDUPTHER

## 2020-03-23 RX ADMIN — MIDODRINE HYDROCHLORIDE 10 MG: 5 TABLET ORAL at 08:25

## 2020-03-23 RX ADMIN — LEVOTHYROXINE SODIUM 50 MCG: 50 TABLET ORAL at 05:39

## 2020-03-23 RX ADMIN — MIDODRINE HYDROCHLORIDE 10 MG: 5 TABLET ORAL at 14:05

## 2020-03-23 RX ADMIN — FOLIC ACID 1 MG: 1 TABLET ORAL at 08:26

## 2020-03-23 RX ADMIN — SODIUM CHLORIDE, PRESERVATIVE FREE 10 ML: 5 INJECTION INTRAVENOUS at 08:28

## 2020-03-23 RX ADMIN — ACETAMINOPHEN 650 MG: 325 TABLET ORAL at 12:32

## 2020-03-23 RX ADMIN — BUMETANIDE 2 MG: 1 TABLET ORAL at 08:26

## 2020-03-23 RX ADMIN — SPIRONOLACTONE 25 MG: 25 TABLET ORAL at 08:27

## 2020-03-23 RX ADMIN — Medication 81 MG: at 08:26

## 2020-03-23 RX ADMIN — THERA TABS 1 TABLET: TAB at 08:26

## 2020-03-23 RX ADMIN — METOPROLOL TARTRATE 12.5 MG: 25 TABLET ORAL at 11:54

## 2020-03-23 RX ADMIN — HEPARIN SODIUM 5000 UNITS: 5000 INJECTION INTRAVENOUS; SUBCUTANEOUS at 05:26

## 2020-03-23 RX ADMIN — POTASSIUM CHLORIDE 10 MEQ: 10 INJECTION, SOLUTION INTRAVENOUS at 10:31

## 2020-03-23 RX ADMIN — DIVALPROEX SODIUM 1500 MG: 500 TABLET, DELAYED RELEASE ORAL at 08:25

## 2020-03-23 RX ADMIN — POTASSIUM CHLORIDE 10 MEQ: 10 INJECTION, SOLUTION INTRAVENOUS at 08:29

## 2020-03-23 RX ADMIN — Medication 100 MG: at 08:26

## 2020-03-23 RX ADMIN — DESMOPRESSIN ACETATE 40 MG: 0.2 TABLET ORAL at 08:27

## 2020-03-23 RX ADMIN — POTASSIUM CHLORIDE 10 MEQ: 10 INJECTION, SOLUTION INTRAVENOUS at 06:55

## 2020-03-23 RX ADMIN — CLOPIDOGREL 75 MG: 75 TABLET, FILM COATED ORAL at 08:27

## 2020-03-23 RX ADMIN — POTASSIUM CHLORIDE 10 MEQ: 10 INJECTION, SOLUTION INTRAVENOUS at 09:40

## 2020-03-23 RX ADMIN — MIDODRINE HYDROCHLORIDE 10 MG: 5 TABLET ORAL at 17:28

## 2020-03-23 ASSESSMENT — ENCOUNTER SYMPTOMS
BACK PAIN: 0
NAUSEA: 0
SHORTNESS OF BREATH: 0
PHOTOPHOBIA: 0
EYE DISCHARGE: 0
RHINORRHEA: 0
ABDOMINAL PAIN: 0
COUGH: 0
VOMITING: 0
DIARRHEA: 0
CHEST TIGHTNESS: 0

## 2020-03-23 ASSESSMENT — PAIN SCALES - GENERAL: PAINLEVEL_OUTOF10: 7

## 2020-03-23 NOTE — DISCHARGE INSTR - DIET
Commercially prepared potato and vegetable mixes   Fruits   Most fresh, frozen, and canned fruits All fruit juices   Fruits processed with salt or sodium   Milk   Nonfat or low-fat (1%) milk Nonfat or low-fat yogurt Cottage cheese, low-fat ricotta, cheeses labeled as low-fat and low-sodium   Whole milk Reduced-fat (2%) milk Malted and chocolate milk Full fat yogurt Most cheeses (unless low-fat and low salt) Buttermilk (no more than 1 cup per week)   Meats and Beans   Lean cuts of fresh or frozen beef, veal, lamb, or pork (look for the word loin) Fresh or frozen poultry without the skin Fresh or frozen fish and some shellfish Egg whites and egg substitutes (Limit whole eggs to three per week) Tofu Nuts or seeds (unsalted, dry-roasted), low-sodium peanut butter Dried peas, beans, and lentils   Any smoked, cured, salted, or canned meat, fish, or poultry (including rivera, chipped beef, cold cuts, hot dogs, sausages, sardines, and anchovies) Poultry skins Breaded and/or fried fish or meats Canned peas, beans, and lentils Salted nuts   Fats and Oils   Olive oil and canola oil Low-sodium, low-fat salad dressings and mayonnaise   Butter, margarine, coconut and palm oils, rivera fat   Snacks, Sweets, and Condiments   Low-sodium or unsalted versions of broths, soups, soy sauce, and condiments Pepper, herbs, and spices; vinegar, lemon, or lime juice Low-fat frozen desserts (yogurt, sherbet, fruit bars) Sugar, cocoa powder, honey, syrup, jam, and preserves Low-fat, trans-fat free cookies, cakes, and pies Jean Paul and animal crackers, fig bars, yolanda snaps   High-fat desserts Broth, soups, gravies, and sauces, made from instant mixes or other high-sodium ingredients Salted snack foods Canned olives Meat tenderizers, seasoning salt, and most flavored vinegars   Beverages   Low-sodium carbonated beverages Tea and coffee in moderation Soy milk   Commercially softened water   Suggestions   · Make whole grains, fruits, and vegetables the base of your diet. · Choose heart-healthy fats such as canola, olive, and flaxseed oil, and foods high in heart-healthy fats, such as nuts, seeds, soybeans, tofu, and fish. · Eat fish at least twice per week; the fish highest in omega-3 fatty acids and lowest in mercury include salmon, herring, mackerel, sardines, and canned chunk light tuna. If you eat fish less than twice per week or have high triglycerides, talk to your doctor about taking fish oil supplements. · Read food labels. ¨ For products low in fat and cholesterol, look for fat free, low-fat, cholesterol free, saturated fat free, and trans fat freeAlso scan the Nutrition Facts Label, which lists saturated fat, trans fat, and cholesterol amounts. ¨ For products low in sodium, look for sodium free, very low sodium, low sodium, no added salt, and unsalted   · Skip the salt when cooking or at the table; if food needs more flavor, get creative and try out different herbs and spices. Garlic and onion also add substantial flavor to foods. · Trim any visible fat off meat and poultry before cooking, and drain the fat off after robertson. · Use cooking methods that require little or no added fat, such as grilling, boiling, baking, poaching, broiling, roasting, steaming, stir-frying, and sauting. · Avoid fast food and convenience food. They tend to be high in saturated and trans fat and have a lot of added salt. · Talk to a registered dietitian for individualized diet advice.       Last Reviewed: March 2011 Rk Burns MS, MPH, RD   Updated: 3/29/2011

## 2020-03-23 NOTE — PROGRESS NOTES
Patient in bed awake and alert. No c/o pain no s/s of distress. Nsg assessment completed, see flow. See flow. Safety measures in place.

## 2020-03-23 NOTE — DISCHARGE INSTR - COC
Continuity of Care Form    Patient Name: Geovanny Frankel   :  1965  MRN:  [id number]    Admit date:  3/19/2020  Discharge date:  3/23/20    Code Status Order: Full Code   Advance Directives:     Admitting Physician:  Soledad Mckee DO  PCP: [id number], APRN - CNP    Discharging Nurse: VIA [facility] Unit/Room#: 1590/7018-31  Discharging Unit Phone Number: [phone]    Emergency Contact:   Extended Emergency Contact Information  Primary Emergency Contact: Madan Padron  Address: [facility], [address] [place] [address] Phone: [phone]  Relation: Parent    Past Surgical History:  Past Surgical History:   Procedure Laterality Date    CARDIAC CATHETERIZATION Left 2019    DR Valerio[facility]/right radial-Severe two vessel coronary artery disease involving involving a 100% proximal probably co-dominant RCA, 90% stenosis in the distal part of the proximal LAD at a bifircation with a moderate sized D1 branch that also has an 80% stenosis. Mildly elevated left ventricular end diastolic pressure. Consult to cardiothoracic surgery for consideration of multi-vess    TOE SURGERY      TOOTH EXTRACTION         Immunization History: There is no immunization history on file for this patient.     Active Problems:  Patient Active Problem List   Diagnosis Code    Onychia and paronychia of toe L03.039    Other specified disease of nail L60.8    Acute on chronic diastolic CHF (congestive heart failure), NYHA class 3 (HCC) I50.33    Acute on chronic congestive heart failure (HCC) I50.9    Acute on chronic combined systolic and diastolic congestive heart failure, NYHA class 4 (HCC) I50.43    Portal hypertension (HCC) L94.4    Alcoholic cirrhosis of liver without ascites ([facility] [address] K70.30    Idiopathic cardiomyopathy (HCC) I42.8    Acute heart failure (HCC) I50.9    Ischemic cardiomyopathy I25.5    CAD, multiple vessel I25.10    CAD ml   Net -665 ml     I/O last 3 completed shifts: In: 670 [P.O.:670]  Out: Yifan Marte [[id number]]    Safety Concerns: At Risk for Falls    Impairments/Disabilities:      Vision    Nutrition Therapy:  Current Nutrition Therapy:   - Oral Diet:  Cardiac    Routes of Feeding: Oral  Liquids: Thin Liquids  Daily Fluid Restriction: yes - amount 1200ml/24hr  Last Modified Barium Swallow with Video (Video Swallowing Test): not done    Treatments at the Time of Hospital Discharge:   Respiratory Treatments: AS needed  Oxygen Therapy:  is on oxygen at 3L L/min per nasal cannula. Ventilator:    {MH CC Vent [facility]}    Rehab Therapies: Physical Therapy and Occupational Therapy  Weight Bearing Status/Restrictions: No weight bearing restirctions  Other Medical Equipment (for information only, NOT a DME order):  walker, bath bench and bedside commode  Other Treatments: ***    Patient's personal belongings (please select all that are sent with patient):  Glasses    RN SIGNATURE:  Electronically signed by Cathleen Bailon RN on 3/23/20 at 3:28 PM EDT    CASE MANAGEMENT/SOCIAL WORK SECTION    Inpatient Status Date: 3-19    Readmission Risk Assessment Score:  Readmission Risk              Risk of Unplanned Readmission:        38           Discharging to Facility/ Agency   · [address]  · Address:  · Phone:  · Fax:    Dialysis Facility (if applicable)   · Name:  · Address:  · Dialysis Schedule:  · Phone:  · Fax:    / signature: Electronically signed by Jose Thompson RN on 3/23/20 at 4:08 PM EDT    PHYSICIAN SECTION    Prognosis: Fair    Condition at Discharge: Stable    Rehab Potential (if transferring to Rehab):  Fair    Recommended Labs or Other Treatments After Discharge: BMP in one week to check potassium levels    Physician Certification: I certify the above information and transfer of Santos Rodriguez  is necessary for the continuing treatment of the diagnosis listed and that he requires [facility] for less 30 days.      Update Admission H&P: No change in H&P    PHYSICIAN SIGNATURE:  Electronically signed by Adriano Diego MD on 3/23/20 at 6:46 PM EDT

## 2020-03-23 NOTE — PROGRESS NOTES
Home Oxygen Evaluation    Home Oxygen Evaluation completed. Patient is on 3 liters per minute via NC. Resting SpO2 = 96%  Resting SpO2 on room air = 88%    SpO2 on room air with exercise = N/A  SpO2 on oxygen as above with exercise = N/A    Nocturnal Oximetry with patient on room air is recommended is SpO2 is between 89% and 95% (requires additional order).     [facility]  4:10 PM

## 2020-03-23 NOTE — PROGRESS NOTES
Physical Therapy  DATE: 3/23/2020    NAME: Dave Carlisle  MRN: [id number]   : 1965    Patient not seen this date for Physical Therapy due to:  [] Blood transfusion in progress  [] Hemodialysis  [x]  Patient Declined; Pt sates he does not feel like getting OOB . [] Spine Precautions   [] Strict Bedrest  [] Surgery/ Procedure  [] Testing      [] Other        [] PT being discontinued at this time. Patient independent. No further needs. [] PT being discontinued at this time as the patient has been transferred to palliative care. No further needs.     Zoila Clements, PTA

## 2020-03-23 NOTE — PLAN OF CARE
Problem: Infection:  Goal: Will remain free from infection  Description: Will remain free from infection  Outcome: Ongoing     Problem: Safety:  Goal: Free from accidental physical injury  Description: Free from accidental physical injury  Outcome: Ongoing  Goal: Free from intentional harm  Description: Free from intentional harm  Outcome: Ongoing     Problem: Daily Care:  Goal: Daily care needs are met  Description: Daily care needs are met  Outcome: Ongoing     Problem: Skin Integrity:  Goal: Skin integrity will stabilize  Description: Skin integrity will stabilize  Outcome: Ongoing     Problem: Musculor/Skeletal Functional Status  Goal: Absence of falls  Outcome: Ongoing

## 2020-03-23 NOTE — PROGRESS NOTES
Pulmonary progress Note      Date and time: 3/23/2020 4:10 PM  Patient's name:  Jodie Curry Record Number: [id number]  Patient's account/billing number: [de-identified]  Patient's YOB: 1965  Age: 49 y.o. Date of Admission: 3/19/2020  7:06 PM  Length of stay during current admission: 4      Primary Care Physician: CAM Hernandez - CNP  ICU Attending Physician: Dr. Solomon Alonso Status: Full Code    Reason for initial ICU admission:   Cardiogenic shock      SUBJECTIVE:     OVERNIGHT EVENTS:           No acute issues overnight reported since he was transferred to stepdown from ICU  He had few episodes of hypotension overnight this morning blood pressure is 100/65 and heart rate is 89. He is afebrile last 24 hours  Denies cough, chest pain and denies shortness of breath at rest but does complain of shortness of breath on activity.     Urine output was reported to be 1335 in last 24 hours on Bumex 2 mg twice daily  No nausea, vomiting, or diarrhea no abdominal pain  He is on 3 L nasal cannula and saturating 100%, room air saturation was reported to be 92% this morning          Intake/Output Summary (Last 24 hours) at 3/23/2020 1610  Last data filed at 3/23/2020 1300  Gross per 24 hour   Intake 670 ml   Output 1135 ml   Net -465 ml            OBJECTIVE:     VITAL SIGNS:  BP (!) 122/98   Pulse 86   Temp 98.1 °F (36.7 °C) (Axillary)   Resp 18   Ht 6' 0.01\" (1.829 m)   Wt 248 lb 0.3 oz (112.5 kg)   SpO2 93%   BMI 33.63 kg/m²   Tmax over 24 hours:  Temp (24hrs), Av.2 °F (36.8 °C), Min:98 °F (36.7 °C), Max:98.4 °F (36.9 °C)      Patient Vitals for the past 6 hrs:   BP Temp Temp src Pulse Resp SpO2   20 1602 (!) 122/98 98.1 °F (36.7 °C) Axillary 86 18 93 %   20 1200 100/78 -- -- 96 -- --   20 1154 100/68 -- -- 94 -- --   03/23/20 1119 (!) 84/54 98.1 °F (36.7 °C) Oral 89 18 --         Intake/Output Summary (Last 24 hours) at 3/23/2020 1610  Last data filed at 3/23/2020 Any additional physical findings:    MEDICATIONS:    Scheduled Meds:   metoprolol tartrate  12.5 mg Oral BID    midodrine  10 mg Oral TID WC    bumetanide  2 mg Oral BID    [Held by provider] spironolactone  25 mg Oral Daily    aspirin  81 mg Oral Daily    atorvastatin  40 mg Oral Daily    clopidogrel  75 mg Oral Daily    divalproex  1,500 mg Oral Daily    doxepin  50 mg Oral Nightly    folic acid  1 mg Oral Daily    levothyroxine  50 mcg Oral Daily    multivitamin  1 tablet Oral Daily    thiamine  100 mg Oral Daily    sodium chloride flush  10 mL Intravenous 2 times per day    heparin (porcine)  5,000 Units Subcutaneous 3 times per day     Continuous Infusions:    PRN Meds:   sodium chloride flush, 10 mL, PRN  acetaminophen, 650 mg, Q6H PRN    Or  acetaminophen, 650 mg, Q6H PRN  polyethylene glycol, 17 g, Daily PRN  promethazine, 12.5 mg, Q6H PRN    Or  ondansetron, 4 mg, Q6H PRN  potassium chloride, 10 mEq, PRN          VENT SETTINGS (Comprehensive) (if applicable):      Additional Respiratory  Assessments  Pulse: 86  Resp: 18  SpO2: 93 %    ABGs:     Laboratory findings:    Complete Blood Count:   Recent Labs     03/21/20  0631 03/23/20  0437   WBC 5.4 6.3   HGB 11.2* 11.1*   HCT 37.3* 37.1*   * 110*        Last 3 Blood Glucose:   Recent Labs     03/21/20 2327 03/22/20  0703 03/23/20  0437   GLUCOSE 109* 71 70        PT/INR:    Lab Results   Component Value Date    PROTIME 13.5 03/19/2020    INR 1.3 03/19/2020     PTT:    Lab Results   Component Value Date    APTT 32.8 03/18/2020       Comprehensive Metabolic Profile:   Recent Labs     03/21/20 2327 03/22/20  0703 03/23/20  0437   * 129* 134*   K 3.5* 3.3* 3.4*   CL 83* 84* 86*   CO2 35* 33* 32*   BUN 19 15 16   CREATININE 0.99 0.76 0.87   GLUCOSE 109* 71 70   CALCIUM 7.7* 7.9* 8.4*      Magnesium:   Lab Results   Component Value Date    MG 2.1 03/23/2020     Phosphorus:   Lab Results   Component Value Date    PHOS 3.8 03/01/2020 Ionized Calcium: No results found for: CAION     Urinalysis:     Troponin: No results for input(s): TROPONINI in the last 72 hours. Microbiology:    Cultures during this admission:     Blood cultures:                 [] None drawn      [] Negative             []  Positive (Details:  )  Urine Culture:                   [] None drawn      [] Negative             []  Positive (Details:  )  Sputum Culture:               [] None drawn       [] Negative             []  Positive (Details:  )   Endotracheal aspirate:     [] None drawn       [] Negative             []  Positive (Details:  )     Other pertinent Labs:       Radiology/Imaging:     Chest Xray (3/20/2020): The cardiac silhouette is enlarged and stable.  The lungs remain clear.  No   infiltrate, pleural fluid or evidence of overt failure.  Right IJ line has   been placed with catheter tip at the cavoatrial junction and no pneumothorax.        ASSESSMENT:     Patient Active Problem List    Diagnosis Date Noted    Idiopathic hypotension 01/20/2020     Priority: High    Medication side effect, initial encounter 10/02/2019     Priority: High    Idiopathic cardiomyopathy (HCC)      Priority: High    Acute on chronic congestive heart failure (HCC)      Priority: High    Acute on chronic combined systolic and diastolic congestive heart failure, NYHA class 4 (HCC)      Priority: High    Portal hypertension (HCC)      Priority: High    Alcoholic cirrhosis of liver without ascites (HCC)      Priority: High    Onychia and paronychia of toe 03/25/2015     Priority: Medium    Systolic heart failure, chronic ([address] 03/19/2020    Cardiogenic shock ([facility] 03/19/2020    GREGORY (acute kidney injury) ([facility] 03/19/2020    Hyperkalemia 03/18/2020    Hyponatremia 03/18/2020    Anasarca associated with disorder of kidney 03/18/2020    Alcohol withdrawal ([address] 03/18/2020    Tobacco abuse disorder 03/18/2020    Peripheral cyanosis 03/18/2020    Bipolar disorder ([address] 03/03/2020    Acute metabolic encephalopathy 21/10/1260    COPD, severity to be determined ([facility]     Class 1 obesity due to excess calories with serious comorbidity and body mass index (BMI) of 31.0 to 31.9 in adult     Stopped smoking with greater than 40 pack year history     CHF with unknown LVEF ([facility] 02/26/2020    NSTEMI (non-ST elevated myocardial infarction) ([facility] 02/10/2020    CAD (coronary artery disease) 09/19/2019    Ischemic cardiomyopathy 09/18/2019    CAD, multiple vessel 09/18/2019    Acute heart failure ([facility] 09/17/2019    Acute on chronic diastolic CHF (congestive heart failure), NYHA class 3 ([facility] 09/13/2019    Other specified disease of nail 03/25/2015     Cardiogenic shock resolved. Acute on chronic systolic heart failure. Severe ischemic cardiomyopathy. Tobacco dependence. Possible COPD. Likely obstructive sleep apnea    PLAN:       1. Continue Bumex 2 mg twice daily   2. Monitor intake and output. 3. Optimization of CHF, volume status and cardiomyopathy per cardiology and medicine team  4. On aspirin, Plavix and Lipitor  5. Hypothyroidism- Continue synthroid 50 MCG and adjust medication per primary team  6. Started on low-dose beta-blocker, currently off ACE inhibitor's  7. Heparin for DVT prophylaxis  8. Outpatient pulmonary function test.  9. Wean O2 to keep saturation greater than 90%. 10. Recommend outpatient sleep study        Charanjit Garcia M.D. Pulmonary critical care attending.   [facility])             3/23/2020, 4:10 PM

## 2020-03-23 NOTE — CARE COORDINATION
Transitional planning. Spoke with pt and he said [facility]. Sent referral to [facility] on Aging and spoke with office. They do not do home care    1315 Spoke with pt and he said he would like [facility]. I told him I thought they only did hospice but I would check it out. 1430 Went to talk with pt about another choice. Sleeping. 1545 Referral made to [facility] and spoke to Yosi Canales in admissions. Spoke with pt about his predict tool saying he needed SNF and I explained what that was. I asked him if he felt safe going home and that home care will not be 24/7. He verbalized understanding and [facility]    1615 RAC, admissions related. Both CHF with pt not wearing lifevest that was ordered even though this admission he had some scrotal swelling    1630 Face sheet faxed to [facility] at [address] for home O2 and called [facility]. She is aware he lives in [place] [address] DME order and face to face faxed to [address] [place] [address] at [phone] Asked pt how he normally gets medications and he said from the [facility] in [place] and he \"pays them what I can\" Copay is 13.00. Cleda Sandhoff said for him to get them from [facility] in [facility]. Called [address] [place] [address] and they will get his medications transferrred to them. Faxed resp test, MAR, and H&P to [address] [place] [address]. She will be here around 6:15-6:30 pm 1 Called pts Mom .  She will leave in 15-20 min to get pt and given RN number so somebody can take pt to lobby upon arrival    [phone] PS MD to sign MAGGY

## 2020-03-23 NOTE — PROGRESS NOTES
assistance  Comment: Stood for ~3mins and requested to sit down stating he is tired . Ambulation  Ambulation?: Yes  Ambulation 1  Surface: level tile  Device: No Device  Assistance: Contact guard assistance  Distance: 6ftx2 to chair and back to bed fater The ex. Comments: Limited by fatigue and decrease endurance. Balance  Sitting - Static: Good  Sitting - Dynamic: Good;-  Standing - Static: Fair  Standing - Dynamic: -  Comments: Standing with AD  Exercises  Comments: Seated LE exercise program: Long Arc Quads, hip abduction/adduction, heel/toe raises, and marches. Reps: 8-10 reps limited by decrease endurance       Goals  Short term goals  Time Frame for Short term goals: 10 days  Short term goal 1: Pt to ambulate 300ft without AD and no LOB. Short term goal 2: Pt to demonstrate good standing balance for decrease fall risk. Short term goal 3: Pt to tolerate 20-30 mins ther ex/act for improved strength and endurance with ADL's. Short term goal 4: Pt to perform all bed mob, transfers, and gait independently. Patient Goals   Patient goals : home following discharge    Plan    Plan  Times per week: 5-7 x week  Times per day: Daily  Current Treatment Recommendations: Strengthening, Gait Training, Balance Training, Stair training, Neuromuscular Re-education, Functional Mobility Training, Home Exercise Program, Endurance Training, Transfer Training, Safety Education & Training  Safety Devices  Type of devices:  All fall risk precautions in place, Bed alarm in place, Left in bed, Nurse notified, Patient at risk for falls     Therapy Time   Individual Concurrent Group Co-treatment   Time In 1425         Time Out 1456         Minutes [address] Valentin Cardona, PTA

## 2020-03-23 NOTE — PROGRESS NOTES
[facility]   Department of Internal Medicine - Staff Internal Medicine Teaching Service        Inpatient Daily Progress Note    Date: 3/23/2020  Patient Name: Susana Emerson  MRN: [id number]  Rasheed: [de-identified]   Date of Admission: 3/19/2020  7:06 PM  YOB: 1965  Primary Care Physician: Moises Moore APRN - CNP  Attending Physician: Osmel White MD   Room: [address] [place] [address]  Number of days in the hospital: 4    Subjective   Admitting Diagnosis: Cardiogenic shock [facility])  Chief Complaint: No chief complaint on file. Pt was seen and examined at bedside. Resting comfortably in the bed. Vitals stable. Blood pressures improved. Labs reviewed. No acute events overnight. Patient states he is back at his baseline  No complaints at this time. Possibly discharge today pending home O2 eval, PT and OT evals. Review of Systems   Constitutional: Negative for activity change, appetite change, chills, diaphoresis, fever and unexpected weight change. HENT: Negative for ear discharge, ear pain, rhinorrhea and tinnitus. Eyes: Negative for photophobia and discharge. Respiratory: Negative for cough, chest tightness and shortness of breath. Cardiovascular: Negative for chest pain. Gastrointestinal: Negative for abdominal pain, diarrhea, nausea and vomiting. Genitourinary: Negative for dysuria, frequency and scrotal swelling. Musculoskeletal: Negative for back pain and neck pain. Neurological: Negative for seizures, weakness and headaches. Psychiatric/Behavioral: Negative for agitation and confusion.      Objective   Vital Signs:  BP (!) 84/54   Pulse 89   Temp 98.1 °F (36.7 °C) (Oral)   Resp 18   Ht 6' 0.01\" (1.829 m)   Wt 248 lb 0.3 oz (112.5 kg)   SpO2 92%   BMI 33.63 kg/m²     Temp (24hrs), Av °F (36.7 °C), Min:97.3 °F (36.3 °C), Max:98.4 °F (36.9 °C)    In: 120   Out: 935 [Urine:935]  Physical Exam -  Physical Exam  Vitals signs Labs:  CBC:   Recent Labs     03/21/20  0631 03/23/20  0437   WBC 5.4 6.3   RBC 4.10* 4.10*   HGB 11.2* 11.1*   HCT 37.3* 37.1*   MCV 91.0 90.5   RDW 17.6* 17.7*   * 110*     BMP:   Recent Labs     03/21/20  2327 03/22/20  0703 03/23/20  0437   * 129* 134*   K 3.5* 3.3* 3.4*   CL 83* 84* 86*   CO2 35* 33* 32*   BUN 19 15 16   CREATININE 0.99 0.76 0.87       ASSESSMENT & PLAN   Principal Problem:    Cardiogenic shock (HCC)  Active Problems:    Acute on chronic congestive heart failure (HCC)    Portal hypertension (HCC)    Alcoholic cirrhosis of liver without ascites (HCC)    Idiopathic cardiomyopathy (HCC)    CAD (coronary artery disease)    Bipolar disorder (HCC)    Hyponatremia    Anasarca associated with disorder of kidney    Systolic heart failure, chronic (HCC)    GREGORY (acute kidney injury) ([facility]  Resolved Problems:    * No resolved hospital problems. *    1. Cardiogenic Shock - resolved  2. Chronic systolic CHF. Continue Bumex 2mg BiD. Midodrine 5mg TiD. Monitor BMPs. MAP goal 65.  3. Coronary Artery Disease - chronic s/p unsuccessful PTCA attempt to LAD on recent cath. Continue Aspirin, Clopidogrel, Atorvastatin  4. Hypothyroidism. Continue Synthroid  5. Hypertension - controlled. Continue Metoprolol 12.5 BiD, Spironolactone 25 QD. 6. Seizure Disorder - chronic. Continue home Depakote  7. GREGORY Baseline 0.8-0.9 Resolved. Ischemic ATN.    DVT ppx: Heparin 5000U TiD SQ  Diet: Renal, 1200ml fluid restriction  Discharge planning:  Possibly discharge today    Marlys Ocampo MD  PGY-1, Internal Medicine Resident  [facility] [place]         3/23/2020, 12:09 PM      Attending Physician Statement  I have discussed the case, including pertinent history and exam findings with the resident and the team.  I have seen and examined the patient and the key elements of the encounter have been performed by me.   I agree with the assessment, plan and orders as documented by the

## 2020-03-24 ENCOUNTER — TELEPHONE (OUTPATIENT)
Dept: OTHER | Age: 55
End: 2020-03-24

## 2020-03-24 ENCOUNTER — CARE COORDINATION (OUTPATIENT)
Dept: CASE MANAGEMENT | Age: 55
End: 2020-03-24

## 2020-03-24 ENCOUNTER — OFFICE VISIT (OUTPATIENT)
Dept: CARDIOLOGY | Age: 55
End: 2020-03-24
Payer: MEDICARE

## 2020-03-24 VITALS
WEIGHT: 255 LBS | RESPIRATION RATE: 18 BRPM | HEIGHT: 72 IN | BODY MASS INDEX: 34.54 KG/M2 | SYSTOLIC BLOOD PRESSURE: 105 MMHG | DIASTOLIC BLOOD PRESSURE: 72 MMHG | HEART RATE: 83 BPM | OXYGEN SATURATION: 97 %

## 2020-03-24 PROBLEM — R23.0 PERIPHERAL CYANOSIS: Status: RESOLVED | Noted: 2020-03-18 | Resolved: 2020-03-24

## 2020-03-24 PROBLEM — E87.6 HYPOKALEMIA DUE TO LOSS OF POTASSIUM: Status: ACTIVE | Noted: 2020-03-24

## 2020-03-24 PROBLEM — E87.5 HYPERKALEMIA: Status: RESOLVED | Noted: 2020-03-18 | Resolved: 2020-03-24

## 2020-03-24 PROCEDURE — 1036F TOBACCO NON-USER: CPT | Performed by: FAMILY MEDICINE

## 2020-03-24 PROCEDURE — G8484 FLU IMMUNIZE NO ADMIN: HCPCS | Performed by: FAMILY MEDICINE

## 2020-03-24 PROCEDURE — G8417 CALC BMI ABV UP PARAM F/U: HCPCS | Performed by: FAMILY MEDICINE

## 2020-03-24 PROCEDURE — 3017F COLORECTAL CA SCREEN DOC REV: CPT | Performed by: FAMILY MEDICINE

## 2020-03-24 PROCEDURE — G8427 DOCREV CUR MEDS BY ELIG CLIN: HCPCS | Performed by: FAMILY MEDICINE

## 2020-03-24 PROCEDURE — 99215 OFFICE O/P EST HI 40 MIN: CPT | Performed by: FAMILY MEDICINE

## 2020-03-24 PROCEDURE — 1111F DSCHRG MED/CURRENT MED MERGE: CPT | Performed by: FAMILY MEDICINE

## 2020-03-24 PROCEDURE — 99214 OFFICE O/P EST MOD 30 MIN: CPT | Performed by: FAMILY MEDICINE

## 2020-03-24 RX ORDER — ASPIRIN 81 MG/1
81 TABLET ORAL DAILY
Qty: 90 TABLET | Refills: 3 | Status: SHIPPED | OUTPATIENT
Start: 2020-03-24

## 2020-03-24 RX ORDER — POTASSIUM CHLORIDE 20 MEQ/1
40 TABLET, EXTENDED RELEASE ORAL DAILY
Qty: 90 TABLET | Refills: 3 | Status: ON HOLD
Start: 2020-03-24 | End: 2020-04-11 | Stop reason: HOSPADM

## 2020-03-24 NOTE — PROGRESS NOTES
Phoned pt at home. He was discharged prior to CHF Education being performed on floor. Reviewed CHF education in particular daily wts, low sodium diet, and fluid restriction of 1.5-2 liters daily. Will send CHF education packet to patient's home address.

## 2020-03-25 ENCOUNTER — TELEPHONE (OUTPATIENT)
Dept: CARDIOLOGY | Age: 55
End: 2020-03-25

## 2020-03-25 ENCOUNTER — CARE COORDINATION (OUTPATIENT)
Dept: CASE MANAGEMENT | Age: 55
End: 2020-03-25

## 2020-03-25 NOTE — CARE COORDINATION
[place] Readmission BPCI-A Medical Bundle Patient:  Working DR - [facility]  Admitting Location: [facility]  Adm Date: 20  Date of Discharge: 3/4/20  Bundle End Date: 20      [address] Transitions Follow Up Call - follow up call from yesterday since patient hadn't picked up his medications yet at the timing of yesterday's call. 3/25/2020    Patient: Geovanny Frankel  Patient : 1965   MRN: [id number]  Reason for Admission: CHF  Discharge Date: 3/23/20 RARS: Readmission Risk Score: 44       Spoke with: patient & confirmed that he picked up newly prescribed medications yesterday from his local pharmacy. He needs to  ASA 81 mg yet, but has all others. Confirmed [facility] nurse visited patient already today & clarified K dose with cardiologist.    Patient was seen by cardiologist, Dr Jennifer Lomas yesterday. Has another cardio appt scheduled for 2 weeks. Cardiologist discussed ICD implantation with patient. Care Transition will continue to follow by CTNs who follow 90 day BPCI program.  CTN's names are added to care team.      Care Transitions Subsequent and Final Call    Schedule Follow Up Appointment with PCP:  Completed  Subsequent and Final Calls  Do you have any ongoing symptoms?:  Yes  Onset of Patient-reported symptoms: In the past 7 days  Patient-reported symptoms:  Shortness of Breath  Interventions for patient-reported symptoms:  Other  Have your medications changed?:  Yes  Do you have any questions related to your medications?:  No  Do you currently have any active services?:  Yes  Are you currently active with any services?:  Home Health  Do you have any needs or concerns that I can assist you with?:  No  Care Transitions Interventions  Other Interventions:             Follow Up  Future Appointments   Date Time Provider Damaris Gonzalez   2020  4:00 PM Noy Fuentes MD [facility]       Jose Lawton RN

## 2020-03-25 NOTE — TELEPHONE ENCOUNTER
[facility] called and said the Rx they picked up for potassium said 20 meq, but take 40 meq. And in the office note it says he is to take 20 meq daily. She wants to know what the correct dose is. Please advise.       [facility] [phone]

## 2020-03-30 ENCOUNTER — HOSPITAL ENCOUNTER (OUTPATIENT)
Age: 55
Setting detail: SPECIMEN
Discharge: HOME | End: 2020-03-30
Payer: MEDICARE

## 2020-03-30 LAB
ANION GAP SERPL CALCULATED.3IONS-SCNC: 20 MMOL/L (ref 9–17)
BUN BLDV-MCNC: 48 MG/DL (ref 6–20)
BUN/CREAT BLD: 27 (ref 9–20)
CALCIUM SERPL-MCNC: 9 MG/DL (ref 8.6–10.4)
CHLORIDE BLD-SCNC: 83 MMOL/L (ref 98–107)
CO2: 23 MMOL/L (ref 20–31)
CREAT SERPL-MCNC: 1.78 MG/DL (ref 0.7–1.2)
GFR AFRICAN AMERICAN: 49 ML/MIN
GFR NON-AFRICAN AMERICAN: 40 ML/MIN
GFR SERPL CREATININE-BSD FRML MDRD: ABNORMAL ML/MIN/{1.73_M2}
GFR SERPL CREATININE-BSD FRML MDRD: ABNORMAL ML/MIN/{1.73_M2}
GLUCOSE BLD-MCNC: 76 MG/DL (ref 70–99)
POTASSIUM SERPL-SCNC: 5 MMOL/L (ref 3.7–5.3)
SODIUM BLD-SCNC: 126 MMOL/L (ref 135–144)

## 2020-03-30 PROCEDURE — 80048 BASIC METABOLIC PNL TOTAL CA: CPT

## 2020-03-31 ENCOUNTER — HOSPITAL ENCOUNTER (INPATIENT)
Age: 55
LOS: 4 days | Discharge: HOME | DRG: 292 | End: 2020-04-04
Attending: INTERNAL MEDICINE | Admitting: INTERNAL MEDICINE
Payer: MEDICARE

## 2020-03-31 ENCOUNTER — APPOINTMENT (OUTPATIENT)
Dept: GENERAL RADIOLOGY | Age: 55
End: 2020-03-31
Payer: MEDICARE

## 2020-03-31 ENCOUNTER — HOSPITAL ENCOUNTER (EMERGENCY)
Age: 55
Discharge: ANOTHER HOSPITAL | End: 2020-03-31
Attending: EMERGENCY MEDICINE
Payer: MEDICARE

## 2020-03-31 ENCOUNTER — APPOINTMENT (OUTPATIENT)
Dept: CT IMAGING | Age: 55
End: 2020-03-31
Payer: MEDICARE

## 2020-03-31 ENCOUNTER — TELEPHONE (OUTPATIENT)
Dept: OTHER | Facility: CLINIC | Age: 55
End: 2020-03-31

## 2020-03-31 VITALS
RESPIRATION RATE: 18 BRPM | TEMPERATURE: 97.3 F | WEIGHT: 250 LBS | BODY MASS INDEX: 33.86 KG/M2 | HEART RATE: 80 BPM | SYSTOLIC BLOOD PRESSURE: 93 MMHG | HEIGHT: 72 IN | DIASTOLIC BLOOD PRESSURE: 65 MMHG | OXYGEN SATURATION: 99 %

## 2020-03-31 PROBLEM — Z91.14 NON COMPLIANCE W MEDICATION REGIMEN: Status: ACTIVE | Noted: 2020-03-31

## 2020-03-31 PROBLEM — J18.9 PNEUMONIA: Status: ACTIVE | Noted: 2020-03-31

## 2020-03-31 LAB
-: NORMAL
ABSOLUTE EOS #: <0.03 K/UL (ref 0–0.44)
ABSOLUTE IMMATURE GRANULOCYTE: <0.03 K/UL (ref 0–0.3)
ABSOLUTE LYMPH #: 1.25 K/UL (ref 1.1–3.7)
ABSOLUTE MONO #: 0.59 K/UL (ref 0.1–1.2)
ALBUMIN SERPL-MCNC: 3.7 G/DL (ref 3.5–5.2)
ALBUMIN/GLOBULIN RATIO: 1.4 (ref 1–2.5)
ALLEN TEST: ABNORMAL
ALP BLD-CCNC: 167 U/L (ref 40–129)
ALT SERPL-CCNC: 62 U/L (ref 5–41)
AMMONIA: 23 UMOL/L (ref 16–60)
AMORPHOUS: NORMAL
ANION GAP SERPL CALCULATED.3IONS-SCNC: 17 MMOL/L (ref 9–17)
AST SERPL-CCNC: 88 U/L
BACTERIA: NORMAL
BASOPHILS # BLD: 0 % (ref 0–2)
BASOPHILS ABSOLUTE: <0.03 K/UL (ref 0–0.2)
BILIRUB SERPL-MCNC: 1.31 MG/DL (ref 0.3–1.2)
BILIRUBIN DIRECT: 0.6 MG/DL
BILIRUBIN URINE: ABNORMAL
BILIRUBIN URINE: NEGATIVE
BILIRUBIN, INDIRECT: 0.71 MG/DL (ref 0–1)
BNP INTERPRETATION: ABNORMAL
BUN BLDV-MCNC: 50 MG/DL (ref 6–20)
BUN/CREAT BLD: 28 (ref 9–20)
CALCIUM SERPL-MCNC: 8.7 MG/DL (ref 8.6–10.4)
CARBOXYHEMOGLOBIN: ABNORMAL % (ref 0–5)
CASTS UA: NORMAL /LPF
CHLORIDE BLD-SCNC: 85 MMOL/L (ref 98–107)
CO2: 25 MMOL/L (ref 20–31)
COLOR: YELLOW
COLOR: YELLOW
COMMENT UA: ABNORMAL
COMMENT UA: NORMAL
CREAT SERPL-MCNC: 1.77 MG/DL (ref 0.7–1.2)
CRYSTALS, UA: NORMAL /HPF
DIFFERENTIAL TYPE: ABNORMAL
EKG ATRIAL RATE: 79 BPM
EKG P AXIS: 72 DEGREES
EKG P-R INTERVAL: 206 MS
EKG Q-T INTERVAL: 428 MS
EKG QRS DURATION: 134 MS
EKG QTC CALCULATION (BAZETT): 490 MS
EKG R AXIS: 141 DEGREES
EKG T AXIS: 46 DEGREES
EKG VENTRICULAR RATE: 79 BPM
EOSINOPHILS RELATIVE PERCENT: 0 % (ref 1–4)
EPITHELIAL CELLS UA: NORMAL /HPF (ref 0–5)
FIO2: ABNORMAL
GFR AFRICAN AMERICAN: 49 ML/MIN
GFR NON-AFRICAN AMERICAN: 40 ML/MIN
GFR SERPL CREATININE-BSD FRML MDRD: ABNORMAL ML/MIN/{1.73_M2}
GFR SERPL CREATININE-BSD FRML MDRD: ABNORMAL ML/MIN/{1.73_M2}
GLOBULIN: ABNORMAL G/DL (ref 1.5–3.8)
GLUCOSE BLD-MCNC: 96 MG/DL (ref 70–99)
GLUCOSE URINE: NEGATIVE
GLUCOSE URINE: NEGATIVE
HCO3 ARTERIAL: 21 MMOL/L (ref 22–26)
HCT VFR BLD CALC: 33.1 % (ref 40.7–50.3)
HEMOGLOBIN: 10.3 G/DL (ref 13–17)
IMMATURE GRANULOCYTES: 0 %
KETONES, URINE: NEGATIVE
KETONES, URINE: NEGATIVE
LACTIC ACID: 1.6 MMOL/L (ref 0.5–2.2)
LACTIC ACID: 3.9 MMOL/L (ref 0.5–2.2)
LEUKOCYTE ESTERASE, URINE: NEGATIVE
LEUKOCYTE ESTERASE, URINE: NEGATIVE
LYMPHOCYTES # BLD: 23 % (ref 24–43)
MCH RBC QN AUTO: 27.5 PG (ref 25.2–33.5)
MCHC RBC AUTO-ENTMCNC: 31.1 G/DL (ref 28.4–34.8)
MCV RBC AUTO: 88.5 FL (ref 82.6–102.9)
METHEMOGLOBIN: ABNORMAL % (ref 0–1.9)
MODE: ABNORMAL
MONOCYTES # BLD: 11 % (ref 3–12)
MUCUS: NORMAL
NEGATIVE BASE EXCESS, ART: 2.4 MMOL/L (ref 0–2)
NITRITE, URINE: NEGATIVE
NITRITE, URINE: NEGATIVE
NOTIFICATION TIME: ABNORMAL
NOTIFICATION: ABNORMAL
NRBC AUTOMATED: 1.3 PER 100 WBC
O2 DEVICE/FLOW/%: ABNORMAL
O2 SAT, ARTERIAL: 98.4 % (ref 95–98)
OTHER OBSERVATIONS UA: NORMAL
OXYHEMOGLOBIN: ABNORMAL % (ref 95–98)
PATIENT TEMP: 35
PCO2 ARTERIAL: 30 MMHG (ref 35–45)
PCO2, ART, TEMP ADJ: ABNORMAL (ref 35–45)
PDW BLD-RTO: 18.8 % (ref 11.8–14.4)
PEEP/CPAP: ABNORMAL
PH ARTERIAL: 7.46 (ref 7.35–7.45)
PH UA: 5.5 (ref 5–8)
PH UA: 6 (ref 5–9)
PH, ART, TEMP ADJ: ABNORMAL (ref 7.35–7.45)
PLATELET # BLD: 138 K/UL (ref 138–453)
PLATELET ESTIMATE: ABNORMAL
PMV BLD AUTO: 9.5 FL (ref 8.1–13.5)
PO2 ARTERIAL: 107.5 MMHG (ref 80–100)
PO2, ART, TEMP ADJ: ABNORMAL MMHG (ref 80–100)
POSITIVE BASE EXCESS, ART: ABNORMAL MMOL/L (ref 0–2)
POTASSIUM SERPL-SCNC: 5.4 MMOL/L (ref 3.7–5.3)
PRO-BNP: ABNORMAL PG/ML
PROCALCITONIN: 0.45 NG/ML
PROTEIN UA: NEGATIVE
PROTEIN UA: NEGATIVE
PSV: ABNORMAL
PT. POSITION: ABNORMAL
RBC # BLD: 3.74 M/UL (ref 4.21–5.77)
RBC # BLD: ABNORMAL 10*6/UL
RBC UA: NORMAL /HPF (ref 0–2)
RENAL EPITHELIAL, UA: NORMAL /HPF
RESPIRATORY RATE: 24
SAMPLE SITE: ABNORMAL
SEG NEUTROPHILS: 66 % (ref 36–65)
SEGMENTED NEUTROPHILS ABSOLUTE COUNT: 3.65 K/UL (ref 1.5–8.1)
SET RATE: ABNORMAL
SODIUM BLD-SCNC: 127 MMOL/L (ref 135–144)
SPECIFIC GRAVITY UA: 1.02 (ref 1.01–1.02)
SPECIFIC GRAVITY UA: 1.02 (ref 1–1.03)
TEXT FOR RESPIRATORY: ABNORMAL
TOTAL HB: ABNORMAL G/DL (ref 12–16)
TOTAL PROTEIN: 6.4 G/DL (ref 6.4–8.3)
TOTAL RATE: ABNORMAL
TRICHOMONAS: NORMAL
TROPONIN INTERP: ABNORMAL
TROPONIN T: ABNORMAL NG/ML
TROPONIN, HIGH SENSITIVITY: 137 NG/L (ref 0–22)
TURBIDITY: CLEAR
TURBIDITY: CLEAR
URINE HGB: NEGATIVE
URINE HGB: NEGATIVE
UROBILINOGEN, URINE: NORMAL
UROBILINOGEN, URINE: NORMAL
VT: ABNORMAL
WBC # BLD: 5.5 K/UL (ref 3.5–11.3)
WBC # BLD: ABNORMAL 10*3/UL
WBC UA: NORMAL /HPF (ref 0–5)
YEAST: NORMAL

## 2020-03-31 PROCEDURE — 2500000003 HC RX 250 WO HCPCS: Performed by: EMERGENCY MEDICINE

## 2020-03-31 PROCEDURE — 36415 COLL VENOUS BLD VENIPUNCTURE: CPT

## 2020-03-31 PROCEDURE — 36556 INSERT NON-TUNNEL CV CATH: CPT

## 2020-03-31 PROCEDURE — 2000000000 HC ICU R&B

## 2020-03-31 PROCEDURE — 83880 ASSAY OF NATRIURETIC PEPTIDE: CPT

## 2020-03-31 PROCEDURE — 71250 CT THORAX DX C-: CPT

## 2020-03-31 PROCEDURE — 84540 ASSAY OF URINE/UREA-N: CPT

## 2020-03-31 PROCEDURE — 80048 BASIC METABOLIC PNL TOTAL CA: CPT

## 2020-03-31 PROCEDURE — 84484 ASSAY OF TROPONIN QUANT: CPT

## 2020-03-31 PROCEDURE — 82728 ASSAY OF FERRITIN: CPT

## 2020-03-31 PROCEDURE — 2500000003 HC RX 250 WO HCPCS: Performed by: STUDENT IN AN ORGANIZED HEALTH CARE EDUCATION/TRAINING PROGRAM

## 2020-03-31 PROCEDURE — 0100U HC RESPIRPTHGN MULT REV TRANS & AMP PRB TECH 21 TRGT: CPT

## 2020-03-31 PROCEDURE — 87641 MR-STAPH DNA AMP PROBE: CPT

## 2020-03-31 PROCEDURE — 96365 THER/PROPH/DIAG IV INF INIT: CPT

## 2020-03-31 PROCEDURE — 93010 ELECTROCARDIOGRAM REPORT: CPT | Performed by: FAMILY MEDICINE

## 2020-03-31 PROCEDURE — 83930 ASSAY OF BLOOD OSMOLALITY: CPT

## 2020-03-31 PROCEDURE — 82140 ASSAY OF AMMONIA: CPT

## 2020-03-31 PROCEDURE — 86140 C-REACTIVE PROTEIN: CPT

## 2020-03-31 PROCEDURE — 87040 BLOOD CULTURE FOR BACTERIA: CPT

## 2020-03-31 PROCEDURE — U0002 COVID-19 LAB TEST NON-CDC: HCPCS

## 2020-03-31 PROCEDURE — 71045 X-RAY EXAM CHEST 1 VIEW: CPT

## 2020-03-31 PROCEDURE — 2580000003 HC RX 258: Performed by: STUDENT IN AN ORGANIZED HEALTH CARE EDUCATION/TRAINING PROGRAM

## 2020-03-31 PROCEDURE — 83605 ASSAY OF LACTIC ACID: CPT

## 2020-03-31 PROCEDURE — 2580000003 HC RX 258: Performed by: EMERGENCY MEDICINE

## 2020-03-31 PROCEDURE — 85025 COMPLETE CBC W/AUTO DIFF WBC: CPT

## 2020-03-31 PROCEDURE — 82805 BLOOD GASES W/O2 SATURATION: CPT

## 2020-03-31 PROCEDURE — 83935 ASSAY OF URINE OSMOLALITY: CPT

## 2020-03-31 PROCEDURE — 83735 ASSAY OF MAGNESIUM: CPT

## 2020-03-31 PROCEDURE — 99291 CRITICAL CARE FIRST HOUR: CPT

## 2020-03-31 PROCEDURE — 51702 INSERT TEMP BLADDER CATH: CPT

## 2020-03-31 PROCEDURE — 6360000002 HC RX W HCPCS: Performed by: EMERGENCY MEDICINE

## 2020-03-31 PROCEDURE — 81003 URINALYSIS AUTO W/O SCOPE: CPT

## 2020-03-31 PROCEDURE — 96367 TX/PROPH/DG ADDL SEQ IV INF: CPT

## 2020-03-31 PROCEDURE — 84145 PROCALCITONIN (PCT): CPT

## 2020-03-31 PROCEDURE — 96366 THER/PROPH/DIAG IV INF ADDON: CPT

## 2020-03-31 PROCEDURE — 80076 HEPATIC FUNCTION PANEL: CPT

## 2020-03-31 PROCEDURE — 81001 URINALYSIS AUTO W/SCOPE: CPT

## 2020-03-31 PROCEDURE — 93005 ELECTROCARDIOGRAM TRACING: CPT | Performed by: EMERGENCY MEDICINE

## 2020-03-31 PROCEDURE — 83615 LACTATE (LD) (LDH) ENZYME: CPT

## 2020-03-31 RX ORDER — ASPIRIN 81 MG/1
81 TABLET ORAL DAILY
Status: DISCONTINUED | OUTPATIENT
Start: 2020-04-01 | End: 2020-04-04 | Stop reason: HOSPADM

## 2020-03-31 RX ORDER — LEVOFLOXACIN 5 MG/ML
500 INJECTION, SOLUTION INTRAVENOUS ONCE
Status: COMPLETED | OUTPATIENT
Start: 2020-03-31 | End: 2020-03-31

## 2020-03-31 RX ORDER — THIAMINE MONONITRATE (VIT B1) 100 MG
100 TABLET ORAL DAILY
Status: DISCONTINUED | OUTPATIENT
Start: 2020-04-01 | End: 2020-04-04 | Stop reason: HOSPADM

## 2020-03-31 RX ORDER — MULTIVITAMIN WITH FOLIC ACID 400 MCG
1 TABLET ORAL DAILY
Status: DISCONTINUED | OUTPATIENT
Start: 2020-04-01 | End: 2020-04-04 | Stop reason: HOSPADM

## 2020-03-31 RX ORDER — 0.9 % SODIUM CHLORIDE 0.9 %
250 INTRAVENOUS SOLUTION INTRAVENOUS ONCE
Status: COMPLETED | OUTPATIENT
Start: 2020-03-31 | End: 2020-03-31

## 2020-03-31 RX ORDER — BUMETANIDE 0.25 MG/ML
2 INJECTION, SOLUTION INTRAMUSCULAR; INTRAVENOUS ONCE
Status: COMPLETED | OUTPATIENT
Start: 2020-03-31 | End: 2020-04-01

## 2020-03-31 RX ORDER — POTASSIUM CHLORIDE 7.45 MG/ML
10 INJECTION INTRAVENOUS PRN
Status: DISCONTINUED | OUTPATIENT
Start: 2020-03-31 | End: 2020-04-04 | Stop reason: HOSPADM

## 2020-03-31 RX ORDER — HEPARIN SODIUM 5000 [USP'U]/ML
5000 INJECTION, SOLUTION INTRAVENOUS; SUBCUTANEOUS EVERY 8 HOURS SCHEDULED
Status: DISCONTINUED | OUTPATIENT
Start: 2020-04-01 | End: 2020-04-04 | Stop reason: HOSPADM

## 2020-03-31 RX ORDER — LEVOTHYROXINE SODIUM 0.05 MG/1
50 TABLET ORAL DAILY
Status: DISCONTINUED | OUTPATIENT
Start: 2020-04-01 | End: 2020-04-04 | Stop reason: HOSPADM

## 2020-03-31 RX ORDER — ATORVASTATIN CALCIUM 40 MG/1
40 TABLET, FILM COATED ORAL DAILY
Status: DISCONTINUED | OUTPATIENT
Start: 2020-04-01 | End: 2020-04-04 | Stop reason: HOSPADM

## 2020-03-31 RX ORDER — SODIUM CHLORIDE 9 MG/ML
INJECTION, SOLUTION INTRAVENOUS CONTINUOUS
Status: DISCONTINUED | OUTPATIENT
Start: 2020-03-31 | End: 2020-04-01

## 2020-03-31 RX ORDER — BUMETANIDE 0.25 MG/ML
2 INJECTION, SOLUTION INTRAMUSCULAR; INTRAVENOUS 2 TIMES DAILY
Status: DISCONTINUED | OUTPATIENT
Start: 2020-04-01 | End: 2020-04-04 | Stop reason: HOSPADM

## 2020-03-31 RX ORDER — MIDODRINE HYDROCHLORIDE 5 MG/1
10 TABLET ORAL
Status: DISCONTINUED | OUTPATIENT
Start: 2020-04-01 | End: 2020-04-04 | Stop reason: HOSPADM

## 2020-03-31 RX ORDER — SODIUM CHLORIDE 0.9 % (FLUSH) 0.9 %
10 SYRINGE (ML) INJECTION PRN
Status: DISCONTINUED | OUTPATIENT
Start: 2020-03-31 | End: 2020-04-04 | Stop reason: HOSPADM

## 2020-03-31 RX ORDER — FOLIC ACID 1 MG/1
1 TABLET ORAL DAILY
Status: DISCONTINUED | OUTPATIENT
Start: 2020-04-01 | End: 2020-04-04 | Stop reason: HOSPADM

## 2020-03-31 RX ORDER — CLOPIDOGREL BISULFATE 75 MG/1
75 TABLET ORAL DAILY
Status: DISCONTINUED | OUTPATIENT
Start: 2020-04-01 | End: 2020-04-04 | Stop reason: HOSPADM

## 2020-03-31 RX ORDER — 0.9 % SODIUM CHLORIDE 0.9 %
500 INTRAVENOUS SOLUTION INTRAVENOUS ONCE
Status: COMPLETED | OUTPATIENT
Start: 2020-03-31 | End: 2020-03-31

## 2020-03-31 RX ORDER — 0.9 % SODIUM CHLORIDE 0.9 %
1000 INTRAVENOUS SOLUTION INTRAVENOUS ONCE
Status: COMPLETED | OUTPATIENT
Start: 2020-03-31 | End: 2020-03-31

## 2020-03-31 RX ORDER — POLYETHYLENE GLYCOL 3350 17 G/17G
17 POWDER, FOR SOLUTION ORAL DAILY PRN
Status: DISCONTINUED | OUTPATIENT
Start: 2020-03-31 | End: 2020-04-04 | Stop reason: HOSPADM

## 2020-03-31 RX ORDER — SODIUM CHLORIDE 0.9 % (FLUSH) 0.9 %
10 SYRINGE (ML) INJECTION EVERY 12 HOURS SCHEDULED
Status: DISCONTINUED | OUTPATIENT
Start: 2020-03-31 | End: 2020-04-04 | Stop reason: HOSPADM

## 2020-03-31 RX ADMIN — SODIUM CHLORIDE 500 ML: 9 INJECTION, SOLUTION INTRAVENOUS at 14:35

## 2020-03-31 RX ADMIN — SODIUM CHLORIDE: 9 INJECTION, SOLUTION INTRAVENOUS at 22:40

## 2020-03-31 RX ADMIN — LEVOFLOXACIN 500 MG: 5 INJECTION, SOLUTION INTRAVENOUS at 13:23

## 2020-03-31 RX ADMIN — NOREPINEPHRINE BITARTRATE 2 MCG/MIN: 1 INJECTION, SOLUTION, CONCENTRATE INTRAVENOUS at 18:14

## 2020-03-31 RX ADMIN — SODIUM CHLORIDE 1000 ML: 9 INJECTION, SOLUTION INTRAVENOUS at 13:23

## 2020-03-31 RX ADMIN — VANCOMYCIN HYDROCHLORIDE 1000 MG: 1 INJECTION, POWDER, LYOPHILIZED, FOR SOLUTION INTRAVENOUS at 14:37

## 2020-03-31 RX ADMIN — SODIUM CHLORIDE 250 ML: 9 INJECTION, SOLUTION INTRAVENOUS at 11:06

## 2020-03-31 RX ADMIN — Medication 2 MCG/MIN: at 22:10

## 2020-03-31 ASSESSMENT — ENCOUNTER SYMPTOMS
NAUSEA: 0
EYE REDNESS: 0
ABDOMINAL PAIN: 0
VOMITING: 0
DIARRHEA: 0
COUGH: 0
EYE DISCHARGE: 0
SHORTNESS OF BREATH: 1
SORE THROAT: 0

## 2020-03-31 ASSESSMENT — PAIN DESCRIPTION - FREQUENCY: FREQUENCY: CONTINUOUS

## 2020-03-31 ASSESSMENT — PAIN DESCRIPTION - PAIN TYPE: TYPE: CHRONIC PAIN

## 2020-03-31 ASSESSMENT — PAIN - FUNCTIONAL ASSESSMENT: PAIN_FUNCTIONAL_ASSESSMENT: PREVENTS OR INTERFERES WITH MANY ACTIVE NOT PASSIVE ACTIVITIES

## 2020-03-31 ASSESSMENT — PAIN DESCRIPTION - ONSET: ONSET: PROGRESSIVE

## 2020-03-31 ASSESSMENT — PAIN DESCRIPTION - DESCRIPTORS: DESCRIPTORS: SORE;ACHING;BURNING

## 2020-03-31 ASSESSMENT — PAIN SCALES - GENERAL: PAINLEVEL_OUTOF10: 9

## 2020-03-31 ASSESSMENT — PAIN DESCRIPTION - LOCATION: LOCATION: BACK;RIB CAGE

## 2020-03-31 NOTE — ED NOTES
Ofelia Feliz, spoke with Liza Velasquez to initiate transfer to [facility].  She will page the hospitalist     Romero Orf  03/31/20 4459

## 2020-03-31 NOTE — ED PROVIDER NOTES
[facility] ED  eMERGENCY dEPARTMENT eNCOUnter      Pt Name: Kinjal Sousa  MRN: [id number]  [place] 1965  Date of evaluation: 3/31/2020  Provider: Veto Chavez, [address] [place] [address]       Chief Complaint   Patient presents with    Shortness of Breath     onset a couple of days ago         HISTORY OF PRESENT ILLNESS   (Location/Symptom, Timing/Onset, Context/Setting, Quality, Duration, Modifying Factors, Severity) Note limiting factors. HPI    Kinjal Sousa is a 49 y.o. male who presents to the emergency department with complaint of shortness of breath. Patient has a past medical history of congestive heart failure as well as cirrhosis and CAD. He was recently admitted to the hospital approximately 2 weeks ago. He states he has been home for about a week. He states even despite being discharged from the hospital he did not feel \"much better\". He states that he has been wearing his oxygen at home but has continued to feel tired and shortness of breath despite doing this and therefore presents for evaluation. Nursing Notes were reviewed. REVIEW OF SYSTEMS    (2+ for level 4; 10+ for level 5)   Review of Systems   Constitutional: Positive for fatigue. Negative for chills and fever. HENT: Negative for congestion and sore throat. Eyes: Negative for discharge and redness. Respiratory: Positive for shortness of breath. Negative for cough. Cardiovascular: Positive for leg swelling. Negative for chest pain. Gastrointestinal: Negative for abdominal pain, diarrhea, nausea and vomiting. Genitourinary: Positive for dysuria. Skin: Negative for rash. Neurological: Positive for weakness. All other systems reviewed and are negative.       PAST MEDICAL HISTORY     Past Medical History:   Diagnosis Date    Al[facility]olic [facility] ([facility]     Back pain     Bipolar 1 disorder ([facility]     CAD (coronary artery disease) 09/2019    s/p proximal LAD stent    [facility]     Idiopathic cardiomyopathy ([facility]     Seizures ([facility]     LAST SEIZURE 9-4-19       SURGICAL HISTORY       Past Surgical History:   Procedure Laterality Date    CARDIAC [facility]17/2019    [facility]-Severe two vessel coronary artery disease involving involving a 100% proximal probably co-dominant RCA, 90% stenosis in the distal part of the proximal LAD at a bifircation with a moderate sized D1 branch that also has an 80% stenosis. Mildly elevated left ventricular end diastolic pressure. Consult to cardiothoracic surgery for consideration of multi-vess    TOE SURGERY      TOOTH EXTRACTION         CURRENT MEDICATIONS       Previous Medications    ASPIRIN 81 MG EC TABLET    Take 1 tablet by mouth daily    ATORVASTATIN (LIPITOR) 40 MG TABLET    Take 40 mg by mouth daily    BENZTROPINE (COGENTIN) 1 MG TABLET    Take 1 mg by mouth 2 times daily    BUMETANIDE (BUMEX) 2 MG TABLET    Take 1 tablet by mouth 2 times daily    CLOPIDOGREL (PLAVIX) 75 MG TABLET    Take 1 tablet by mouth daily    DIVALPROEX (DEPAKOTE) 500 MG EC TABLET    Take 1,500 mg by mouth daily. DOXEPIN (SINEQUAN) 50 MG CAPSULE    Take 50 mg by mouth    ENOXAPARIN (LOVENOX) 30 MG/0.3ML INJECTION    Inject 0.3 mLs into the skin 2 times daily    FAMOTIDINE (PEPCID) 20 MG TABLET    Take 1 tablet by mouth 2 times daily    FOLIC ACID (FOLVITE) 1 MG TABLET    Take 1 tablet by mouth daily    LEVOTHYROXINE (SYNTHROID) 50 MCG TABLET    Take 1 tablet by mouth Daily    METOPROLOL TARTRATE (LOPRESSOR) 25 MG TABLET    Take 0.5 tablets by mouth 2 times daily    MIDODRINE (PROAMATINE) 10 MG TABLET    Take 1 tablet by mouth 3 times daily (with meals)    MULTIPLE VITAMIN (MULTIVITAMIN) TABLET    Take 1 tablet by mouth daily    NICOTINE (NICODERM CQ) 21 MG/24HR    Place 1 patch onto the skin daily    NITROGLYCERIN (NITROSTAT) 0.4 MG SL TABLET    up to max of 3 total doses. If no relief after 1 dose, call 911. WITH AUTO DIFFERENTIAL - Abnormal; Notable for the following components:       Result Value    RBC 3.74 (*)     Hemoglobin 10.3 (*)     Hematocrit 33.1 (*)     RDW 18.8 (*)     NRBC Automated 1.3 (*)     Seg Neutrophils 66 (*)     Lymphocytes 23 (*)     Eosinophils % 0 (*)     All other components within normal limits   BASIC METABOLIC PANEL W/ REFLEX TO MG FOR LOW K - Abnormal; Notable for the following components:    BUN 50 (*)     CREATININE 1.77 (*)     Bun/Cre Ratio 28 (*)     Sodium 127 (*)     Potassium 5.4 (*)     Chloride 85 (*)     GFR Non- 40 (*)     GFR  49 (*)     All other components within normal limits   HEPATIC FUNCTION PANEL - Abnormal; Notable for the following components:    Alkaline Phosphatase 167 (*)     ALT 62 (*)     AST 88 (*)     Total Bilirubin 1.31 (*)     Bilirubin, Direct 0.60 (*)     All other components within normal limits   TROPONIN - Abnormal; Notable for the following components:    Troponin, High Sensitivity 137 (*)     All other components within normal limits   BRAIN NATRIURETIC PEPTIDE - Abnormal; Notable for the following components:    Pro-BNP 10,842 (*)     All other components within normal limits   LACTIC ACID - Abnormal; Notable for the following components:    Lactic Acid 3.9 (*)     All other components within normal limits   BLOOD GAS, ARTERIAL - Abnormal; Notable for the following components:    pH, Arterial 7.456 (*)     pCO2, Arterial 30.0 (*)     pO2, Arterial 107.5 (*)     HCO3, Arterial 21.0 (*)     Negative Base Excess, Art 2.4 (*)     O2 Sat, Arterial 98.4 (*)     All other components within normal limits   URINE RT REFLEX TO CULTURE - Abnormal; Notable for the following components:    Bilirubin Urine SMALL (*)     All other components within normal limits   CULTURE, BLOOD 1   CULTURE, BLOOD 1   AMMONIA   MICROSCOPIC URINALYSIS   PROCALCITONIN   LACTIC ACID        All other labs were within normal range or not returned as of this dictation. EMERGENCY DEPARTMENT COURSE and DIFFERENTIAL DIAGNOSIS/MDM:   Vitals:    Vitals:    03/31/20 1551 03/31/20 1601 03/31/20 1611 03/31/20 1621   BP: 102/68 97/66 101/70 92/64   Pulse: 72 72 72 76   Resp: 10 14 20 22   Temp:       TempSrc:       SpO2: 100% 99% 100% 99%   Weight:       Height:           Medications   0.9 % sodium chloride bolus (0 mLs Intravenous Stopped 3/31/20 1131)   vancomycin 1000 mg IVPB in 250 mL D5W addavial (0 mg Intravenous Stopped 3/31/20 1624)   levofloxacin (LEVAQUIN) 500 MG/100ML infusion 500 mg (0 mg Intravenous Stopped 3/31/20 1430)   0.9 % sodium chloride bolus (0 mLs Intravenous Stopped 3/31/20 1430)   0.9 % sodium chloride bolus (0 mLs Intravenous Stopped 3/31/20 1624)       MDM. The patient arrived to the ER tachypneic and his initial pulse ox was low at 84 but his hands were also cold but based on his complaint of shortness of breath and the pulse ox a ABG was obtained. This confirmed that he was not truly hypoxic with a PO2 of 119. However rectal temperature did confirm hypothermia with a core temperature of 95. Based on his feeling of shortness of breath and hypothermia there is concern there was an infectious process. With his recent hospitalization report of shortness of breath and hypothermia a CT of the chest was obtained along with laboratory studies. White count was normal but lactic acid is elevated at 3.9. Urine showed no sign of infection. I felt no need for abdominal CT as he had no belly pain with palpation. CT of the chest showed groundglass opacities in the right lobe which could be atelectasis versus early pneumonia. With his hypothermia and hypotension I do believe this is early pneumonia and therefore blood cultures and antibiotics will be started.   I discussed the case with Dr. Jessica Wilson (hospitalist) who states that with his multiple medical comorbidities that there is high likelihood he will decompensate and he recommends transfer to a higher level of care. The case was discussed with [address] [place] [address] intensivist Dr. Alan Ansari. He agrees as the patient has been hypothermic and hypotensive that he should be placed in the ICU and accepts transfer. I did attempt to place a central line after this discussion as patient's had 1.5 L of fluid and pressure has been running approximately 100/70-90/60. However I had difficulty cannulizing the vessel and getting the guidewire to thread and therefore I placed a right femoral line as documented in the procedure section. REVAL:         CRITICAL CARE TIME   Total Critical Care time was 35 minutes, excluding separately reportable procedures. There was a high probability of clinically significant/life threatening deterioration in the patient's condition which required my urgent intervention. CONSULTS:  None    PROCEDURES:  Unless otherwise noted below, none     Procedures     A right femoral central line was placed. The area was prepped in sterile fashion with chlorhexidine. An ultrasound was used to visualize the femoral vein. The vessel was cannulated under direct visualization of ultrasound and there was return of dark nonpulsatile blood. The patient tolerated the procedure well without complication. FINAL IMPRESSION      1. Pneumonia due to organism    2. Hypothermia, initial encounter    3. Lactic acidosis    4. Acute kidney injury (nontraumatic) ([facility]          DISPOSITION/PLAN   DISPOSITION        PATIENT REFERRED TO:  No follow-up provider specified. DISCHARGE MEDICATIONS:  New Prescriptions    No medications on file          (Please note:  Portions of this note were completed with a voice recognition program.  Efforts were made to edit the dictations but occasionally words and phrases are mis-transcribed.)  Form v2016. J.[id number]    Blanche Cesar DO (electronically signed)  Emergency Medicine Provider        DO Cuong  03/31/20 [address] [place] [address],   03/31/20

## 2020-03-31 NOTE — ED NOTES
[facility] called for update on bed assignment. The bed is marked for cleaning but has not been started yet. Transport information given as well. They will call back with transport and a bed assignment once the bed is cleaned.      Valentina Christie  03/31/20 [id number]

## 2020-04-01 ENCOUNTER — APPOINTMENT (OUTPATIENT)
Dept: GENERAL RADIOLOGY | Age: 55
DRG: 292 | End: 2020-04-01
Attending: INTERNAL MEDICINE
Payer: MEDICARE

## 2020-04-01 LAB
ABSOLUTE EOS #: 0.04 K/UL (ref 0–0.4)
ABSOLUTE IMMATURE GRANULOCYTE: 0 K/UL (ref 0–0.3)
ABSOLUTE LYMPH #: 0.9 K/UL (ref 1–4.8)
ABSOLUTE MONO #: 0.34 K/UL (ref 0.1–0.8)
ADENOVIRUS PCR: NOT DETECTED
ALBUMIN SERPL-MCNC: 3.6 G/DL (ref 3.5–5.2)
ALBUMIN/GLOBULIN RATIO: 1.3 (ref 1–2.5)
ALP BLD-CCNC: 158 U/L (ref 40–129)
ALT SERPL-CCNC: 54 U/L (ref 5–41)
ANION GAP SERPL CALCULATED.3IONS-SCNC: 17 MMOL/L (ref 9–17)
ANION GAP SERPL CALCULATED.3IONS-SCNC: 20 MMOL/L (ref 9–17)
AST SERPL-CCNC: 63 U/L
BASOPHILS # BLD: 0 % (ref 0–2)
BASOPHILS ABSOLUTE: 0 K/UL (ref 0–0.2)
BILIRUB SERPL-MCNC: 1.04 MG/DL (ref 0.3–1.2)
BILIRUBIN DIRECT: 0.48 MG/DL
BILIRUBIN, INDIRECT: 0.56 MG/DL (ref 0–1)
BORDETELLA PARAPERTUSSIS: NOT DETECTED
BORDETELLA PERTUSSIS PCR: NOT DETECTED
BUN BLDV-MCNC: 42 MG/DL (ref 6–20)
BUN BLDV-MCNC: 43 MG/DL (ref 6–20)
BUN/CREAT BLD: ABNORMAL (ref 9–20)
BUN/CREAT BLD: ABNORMAL (ref 9–20)
C-REACTIVE PROTEIN: 27.8 MG/L (ref 0–5)
CALCIUM SERPL-MCNC: 8.6 MG/DL (ref 8.6–10.4)
CALCIUM SERPL-MCNC: 8.7 MG/DL (ref 8.6–10.4)
CHLAMYDIA PNEUMONIAE BY PCR: NOT DETECTED
CHLORIDE BLD-SCNC: 87 MMOL/L (ref 98–107)
CHLORIDE BLD-SCNC: 88 MMOL/L (ref 98–107)
CO2: 22 MMOL/L (ref 20–31)
CO2: 23 MMOL/L (ref 20–31)
CORONAVIRUS 229E PCR: NOT DETECTED
CORONAVIRUS HKU1 PCR: NOT DETECTED
CORONAVIRUS NL63 PCR: NOT DETECTED
CORONAVIRUS OC43 PCR: NOT DETECTED
CREAT SERPL-MCNC: 1.35 MG/DL (ref 0.7–1.2)
CREAT SERPL-MCNC: 1.36 MG/DL (ref 0.7–1.2)
DIFFERENTIAL TYPE: ABNORMAL
EOSINOPHILS RELATIVE PERCENT: 1 % (ref 1–4)
FERRITIN: 138 UG/L (ref 30–400)
GFR AFRICAN AMERICAN: >60 ML/MIN
GFR AFRICAN AMERICAN: >60 ML/MIN
GFR NON-AFRICAN AMERICAN: 55 ML/MIN
GFR NON-AFRICAN AMERICAN: 55 ML/MIN
GFR SERPL CREATININE-BSD FRML MDRD: ABNORMAL ML/MIN/{1.73_M2}
GLOBULIN: ABNORMAL G/DL (ref 1.5–3.8)
GLUCOSE BLD-MCNC: 134 MG/DL (ref 70–99)
GLUCOSE BLD-MCNC: 81 MG/DL (ref 70–99)
HCT VFR BLD CALC: 33.3 % (ref 40.7–50.3)
HEMOGLOBIN: 10 G/DL (ref 13–17)
HUMAN METAPNEUMOVIRUS PCR: NOT DETECTED
IMMATURE GRANULOCYTES: 0 %
INFLUENZA A BY PCR: NOT DETECTED
INFLUENZA A H1 (2009) PCR: NORMAL
INFLUENZA A H1 PCR: NORMAL
INFLUENZA A H3 PCR: NORMAL
INFLUENZA B BY PCR: NOT DETECTED
LACTATE DEHYDROGENASE: 352 U/L (ref 135–225)
LYMPHOCYTES # BLD: 21 % (ref 24–44)
MAGNESIUM: 2.6 MG/DL (ref 1.6–2.6)
MCH RBC QN AUTO: 26.8 PG (ref 25.2–33.5)
MCHC RBC AUTO-ENTMCNC: 30 G/DL (ref 28.4–34.8)
MCV RBC AUTO: 89.3 FL (ref 82.6–102.9)
MONOCYTES # BLD: 8 % (ref 1–7)
MORPHOLOGY: ABNORMAL
MRSA, DNA, NASAL: NORMAL
MYCOPLASMA PNEUMONIAE PCR: NOT DETECTED
NRBC AUTOMATED: 1.2 PER 100 WBC
OSMOLALITY URINE: 662 MOSM/KG (ref 80–1300)
PARAINFLUENZA 1 PCR: NOT DETECTED
PARAINFLUENZA 2 PCR: NOT DETECTED
PARAINFLUENZA 3 PCR: NOT DETECTED
PARAINFLUENZA 4 PCR: NOT DETECTED
PDW BLD-RTO: 18.9 % (ref 11.8–14.4)
PLATELET # BLD: 132 K/UL (ref 138–453)
PLATELET ESTIMATE: ABNORMAL
PMV BLD AUTO: 9.9 FL (ref 8.1–13.5)
POTASSIUM SERPL-SCNC: 3.9 MMOL/L (ref 3.7–5.3)
POTASSIUM SERPL-SCNC: 4.1 MMOL/L (ref 3.7–5.3)
RBC # BLD: 3.73 M/UL (ref 4.21–5.77)
RBC # BLD: ABNORMAL 10*6/UL
RESP SYNCYTIAL VIRUS PCR: NOT DETECTED
RHINO/ENTEROVIRUS PCR: NOT DETECTED
SEG NEUTROPHILS: 70 % (ref 36–66)
SEGMENTED NEUTROPHILS ABSOLUTE COUNT: 3.02 K/UL (ref 1.8–7.7)
SERUM OSMOLALITY: 279 MOSM/KG (ref 275–295)
SODIUM BLD-SCNC: 127 MMOL/L (ref 135–144)
SODIUM BLD-SCNC: 130 MMOL/L (ref 135–144)
SPECIMEN DESCRIPTION: NORMAL
SPECIMEN DESCRIPTION: NORMAL
TOTAL PROTEIN: 6.3 G/DL (ref 6.4–8.3)
TROPONIN INTERP: ABNORMAL
TROPONIN INTERP: ABNORMAL
TROPONIN T: ABNORMAL NG/ML
TROPONIN T: ABNORMAL NG/ML
TROPONIN, HIGH SENSITIVITY: 107 NG/L (ref 0–22)
TROPONIN, HIGH SENSITIVITY: 109 NG/L (ref 0–22)
UREA NITROGEN, UR: 1343 MG/DL
WBC # BLD: 4.3 K/UL (ref 3.5–11.3)
WBC # BLD: ABNORMAL 10*3/UL

## 2020-04-01 PROCEDURE — 80307 DRUG TEST PRSMV CHEM ANLYZR: CPT

## 2020-04-01 PROCEDURE — 2500000003 HC RX 250 WO HCPCS: Performed by: STUDENT IN AN ORGANIZED HEALTH CARE EDUCATION/TRAINING PROGRAM

## 2020-04-01 PROCEDURE — 80076 HEPATIC FUNCTION PANEL: CPT

## 2020-04-01 PROCEDURE — 71045 X-RAY EXAM CHEST 1 VIEW: CPT

## 2020-04-01 PROCEDURE — 2580000003 HC RX 258: Performed by: STUDENT IN AN ORGANIZED HEALTH CARE EDUCATION/TRAINING PROGRAM

## 2020-04-01 PROCEDURE — 6370000000 HC RX 637 (ALT 250 FOR IP): Performed by: STUDENT IN AN ORGANIZED HEALTH CARE EDUCATION/TRAINING PROGRAM

## 2020-04-01 PROCEDURE — G0480 DRUG TEST DEF 1-7 CLASSES: HCPCS

## 2020-04-01 PROCEDURE — 85025 COMPLETE CBC W/AUTO DIFF WBC: CPT

## 2020-04-01 PROCEDURE — 2060000000 HC ICU INTERMEDIATE R&B

## 2020-04-01 PROCEDURE — 99291 CRITICAL CARE FIRST HOUR: CPT | Performed by: INTERNAL MEDICINE

## 2020-04-01 PROCEDURE — 6360000002 HC RX W HCPCS: Performed by: STUDENT IN AN ORGANIZED HEALTH CARE EDUCATION/TRAINING PROGRAM

## 2020-04-01 PROCEDURE — 84484 ASSAY OF TROPONIN QUANT: CPT

## 2020-04-01 PROCEDURE — 99222 1ST HOSP IP/OBS MODERATE 55: CPT | Performed by: NURSE PRACTITIONER

## 2020-04-01 PROCEDURE — 99222 1ST HOSP IP/OBS MODERATE 55: CPT | Performed by: INTERNAL MEDICINE

## 2020-04-01 PROCEDURE — 80048 BASIC METABOLIC PNL TOTAL CA: CPT

## 2020-04-01 RX ORDER — PANTOPRAZOLE SODIUM 40 MG/1
40 TABLET, DELAYED RELEASE ORAL
Status: DISCONTINUED | OUTPATIENT
Start: 2020-04-02 | End: 2020-04-04 | Stop reason: HOSPADM

## 2020-04-01 RX ADMIN — Medication 81 MG: at 09:48

## 2020-04-01 RX ADMIN — MIDODRINE HYDROCHLORIDE 10 MG: 5 TABLET ORAL at 12:23

## 2020-04-01 RX ADMIN — SODIUM CHLORIDE, PRESERVATIVE FREE 10 ML: 5 INJECTION INTRAVENOUS at 21:14

## 2020-04-01 RX ADMIN — BUMETANIDE 2 MG: 0.25 INJECTION INTRAMUSCULAR; INTRAVENOUS at 01:53

## 2020-04-01 RX ADMIN — HEPARIN SODIUM 5000 UNITS: 5000 INJECTION INTRAVENOUS; SUBCUTANEOUS at 21:15

## 2020-04-01 RX ADMIN — THERA TABS 1 TABLET: TAB at 09:47

## 2020-04-01 RX ADMIN — MIDODRINE HYDROCHLORIDE 10 MG: 5 TABLET ORAL at 09:47

## 2020-04-01 RX ADMIN — DESMOPRESSIN ACETATE 40 MG: 0.2 TABLET ORAL at 09:47

## 2020-04-01 RX ADMIN — BUMETANIDE 2 MG: 0.25 INJECTION INTRAMUSCULAR; INTRAVENOUS at 09:48

## 2020-04-01 RX ADMIN — Medication 100 MG: at 09:47

## 2020-04-01 RX ADMIN — SODIUM CHLORIDE, PRESERVATIVE FREE 10 ML: 5 INJECTION INTRAVENOUS at 09:48

## 2020-04-01 RX ADMIN — HEPARIN SODIUM 5000 UNITS: 5000 INJECTION INTRAVENOUS; SUBCUTANEOUS at 15:39

## 2020-04-01 RX ADMIN — MIDODRINE HYDROCHLORIDE 10 MG: 5 TABLET ORAL at 17:45

## 2020-04-01 RX ADMIN — HEPARIN SODIUM 5000 UNITS: 5000 INJECTION INTRAVENOUS; SUBCUTANEOUS at 06:17

## 2020-04-01 RX ADMIN — BUMETANIDE 2 MG: 0.25 INJECTION INTRAMUSCULAR; INTRAVENOUS at 21:14

## 2020-04-01 RX ADMIN — FOLIC ACID 1 MG: 1 TABLET ORAL at 09:47

## 2020-04-01 RX ADMIN — CLOPIDOGREL 75 MG: 75 TABLET, FILM COATED ORAL at 09:47

## 2020-04-01 RX ADMIN — LEVOTHYROXINE SODIUM 50 MCG: 50 TABLET ORAL at 06:17

## 2020-04-01 ASSESSMENT — ENCOUNTER SYMPTOMS
COLOR CHANGE: 0
ABDOMINAL PAIN: 0
VOMITING: 0
SHORTNESS OF BREATH: 1
CHEST TIGHTNESS: 0
EYE DISCHARGE: 0
BACK PAIN: 0
WHEEZING: 0
ABDOMINAL DISTENTION: 0
CONSTIPATION: 0
SINUS PAIN: 0
NAUSEA: 0
SINUS PRESSURE: 0
COUGH: 1
ABDOMINAL DISTENTION: 1
DIARRHEA: 0
SORE THROAT: 1

## 2020-04-01 ASSESSMENT — PAIN DESCRIPTION - PAIN TYPE: TYPE: CHRONIC PAIN

## 2020-04-01 ASSESSMENT — PAIN DESCRIPTION - PROGRESSION: CLINICAL_PROGRESSION: NOT CHANGED

## 2020-04-01 ASSESSMENT — PAIN SCALES - GENERAL
PAINLEVEL_OUTOF10: 7
PAINLEVEL_OUTOF10: 8

## 2020-04-01 ASSESSMENT — PAIN DESCRIPTION - FREQUENCY: FREQUENCY: CONTINUOUS

## 2020-04-01 ASSESSMENT — PAIN DESCRIPTION - DESCRIPTORS: DESCRIPTORS: ACHING

## 2020-04-01 ASSESSMENT — PAIN DESCRIPTION - LOCATION: LOCATION: BACK;KNEE;RIB CAGE

## 2020-04-01 NOTE — ED NOTES
Report called to [facility]. Vs.    Report was given to [facility] over phone.        Polly Angel RN  03/31/20 2019

## 2020-04-01 NOTE — H&P
He was started on Levophed for hypotension via right femoral line. He was on nasal cannula. En route, he was weaned off oxygen. Patient is alert, oriented. Confused at times. Reports progressively worsening shortness of breath and dry cough. Denied any fever, chills, sick contacts, no travel history. Denied any flulike symptoms, nausea, vomiting, abdominal pain, diarrhea. Reported he does have occasional constipation. Denied any weakness tingling or numbness. Unsure of what dose of bumex he takes but reports he is been complaint with medications. Labs here showed creatinine 1.36, sodium stable 127, mildly elevated CRP 27.8, elevated LDH, normal ferritin, troponin I09    Denied any alcohol use or illicit drug use. Smokes 2 cigarettes/day, lifelong smoker. Past Medical History:        Diagnosis Date    Alcoholic cirrhosis ([facility]     Back pain     Bipolar 1 disorder ([facility]     CAD (coronary artery disease) 09/2019    s/p proximal LAD stent    [facility]     Idiopathic cardiomyopathy ([facility])     Seizures ([facility]     LAST SEIZURE 9-4-19       Past Surgical History:        Procedure Laterality Date    CARDIAC CATHETERIZATION Left 09/17/2019    DR Valerio/[facility]/right radial-Severe two vessel coronary artery disease involving involving a 100% proximal probably co-dominant RCA, 90% stenosis in the distal part of the proximal LAD at a bifircation with a moderate sized D1 branch that also has an 80% stenosis. Mildly elevated left ventricular end diastolic pressure. Consult to cardiothoracic surgery for consideration of multi-vess    TOE SURGERY      TOOTH EXTRACTION         Allergies: Allergies   Allergen Reactions    Ticagrelor Shortness Of Breath    Pcn [Penicillins]      Doesn't know reaction         Home Meds:   Prior to Admission medications    Medication Sig Start Date End Date Taking?  Authorizing Provider   potassium chloride (KLOR-CON M) 20 MEQ extended release tablet Take 2 tablets by mouth daily 3/24/20   Jammie Irby MD   aspirin 81 MG EC tablet Take 1 tablet by mouth daily 3/24/20   Jammie Irby MD   metoprolol tartrate (LOPRESSOR) 25 MG tablet Take 0.5 tablets by mouth 2 times daily 3/23/20   Danay Cobb MD   bumetanide (BUMEX) 2 MG tablet Take 1 tablet by mouth 2 times daily 3/23/20   Danay Cobb MD   midodrine (PROAMATINE) 10 MG tablet Take 1 tablet by mouth 3 times daily (with meals)  Patient not taking: Reported on 3/24/2020 3/23/20   Danay Cobb MD   levothyroxine (SYNTHROID) 50 MCG tablet Take 1 tablet by mouth Daily  Patient not taking: Reported on 3/24/2020 3/20/20   CAM Oneill - CNP   folic acid (FOLVITE) 1 MG tablet Take 1 tablet by mouth daily  Patient not taking: Reported on 3/24/2020 3/5/20   Diann Flores MD   ondansetron (ZOFRAN-ODT) 4 MG disintegrating tablet Take 1 tablet by mouth every 8 hours as needed for Nausea or Vomiting  Patient not taking: Reported on 3/24/2020 3/4/20   Diann Flores MD   vitamin B-1 100 MG tablet Take 1 tablet by mouth daily  Patient not taking: Reported on 3/24/2020 3/5/20   Diann Flores MD   enoxaparin (LOVENOX) 30 MG/0.3ML injection Inject 0.3 mLs into the skin 2 times daily 3/4/20   Diann Flores MD   Multiple Vitamin (MULTIVITAMIN) tablet Take 1 tablet by mouth daily  Patient not taking: Reported on 3/24/2020 3/5/20   Diann Flores MD   polyethylene glycol [facility]) packet Take 17 g by mouth daily as needed for Constipation  Patient not taking: Reported on 3/24/2020 3/4/20 4/3/20  Diann Flores MD   nitroGLYCERIN (NITROSTAT) 0.4 MG SL tablet up to max of 3 total doses.  If no relief after 1 dose, call 911. 2/24/20   Jammie Irby MD   atorvastatin (LIPITOR) 40 MG tablet Take 40 mg by mouth daily 1/14/20   Historical Provider, MD   doxepin (SINEQUAN) 50 MG capsule Take 50 mg by mouth 1/8/20   Historical Provider, MD   clopidogrel (PLAVIX) 75 MG tablet Take 1 lower lobe atelectasis -less likely pneumonia, follow-up respiratory viral panel and mycoplasma IgM, COVID 19, consult infectious disease, monitor off antibiotics. Required pressors with brief period of time, stopped in ICU. 5. Ischemic cardiomyopathy EF 17% in February 2020 -restart aspirin, Plavix, Lipitor. Will start Lopressor as blood pressure tolerates. Needs to follow-up with cardiology for ICD placement. 6. History of Alcoholic liver cirrhosis, portal hypertension - ammonia 23 on admission    7. Smoker - advised cessation   8. Low sodium siet  9. Heparin for DVT prophylaxis   10. Will transfer the patient to [facility] if bed needed as he is off levophed, hemodynamically stable and saturating well on room air. The critical care team assigned to the patient will be following up the patient in the intensive care unit. I have discussed the current plan with the critical care attending. The above mentioned assessment and plan will be reviewed again in detail by the critical care attending at bedside, and can be further changed or modified accordingly by the attending physician. Montez Leger M.D. Critical care resident,  Department of Internal Medicine/ Critical care  hospitals)             3/31/2020, 11:35 PM  Attending Physician Statement  I have discussed the care of Kd Bolton, including pertinent history and exam findings,  with the resident. I have seen and examined the patient and the key elements of all parts of the encounter have been performed by me. I agree with the assessment, plan and orders as documented by the resident with additions .   Acute on chronic systolic chf with b/lpleural effusion and atelectasis   On ra   Covid-19 pui   Transfer out of micu        Total critical care time caring for this patient with life threatening, unstable organ failure, including direct patient contact, management of life support systems, review of

## 2020-04-01 NOTE — CARE COORDINATION
Case Management Initial Discharge Plan  [place] Nere[place],         R/o covid-19  [facility]  Readmit [facility]     Called :patient to discuss discharge plans. Information verified: address, contacts, phone number, , insurance Yes  PCP: CAM Valle CNP  Date of last visit: 2 wks    Insurance Provider: medicare/medicaid     Discharge Planning    Living Arrangements:    lives alone  Support Systems:   dad    Home has 1 stories  3 stairs to climb to get into front door     Patient able to perform ADL's:Independent    Current Services (outpatient & in home) [facility] home care peter dc'd yesterday dt safety concerns  DME equipment: o2, life vest (not using)  DME provider: unsure      Potential Assistance Needed:   home care     Wants to think about home care   [place] of choice given    Prior SNF/Rehab Placement and Facility: none  Agreeable to SNF/Rehab: plan return home   Freedom of choice provided: n/a   Evaluation: no    Expected Discharge date:   tbd  Patient expects to be discharged to:   home       Transportation provider: joann  Transportation arrangements needed for discharge: No    Readmission Risk              Risk of Unplanned Readmission:        41             Does patient have a readmission risk score greater than 14?: Yes  If yes, follow-up appointment must be made within 7 days of discharge.      Goals of Care: return to adl without shortness of breath      Discharge Plan:  Return home considering home care           Electronically signed by Madalyn Paget, RN on 20 at 12:22 PM EDT

## 2020-04-01 NOTE — H&P
[facility]    HISTORY AND PHYSICAL EXAMINATION            Date:   4/1/2020  Patient name:  Mimi Ho  Date of admission:  3/31/2020  9:50 PM  MRN:   [id number]  Account:  [de-identified]  YOB: 1965  PCP:    CAM Mueller CNP  Room:   2019/2019-01  Code Status:    Full Code    Chief Complaint:     Shortness of breath    History Obtained From:     patient, electronic medical record    History of Present Illness:     Mimi Ho is a 49 y.o. Non-/non  male who presents with shortness f breath and is admitted to the hospital for the management of Acute on chronic combined systolic and diastolic congestive heart failure, NYHA class 4 ([facility] [address] Patient is a 50-year-old male with a past medical history significant for ischemic cardiomyopathy with a documented ejection fraction of 72%, alcoholic liver cirrhosis with associated portal hypertension, hypothyroidism, who was recently seen and evaluated by our services approximately 2 weeks ago and discharged home who represented to the emergency department in [place] with 1 week history of worsening shortness of breath. Upon previous discharge patient was prescribed Bumex 2 mg p.o. twice daily. Patient historically is noncompliant with medications and prior to that admission had stopped all of his home medications. Diagnostics in [place]'s emergency department on 3/31/2020 showing hyponatremia 127, potassium 5.4, acute kidney injury 1.77, elevated LFTs, lactic acidosis 3.9, troponin I 37, proBNP 10, 843. ABG s consistent with respiratory alkalosis. Urinalysis was negative. Troponin I 37, EKG negative for acute ST-T wave changes. Chest x-ray showed mild pulmonary vascular congestion and cardiomegaly. CT chest showed worsening cardiomegaly, tiny right pleural effusion, right lung base atelectasis versus early pneumonia.   He was also hypothermic with a rectal Admission medications    Medication Sig Start Date End Date Taking? Authorizing Provider   potassium chloride (KLOR-CON M) 20 MEQ extended release tablet Take 2 tablets by mouth daily 3/24/20   Marvin Sen MD   aspirin 81 MG EC tablet Take 1 tablet by mouth daily 3/24/20   Marvin Sen MD   metoprolol tartrate (LOPRESSOR) 25 MG tablet Take 0.5 tablets by mouth 2 times daily 3/23/20   Valentín Glez MD   bumetanide (BUMEX) 2 MG tablet Take 1 tablet by mouth 2 times daily 3/23/20   Valentín Glez MD   midodrine (PROAMATINE) 10 MG tablet Take 1 tablet by mouth 3 times daily (with meals)  Patient not taking: Reported on 3/24/2020 3/23/20   Valentín Glez MD   levothyroxine (SYNTHROID) 50 MCG tablet Take 1 tablet by mouth Daily  Patient not taking: Reported on 3/24/2020 3/20/20   Elfida Flavors, APRN - CNP   folic acid (FOLVITE) 1 MG tablet Take 1 tablet by mouth daily  Patient not taking: Reported on 3/24/2020 3/5/20   Kevin Murdock MD   ondansetron (ZOFRAN-ODT) 4 MG disintegrating tablet Take 1 tablet by mouth every 8 hours as needed for Nausea or Vomiting  Patient not taking: Reported on 3/24/2020 3/4/20   Kevin Murdock MD   vitamin B-1 100 MG tablet Take 1 tablet by mouth daily  Patient not taking: Reported on 3/24/2020 3/5/20   Kevin Murdock MD   enoxaparin (LOVENOX) 30 MG/0.3ML injection Inject 0.3 mLs into the skin 2 times daily 3/4/20   Kevin Murdock MD   Multiple Vitamin (MULTIVITAMIN) tablet Take 1 tablet by mouth daily  Patient not taking: Reported on 3/24/2020 3/5/20   Kevin Murdock MD   polyethylene glycol [facility]) packet Take 17 g by mouth daily as needed for Constipation  Patient not taking: Reported on 3/24/2020 3/4/20 4/3/20  Kevin Murdock MD   nitroGLYCERIN (NITROSTAT) 0.4 MG SL tablet up to max of 3 total doses.  If no relief after 1 dose, call 911. 2/24/20   Marvin Sen MD   atorvastatin (LIPITOR) 40 MG tablet Take 40 mg by mouth daily 1/14/20   Historical Provider, MD Collection Time: 03/31/20  4:00 PM   Result Value Ref Range    Lactic Acid 1.6 0.5 - 2.2 mmol/L   Respiratory Virus PCR Panel    Collection Time: 03/31/20 11:06 PM   Result Value Ref Range    Specimen Description . NASOPHARYNGEAL SWAB     Adenovirus PCR Not Detected Not Detected    Coronavirus 229E PCR Not Detected Not Detected    Coronavirus HKU1 PCR Not Detected Not Detected    Coronavirus NL63 PCR Not Detected Not Detected    Coronavirus OC43 PCR Not Detected Not Detected    Human Metapneumovirus PCR Not Detected Not Detected    Rhino/Enterovirus PCR Not Detected Not Detected    Influenza A by PCR Not Detected Not Detected    Influenza A H1 PCR NOT REPORTED Not Detected    Influenza A H1 (2009) PCR NOT REPORTED Not Detected    Influenza A H3 PCR NOT REPORTED Not Detected    Influenza B by PCR Not Detected Not Detected    Parainfluenza 1 PCR Not Detected Not Detected    Parainfluenza 2 PCR Not Detected Not Detected    Parainfluenza 3 PCR Not Detected Not Detected    Parainfluenza 4 PCR Not Detected Not Detected    Resp Syncytial Virus PCR Not Detected Not Detected    Bordetella Parapertussis Not Detected Not Detected    B Pertussis by PCR Not Detected Not Detected    Chlamydia pneumoniae By PCR Not Detected Not Detected    Mycoplasma pneumo by PCR Not Detected Not Detected   Urinalysis Reflex to Culture    Collection Time: 03/31/20 11:13 PM   Result Value Ref Range    Color, UA YELLOW YELLOW    Turbidity UA CLEAR CLEAR    Glucose, Ur NEGATIVE NEGATIVE    Bilirubin Urine NEGATIVE NEGATIVE    Ketones, Urine NEGATIVE NEGATIVE    Specific Gravity, UA 1.021 1.005 - 1.030    Urine Hgb NEGATIVE NEGATIVE    pH, UA 5.5 5.0 - 8.0    Protein, UA NEGATIVE NEGATIVE    Urobilinogen, Urine Normal Normal    Nitrite, Urine NEGATIVE NEGATIVE    Leukocyte Esterase, Urine NEGATIVE NEGATIVE    Urinalysis Comments       Microscopic exam not performed based on chemical results unless requested in original order.    Osmolality, Urine Collection Time: 03/31/20 11:13 PM   Result Value Ref Range    Osmolality, Ur 662 80 - 1300 mOsm/kg   Urea nitrogen, urine    Collection Time: 03/31/20 11:13 PM   Result Value Ref Range    Urea Nitrogen, Ur 1343 mg/dL   Troponin    Collection Time: 03/31/20 11:57 PM   Result Value Ref Range    Troponin, High Sensitivity 109 (HH) 0 - 22 ng/L    Troponin T NOT REPORTED <0.03 ng/mL    Troponin Interp NOT REPORTED    Magnesium    Collection Time: 03/31/20 11:57 PM   Result Value Ref Range    Magnesium 2.6 1.6 - 2.6 mg/dL   OSMOLALITY    Collection Time: 03/31/20 11:57 PM   Result Value Ref Range    Serum Osmolality 279 275 - 295 mOsm/kg   C-Reactive Protein    Collection Time: 03/31/20 11:57 PM   Result Value Ref Range    CRP 27.8 (H) 0.0 - 5.0 mg/L   LACTATE DEHYDROGENASE    Collection Time: 03/31/20 11:57 PM   Result Value Ref Range     (H) 135 - 225 U/L   FERRITIN    Collection Time: 03/31/20 11:57 PM   Result Value Ref Range    Ferritin 138 30 - 400 ug/L   Basic Metabolic Panel    Collection Time: 03/31/20 11:57 PM   Result Value Ref Range    Glucose 81 70 - 99 mg/dL    BUN 43 (H) 6 - 20 mg/dL    CREATININE 1.36 (H) 0.70 - 1.20 mg/dL    Bun/Cre Ratio NOT REPORTED 9 - 20    Calcium 8.6 8.6 - 10.4 mg/dL    Sodium 127 (L) 135 - 144 mmol/L    Potassium 3.9 3.7 - 5.3 mmol/L    Chloride 87 (L) 98 - 107 mmol/L    CO2 23 20 - 31 mmol/L    Anion Gap 17 9 - 17 mmol/L    GFR Non-African American 55 (L) >60 mL/min    GFR African American >60 >60 mL/min    GFR Comment          GFR Staging NOT REPORTED    Basic Metabolic Panel w/ Reflex to MG    Collection Time: 04/01/20  5:11 AM   Result Value Ref Range    Glucose 134 (H) 70 - 99 mg/dL    BUN 42 (H) 6 - 20 mg/dL    CREATININE 1.35 (H) 0.70 - 1.20 mg/dL    Bun/Cre Ratio NOT REPORTED 9 - 20    Calcium 8.7 8.6 - 10.4 mg/dL    Sodium 130 (L) 135 - 144 mmol/L    Potassium 4.1 3.7 - 5.3 mmol/L    Chloride 88 (L) 98 - 107 mmol/L    CO2 22 20 - 31 mmol/L    Anion Gap 20 (H) 9 - 17 mmol/L    GFR Non- 55 (L) >60 mL/min    GFR African American >60 >60 mL/min    GFR Comment          GFR Staging NOT REPORTED    Hepatic function panel    Collection Time: 04/01/20  5:11 AM   Result Value Ref Range    Alb 3.6 3.5 - 5.2 g/dL    Alkaline Phosphatase 158 (H) 40 - 129 U/L    ALT 54 (H) 5 - 41 U/L    AST 63 (H) <40 U/L    Total Bilirubin 1.04 0.3 - 1.2 mg/dL    Bilirubin, Direct 0.48 (H) <0.31 mg/dL    Bilirubin, Indirect 0.56 0.00 - 1.00 mg/dL    Total Protein 6.3 (L) 6.4 - 8.3 g/dL    Globulin NOT REPORTED 1.5 - 3.8 g/dL    Albumin/Globulin Ratio 1.3 1.0 - 2.5   CBC auto differential    Collection Time: 04/01/20  5:11 AM   Result Value Ref Range    WBC 4.3 3.5 - 11.3 k/uL    RBC 3.73 (L) 4.21 - 5.77 m/uL    Hemoglobin 10.0 (L) 13.0 - 17.0 g/dL    Hematocrit 33.3 (L) 40.7 - 50.3 %    MCV 89.3 82.6 - 102.9 fL    MCH 26.8 25.2 - 33.5 pg    MCHC 30.0 28.4 - 34.8 g/dL    RDW 18.9 (H) 11.8 - 14.4 %    Platelets 107 (L) 776 - 453 k/uL    MPV 9.9 8.1 - 13.5 fL    NRBC Automated 1.2 (H) 0.0 per 100 WBC    Differential Type NOT REPORTED     WBC Morphology NOT REPORTED     RBC Morphology NOT REPORTED     Platelet Estimate NOT REPORTED     Immature Granulocytes 0 0 %    Seg Neutrophils 70 (H) 36 - 66 %    Lymphocytes 21 (L) 24 - 44 %    Monocytes 8 (H) 1 - 7 %    Eosinophils % 1 1 - 4 %    Basophils 0 0 - 2 %    Absolute Immature Granulocyte 0.00 0.00 - 0.30 k/uL    Segs Absolute 3.02 1.8 - 7.7 k/uL    Absolute Lymph # 0.90 (L) 1.0 - 4.8 k/uL    Absolute Mono # 0.34 0.1 - 0.8 k/uL    Absolute Eos # 0.04 0.0 - 0.4 k/uL    Basophils Absolute 0.00 0.0 - 0.2 k/uL    Morphology ANISOCYTOSIS PRESENT    Troponin    Collection Time: 04/01/20  5:11 AM   Result Value Ref Range    Troponin, High Sensitivity 107 (HH) 0 - 22 ng/L    Troponin T NOT REPORTED <0.03 ng/mL    Troponin Interp NOT REPORTED        Imaging/Diagnostics:  Ct Chest Wo Contrast    Result Date: 3/31/2020  1.   Moderate cardiomegaly, slightly worsened compared to a previous CT chest dated 10/16/2019. No pericardial effusion. 2.  Tiny right pleural effusion with mild patchy ground-glass and airspace opacities at the right lung base. This is likely atelectasis, but possibility of superimposed early pneumonia would be difficult to exclude. 3.  Small to moderate volume of ascites in the visualized upper abdomen. 4.  Possible gallbladder wall thickening, partially visualized. If there is concern for gallbladder disease, consider gallbladder ultrasound. 5.  Mild mediastinal lymphadenopathy, likely reactive. Xr Chest Portable    Result Date: 4/1/2020  Cardiomegaly with pulmonary vascular prominence. Xr Chest Portable    Result Date: 3/31/2020  Cardiomegaly and mild central vascular congestion. No focal airspace consolidation. Assessment :      Hospital Problems           Last Modified POA    * (Principal) Acute on chronic combined systolic and diastolic congestive heart failure, NYHA class 4 ([address] 4/1/2020 Yes    Portal hypertension ([address] 0/82/4355 Yes    Alcoholic cirrhosis of liver with ascites ([address] 3/31/2020 Yes    Ischemic cardiomyopathy 3/31/2020 Yes    CAD (coronary artery disease) 3/31/2020 Yes    Bipolar disorder ([address] 3/31/2020 Yes    Hyponatremia 3/31/2020 Yes    GREGORY (acute kidney injury) ([address] 3/31/2020 Yes    Pneumonia 3/31/2020 Yes    Non compliance w medication regimen 3/31/2020 Yes    Elevated ferritin 4/1/2020 Yes    Elevated LDH 4/1/2020 Yes          Plan:     Patient status inpatient in the Progressive Unit/Step down    1. Acute on chronic combined systolic and diastolic CHF: Guarded continue diuresis despite asymptomatic hypotension, Midodrine available as needed. Bumex twice daily started in the ICU, patient was given this during his last inpatient hospital stay and did not take it once he was discharged home requiring him to be readmitted approximately 1 week ago. Continue 1500 mL fluid restriction.   Patient will need AICD as outpatient  2. Portal hypertension: Secondary to alcoholic cirrhosis, check ammonia level  3. Ischemic cardiomyopathy with an ejection fraction of 17%. 4. Asymptomatic hypotension: Continue midodrine scheduled  5. Coronary artery disease: Continue aspirin, statin, Plavix. 6. Hyponatremia: Most likely secondary to alcoholic cirrhosis  7. Acute kidney injury: Improving. Continue to monitor patient. Bumex has started while patient was in ICU. Do not administer fluids  8. Pneumonia: Questionable viral, covid pending, viral respiratory panel, strep pneumo, mycoplasma  9. GI/DVT prophylaxis: Heparin, Protonix    Consultations:   IP CONSULT TO INFECTIOUS DISEASES  IP CONSULT TO HOSPITALIST     Patient is admitted as inpatient status because of co-morbidities listed above, severity of signs and symptoms as outlined, requirement for current medical therapies and most importantly because of direct risk to patient if care not provided in a hospital setting.     CAM Valle NP  4/1/2020  3:05 PM    Copy sent to CAM Ray - CNP

## 2020-04-02 ENCOUNTER — APPOINTMENT (OUTPATIENT)
Dept: GENERAL RADIOLOGY | Age: 55
DRG: 292 | End: 2020-04-02
Attending: INTERNAL MEDICINE
Payer: MEDICARE

## 2020-04-02 LAB
ABSOLUTE EOS #: <0.03 K/UL (ref 0–0.44)
ABSOLUTE IMMATURE GRANULOCYTE: <0.03 K/UL (ref 0–0.3)
ABSOLUTE LYMPH #: 1.12 K/UL (ref 1.1–3.7)
ABSOLUTE MONO #: 0.36 K/UL (ref 0.1–1.2)
ACETAMINOPHEN LEVEL: <5 UG/ML (ref 10–30)
AMMONIA: 17 UMOL/L (ref 16–60)
AMPHETAMINE SCREEN URINE: NEGATIVE
ANION GAP SERPL CALCULATED.3IONS-SCNC: 13 MMOL/L (ref 9–17)
BARBITURATE SCREEN URINE: NEGATIVE
BASOPHILS # BLD: 0 % (ref 0–2)
BASOPHILS ABSOLUTE: <0.03 K/UL (ref 0–0.2)
BENZODIAZEPINE SCREEN, URINE: NEGATIVE
BUN BLDV-MCNC: 28 MG/DL (ref 6–20)
BUN/CREAT BLD: ABNORMAL (ref 9–20)
BUPRENORPHINE URINE: NORMAL
C-REACTIVE PROTEIN: 19.4 MG/L (ref 0–5)
CALCIUM SERPL-MCNC: 7.6 MG/DL (ref 8.6–10.4)
CANNABINOID SCREEN URINE: NEGATIVE
CHLORIDE BLD-SCNC: 94 MMOL/L (ref 98–107)
CO2: 27 MMOL/L (ref 20–31)
COCAINE METABOLITE, URINE: NEGATIVE
CREAT SERPL-MCNC: 0.95 MG/DL (ref 0.7–1.2)
DIFFERENTIAL TYPE: ABNORMAL
EOSINOPHILS RELATIVE PERCENT: 0 % (ref 1–4)
ETHANOL PERCENT: <0.01 %
ETHANOL: <10 MG/DL
FERRITIN: 117 UG/L (ref 30–400)
GFR AFRICAN AMERICAN: >60 ML/MIN
GFR NON-AFRICAN AMERICAN: >60 ML/MIN
GFR SERPL CREATININE-BSD FRML MDRD: ABNORMAL ML/MIN/{1.73_M2}
GFR SERPL CREATININE-BSD FRML MDRD: ABNORMAL ML/MIN/{1.73_M2}
GLUCOSE BLD-MCNC: 77 MG/DL (ref 70–99)
HCT VFR BLD CALC: 29.7 % (ref 40.7–50.3)
HCT VFR BLD CALC: 32.9 % (ref 40.7–50.3)
HEMOGLOBIN: 10 G/DL (ref 13–17)
HEMOGLOBIN: 9.2 G/DL (ref 13–17)
IMMATURE GRANULOCYTES: 1 %
LYMPHOCYTES # BLD: 25 % (ref 24–43)
MAGNESIUM: 2.1 MG/DL (ref 1.6–2.6)
MCH RBC QN AUTO: 27.1 PG (ref 25.2–33.5)
MCHC RBC AUTO-ENTMCNC: 31 G/DL (ref 28.4–34.8)
MCV RBC AUTO: 87.6 FL (ref 82.6–102.9)
MDMA URINE: NORMAL
METHADONE SCREEN, URINE: NEGATIVE
METHAMPHETAMINE, URINE: NORMAL
MONOCYTES # BLD: 8 % (ref 3–12)
NRBC AUTOMATED: 0.9 PER 100 WBC
OPIATES, URINE: NEGATIVE
OXYCODONE SCREEN URINE: NEGATIVE
PDW BLD-RTO: 18.9 % (ref 11.8–14.4)
PHENCYCLIDINE, URINE: NEGATIVE
PLATELET # BLD: 106 K/UL (ref 138–453)
PLATELET ESTIMATE: ABNORMAL
PMV BLD AUTO: 9.8 FL (ref 8.1–13.5)
POTASSIUM SERPL-SCNC: 3.5 MMOL/L (ref 3.7–5.3)
PROPOXYPHENE, URINE: NORMAL
RBC # BLD: 3.39 M/UL (ref 4.21–5.77)
RBC # BLD: ABNORMAL 10*6/UL
SALICYLATE LEVEL: <1 MG/DL (ref 3–10)
SARS-COV-2, NAA: NOT DETECTED
SEG NEUTROPHILS: 66 % (ref 36–65)
SEGMENTED NEUTROPHILS ABSOLUTE COUNT: 2.9 K/UL (ref 1.5–8.1)
SODIUM BLD-SCNC: 134 MMOL/L (ref 135–144)
TEST INFORMATION: NORMAL
TOXIC TRICYCLIC SC,BLOOD: NEGATIVE
TRICYCLIC ANTIDEPRESSANTS, UR: NORMAL
WBC # BLD: 4.4 K/UL (ref 3.5–11.3)
WBC # BLD: ABNORMAL 10*3/UL

## 2020-04-02 PROCEDURE — 83735 ASSAY OF MAGNESIUM: CPT

## 2020-04-02 PROCEDURE — 99232 SBSQ HOSP IP/OBS MODERATE 35: CPT | Performed by: NURSE PRACTITIONER

## 2020-04-02 PROCEDURE — 6370000000 HC RX 637 (ALT 250 FOR IP): Performed by: NURSE PRACTITIONER

## 2020-04-02 PROCEDURE — 2500000003 HC RX 250 WO HCPCS: Performed by: STUDENT IN AN ORGANIZED HEALTH CARE EDUCATION/TRAINING PROGRAM

## 2020-04-02 PROCEDURE — 6360000002 HC RX W HCPCS: Performed by: STUDENT IN AN ORGANIZED HEALTH CARE EDUCATION/TRAINING PROGRAM

## 2020-04-02 PROCEDURE — 1200000000 HC SEMI PRIVATE

## 2020-04-02 PROCEDURE — 85014 HEMATOCRIT: CPT

## 2020-04-02 PROCEDURE — 80048 BASIC METABOLIC PNL TOTAL CA: CPT

## 2020-04-02 PROCEDURE — 51798 US URINE CAPACITY MEASURE: CPT

## 2020-04-02 PROCEDURE — 36415 COLL VENOUS BLD VENIPUNCTURE: CPT

## 2020-04-02 PROCEDURE — 86738 MYCOPLASMA ANTIBODY: CPT

## 2020-04-02 PROCEDURE — 86140 C-REACTIVE PROTEIN: CPT

## 2020-04-02 PROCEDURE — 2580000003 HC RX 258: Performed by: STUDENT IN AN ORGANIZED HEALTH CARE EDUCATION/TRAINING PROGRAM

## 2020-04-02 PROCEDURE — 6370000000 HC RX 637 (ALT 250 FOR IP): Performed by: STUDENT IN AN ORGANIZED HEALTH CARE EDUCATION/TRAINING PROGRAM

## 2020-04-02 PROCEDURE — 99231 SBSQ HOSP IP/OBS SF/LOW 25: CPT | Performed by: INTERNAL MEDICINE

## 2020-04-02 PROCEDURE — 85018 HEMOGLOBIN: CPT

## 2020-04-02 PROCEDURE — 85025 COMPLETE CBC W/AUTO DIFF WBC: CPT

## 2020-04-02 PROCEDURE — 99232 SBSQ HOSP IP/OBS MODERATE 35: CPT | Performed by: INTERNAL MEDICINE

## 2020-04-02 PROCEDURE — 82728 ASSAY OF FERRITIN: CPT

## 2020-04-02 PROCEDURE — 71045 X-RAY EXAM CHEST 1 VIEW: CPT

## 2020-04-02 PROCEDURE — 82140 ASSAY OF AMMONIA: CPT

## 2020-04-02 RX ORDER — IPRATROPIUM BROMIDE AND ALBUTEROL SULFATE 2.5; .5 MG/3ML; MG/3ML
1 SOLUTION RESPIRATORY (INHALATION) EVERY 4 HOURS PRN
Status: DISCONTINUED | OUTPATIENT
Start: 2020-04-02 | End: 2020-04-03

## 2020-04-02 RX ADMIN — FOLIC ACID 1 MG: 1 TABLET ORAL at 09:15

## 2020-04-02 RX ADMIN — SODIUM CHLORIDE, PRESERVATIVE FREE 10 ML: 5 INJECTION INTRAVENOUS at 09:25

## 2020-04-02 RX ADMIN — HEPARIN SODIUM 5000 UNITS: 5000 INJECTION INTRAVENOUS; SUBCUTANEOUS at 20:36

## 2020-04-02 RX ADMIN — BUMETANIDE 2 MG: 0.25 INJECTION INTRAMUSCULAR; INTRAVENOUS at 20:36

## 2020-04-02 RX ADMIN — MIDODRINE HYDROCHLORIDE 10 MG: 5 TABLET ORAL at 09:15

## 2020-04-02 RX ADMIN — SODIUM CHLORIDE, PRESERVATIVE FREE 10 ML: 5 INJECTION INTRAVENOUS at 20:37

## 2020-04-02 RX ADMIN — LEVOTHYROXINE SODIUM 50 MCG: 50 TABLET ORAL at 06:38

## 2020-04-02 RX ADMIN — BUMETANIDE 2 MG: 0.25 INJECTION INTRAMUSCULAR; INTRAVENOUS at 09:16

## 2020-04-02 RX ADMIN — DESMOPRESSIN ACETATE 40 MG: 0.2 TABLET ORAL at 09:14

## 2020-04-02 RX ADMIN — HEPARIN SODIUM 5000 UNITS: 5000 INJECTION INTRAVENOUS; SUBCUTANEOUS at 06:37

## 2020-04-02 RX ADMIN — MIDODRINE HYDROCHLORIDE 10 MG: 5 TABLET ORAL at 11:37

## 2020-04-02 RX ADMIN — PANTOPRAZOLE SODIUM 40 MG: 40 TABLET, DELAYED RELEASE ORAL at 09:15

## 2020-04-02 ASSESSMENT — ENCOUNTER SYMPTOMS
RESPIRATORY NEGATIVE: 1
GASTROINTESTINAL NEGATIVE: 1

## 2020-04-02 NOTE — PROGRESS NOTES
Recent Labs     03/31/20  1045 04/01/20  0511   PROT 6.4 6.3*   LABALBU 3.7 3.6   BILIDIR 0.60* 0.48*   IBILI 0.71 0.56   BILITOT 1.31* 1.04   ALKPHOS 167* 158*   ALT 62* 54*   AST 88* 63*     No results for input(s): [facility] in the last 72 hours. Lab Results   Component Value Date    CRP 19.4 (H) 04/02/2020     Lab Results   Component Value Date    SEDRATE 5 05/09/2012       Recent Labs     03/31/20  1343   PROCAL 0.45*       Imaging Studies:   ONE XRAY VIEW OF THE CHEST 4/2/2020 6:11 am  Impression   1. Moderate cardiomegaly. 2. Central pulmonary vascular congestion. 3. No significant interval change. Cultures:     COVID-19 [[id number]] Collected: 03/31/20 2230   Order Status: Completed Specimen: Nasopharyngeal Swab Updated: 04/02/20 0756    SARS-CoV-2, AARON Not Detected    Comment: (NOTE)   TEST                RESULT   [id number]       NOT DETECTED         MRSA DNA Probe, Nasal [[id number]] Collected: 03/31/20 2318   Order Status: Completed Specimen: Nasal Updated: 04/01/20 1557    Specimen Description . NASAL SWAB    MRSA, DNA, Nasal NEGATIVE:  MRSA DNA not detected by nucleic acid amplification. Comment:                                                          Respiratory Virus PCR Panel [[id number]] Collected: 03/31/20 2306   Order Status: Completed Specimen: Nasopharyngeal Swab Updated: 04/01/20 0300    Specimen Description . NASOPHARYNGEAL SWAB    Adenovirus PCR Not Detected    Coronavirus 229E PCR Not Detected    Comment: Coronoviruses detected by this panel are those associated with the clinical common cold. This test will not detect 2019-SARS-COV-2 or other novel coronaviruses. Coronavirus HKU1 PCR Not Detected    Comment: Coronoviruses detected by this panel are those associated with the clinical common cold. This test will not detect 2019-SARS-COV-2 or other novel coronaviruses.         Coronavirus NL63 PCR Not Detected    Comment: Coronoviruses detected by this panel are the visit, the document can still have some errors including those of syntax andsound a like substitutions which may escape proof reading. In such instances, actual meaning can be extrapolated by contextual diversion.

## 2020-04-02 NOTE — PROGRESS NOTES
pupils equal and reactive, extraocular eye movements intact  Mouth - mucous membranes moist, pharynx normal without lesions  Neck - supple, no significant adenopathy  Chest -decreased bilateral breath sounds. No wheezing. He has occasional rhonchi.   Heart - normal rate, regular rhythm, normal S1, S2, no murmurs, rubs, clicks or gallops  Abdomen - soft, nontender, nondistended, no masses or organomegaly  Neurological - alert, oriented, normal speech, no focal findings or movement disorder noted  Extremities - peripheral pulses normal, no pedal edema, no clubbing or cyanosis  Skin - normal coloration and turgor, no rashes, no suspicious skin lesions noted     DATA REVIEW     Medications:  Scheduled Meds:   pantoprazole  40 mg Oral QAM AC    sodium chloride flush  10 mL Intravenous 2 times per day    heparin (porcine)  5,000 Units Subcutaneous 3 times per day    aspirin  81 mg Oral Daily    atorvastatin  40 mg Oral Daily    clopidogrel  75 mg Oral Daily    levothyroxine  50 mcg Oral Daily    folic acid  1 mg Oral Daily    midodrine  10 mg Oral TID WC    thiamine  100 mg Oral Daily    multivitamin  1 tablet Oral Daily    bumetanide  2 mg Intravenous BID     Continuous Infusions:    LABS:-  ABGs:   No results found for: PH, PCO2, PO2, HCO3, O2SAT  Recent Labs     03/31/20  1045   PHART 7.456*   PO2ART 107.5*   YJK5BNI 30.0*   QSK5HUJ 21.0*   U1VPSOOE 98.4*     Lab Results   Component Value Date    POCPH 7.523 (H) 02/29/2020    POCPCO2 33.3 (L) 02/29/2020    POCPO2 116.3 (H) 02/29/2020    POCHCO3 27.4 02/29/2020    QMDX4KFS 99 (H) 02/29/2020     CBC:   Recent Labs     03/31/20  1045 04/01/20  0511 04/02/20  0556 04/02/20  0926   WBC 5.5 4.3 4.4  --    HGB 10.3* 10.0* 9.2* 10.0*   HCT 33.1* 33.3* 29.7* 32.9*   MCV 88.5 89.3 87.6  --     132* 106*  --    LYMPHOPCT 23* 21* 25  --    RBC 3.74* 3.73* 3.39*  --    MCH 27.5 26.8 27.1  --    MCHC 31.1 30.0 31.0  --    RDW 18.8* 18.9* 18.9*  --      BMP: (grade I) diastolic dysfunction is seen. The aortic root is considered to be at the upper limits of normal in size  when corrected for body surface area. Compared to the previous study of 10/23/19, no significant change was seen. Signature  ----------------------------------------------------------------------------   Electronically signed by Radha Delacruz(Sonographer) on 12/18/2019 02:42   PM  ----------------------------------------------------------------------------    ----------------------------------------------------------------------------   Electronically signed by Chao Valerio(Interpreting physician) on   12/18/2019 05:46 PM  ----------------------------------------------------------------------------  FINDINGS  Left Atrium  The left atrium is moderately dilated (34-39) with a left atrial volume  index of 34 ml/m2. Left Ventricle  Global left ventricular systolic function appears severely reduced with an  estimated ejection fraction of 15-20%. The left ventricular cavity size is severely enlarged and the left  ventricular wall thickness is moderately increased. Global hypokinesis with more severe inferior and inferoseptal hypokinesis  noted. Right Atrium  Right atrium is normal in size. Right Ventricle  Normal right ventricular size and function. Mitral Valve  Normal mitral valve structure with moderate mitral regurgitation. Aortic Valve  Aortic leaflets show calcification without restriction of motion. No aortic  insufficiency. Tricuspid Valve  Normal tricuspid valve structure and function with trivial regurgitation. Pulmonic Valve  The pulmonic valve is normal in structure. Pericardial Effusion  No significant pericardial effusion is seen. Miscellaneous  Evidence of mild (grade I) diastolic dysfunction is seen. The aortic root is considered to be at the upper limits of normal in size  when corrected for body surface area.     M-mode / 2D Measurements & Calculations:      LVIDd:7.66 cm(3.7 - 5.6 cm)     Diastolic SOYJT.21 ml   LVIDs:7.11 cm(2.2 - 4.0 cm)     Systolic NKILJN:455.46 ml   IVSd:1.55 cm(0.6 - 1.1 cm)      Aortic Root:3.82 cm(2.0 - 3.7 cm)   LVPWd:1.39 cm(0.6 - 1.1 cm)     LA Dimension: 5.38 cm(1.9 - 4.0 cm)   Fractional Shortenin.18 %    LA volume/Index: 79.4 ml /34m^2   Calculated LVEF (%): 20.01 %    AV Cusp Separation: 2.13 cm      Mitral:                                Aortic      Valve Area (P1/2-Time): 3.27 cm^2      Peak Velocity: 0.95 m/s   Peak E-Wave: 0.55 m/s                  Mean Velocity: 0.65 m/s   Peak A-Wave: 0.61 m/s                  Peak Gradient: 3.64 mmHg   E/A Ratio: 0.9                         Mean Gradient: 1.86 mmHg   Peak Gradient: 1.22 mmHg                                          Acceleration Time: 104.98 msec   P1/2t: 67.3 msec                                          AV VTI: 17.57 cm     Diastology / Tissue Doppler    Lateral Wall E' velocity:0.05 m/s  Lateral Wall E/E':14.25       Cardiac Catheterization:   No results found for this or any previous visit. ASSESSMENT AND PLAN     Assessment:    Principal Problem:    Acute on chronic combined systolic and diastolic congestive heart failure, NYHA class 4 (HCC)  Active Problems:    Portal hypertension (HCC)    Alcoholic cirrhosis of liver with ascites (HCC)    Ischemic cardiomyopathy    CAD (coronary artery disease)    Bipolar disorder (HCC)    Hyponatremia    GREGORY (acute kidney injury) ([facility] [address]    Pneumonia    Non compliance w medication regimen    Elevated ferritin    Elevated LDH  Resolved Problems:    * No resolved hospital problems. *        Plan:  Patient examined at bedside, eating breakfast comfortably. Request being put on nasal cannula oxygen  He is very noncompliant to his diuretics. Continues to be on IV diuretics. Heart failure management as per primary. At this time patient does not have any acute respiratory distress. vascular congestion  His creatinine is improved from 1.35 yesterday and 1.78 on admission to 0.95 today. He is on Bumex 2 mg IV twice daily, he is also on midodrine, aspirin Plavix statin and levothyroxine. Recommend to monitor intake and output continue diuretic Daily weight and optimization of CHF treatment per primary team and cardiology. Pulmonary status is stable mostly related to his severe cardiomyopathy and acute on chronic CHF, no need for antibiotic from pulmonary standpoint. We will sign off please call us if needed. Please note that this chart was generated using voice recognition Dragon dictation software. Although every effort was made to ensure the accuracy of this automated transcription, some errors in transcription may have occurred.       Adele Salmeron MD  4/2/2020 12:57 PM

## 2020-04-02 NOTE — FLOWSHEET NOTE
Resident on-call for infectious disease; Dr. Orly Chacon made aware that per [facility] labs, patient's COVID-19 test results are negative. No new orders noted at this time.

## 2020-04-02 NOTE — PROGRESS NOTES
Cheryl Gustafson 20    Progress Note    4/2/2020    10:10 AM    Name:   Georgina Dent  MRN:     [id number]     Acct:      [de-identified]   Room:   2019/2019-01  IP Day:  2  Admit Date:  3/31/2020  9:50 PM    PCP:   CAM Gamble CNP  Code Status:  Full Code    Subjective:     C/C: Shortness of breath  Interval History Status: not changed. Pt seen ans evaluated after pulling out indwelling escamilla cath, hematuria present with princess bleeding. COVID negative, transfer underway. Diuresis continues despite asymptomatic hypotension which is not new for the patient. Brief History:     Per records:    Georgina Dent is a 49 y.o. Non-/non  male who presents with shortness f breath and is admitted to the hospital for the management of Acute on chronic combined systolic and diastolic congestive heart failure, NYHA class 4 ([facility] [address]    Patient is a 69-year-old male with a past medical history significant for ischemic cardiomyopathy with a documented ejection fraction of 64%, alcoholic liver cirrhosis with associated portal hypertension, hypothyroidism, who was recently seen and evaluated by our services approximately 2 weeks ago and discharged home who represented to the emergency department in [place] with 1 week history of worsening shortness of breath. Upon previous discharge patient was prescribed Bumex 2 mg p.o. twice daily.   Patient historically is noncompliant with medications and prior to that admission had stopped all of his home medications.     Diagnostics in [place]'s emergency department on 3/31/2020 showing hyponatremia 127, potassium 5.4, acute kidney injury 1.77, elevated LFTs, lactic acidosis 3.9, troponin I 37, proBNP 10, 843.  ABG s consistent with respiratory alkalosis.  Urinalysis was negative.  Troponin I 37, EKG negative for acute ST-T wave changes.  Chest x-ray showed mild pulmonary vascular congestion and change. Xr Chest Portable    Result Date: 4/1/2020  Cardiomegaly with pulmonary vascular prominence. Xr Chest Portable    Result Date: 3/31/2020  Cardiomegaly and mild central vascular congestion. No focal airspace consolidation. Physical Examination:        Constitutional:       General: He is not in acute distress. Appearance: Normal appearance. He is obese. HENT:      Head: Normocephalic and atraumatic. Mouth/Throat:      Mouth: Mucous membranes are dry. Eyes:      Pupils: Pupils are equal, round, and reactive to light. Neck:      Musculoskeletal: Normal range of motion and neck supple. Cardiovascular:      Rate and Rhythm: Normal rate and regular rhythm. Pulses: Normal pulses. Heart sounds: Normal heart sounds. No murmur. Comments: Hypotension intermittently  Pulmonary:      Effort: Pulmonary effort is normal.      Breath sounds: Normal breath sounds. No wheezing. Comments: Diminished posteriorly, bilat  Abdominal:      General: Bowel sounds are normal. There is distension. Palpations: Abdomen is soft. Tenderness: There is abdominal tenderness (RUQ). Musculoskeletal: Normal range of motion. Right lower leg: No edema. Left lower leg: No edema. Lymphadenopathy:      Cervical: No cervical adenopathy. Skin:     General: Skin is warm and dry. Coloration: Skin is pale. Findings: Rash (Abdominal macular papular rash) present. Neurological:      Mental Status: He is oriented to person, place, and time.       Comments: Patient is oriented x3 with episodes of delirium   Psychiatric:         Mood and Affect: Mood normal.         Behavior: Behavior normal.     Assessment:        Hospital Problems           Last Modified POA    * (Principal) Acute on chronic combined systolic and diastolic congestive heart failure, NYHA class 4 ([address] 4/1/2020 Yes    Portal hypertension ([facility] [address] 1/82/6245 Yes    Alcoholic cirrhosis of liver with ascites

## 2020-04-02 NOTE — PLAN OF CARE
PROVIDE ADEQUATE OXYGENATION WITH ACCEPTABLE SP02/ABG'S    [x]  IDENTIFY APPROPRIATE OXYGEN THERAPY  []   MONITOR SP02/ABG'S AS NEEDED   [x]   PATIENT EDUCATION AS NEEDED    [facility]. Suspected COVID-19 virus infection [R68.89]  Pneumonia [J18.9] . Vitals:    04/02/20 1219   BP: 114/72   Pulse: 90   Resp: 20   Temp: 97.6 °F (36.4 °C)   SpO2: 98%   . Patients home meds are   Prior to Admission medications    Medication Sig Start Date End Date Taking?  Authorizing Provider   potassium chloride (KLOR-CON M) 20 MEQ extended release tablet Take 2 tablets by mouth daily 3/24/20   Jammie Irby MD   aspirin 81 MG EC tablet Take 1 tablet by mouth daily 3/24/20   Jammie Irby MD   metoprolol tartrate (LOPRESSOR) 25 MG tablet Take 0.5 tablets by mouth 2 times daily 3/23/20   Danay Cobb MD   bumetanide (BUMEX) 2 MG tablet Take 1 tablet by mouth 2 times daily 3/23/20   Danay Cobb MD   midodrine (PROAMATINE) 10 MG tablet Take 1 tablet by mouth 3 times daily (with meals)  Patient not taking: Reported on 3/24/2020 3/23/20   Danay Cobb MD   levothyroxine (SYNTHROID) 50 MCG tablet Take 1 tablet by mouth Daily  Patient not taking: Reported on 3/24/2020 3/20/20   CAM Oneill - CNP   folic acid (FOLVITE) 1 MG tablet Take 1 tablet by mouth daily  Patient not taking: Reported on 3/24/2020 3/5/20   Diann Flores MD   ondansetron (ZOFRAN-ODT) 4 MG disintegrating tablet Take 1 tablet by mouth every 8 hours as needed for Nausea or Vomiting  Patient not taking: Reported on 3/24/2020 3/4/20   Diann Flores MD   vitamin B-1 100 MG tablet Take 1 tablet by mouth daily  Patient not taking: Reported on 3/24/2020 3/5/20   Diann Flores MD   enoxaparin (LOVENOX) 30 MG/0.3ML injection Inject 0.3 mLs into the skin 2 times daily 3/4/20   Diann Flores MD   Multiple Vitamin (MULTIVITAMIN) tablet Take 1 tablet by mouth daily  Patient not taking: Reported on 3/24/2020 3/5/20 Palak Cummings MD   polyethylene glycol [facility]) packet Take 17 g by mouth daily as needed for Constipation  Patient not taking: Reported on 3/24/2020 3/4/20 4/3/20  Palak Cummings MD   nitroGLYCERIN (NITROSTAT) 0.4 MG SL tablet up to max of 3 total doses. If no relief after 1 dose, call 911. 2/24/20   Geoff Contreras MD   atorvastatin (LIPITOR) 40 MG tablet Take 40 mg by mouth daily 1/14/20   Historical Provider, MD   doxepin (SINEQUAN) 50 MG capsule Take 50 mg by mouth 1/8/20   Historical Provider, MD   clopidogrel (PLAVIX) 75 MG tablet Take 1 tablet by mouth daily 10/2/19   Geoff Contreras MD   famotidine (PEPCID) 20 MG tablet Take 1 tablet by mouth 2 times daily  Patient not taking: Reported on 3/24/2020 9/19/19   CAM Kulkarni CNP   nicotine (NICODERM CQ) 21 MG/24HR Place 1 patch onto the skin daily  Patient not taking: Reported on 3/24/2020 9/20/19   CAM Kulkarni CNP   benztropine (COGENTIN) 1 MG tablet Take 1 mg by mouth 2 times daily    Historical Provider, MD   divalproex (DEPAKOTE) 500 MG EC tablet Take 1,500 mg by mouth daily.       Historical Provider, MD     RR 20  Breath Sounds: diminished      · Bronchodilator assessment at level  2  · Hyperinflation assessment at level   · Secretion Management assessment at level    ·   · []    Bronchodilator Assessment  BRONCHODILATOR ASSESSMENT SCORE  Score 0 1 2 3 4 5   Breath Sounds   [x]  Patient Baseline [x]  No Wheeze good aeration []  Faint, scattered wheezing, good aeration []  Expiratory Wheezing and or moderately diminished []  Insp/Exp wheeze and/or very diminished []  Insp/Exp and/ or marked distress   Respiratory Rate   []  Patient Baseline []  Less than 20 []  Less than 20 [x]  20-25 []  Greater than 25 []  Greater than 25   Peak flow % of Pred or PB [x]  NA   []  Greater than 90%  []  81-90% []  71-80% []  Less than or equal to 70%  or unable to perform []  Unable due to Respiratory Distress   Dyspnea re []  Patient

## 2020-04-02 NOTE — FLOWSHEET NOTE
Patient has pulled out indwelling escamilla catheter with balloon intact. Bleeding controled but clots still present and visible in meatus. Triple lumen IV catheter to patient's right groin is intact but dressing is saturated with blood, requiring nurse to break down and redress. Old dressing removed with catheter anchor changed and then new dressing applied. Telesitter applied to monitor patient.

## 2020-04-02 NOTE — FLOWSHEET NOTE
Patient disconnected his telemetry. While nurse was reconnecting telemetry lead to monitor, patient stated, \"Why did you take all the gel out of me? \"  Patient is disoriented to place and time, only oriented to person. Patient made comfortable in bed with bed alarm applied.

## 2020-04-02 NOTE — PROGRESS NOTES
Voided 100 pink tinged urine patient unable to void again. Bladder scan performed and 10mls of urine noted in bladder. Notified Urology PA of findings.

## 2020-04-03 PROBLEM — T83.9XXA COMPLICATION OF FOLEY CATHETER (HCC): Status: ACTIVE | Noted: 2020-04-03

## 2020-04-03 PROBLEM — R31.0 GROSS HEMATURIA: Status: ACTIVE | Noted: 2020-04-03

## 2020-04-03 LAB
ABSOLUTE EOS #: <0.03 K/UL (ref 0–0.44)
ABSOLUTE IMMATURE GRANULOCYTE: <0.03 K/UL (ref 0–0.3)
ABSOLUTE LYMPH #: 1.15 K/UL (ref 1.1–3.7)
ABSOLUTE MONO #: 0.43 K/UL (ref 0.1–1.2)
ANION GAP SERPL CALCULATED.3IONS-SCNC: 12 MMOL/L (ref 9–17)
BASOPHILS # BLD: 0 % (ref 0–2)
BASOPHILS ABSOLUTE: <0.03 K/UL (ref 0–0.2)
BUN BLDV-MCNC: 21 MG/DL (ref 6–20)
BUN/CREAT BLD: ABNORMAL (ref 9–20)
CALCIUM SERPL-MCNC: 8.4 MG/DL (ref 8.6–10.4)
CHLORIDE BLD-SCNC: 90 MMOL/L (ref 98–107)
CO2: 29 MMOL/L (ref 20–31)
CREAT SERPL-MCNC: 0.79 MG/DL (ref 0.7–1.2)
DIFFERENTIAL TYPE: ABNORMAL
EOSINOPHILS RELATIVE PERCENT: 0 % (ref 1–4)
GFR AFRICAN AMERICAN: >60 ML/MIN
GFR NON-AFRICAN AMERICAN: >60 ML/MIN
GFR SERPL CREATININE-BSD FRML MDRD: ABNORMAL ML/MIN/{1.73_M2}
GFR SERPL CREATININE-BSD FRML MDRD: ABNORMAL ML/MIN/{1.73_M2}
GLUCOSE BLD-MCNC: 138 MG/DL (ref 70–99)
HCT VFR BLD CALC: 32.4 % (ref 40.7–50.3)
HEMOGLOBIN: 9.8 G/DL (ref 13–17)
IMMATURE GRANULOCYTES: 0 %
LYMPHOCYTES # BLD: 21 % (ref 24–43)
MAGNESIUM: 2.1 MG/DL (ref 1.6–2.6)
MCH RBC QN AUTO: 27.1 PG (ref 25.2–33.5)
MCHC RBC AUTO-ENTMCNC: 30.2 G/DL (ref 28.4–34.8)
MCV RBC AUTO: 89.8 FL (ref 82.6–102.9)
MONOCYTES # BLD: 8 % (ref 3–12)
MYCOPLASMA PNEUMONIAE IGM: 0.09
NRBC AUTOMATED: 0.6 PER 100 WBC
PDW BLD-RTO: 19 % (ref 11.8–14.4)
PLATELET # BLD: 101 K/UL (ref 138–453)
PLATELET ESTIMATE: ABNORMAL
PMV BLD AUTO: 9.4 FL (ref 8.1–13.5)
POTASSIUM SERPL-SCNC: 3.1 MMOL/L (ref 3.7–5.3)
RBC # BLD: 3.61 M/UL (ref 4.21–5.77)
RBC # BLD: ABNORMAL 10*6/UL
SEG NEUTROPHILS: 71 % (ref 36–65)
SEGMENTED NEUTROPHILS ABSOLUTE COUNT: 3.77 K/UL (ref 1.5–8.1)
SODIUM BLD-SCNC: 131 MMOL/L (ref 135–144)
WBC # BLD: 5.4 K/UL (ref 3.5–11.3)
WBC # BLD: ABNORMAL 10*3/UL

## 2020-04-03 PROCEDURE — 1200000000 HC SEMI PRIVATE

## 2020-04-03 PROCEDURE — 83735 ASSAY OF MAGNESIUM: CPT

## 2020-04-03 PROCEDURE — 6360000002 HC RX W HCPCS: Performed by: STUDENT IN AN ORGANIZED HEALTH CARE EDUCATION/TRAINING PROGRAM

## 2020-04-03 PROCEDURE — 85025 COMPLETE CBC W/AUTO DIFF WBC: CPT

## 2020-04-03 PROCEDURE — 99232 SBSQ HOSP IP/OBS MODERATE 35: CPT | Performed by: INTERNAL MEDICINE

## 2020-04-03 PROCEDURE — 36415 COLL VENOUS BLD VENIPUNCTURE: CPT

## 2020-04-03 PROCEDURE — 80048 BASIC METABOLIC PNL TOTAL CA: CPT

## 2020-04-03 PROCEDURE — 6370000000 HC RX 637 (ALT 250 FOR IP): Performed by: STUDENT IN AN ORGANIZED HEALTH CARE EDUCATION/TRAINING PROGRAM

## 2020-04-03 PROCEDURE — 97166 OT EVAL MOD COMPLEX 45 MIN: CPT

## 2020-04-03 PROCEDURE — 97535 SELF CARE MNGMENT TRAINING: CPT

## 2020-04-03 RX ADMIN — LEVOTHYROXINE SODIUM 50 MCG: 50 TABLET ORAL at 05:53

## 2020-04-03 RX ADMIN — MIDODRINE HYDROCHLORIDE 10 MG: 5 TABLET ORAL at 12:00

## 2020-04-03 RX ADMIN — MIDODRINE HYDROCHLORIDE 10 MG: 5 TABLET ORAL at 17:00

## 2020-04-03 RX ADMIN — HEPARIN SODIUM 5000 UNITS: 5000 INJECTION INTRAVENOUS; SUBCUTANEOUS at 05:53

## 2020-04-03 ASSESSMENT — PAIN DESCRIPTION - LOCATION
LOCATION: GENERALIZED
LOCATION: GENERALIZED

## 2020-04-03 ASSESSMENT — PAIN SCALES - GENERAL
PAINLEVEL_OUTOF10: 7
PAINLEVEL_OUTOF10: 9

## 2020-04-03 ASSESSMENT — PAIN DESCRIPTION - PAIN TYPE: TYPE: CHRONIC PAIN

## 2020-04-03 NOTE — CONSULTS
had been confused on and off       In [place] emergency room he was shown to have a hyponatremia with an acute renal injury 1.77, elevated liver enzymes and lactic acidosis of 3.9, EKG showed some acute ST changes. Chest x-ray showed congestion with cardiomegaly. CT scan showed a right lower base atelectasis versus early pneumonia with a tiny right effusion. He was hypothermic needed fluids and to be hugger. Underlying problems include colic liver cirrhosis, hypertension hypothyroidism, CHF    Interval changes  4/1/2020   Laying bilat  Dysuria, mild, new  No abd pain  Cough w white phlem  Dizzy ++  No headache   Ox 3 on my exam      Summary of relevant labs:  Labs:  Liver enz 54/63  WBC 4.3  Absolute lymphocyte count 1.25      CRP is 27  Ferritin 138    Micro:  resp PCR neg  Imaging:  3/31 CT  of the chest personally reviewed, right lower lobe infiltrate possibly a pleural thickening with minimal ground glass    CXR 4/1 increased markings              I have personally reviewed the past medical history, past surgical history, medications, social history, and family history, and I haveupdated the database accordingly. Past Medical History:     Past Medical History:   Diagnosis Date    Alcoholic cirrhosis ([facility] [address]     Back pain     Bipolar 1 disorder ([facility] [address]     CAD (coronary artery disease) 09/2019    s/p proximal LAD stent    CHF (congestive heart failure) (HCC)     Idiopathic cardiomyopathy (HCC)     Seizures ([facility] [address]     LAST SEIZURE 9-4-19       Past Surgical  History:     Past Surgical History:   Procedure Laterality Date    CARDIAC CATHETERIZATION Left 09/17/2019    DR Valerio/[facility]/right radial-Severe two vessel coronary artery disease involving involving a 100% proximal probably co-dominant RCA, 90% stenosis in the distal part of the proximal LAD at a bifircation with a moderate sized D1 branch that also has an 80% stenosis. Mildly elevated left ventricular end diastolic pressure.
YELLOW   PHUR 6.0 5.5   WBCUA None  --    RBCUA None  --    MUCUS NOT REPORTED  --    TRICHOMONAS NOT REPORTED  --    YEAST NOT REPORTED  --    BACTERIA NOT REPORTED  --    SPECGRAV 1.020 1.021   LEUKOCYTESUR NEGATIVE NEGATIVE   UROBILINOGEN Normal Normal   BILIRUBINUR SMALL* NEGATIVE        -----------------------------------------------------------------  Imaging Results: 2/27/20 renal U/S independently reviewed and radiology report verified demonstrating: The right kidney measures 11.1 cm in length and the left kidney measures 12.0   cm in length.       Kidneys demonstrate normal cortical echogenicity.  No evidence of   hydronephrosis or intrarenal stones.          Assessment and Plan   Impression:    Patient Active Problem List   Diagnosis    Onychia and paronychia of toe    Other specified disease of nail    Acute on chronic diastolic CHF (congestive heart failure), NYHA class 3 (HCC)    Acute on chronic congestive heart failure (HCC)    Acute on chronic combined systolic and diastolic congestive heart failure, NYHA class 4 (HCC)    Portal hypertension (HCC)    Alcoholic cirrhosis of liver with ascites (HCC)    Idiopathic cardiomyopathy (HCC)    Acute heart failure (HCC)    Ischemic cardiomyopathy    CAD, multiple vessel    CAD (coronary artery disease)    Medication side effect, initial encounter    Idiopathic hypotension    NSTEMI (non-ST elevated myocardial infarction) ([address]    CHF with unknown LVEF ([address]    COPD, severity to be determined ([address]    Class 1 obesity due to excess calories with serious comorbidity and body mass index (BMI) of 31.0 to 31.9 in adult    Stopped smoking with greater than 40 pack year history    Bipolar disorder ([address]    Acute metabolic encephalopathy    Hyponatremia    Anasarca associated with disorder of kidney    Alcohol withdrawal ([address]    Tobacco abuse disorder    Systolic heart failure, chronic ([address]    Cardiogenic shock ([address]    GREGORY (acute
is prominent. The lungs are clear. No significant pleural effusion is seen. Cardiomegaly with pulmonary vascular prominence. Xr Chest Portable    Result Date: 3/31/2020  EXAMINATION: ONE XRAY VIEW OF THE CHEST 3/31/2020 12:13 pm COMPARISON: 03/19/2020 HISTORY: ORDERING SYSTEM PROVIDED HISTORY: dyspnea TECHNOLOGIST PROVIDED HISTORY: dyspnea FINDINGS: Cardiomegaly and pulmonary vascular congestion. No focal airspace consolidation, sizeable pleural effusion, or pneumothorax. Trachea is midline. Osseous structures and soft tissues are grossly intact. Cardiomegaly and mild central vascular congestion. No focal airspace consolidation.        Assessment and Plan   Impression:  50 yo male with   problem list -     -Hematuria s/p traumatic removal of escamilla catheter (by patient)    Patient Active Problem List   Diagnosis    Onychia and paronychia of toe    Other specified disease of nail    Acute on chronic diastolic CHF (congestive heart failure), NYHA class 3 (HCC)    Acute on chronic congestive heart failure (HCC)    Acute on chronic combined systolic and diastolic congestive heart failure, NYHA class 4 (HCC)    Portal hypertension (HCC)    Alcoholic cirrhosis of liver with ascites (HCC)    Idiopathic cardiomyopathy (HCC)    Acute heart failure (HCC)    Ischemic cardiomyopathy    CAD, multiple vessel    CAD (coronary artery disease)    Medication side effect, initial encounter    Idiopathic hypotension    NSTEMI (non-ST elevated myocardial infarction) ([address]    CHF with unknown LVEF ([address]    COPD, severity to be determined ([address]    Class 1 obesity due to excess calories with serious comorbidity and body mass index (BMI) of 31.0 to 31.9 in adult    Stopped smoking with greater than 40 pack year history    Bipolar disorder ([address]    Acute metabolic encephalopathy    Hyponatremia    Anasarca associated with disorder of kidney    Alcohol withdrawal ([address]    Tobacco abuse disorder   

## 2020-04-03 NOTE — PROGRESS NOTES
cirrhosis ([facility] Back pain, Bipolar 1 disorder ([facility] CAD (coronary artery disease), CHF (congestive heart failure) ([facility] Idiopathic cardiomyopathy ([facility] and Seizures ([facility] Social History:   reports that he has quit smoking. His smoking use included cigarettes. He has never used smokeless tobacco. He reports current alcohol use. He reports current drug use. Frequency: 1.00 time per week. Drug: Marijuana. Family History: No family history on file. Vitals:  /72   Pulse 88   Temp 97.3 °F (36.3 °C) (Oral)   Resp 18   Ht 6' (1.829 m)   Wt 256 lb (116.1 kg)   SpO2 100%   BMI 34.72 kg/m²   Temp (24hrs), Av.4 °F (36.3 °C), Min:97.3 °F (36.3 °C), Max:97.6 °F (36.4 °C)    No results for input(s): POCGLU in the last 72 hours. I/O (24Hr): Intake/Output Summary (Last 24 hours) at 4/3/2020 1157  Last data filed at 4/3/2020 0557  Gross per 24 hour   Intake --   Output 1050 ml   Net -1050 ml       Labs:  Hematology:  Recent Labs     20  2357  20  0511 20  0556 20  0926 20  0852   WBC  --   --  4.3 4.4  --  5.4   RBC  --   --  3.73* 3.39*  --  3.61*   HGB  --    < > 10.0* 9.2* 10.0* 9.8*   HCT  --    < > 33.3* 29.7* 32.9* 32.4*   MCV  --   --  89.3 87.6  --  89.8   MCH  --   --  26.8 27.1  --  27.1   MCHC  --   --  30.0 31.0  --  30.2   RDW  --   --  18.9* 18.9*  --  19.0*   PLT  --   --  132* 106*  --  101*   MPV  --   --  9.9 9.8  --  9.4   CRP 27.8*  --   --  19.4*  --   --     < > = values in this interval not displayed.      Chemistry:  Recent Labs     207 20  0511 20  0556 20  0852   * 130* 134* 131*   K 3.9 4.1 3.5* 3.1*   CL 87* 88* 94* 90*   CO2 23 22 27 29   GLUCOSE 81 134* 77 138*   BUN 43* 42* 28* 21*   CREATININE 1.36* 1.35* 0.95 0.79   MG 2.6  --  2.1 2.1   ANIONGAP 17 20* 13 12   LABGLOM 55* 55* >60 >60   GFRAA >60 >60 >60 >60   CALCIUM 8.6 8.7 7.6* 8.4*   TROPHS 109* 107*  --   --      Recent Labs     20  6129 04/01/20  0511 04/02/20  0556   PROT  --  6.3*  --    LABALBU  --  3.6  --    AST  --  63*  --    ALT  --  54*  --    *  --   --    ALKPHOS  --  158*  --    BILITOT  --  1.04  --    BILIDIR  --  0.48*  --    AMMONIA  --   --  17     ABG:  Lab Results   Component Value Date    POCPH 7.523 02/29/2020    PHART 7.456 03/31/2020    POCPCO2 33.3 02/29/2020    TWF9DUE 30.0 03/31/2020    POCPO2 116.3 02/29/2020    PO2ART 107.5 03/31/2020    POCHCO3 27.4 02/29/2020    HAX8EIO 21.0 03/31/2020    NBEA 2.4 03/31/2020    PBEA NOT REPORTED 03/31/2020    APU5SWV 28 02/29/2020    EBRQ8KTY 99 02/29/2020    H9XXXXZQ 98.4 03/31/2020    FIO2 NOT REPORTED 03/31/2020     Lab Results   Component Value Date/Time    SPECIAL R AC 8ML 03/31/2020 01:50 PM     Lab Results   Component Value Date/Time    CULTURE NO GROWTH 3 DAYS 03/31/2020 01:50 PM       Radiology:  Ct Chest Wo Contrast    Result Date: 3/31/2020  1. Moderate cardiomegaly, slightly worsened compared to a previous CT chest dated 10/16/2019. No pericardial effusion. 2.  Tiny right pleural effusion with mild patchy ground-glass and airspace opacities at the right lung base. This is likely atelectasis, but possibility of superimposed early pneumonia would be difficult to exclude. 3.  Small to moderate volume of ascites in the visualized upper abdomen. 4.  Possible gallbladder wall thickening, partially visualized. If there is concern for gallbladder disease, consider gallbladder ultrasound. 5.  Mild mediastinal lymphadenopathy, likely reactive. Xr Chest Portable    Result Date: 4/2/2020  1. Moderate cardiomegaly. 2. Central pulmonary vascular congestion. 3. No significant interval change. Xr Chest Portable    Result Date: 4/1/2020  Cardiomegaly with pulmonary vascular prominence. Xr Chest Portable    Result Date: 3/31/2020  Cardiomegaly and mild central vascular congestion. No focal airspace consolidation.        Physical Examination:        General appearance: alert, cooperative and no distress  Mental Status:  oriented to person, place and time and normal affect  Lungs:  clear to auscultation bilaterally, normal effort  Heart:  regular rate and rhythm, no murmur  Abdomen:  soft, nontender, nondistended, normal bowel sounds, no masses, hepatomegaly, splenomegaly  Extremities: 2+ pitting edema bilaterally  Skin: Multiple lesions noted on his skin throughout    Assessment:        Hospital Problems           Last Modified POA    * (Principal) Acute on chronic combined systolic and diastolic congestive heart failure, NYHA class 4 ([address] 4/2/2020 Yes    Portal hypertension ([address] 6/73/3969 Yes    Alcoholic cirrhosis of liver with ascites ([address] 3/31/2020 Yes    Ischemic cardiomyopathy 3/31/2020 Yes    CAD (coronary artery disease) 4/2/2020 Yes    Bipolar disorder ([address] 3/31/2020 Yes    Hyponatremia 3/31/2020 Yes    GREGORY (acute kidney injury) ([address] 3/31/2020 Yes    Pneumonia 3/31/2020 Yes    Non compliance w medication regimen 3/31/2020 Yes    Elevated ferritin 4/1/2020 Yes    Elevated LDH 3/3/1915 Yes    Complication of Hkan catheter ([address] 4/3/2020 No    Gross hematuria 4/3/2020 No          Plan:        Acute on chronic congestive heart failure  -Continue IV diuretics  -Midrin available as needed for hypotension  -Continue 1.5 L fluid restriction  -Patient will need AICD as an outpatient  -Strict I's and O's    Acute kidney injury  -Resolved    Right lower lobe atelectasis    Ischemic cardiomyopathy  -Ejection fraction of 68%    Alcoholic liver cirrhosis with associated portal hypertension  -Stable    CAD  -Continue aspirin/statin/Plavix    Bipolar disorder  -Stable    Pneumonia  -Covid negative    Hypothermia  -Resolved    Hypothyroidism  -Continue Synthroid    Hypertension  -Resolved    Hematuria  -Traumatic due to pulling out Khan catheter  -Resolving    Urinary retention  -Urology on board  -Continue timed voiding/double voiding every 4-6 hours  -Measure postvoid residual and page urology for more than 350 mL's noted    Noncompliance    GI and DVT prophylaxis with heparin and Protonix    Richard Dukes MD  4/3/2020  11:57 AM

## 2020-04-03 NOTE — PROGRESS NOTES
Occupational Therapy   Occupational Therapy Initial Assessment  Date: 4/3/2020   Patient Name: Kinjal Sousa  MRN: [id number]     : 1965    Date of Service: 4/3/2020    Discharge Recommendations:    Further therapy recommended at discharge. Assessment   Performance deficits / Impairments: Decreased functional mobility ; Decreased strength;Decreased endurance;Decreased ADL status; Decreased safe awareness;Decreased high-level IADLs  Assessment: Patient is expected to need acute OT services to address functional deficits present during evaluation and treatment session. Pt demonstrates decreased endurance with activity, needing to rest elbows on grab bars in shower while seated on shower chair during shower task due to fatigue and needing verbal instructions/cueing for safe task participation and execution. Pt would benefit from acute OT services to address deficits heavily impacting ADL performance and provide education and maximize safety with activity. Prognosis: Good  Decision Making: Medium Complexity  Patient Education: OT role, OTPOC, purpose of evaluation, importance of OOB activity, safety awareness within shower, use of shower chair and grab bars - fair return   REQUIRES OT FOLLOW UP: Yes  Activity Tolerance  Activity Tolerance: Patient Tolerated treatment well;Patient limited by fatigue  Safety Devices  Safety Devices in place: Yes  Type of devices: All fall risk precautions in place; Patient at risk for falls;Gait belt;Nurse notified; Left in bed;Call light within reach  Restraints  Initially in place: No         Patient Diagnosis(es): There were no encounter diagnoses. has a past medical history of Alcoholic cirrhosis ([facility] [address] Back pain, Bipolar 1 disorder ([facility] [address] CAD (coronary artery disease), CHF (congestive heart failure) ([facility] [address] Idiopathic cardiomyopathy ([facility] [address] and Seizures ([facility] [address] has a past surgical history that includes Toe Surgery;  Tooth Extraction; and Cardiac catheterization (Left, functional transfers/mobility independently to engage in ADLs safely  Short term goal 4: demonstrate ~35 min of functional activity tolerance to engage in ADLs safely        Therapy Time   Individual Concurrent Group Co-treatment   Time In 1456(additional time 7318-2071 for activity preparation and patient utilizing toilet )         Time Out 1536         Minutes 40         Timed Code Treatment Minutes: JUN/Delon Wilkinson OTR/L

## 2020-04-03 NOTE — DISCHARGE SUMMARY
skin 2 times daily, R-0DC to SNF      Multiple Vitamin (MULTIVITAMIN) tablet Take 1 tablet by mouth daily, R-0DC to SNF      polyethylene glycol (GLYCOLAX) packet Take 17 g by mouth daily as needed for Constipation, Disp-527 g, R-1DC to SNF      nitroGLYCERIN (NITROSTAT) 0.4 MG SL tablet up to max of 3 total doses. If no relief after 1 dose, call 911., Disp-25 tablet, R-3Normal      atorvastatin (LIPITOR) 40 MG tablet Take 40 mg by mouth dailyHistorical Med      doxepin (SINEQUAN) 50 MG capsule Take 50 mg by mouthHistorical Med      clopidogrel (PLAVIX) 75 MG tablet Take 1 tablet by mouth daily, Disp-90 tablet, R-3Normal      famotidine (PEPCID) 20 MG tablet Take 1 tablet by mouth 2 times daily, Disp-60 tablet, R-3Normal      nicotine (NICODERM CQ) 21 MG/24HR Place 1 patch onto the skin daily, Disp-30 patch, R-3Normal      aspirin 81 MG EC tablet Take 1 tablet by mouth daily, Disp-30 tablet, R-3Normal      benztropine (COGENTIN) 1 MG tablet Take 1 mg by mouth 2 times dailyHistorical Med      divalproex (DEPAKOTE) 500 MG EC tablet Take 1,500 mg by mouth daily.   Historical Med         STOP taking these medications       losartan (COZAAR) 25 MG tablet Comments:   Reason for Stopping:               Activity: activity as tolerated    Diet: renal diet    Follow-up:    Kelly Cordero, APRN - CNP  [address]  [phone]    Schedule an appointment as soon as possible for a visit in 2 weeks  follow-up    MD Colton Villarreal [address] [phone]    Schedule an appointment as soon as possible for a visit in 1 week  follow-up for CHF and needs EP follow-up    Patient Instructions: Please take all medications as instructed  Follow up labs: BMP in 1 week    Note that over 30 minutes was spent in preparing discharge papers, discussing discharge with patient, medication review, etc.    Varney Meckel, MD  PGY-1, Internal Medicine Resident  [facility] [address] 4/3/2020, 4:00 PM

## 2020-04-03 NOTE — PROGRESS NOTES
Ni Shirley MD FACS    Urology Progress Note    Subjective:   No acute events overnight. Hematuria clearing up, he does have some urethral bleeding  Denies nausea, vomiting, fever and chills  Voiding without need for straight catheterization/PVRs are around 100 ml  x2 overnight  UOP 2024 mL Viridiana Rincon    Patient Vitals for the past 24 hrs:   BP Temp Temp src Pulse Resp SpO2 Weight   04/03/20 0556 -- -- -- -- -- -- 256 lb (116.1 kg)   04/02/20 1829 (!) 94/52 97.4 °F (36.3 °C) Axillary 84 20 100 % --   04/02/20 1219 114/72 97.6 °F (36.4 °C) Axillary 90 20 98 % --       Intake/Output Summary (Last 24 hours) at 4/3/2020 0654  Last data filed at 4/3/2020 0557  Gross per 24 hour   Intake --   Output 2074 ml   Net -2074 ml       Recent Labs     03/31/20  1045 04/01/20  0511 04/02/20  0556 04/02/20  0926   WBC 5.5 4.3 4.4  --    HGB 10.3* 10.0* 9.2* 10.0*   HCT 33.1* 33.3* 29.7* 32.9*   MCV 88.5 89.3 87.6  --     132* 106*  --      Recent Labs     03/31/20  2357 04/01/20  0511 04/02/20  0556   * 130* 134*   K 3.9 4.1 3.5*   CL 87* 88* 94*   CO2 23 22 27   BUN 43* 42* 28*   CREATININE 1.36* 1.35* 0.95       Recent Labs     03/31/20  1115 03/31/20  2313   COLORU YELLOW YELLOW   PHUR 6.0 5.5   WBCUA None  --    RBCUA None  --    MUCUS NOT REPORTED  --    TRICHOMONAS NOT REPORTED  --    YEAST NOT REPORTED  --    BACTERIA NOT REPORTED  --    SPECGRAV 1.020 1.021   LEUKOCYTESUR NEGATIVE NEGATIVE   UROBILINOGEN Normal Normal   BILIRUBINUR SMALL* NEGATIVE       Additional Lab/culture results:    Physical Exam:   Constitutional: Patient in no acute distress  Neuro: Alert and oriented to person place and time  Psych: Mood and affect normal  Skin: Normal no bruising. Slightly jaundice  Lungs: Respiratory effort normal  Cardiovascular:  Normal peripheral pulses  Abdomen: Soft, non-tender, non-distended with no hepatosplenomegaly or hernia.  CVA tenderness none  Bladder: Non-tender and not distended  Lymphatics: No history above and agree. I have repeated the key portions of the physical exam and concur with the resident's findings. I have reviewed all laboratory findings and imaging reports/films. I agree with the plan as noted above.     Electronically signed by Krunal Fish MD on 4/3/2020 at 10:55 AM

## 2020-04-04 VITALS
HEART RATE: 90 BPM | WEIGHT: 256 LBS | DIASTOLIC BLOOD PRESSURE: 54 MMHG | OXYGEN SATURATION: 98 % | BODY MASS INDEX: 34.67 KG/M2 | SYSTOLIC BLOOD PRESSURE: 81 MMHG | RESPIRATION RATE: 16 BRPM | HEIGHT: 72 IN | TEMPERATURE: 97.2 F

## 2020-04-04 LAB
ABSOLUTE EOS #: 0.11 K/UL (ref 0–0.44)
ABSOLUTE IMMATURE GRANULOCYTE: 0.03 K/UL (ref 0–0.3)
ABSOLUTE LYMPH #: 1.41 K/UL (ref 1.1–3.7)
ABSOLUTE MONO #: 0.59 K/UL (ref 0.1–1.2)
ANION GAP SERPL CALCULATED.3IONS-SCNC: 12 MMOL/L (ref 9–17)
BASOPHILS # BLD: 0 % (ref 0–2)
BASOPHILS ABSOLUTE: <0.03 K/UL (ref 0–0.2)
BUN BLDV-MCNC: 18 MG/DL (ref 6–20)
BUN/CREAT BLD: ABNORMAL (ref 9–20)
CALCIUM SERPL-MCNC: 8.6 MG/DL (ref 8.6–10.4)
CHLORIDE BLD-SCNC: 90 MMOL/L (ref 98–107)
CO2: 28 MMOL/L (ref 20–31)
CREAT SERPL-MCNC: 0.68 MG/DL (ref 0.7–1.2)
DIFFERENTIAL TYPE: ABNORMAL
EOSINOPHILS RELATIVE PERCENT: 2 % (ref 1–4)
GFR AFRICAN AMERICAN: >60 ML/MIN
GFR NON-AFRICAN AMERICAN: >60 ML/MIN
GFR SERPL CREATININE-BSD FRML MDRD: ABNORMAL ML/MIN/{1.73_M2}
GFR SERPL CREATININE-BSD FRML MDRD: ABNORMAL ML/MIN/{1.73_M2}
GLUCOSE BLD-MCNC: 85 MG/DL (ref 70–99)
HCT VFR BLD CALC: 31.3 % (ref 40.7–50.3)
HEMOGLOBIN: 9.2 G/DL (ref 13–17)
IMMATURE GRANULOCYTES: 1 %
LYMPHOCYTES # BLD: 25 % (ref 24–43)
MCH RBC QN AUTO: 27.1 PG (ref 25.2–33.5)
MCHC RBC AUTO-ENTMCNC: 29.4 G/DL (ref 28.4–34.8)
MCV RBC AUTO: 92.1 FL (ref 82.6–102.9)
MONOCYTES # BLD: 10 % (ref 3–12)
NRBC AUTOMATED: 0.7 PER 100 WBC
PDW BLD-RTO: 19.4 % (ref 11.8–14.4)
PLATELET # BLD: 148 K/UL (ref 138–453)
PLATELET ESTIMATE: ABNORMAL
PMV BLD AUTO: 10.3 FL (ref 8.1–13.5)
POTASSIUM SERPL-SCNC: 4.3 MMOL/L (ref 3.7–5.3)
RBC # BLD: 3.4 M/UL (ref 4.21–5.77)
RBC # BLD: ABNORMAL 10*6/UL
SEG NEUTROPHILS: 62 % (ref 36–65)
SEGMENTED NEUTROPHILS ABSOLUTE COUNT: 3.6 K/UL (ref 1.5–8.1)
SODIUM BLD-SCNC: 130 MMOL/L (ref 135–144)
SOURCE: NORMAL
STREP PNEUMONIAE ANTIGEN: NEGATIVE
WBC # BLD: 5.8 K/UL (ref 3.5–11.3)
WBC # BLD: ABNORMAL 10*3/UL

## 2020-04-04 PROCEDURE — 6370000000 HC RX 637 (ALT 250 FOR IP): Performed by: STUDENT IN AN ORGANIZED HEALTH CARE EDUCATION/TRAINING PROGRAM

## 2020-04-04 PROCEDURE — 99239 HOSP IP/OBS DSCHRG MGMT >30: CPT | Performed by: INTERNAL MEDICINE

## 2020-04-04 PROCEDURE — 97530 THERAPEUTIC ACTIVITIES: CPT

## 2020-04-04 PROCEDURE — 85025 COMPLETE CBC W/AUTO DIFF WBC: CPT

## 2020-04-04 PROCEDURE — 36415 COLL VENOUS BLD VENIPUNCTURE: CPT

## 2020-04-04 PROCEDURE — 97116 GAIT TRAINING THERAPY: CPT

## 2020-04-04 PROCEDURE — 97162 PT EVAL MOD COMPLEX 30 MIN: CPT

## 2020-04-04 PROCEDURE — 6370000000 HC RX 637 (ALT 250 FOR IP): Performed by: NURSE PRACTITIONER

## 2020-04-04 PROCEDURE — 6370000000 HC RX 637 (ALT 250 FOR IP): Performed by: INTERNAL MEDICINE

## 2020-04-04 PROCEDURE — 87899 AGENT NOS ASSAY W/OPTIC: CPT

## 2020-04-04 PROCEDURE — 80048 BASIC METABOLIC PNL TOTAL CA: CPT

## 2020-04-04 RX ORDER — BUMETANIDE 1 MG/1
2 TABLET ORAL 2 TIMES DAILY
Status: DISCONTINUED | OUTPATIENT
Start: 2020-04-04 | End: 2020-04-04 | Stop reason: HOSPADM

## 2020-04-04 RX ADMIN — DESMOPRESSIN ACETATE 40 MG: 0.2 TABLET ORAL at 08:24

## 2020-04-04 RX ADMIN — THERA TABS 1 TABLET: TAB at 08:24

## 2020-04-04 RX ADMIN — PANTOPRAZOLE SODIUM 40 MG: 40 TABLET, DELAYED RELEASE ORAL at 06:32

## 2020-04-04 RX ADMIN — Medication 100 MG: at 08:23

## 2020-04-04 RX ADMIN — CLOPIDOGREL 75 MG: 75 TABLET, FILM COATED ORAL at 08:24

## 2020-04-04 RX ADMIN — Medication 81 MG: at 08:24

## 2020-04-04 RX ADMIN — LEVOTHYROXINE SODIUM 50 MCG: 50 TABLET ORAL at 06:32

## 2020-04-04 RX ADMIN — BUMETANIDE 2 MG: 1 TABLET ORAL at 12:06

## 2020-04-04 RX ADMIN — MIDODRINE HYDROCHLORIDE 10 MG: 5 TABLET ORAL at 08:24

## 2020-04-04 RX ADMIN — MIDODRINE HYDROCHLORIDE 10 MG: 5 TABLET ORAL at 12:07

## 2020-04-04 RX ADMIN — FOLIC ACID 1 MG: 1 TABLET ORAL at 08:24

## 2020-04-04 ASSESSMENT — PAIN DESCRIPTION - PROGRESSION: CLINICAL_PROGRESSION: NOT CHANGED

## 2020-04-04 ASSESSMENT — PAIN DESCRIPTION - FREQUENCY: FREQUENCY: CONTINUOUS

## 2020-04-04 ASSESSMENT — PAIN DESCRIPTION - ONSET: ONSET: ON-GOING

## 2020-04-04 ASSESSMENT — PAIN SCALES - GENERAL: PAINLEVEL_OUTOF10: 8

## 2020-04-04 ASSESSMENT — PAIN DESCRIPTION - DESCRIPTORS: DESCRIPTORS: ACHING

## 2020-04-04 ASSESSMENT — PAIN - FUNCTIONAL ASSESSMENT: PAIN_FUNCTIONAL_ASSESSMENT: ACTIVITIES ARE NOT PREVENTED

## 2020-04-04 ASSESSMENT — PAIN DESCRIPTION - PAIN TYPE: TYPE: CHRONIC PAIN

## 2020-04-04 ASSESSMENT — PAIN DESCRIPTION - LOCATION: LOCATION: GENERALIZED

## 2020-04-04 NOTE — CARE COORDINATION
Patient/family seen: Yes       Informed patient/family of BPCI-A Medical Bundle Program with potential outreach by either Care Transitions Team or naviHealth Team based on hospital admission and location.        BPCI-A Notification Letter given: Yes         Current discharge plan: home with home care

## 2020-04-04 NOTE — DISCHARGE INSTR - COC
[phone]  · Fax:    Dialysis Facility (if applicable)   · Name:  · Address:  · Dialysis Schedule:  · Phone:  · Fax:    / signature: Electronically signed by Yusra Paz RN on 4/4/20 at 3:18 PM EDT    PHYSICIAN SECTION    Prognosis: Guarded    Condition at Discharge: Stable    Rehab Potential (if transferring to Rehab): Good    Physician Certification: I certify the above information and transfer of Tennille Sanders  is necessary for the continuing treatment of the diagnosis listed and that he requires [address] for greater 30 days.      Update Admission H&P: No change in H&P    PHYSICIAN SIGNATURE:  Electronically signed by Matthew Ramos MD on 4/4/20 at 12:11 PM EDT

## 2020-04-04 NOTE — CARE COORDINATION
Review of chart shows that [facility] has declined to take patient back - updated patient, reviewed home care list and requests referral to [address] [place] [address] and [facility] as 2nd choice. Referral sent to [address] [place] [address] and called office to update with discharge, they do not cover his zip code. Referral sent to [facility], call to [facility] to update with discharge. Will review and make verification of PCP to follow.

## 2020-04-04 NOTE — PROGRESS NOTES
Physical Therapy    Facility/Department: [address] ORTHO/MED SURG  Initial Assessment    NAME: Evelina Padron  : 1965  MRN: [id number]    Date of Service: 2020  Per chart Yonatan Quinones is a 49 y.o. Non-/non  male who presents with shortness f breath and is admitted to the hospital for the management of Acute on chronic combined systolic and diastolic congestive heart failure, NYHA class 4 ([address]    Patient is a 67-year-old male with a past medical history significant for ischemic cardiomyopathy with a documented ejection fraction of 61%, alcoholic liver cirrhosis with associated portal hypertension, hypothyroidism, who was recently seen and evaluated by our services approximately 2 weeks ago and discharged home who represented to the emergency department in [place] with 1 week history of worsening shortness of breath. Upon previous discharge patient was prescribed Bumex 2 mg p.o. twice daily. Patient historically is noncompliant with medications and prior to that admission had stopped all of his home medications.     Diagnostics in [place]'s emergency department on 3/31/2020 showing hyponatremia 127, potassium 5.4, acute kidney injury 1.77, elevated LFTs, lactic acidosis 3.9, troponin I 37, proBNP 10, 843.  ABG s consistent with respiratory alkalosis.  Urinalysis was negative.  Troponin I 37, EKG negative for acute ST-T wave changes.  Chest x-ray showed mild pulmonary vascular congestion and cardiomegaly.  CT chest showed worsening cardiomegaly, tiny right pleural effusion, right lung base atelectasis versus early pneumonia. He was also hypothermic with a rectal temperature of 95 °F and treated with bear hugger. He was started on Vanco and Levaquin and route to [facility] and started on Levophed for hypotension. Respiratory alkalosis was treated with low flow nasal cannula which was weaned off by the time he was admitted to the ICU.   Patient was admitted to [facility] secondary to most recent inpatient hospital stay as well as shortness of breath.     On assessment the patient is awake and alert with appropriate cueing. He answers all questions appropriately and follows simple commands. He endorses pharyngitis with some shortness of breath at dry nonproductive cough. His abdomen is distended with organomegaly in the right upper quadrant that is tender to palpation. A fine macular papular rash is noted on his abdomen. Vital signs are stable with his historic hypotension noted on telemetry. He currently denies any chest pain, nausea, vomiting, diarrhea, constipation, fever, chills or pain. Khan catheter is in place       Discharge Recommendations:  Patient would benefit from continued therapy after discharge   PT Equipment Recommendations  Other: CTA    Assessment   Body structures, Functions, Activity limitations: Decreased functional mobility ; Decreased strength;Decreased endurance;Decreased posture;Decreased safe awareness; Increased pain;Decreased balance  Assessment: Pt currently completes bed mobility with Sophie, functional transfers with CGA and ambulates 50ft with no AD and CGA. Pt is unsteady with functional mobility and impulsive placing him at increased risk for falls. Pt would benefit from continued skilled physical therapy to increase strength, endurance, balance and independence with functional mobility. Prognosis: Good  Decision Making: Medium Complexity  PT Education: PT Role;Goals;Plan of Care;General Safety;Gait Training;Transfer Training;Functional Mobility Training  REQUIRES PT FOLLOW UP: Yes  Activity Tolerance  Activity Tolerance: Patient limited by endurance; Patient limited by fatigue       Patient Diagnosis(es): There were no encounter diagnoses.      has a past medical history of Alcoholic cirrhosis ([address] Back pain, Bipolar 1 disorder ([address] CAD (coronary artery disease), CHF (congestive heart failure) ([address] Idiopathic cardiomyopathy ([address] and Seizures Good  Sitting - Dynamic: Good;-  Standing - Static: Fair;-  Standing - Dynamic: Poor  Comments: Standing balance assessed without AD. Plan   Plan  Times per week: 4-5x/week  Current Treatment Recommendations: Strengthening, Functional Mobility Training, Neuromuscular Re-education, Home Exercise Program, Equipment Evaluation, Education, & procurement, ROM, Transfer Training, Gait Training, Safety Education & Training, Positioning, Patient/Caregiver Education & Training, Stair training, Endurance Training, Balance Training  Safety Devices  Type of devices: All fall risk precautions in place, Bed alarm in place, Call light within reach, Gait belt, Patient at risk for falls, Left in bed, Nurse notified  Restraints  Initially in place: No      AM-PAC Score  AM-PAC Inpatient Mobility Raw Score : 16 (04/04/20 1518)  AM-PAC Inpatient T-Scale Score : 40.78 (04/04/20 1518)  Mobility Inpatient CMS 0-100% Score: 54.16 (04/04/20 1518)  Mobility Inpatient CMS G-Code Modifier : CK (04/04/20 1518)          Goals  Short term goals  Time Frame for Short term goals: 14 visits. Short term goal 1: Pt to complete bed mobility Katiana  Short term goal 2: Pt to complete functional transfers with Katiana  Short term goal 3: Pt to ambulate at least 300ft with no AD vs least resistive AD and Katiana  Short term goal 4: Pt to negotiate at least 1 step with one handrail and Katiana  Short term goal 5: Pt to tolerate at least 30 min of skilled physical therapy to increase endurance.         Therapy Time   Individual Concurrent Group Co-treatment   Time In 1305         Time Out 1046         Minutes 38         Timed Code Treatment Minutes: 25 Minutes       Nathaniel Gary PT

## 2020-04-04 NOTE — DISCHARGE SUMMARY
RBC 3.40 04/04/2020    RBC 4.65 05/27/2012    HGB 9.2 04/04/2020    HCT 31.3 04/04/2020    MCV 92.1 04/04/2020    MCH 27.1 04/04/2020    MCHC 29.4 04/04/2020    RDW 19.4 04/04/2020     04/04/2020     05/27/2012     BMP:    Lab Results   Component Value Date    GLUCOSE 85 04/04/2020    GLUCOSE 116 05/27/2012     04/04/2020    K 4.3 04/04/2020    CL 90 04/04/2020    CO2 28 04/04/2020    ANIONGAP 12 04/04/2020    BUN 18 04/04/2020    CREATININE 0.68 04/04/2020    BUNCRER NOT REPORTED 04/04/2020    CALCIUM 8.6 04/04/2020    LABGLOM >60 04/04/2020    GFRAA >60 04/04/2020    GFR      04/04/2020    GFR NOT REPORTED 04/04/2020     HFP:    Lab Results   Component Value Date    PROT 6.3 04/01/2020     CMP:    Lab Results   Component Value Date    GLUCOSE 85 04/04/2020    GLUCOSE 116 05/27/2012     04/04/2020    K 4.3 04/04/2020    CL 90 04/04/2020    CO2 28 04/04/2020    BUN 18 04/04/2020    CREATININE 0.68 04/04/2020    ANIONGAP 12 04/04/2020    ALKPHOS 158 04/01/2020    ALT 54 04/01/2020    AST 63 04/01/2020    BILITOT 1.04 04/01/2020    LABALBU 3.6 04/01/2020    LABALBU 4.7 11/15/2011    ALBUMIN 1.3 04/01/2020    LABGLOM >60 04/04/2020    GFRAA >60 04/04/2020    GFR      04/04/2020    GFR NOT REPORTED 04/04/2020    PROT 6.3 04/01/2020    CALCIUM 8.6 04/04/2020     PT/INR:    Lab Results   Component Value Date    PROTIME 13.5 03/19/2020    INR 1.3 03/19/2020     PTT:   Lab Results   Component Value Date    APTT 32.8 03/18/2020     FLP:    Lab Results   Component Value Date    CHOL 85 03/19/2020    TRIG 91 03/19/2020    HDL 18 03/19/2020     U/A:    Lab Results   Component Value Date    COLORU YELLOW 03/31/2020    TURBIDITY CLEAR 03/31/2020    SPECGRAV 1.021 03/31/2020    HGBUR NEGATIVE 03/31/2020    PHUR 5.5 03/31/2020    PROTEINU NEGATIVE 03/31/2020    GLUCOSEU NEGATIVE 03/31/2020    KETUA NEGATIVE 03/31/2020    BILIRUBINUR NEGATIVE 03/31/2020    UROBILINOGEN Normal 03/31/2020    NITRU NEGATIVE 03/31/2020    LEUKOCYTESUR NEGATIVE 03/31/2020     TSH:    Lab Results   Component Value Date    TSH 9.96 03/18/2020        Radiology:  Ct Chest Wo Contrast    Result Date: 3/31/2020  1. Moderate cardiomegaly, slightly worsened compared to a previous CT chest dated 10/16/2019. No pericardial effusion. 2.  Tiny right pleural effusion with mild patchy ground-glass and airspace opacities at the right lung base. This is likely atelectasis, but possibility of superimposed early pneumonia would be difficult to exclude. 3.  Small to moderate volume of ascites in the visualized upper abdomen. 4.  Possible gallbladder wall thickening, partially visualized. If there is concern for gallbladder disease, consider gallbladder ultrasound. 5.  Mild mediastinal lymphadenopathy, likely reactive. Xr Chest Portable    Result Date: 4/2/2020  1. Moderate cardiomegaly. 2. Central pulmonary vascular congestion. 3. No significant interval change. Xr Chest Portable    Result Date: 4/1/2020  Cardiomegaly with pulmonary vascular prominence. Xr Chest Portable    Result Date: 3/31/2020  Cardiomegaly and mild central vascular congestion. No focal airspace consolidation. Consultations:    Consults:     Final Specialist Recommendations/Findings:   IP CONSULT TO INFECTIOUS DISEASES  IP CONSULT TO HOSPITALIST  IP CONSULT TO UROLOGY  IP CONSULT TO HOME CARE NEEDS      The patient was seen and examined on day of discharge and this discharge summary is in conjunction with any daily progress note from day of discharge. Discharge plan:     Disposition: Home with home health care    Physician Follow Up:     Ovidio Mclean, CAM - CNP  [facility]          Malcolm Hodgson MD  [address] [place] [address]  [phone]      For follow up of hematuria if any issues persist        Requiring Further Evaluation/Follow Up POST HOSPITALIZATION/Incidental Findings:  Follow-up with PCP and cardiology within 1 week after discharge    Diet: cardiac diet    Activity: As tolerated    Instructions to Patient: Low-salt diet    Discharge Medications:      Medication List      ASK your doctor about these medications    aspirin 81 MG EC tablet  Take 1 tablet by mouth daily     atorvastatin 40 MG tablet  Commonly known as:  LIPITOR     benztropine 1 MG tablet  Commonly known as:  COGENTIN     bumetanide 2 MG tablet  Commonly known as:  BUMEX  Take 1 tablet by mouth 2 times daily     clopidogrel 75 MG tablet  Commonly known as:  PLAVIX  Take 1 tablet by mouth daily     Depakote 500 MG DR tablet  Generic drug:  divalproex     doxepin 50 MG capsule  Commonly known as:  SINEQUAN     enoxaparin 30 MG/0.3ML injection  Commonly known as:  LOVENOX  Inject 0.3 mLs into the skin 2 times daily     famotidine 20 MG tablet  Commonly known as:  PEPCID  Take 1 tablet by mouth 2 times daily     folic acid 1 MG tablet  Commonly known as:  FOLVITE  Take 1 tablet by mouth daily     levothyroxine 50 MCG tablet  Commonly known as:  SYNTHROID  Take 1 tablet by mouth Daily     metoprolol tartrate 25 MG tablet  Commonly known as:  LOPRESSOR  Take 0.5 tablets by mouth 2 times daily     midodrine 10 MG tablet  Commonly known as:  PROAMATINE  Take 1 tablet by mouth 3 times daily (with meals)     multivitamin tablet  Take 1 tablet by mouth daily     nicotine 21 MG/24HR  Commonly known as:  NICODERM CQ  Place 1 patch onto the skin daily     nitroGLYCERIN 0.4 MG SL tablet  Commonly known as:  NITROSTAT  up to max of 3 total doses. If no relief after 1 dose, call 911. ondansetron 4 MG disintegrating tablet  Commonly known as:  ZOFRAN-ODT  Take 1 tablet by mouth every 8 hours as needed for Nausea or Vomiting     polyethylene glycol packet  Commonly known as:  GLYCOLAX  Take 17 g by mouth daily as needed for Constipation  Ask about: Should I take this medication?      potassium chloride 20 MEQ extended release tablet  Commonly known

## 2020-04-05 LAB
CULTURE: NORMAL
CULTURE: NORMAL
Lab: NORMAL
Lab: NORMAL
SPECIMEN DESCRIPTION: NORMAL
SPECIMEN DESCRIPTION: NORMAL

## 2020-04-06 ENCOUNTER — CARE COORDINATION (OUTPATIENT)
Dept: CASE MANAGEMENT | Age: 55
End: 2020-04-06

## 2020-04-06 NOTE — CARE COORDINATION
- BPCI program  Unable to do daily weights - does not have a scales - will have to rely on home care. Facility:[facility]  Non-face-to-face services provided:  Scheduled appointment with PCP-message left on PCP voicemail to schedule  Scheduled appointment with Specialist-cardio -   Obtained and reviewed discharge summary and/or continuity of care documents  Communication with home health agencies or other community services the patient is currently using-communicated with [facility]    Care Transitions 24 Hour Call    Schedule Follow Up Appointment with PCP:  Completed  Do you have any ongoing symptoms?:  Yes  Patient-reported symptoms:  Shortness of Breath, Other (Comment: bleeding in mouth - teeth out)  Do you have a copy of your discharge instructions?:  Yes  Do you have all of your prescriptions and are they filled?:  Yes (Comment: no new medications - need to clarify lovenox)  Have you been contacted by a [address]?:  No  Have you scheduled your follow up appointment?:  Yes (Comment: cardio appt )  How are you going to get to your appointment?:  Car - family or friend to transport  Were you discharged with any Home Care or Post Acute Services:  Yes  Post Acute Services:  Home Health (Comment: [facility] confirmed)  Care Transitions Interventions         COVID-19 Screening Initial Follow-up Note - CHF, COPD    Patient contacted regarding [address] COVID test done - not detected  Care Transition Nurse contacted the patient by telephone to perform post discharge assessment. Verified name and  with patient as identifiers. Provided introduction to self, and explanation of the CTN role, and reason for call due to risk factors for infection and/or exposure to COVID-19. Symptoms reviewed with patient who verbalized the following symptoms:   Fever-no  Fatigue-yes (EF 17% additionally)  pain or aching joints-no  Cough-yes (bloody expectorant, but says 4 teeth fell out).   shortness of breath-yes - has home oxygen  confusion or unusual change in mental status-no  chills or shaking- reports having had some chills previously  Sweating-no  fast heart rate-no  fast breathing-yes - with activity  Dizziness/lightheadedness-no  less urine output-no  cold, clammy, and pale skin-no        Due to  new onset of symptoms - notification to CNP & office to schedule hospital f/u. Patient has following risk factors of: CV disease, COPD, CHF. CTN reviewed discharge instructions, medical action plan and red flags such as increased shortness of breath, increasing fever and signs of decompensation with patient who verbalized understanding. Discussed exposure protocols and quarantine with CDC Guidelines What to do if you are sick with coronavirus disease 2019 Patient was given an opportunity for questions and concerns. The patient agrees to contact the [facility] exposure line [phone], local [facility] department [facility]: ([phone]) and PCP office for questions related to their healthcare. CTN/ACM provided contact information for future reference.     Reviewed and educated patient on any new and changed medications related to discharge diagnosis     Plan for follow-up call in 1-2 days based on severity of symptoms and risk factors      Follow Up  Future Appointments   Date Time Provider Damaris Gonzalez   4/8/2020  4:00 PM Aries Robin MD TIFF CARD TPP       Mahamed Hayden RN

## 2020-04-07 ENCOUNTER — CARE COORDINATION (OUTPATIENT)
Dept: CASE MANAGEMENT | Age: 55
End: 2020-04-07

## 2020-04-07 ENCOUNTER — TELEPHONE (OUTPATIENT)
Dept: CARDIOLOGY | Age: 55
End: 2020-04-07

## 2020-04-07 NOTE — TELEPHONE ENCOUNTER
Fabi from Care transition called is concerned about some of his medications. She talked to pt he reports he is very SOB. Denies fever. He states he is bleeding from mouth and he had 4 teeth fall out. She thinks he is taking Lovenox since discharge from [address] [place] [address] he reports he is giving himself shots. She really wants you to know he is taking Lovenox. So he is taking asa, Plavix, and Lovenox. He does have [facility] will be checking on him. She also suggested social care possible be involved. He has appointment tomorrow.      Geovanny Su

## 2020-04-08 ENCOUNTER — APPOINTMENT (OUTPATIENT)
Dept: GENERAL RADIOLOGY | Age: 55
DRG: 292 | End: 2020-04-08
Attending: INTERNAL MEDICINE
Payer: MEDICARE

## 2020-04-08 ENCOUNTER — HOSPITAL ENCOUNTER (INPATIENT)
Age: 55
LOS: 3 days | Discharge: NURSING, REHAB OR LONG-TERM CARE | DRG: 292 | End: 2020-04-11
Attending: INTERNAL MEDICINE | Admitting: FAMILY MEDICINE
Payer: MEDICARE

## 2020-04-08 ENCOUNTER — OFFICE VISIT (OUTPATIENT)
Dept: CARDIOLOGY | Age: 55
DRG: 292 | End: 2020-04-08
Payer: MEDICARE

## 2020-04-08 VITALS
RESPIRATION RATE: 20 BRPM | HEART RATE: 93 BPM | OXYGEN SATURATION: 98 % | DIASTOLIC BLOOD PRESSURE: 62 MMHG | BODY MASS INDEX: 33.62 KG/M2 | WEIGHT: 248.2 LBS | HEIGHT: 72 IN | SYSTOLIC BLOOD PRESSURE: 88 MMHG

## 2020-04-08 PROBLEM — Z20.822 EXPOSURE TO COVID-19 VIRUS: Status: ACTIVE | Noted: 2020-04-08

## 2020-04-08 PROBLEM — I50.43 CHF (CONGESTIVE HEART FAILURE), NYHA CLASS I, ACUTE ON CHRONIC, COMBINED (HCC): Status: ACTIVE | Noted: 2020-04-08

## 2020-04-08 LAB
ANION GAP SERPL CALCULATED.3IONS-SCNC: 16 MMOL/L (ref 9–17)
BNP INTERPRETATION: ABNORMAL
BUN BLDV-MCNC: 33 MG/DL (ref 6–20)
BUN/CREAT BLD: 26 (ref 9–20)
CALCIUM SERPL-MCNC: 9.1 MG/DL (ref 8.6–10.4)
CHLORIDE BLD-SCNC: 88 MMOL/L (ref 98–107)
CO2: 27 MMOL/L (ref 20–31)
CREAT SERPL-MCNC: 1.28 MG/DL (ref 0.7–1.2)
GFR AFRICAN AMERICAN: >60 ML/MIN
GFR NON-AFRICAN AMERICAN: 59 ML/MIN
GFR SERPL CREATININE-BSD FRML MDRD: ABNORMAL ML/MIN/{1.73_M2}
GFR SERPL CREATININE-BSD FRML MDRD: ABNORMAL ML/MIN/{1.73_M2}
GLUCOSE BLD-MCNC: 89 MG/DL (ref 70–99)
HCT VFR BLD CALC: 31.1 % (ref 40.7–50.3)
HEMOGLOBIN: 9.2 G/DL (ref 13–17)
MAGNESIUM: 2.6 MG/DL (ref 1.6–2.6)
MCH RBC QN AUTO: 26.3 PG (ref 25.2–33.5)
MCHC RBC AUTO-ENTMCNC: 29.6 G/DL (ref 28.4–34.8)
MCV RBC AUTO: 88.9 FL (ref 82.6–102.9)
NRBC AUTOMATED: 2.2 PER 100 WBC
PDW BLD-RTO: 19.5 % (ref 11.8–14.4)
PLATELET # BLD: 247 K/UL (ref 138–453)
PMV BLD AUTO: 9.9 FL (ref 8.1–13.5)
POTASSIUM SERPL-SCNC: 4.4 MMOL/L (ref 3.7–5.3)
PRO-BNP: 9440 PG/ML
RBC # BLD: 3.5 M/UL (ref 4.21–5.77)
SODIUM BLD-SCNC: 131 MMOL/L (ref 135–144)
WBC # BLD: 5 K/UL (ref 3.5–11.3)

## 2020-04-08 PROCEDURE — 1111F DSCHRG MED/CURRENT MED MERGE: CPT | Performed by: FAMILY MEDICINE

## 2020-04-08 PROCEDURE — 36415 COLL VENOUS BLD VENIPUNCTURE: CPT

## 2020-04-08 PROCEDURE — G8427 DOCREV CUR MEDS BY ELIG CLIN: HCPCS | Performed by: FAMILY MEDICINE

## 2020-04-08 PROCEDURE — 85027 COMPLETE CBC AUTOMATED: CPT

## 2020-04-08 PROCEDURE — 6370000000 HC RX 637 (ALT 250 FOR IP): Performed by: INTERNAL MEDICINE

## 2020-04-08 PROCEDURE — 71045 X-RAY EXAM CHEST 1 VIEW: CPT

## 2020-04-08 PROCEDURE — 3017F COLORECTAL CA SCREEN DOC REV: CPT | Performed by: FAMILY MEDICINE

## 2020-04-08 PROCEDURE — 93005 ELECTROCARDIOGRAM TRACING: CPT | Performed by: INTERNAL MEDICINE

## 2020-04-08 PROCEDURE — 99215 OFFICE O/P EST HI 40 MIN: CPT | Performed by: FAMILY MEDICINE

## 2020-04-08 PROCEDURE — 83735 ASSAY OF MAGNESIUM: CPT

## 2020-04-08 PROCEDURE — 83880 ASSAY OF NATRIURETIC PEPTIDE: CPT

## 2020-04-08 PROCEDURE — 1200000000 HC SEMI PRIVATE

## 2020-04-08 PROCEDURE — 1036F TOBACCO NON-USER: CPT | Performed by: FAMILY MEDICINE

## 2020-04-08 PROCEDURE — 6360000002 HC RX W HCPCS: Performed by: FAMILY MEDICINE

## 2020-04-08 PROCEDURE — 80048 BASIC METABOLIC PNL TOTAL CA: CPT

## 2020-04-08 PROCEDURE — 99211 OFF/OP EST MAY X REQ PHY/QHP: CPT

## 2020-04-08 PROCEDURE — G8417 CALC BMI ABV UP PARAM F/U: HCPCS | Performed by: FAMILY MEDICINE

## 2020-04-08 RX ORDER — DIVALPROEX SODIUM 500 MG/1
1500 TABLET, DELAYED RELEASE ORAL DAILY
Status: DISCONTINUED | OUTPATIENT
Start: 2020-04-09 | End: 2020-04-11 | Stop reason: HOSPADM

## 2020-04-08 RX ORDER — LEVOTHYROXINE SODIUM 0.05 MG/1
50 TABLET ORAL DAILY
Status: DISCONTINUED | OUTPATIENT
Start: 2020-04-09 | End: 2020-04-11 | Stop reason: HOSPADM

## 2020-04-08 RX ORDER — BENZTROPINE MESYLATE 1 MG/1
1 TABLET ORAL 2 TIMES DAILY
Status: DISCONTINUED | OUTPATIENT
Start: 2020-04-08 | End: 2020-04-11 | Stop reason: HOSPADM

## 2020-04-08 RX ORDER — FAMOTIDINE 20 MG/1
20 TABLET, FILM COATED ORAL 2 TIMES DAILY
Status: DISCONTINUED | OUTPATIENT
Start: 2020-04-08 | End: 2020-04-11 | Stop reason: HOSPADM

## 2020-04-08 RX ORDER — FUROSEMIDE 10 MG/ML
20 INJECTION INTRAMUSCULAR; INTRAVENOUS 2 TIMES DAILY
Status: DISCONTINUED | OUTPATIENT
Start: 2020-04-08 | End: 2020-04-09

## 2020-04-08 RX ORDER — CLOPIDOGREL BISULFATE 75 MG/1
75 TABLET ORAL DAILY
Status: DISCONTINUED | OUTPATIENT
Start: 2020-04-09 | End: 2020-04-11 | Stop reason: HOSPADM

## 2020-04-08 RX ORDER — ASPIRIN 81 MG/1
81 TABLET ORAL DAILY
Status: DISCONTINUED | OUTPATIENT
Start: 2020-04-09 | End: 2020-04-11 | Stop reason: HOSPADM

## 2020-04-08 RX ORDER — BUMETANIDE 1 MG/1
2 TABLET ORAL 2 TIMES DAILY
Status: DISCONTINUED | OUTPATIENT
Start: 2020-04-08 | End: 2020-04-09

## 2020-04-08 RX ORDER — FOLIC ACID 1 MG/1
1 TABLET ORAL DAILY
Status: DISCONTINUED | OUTPATIENT
Start: 2020-04-09 | End: 2020-04-11 | Stop reason: HOSPADM

## 2020-04-08 RX ORDER — THIAMINE MONONITRATE (VIT B1) 100 MG
100 TABLET ORAL DAILY
Status: DISCONTINUED | OUTPATIENT
Start: 2020-04-09 | End: 2020-04-11 | Stop reason: HOSPADM

## 2020-04-08 RX ORDER — ATORVASTATIN CALCIUM 40 MG/1
40 TABLET, FILM COATED ORAL DAILY
Status: DISCONTINUED | OUTPATIENT
Start: 2020-04-08 | End: 2020-04-11 | Stop reason: HOSPADM

## 2020-04-08 RX ADMIN — BUMETANIDE 2 MG: 1 TABLET ORAL at 20:26

## 2020-04-08 RX ADMIN — FAMOTIDINE 20 MG: 20 TABLET ORAL at 20:26

## 2020-04-08 RX ADMIN — ATORVASTATIN CALCIUM 40 MG: 40 TABLET, FILM COATED ORAL at 20:26

## 2020-04-08 RX ADMIN — BENZTROPINE MESYLATE 1 MG: 1 TABLET ORAL at 20:26

## 2020-04-08 RX ADMIN — FUROSEMIDE 20 MG: 10 INJECTION, SOLUTION INTRAMUSCULAR; INTRAVENOUS at 19:14

## 2020-04-08 NOTE — PROGRESS NOTES
 potassium chloride (KLOR-CON M) 20 MEQ extended release tablet Take 2 tablets by mouth daily 90 tablet 3    aspirin 81 MG EC tablet Take 1 tablet by mouth daily 90 tablet 3    metoprolol tartrate (LOPRESSOR) 25 MG tablet Take 0.5 tablets by mouth 2 times daily 60 tablet 3    bumetanide (BUMEX) 2 MG tablet Take 1 tablet by mouth 2 times daily 30 tablet 3    midodrine (PROAMATINE) 10 MG tablet Take 1 tablet by mouth 3 times daily (with meals) 90 tablet 3    levothyroxine (SYNTHROID) 50 MCG tablet Take 1 tablet by mouth Daily 30 tablet 3    folic acid (FOLVITE) 1 MG tablet Take 1 tablet by mouth daily 30 tablet 3    ondansetron (ZOFRAN-ODT) 4 MG disintegrating tablet Take 1 tablet by mouth every 8 hours as needed for Nausea or Vomiting 30 tablet 1    vitamin B-1 100 MG tablet Take 1 tablet by mouth daily 30 tablet 3    enoxaparin (LOVENOX) 30 MG/0.3ML injection Inject 0.3 mLs into the skin 2 times daily  0    Multiple Vitamin (MULTIVITAMIN) tablet Take 1 tablet by mouth daily  0    nitroGLYCERIN (NITROSTAT) 0.4 MG SL tablet up to max of 3 total doses. If no relief after 1 dose, call 911. 25 tablet 3    atorvastatin (LIPITOR) 40 MG tablet Take 40 mg by mouth daily      doxepin (SINEQUAN) 50 MG capsule Take 50 mg by mouth      clopidogrel (PLAVIX) 75 MG tablet Take 1 tablet by mouth daily 90 tablet 3    famotidine (PEPCID) 20 MG tablet Take 1 tablet by mouth 2 times daily 60 tablet 3    nicotine (NICODERM CQ) 21 MG/24HR Place 1 patch onto the skin daily 30 patch 3    benztropine (COGENTIN) 1 MG tablet Take 1 mg by mouth 2 times daily      divalproex (DEPAKOTE) 500 MG EC tablet Take 1,500 mg by mouth daily.            FAMILY HISTORY: Reviewed but non-contributory     Physical Examination:     BP 88/62 (Site: Right Upper Arm, Position: Sitting, Cuff Size: Medium Adult)   Pulse 93   Resp 20   Ht 6' (1.829 m)   Wt 248 lb 3.2 oz (112.6 kg)   SpO2 98%   BMI 33.66 kg/m²  Body mass index is 33.66 Idiopathic hypotension       PLAN:        Acute on chronic combined heart failure: Severe Cardiomyopathy, New York Heart Association Class: IV, Echo done on 2/26/20 that showed an EF of 17%, has improved slightly to 20-25%. I do believe that the best option is to admit Mr. Kyle Harman to the hospital due to his symptoms as well as failure of outpatient management as I think that at this point the risks of developing potentially life threatening complications are to great. I spoke with Dr. Afshan Reardon and Lizandro Medico regarding his case and both were in agreement with hospitalization.  Beta Blocker: Continue Metoprolol tartrate (Lopressor) 25 mg 1/2 tab bid.  Diuretics: START furosemide (Lasix) 20 mg IV 2 times daily.  Continue Potassium Chloride 20 mEq daily   Heart failure counseling: I advised them to try and keep their legs up whenever possible and to limit salt in their diet. Start Compression stockings in the hospital ASAP.  Bi-Ventricular Intra Cardiac Defibrillator (ICD): I still think that he really needs this to not only reduce his risk of death but also improve his heart failure status but we will need to optimize his heart failure status 1st    · Severe hypotension in office today: symptomatic with standing  · Hospitalization as above. Likely due to cardiomyopathy +/- medications. May need to add digoxin     Atherosclerotic Heart Disease: S/P Stenting x2 9/18/19: NSTEMI Appears to be doing well. No Angina  Antiplatelet Agent: Continue Aspirin 81 mg daily.  Beta Blocker: Continue Metoprolol tartrate (Lopressor) 25 mg 1/2 tab bid.  Anti-anginal medications: Continue nitroglycerin 0.4 mg tablets as needed for chest pain.  Cholesterol Reduction Therapy: Continue Atorvastatin (Lipitor) 40 mg daily. · Hypokalemia and hyponatremia on last blood test:   · Continue KCL 20 meq daily    In the meantime, I encouraged Mr. Kyle Harman to continue to take his other medications.        FOLLOW UP:   I

## 2020-04-08 NOTE — PROGRESS NOTES
Patient unable to review med list. States he does not know what medications he is taking, but has been taking his medication at home. Also reports current tobacco use, one cigarette per day. Reports that he has not drank \"in months,\" but cannot say how many months it has been. Also denies any current drug use.

## 2020-04-08 NOTE — PROGRESS NOTES
Patient admitted to [facility] room 312, transported via w/c by registration. Patient up to bathroom independently.

## 2020-04-09 LAB
ANION GAP SERPL CALCULATED.3IONS-SCNC: 14 MMOL/L (ref 9–17)
BUN BLDV-MCNC: 28 MG/DL (ref 6–20)
BUN/CREAT BLD: 25 (ref 9–20)
CALCIUM SERPL-MCNC: 8.7 MG/DL (ref 8.6–10.4)
CHLORIDE BLD-SCNC: 94 MMOL/L (ref 98–107)
CO2: 28 MMOL/L (ref 20–31)
CREAT SERPL-MCNC: 1.12 MG/DL (ref 0.7–1.2)
EKG ATRIAL RATE: 99 BPM
EKG P AXIS: 101 DEGREES
EKG P-R INTERVAL: 182 MS
EKG Q-T INTERVAL: 368 MS
EKG QRS DURATION: 128 MS
EKG QTC CALCULATION (BAZETT): 472 MS
EKG R AXIS: 62 DEGREES
EKG T AXIS: 136 DEGREES
EKG VENTRICULAR RATE: 99 BPM
GFR AFRICAN AMERICAN: >60 ML/MIN
GFR NON-AFRICAN AMERICAN: >60 ML/MIN
GFR SERPL CREATININE-BSD FRML MDRD: ABNORMAL ML/MIN/{1.73_M2}
GFR SERPL CREATININE-BSD FRML MDRD: ABNORMAL ML/MIN/{1.73_M2}
GLUCOSE BLD-MCNC: 85 MG/DL (ref 70–99)
MAGNESIUM: 2.1 MG/DL (ref 1.6–2.6)
POTASSIUM SERPL-SCNC: 3.8 MMOL/L (ref 3.7–5.3)
SODIUM BLD-SCNC: 136 MMOL/L (ref 135–144)

## 2020-04-09 PROCEDURE — 99222 1ST HOSP IP/OBS MODERATE 55: CPT | Performed by: FAMILY MEDICINE

## 2020-04-09 PROCEDURE — 93010 ELECTROCARDIOGRAM REPORT: CPT | Performed by: INTERNAL MEDICINE

## 2020-04-09 PROCEDURE — 36415 COLL VENOUS BLD VENIPUNCTURE: CPT

## 2020-04-09 PROCEDURE — 6370000000 HC RX 637 (ALT 250 FOR IP): Performed by: INTERNAL MEDICINE

## 2020-04-09 PROCEDURE — 6370000000 HC RX 637 (ALT 250 FOR IP): Performed by: FAMILY MEDICINE

## 2020-04-09 PROCEDURE — 83735 ASSAY OF MAGNESIUM: CPT

## 2020-04-09 PROCEDURE — 6360000002 HC RX W HCPCS: Performed by: FAMILY MEDICINE

## 2020-04-09 PROCEDURE — 1200000000 HC SEMI PRIVATE

## 2020-04-09 PROCEDURE — 80048 BASIC METABOLIC PNL TOTAL CA: CPT

## 2020-04-09 RX ORDER — DIGOXIN 250 MCG
250 TABLET ORAL DAILY
Status: COMPLETED | OUTPATIENT
Start: 2020-04-09 | End: 2020-04-09

## 2020-04-09 RX ORDER — FUROSEMIDE 10 MG/ML
20 INJECTION INTRAMUSCULAR; INTRAVENOUS DAILY
Status: DISCONTINUED | OUTPATIENT
Start: 2020-04-10 | End: 2020-04-11 | Stop reason: HOSPADM

## 2020-04-09 RX ADMIN — DIGOXIN 250 MCG: 0.25 TABLET ORAL at 17:43

## 2020-04-09 RX ADMIN — Medication 100 MG: at 08:37

## 2020-04-09 RX ADMIN — LEVOTHYROXINE SODIUM 50 MCG: 50 TABLET ORAL at 07:14

## 2020-04-09 RX ADMIN — BENZTROPINE MESYLATE 1 MG: 1 TABLET ORAL at 08:37

## 2020-04-09 RX ADMIN — FOLIC ACID 1 MG: 1 TABLET ORAL at 08:37

## 2020-04-09 RX ADMIN — CLOPIDOGREL BISULFATE 75 MG: 75 TABLET ORAL at 08:41

## 2020-04-09 RX ADMIN — FAMOTIDINE 20 MG: 20 TABLET ORAL at 21:17

## 2020-04-09 RX ADMIN — ATORVASTATIN CALCIUM 40 MG: 40 TABLET, FILM COATED ORAL at 08:37

## 2020-04-09 RX ADMIN — FUROSEMIDE 20 MG: 10 INJECTION, SOLUTION INTRAMUSCULAR; INTRAVENOUS at 08:37

## 2020-04-09 RX ADMIN — BENZTROPINE MESYLATE 1 MG: 1 TABLET ORAL at 21:17

## 2020-04-09 RX ADMIN — FAMOTIDINE 20 MG: 20 TABLET ORAL at 08:37

## 2020-04-09 RX ADMIN — ASPIRIN 81 MG: 81 TABLET, COATED ORAL at 08:37

## 2020-04-09 RX ADMIN — DIVALPROEX SODIUM 1500 MG: 500 TABLET, DELAYED RELEASE ORAL at 08:37

## 2020-04-09 ASSESSMENT — PAIN DESCRIPTION - PAIN TYPE
TYPE: ACUTE PAIN
TYPE: ACUTE PAIN

## 2020-04-09 ASSESSMENT — PAIN DESCRIPTION - LOCATION
LOCATION: TEETH
LOCATION: OTHER (COMMENT)

## 2020-04-09 ASSESSMENT — PAIN SCALES - GENERAL
PAINLEVEL_OUTOF10: 0
PAINLEVEL_OUTOF10: 0
PAINLEVEL_OUTOF10: 5
PAINLEVEL_OUTOF10: 10

## 2020-04-09 NOTE — H&P
MEASURES  DVT prophylaxis: ASA  Decubitus ulcer present on admission: No  CODE STATUS: FULL CODE  Nutrition Status: good   Physical therapy: NA   Old Charts reviewed: Yes  EKG Reviewed:  Yes  Advance Directive Addressed: No    CAM Trinidad, NP-C  4/9/2020, 11:43 AM

## 2020-04-09 NOTE — DISCHARGE INSTR - COC
Resp 24   Ht 6' (1.829 m)   Wt 246 lb 2 oz (111.6 kg)   SpO2 100%   BMI 33.38 kg/m²     Last documented pain score (0-10 scale): Pain Level: 10  Last Weight:   Wt Readings from Last 1 Encounters:   20 246 lb 2 oz (111.6 kg)     Mental Status:  {IP PT MENTAL STATUS:78779}    IV Access:  { MAGGY IV ACCESS:411985379}    Nursing Mobility/ADLs:  Walking   {CHP DME DZUK:059587515}  Transfer  {CHP DME YWDM:564016335}  Bathing  {CHP DME OIMZ:626543180}  Dressing  {CHP DME ZEMS:868196177}  Toileting  {CHP DME AHXJ:377232288}  Feeding  {CHP DME PPII:516198163}  Med Admin  {P DME DSFT:874868091}  Med Delivery   {[facility] MED Delivery:004418155}    Wound Care Documentation and Therapy:        Elimination:  Continence:   · Bowel: {YES / LW:99253}  · Bladder: {YES / HM:17763}  Urinary Catheter: {Urinary Catheter:533640735}   Colostomy/Ileostomy/Ileal Conduit: {YES / VE:44782}       Date of Last BM: ***    Intake/Output Summary (Last 24 hours) at 2020 1045  Last data filed at 2020 0824  Gross per 24 hour   Intake --   Output 1425 ml   Net -1425 ml     I/O last 3 completed shifts:  In: -   Out: 825 [Urine:825]    Safety Concerns:     508 3[address] Safety Concerns:188650250}    Impairments/Disabilities:      [address] Impairments/Disabilities:139326767}    Nutrition Therapy:  Current Nutrition Therapy:   [address] Diet List:013389673}    Routes of Feeding: {[facility] DME Other Feedings:505793469}  Liquids: {Slp liquid thickness:19055}  Daily Fluid Restriction: {CHP DME Yes amt example:993989707}  Last Modified Barium Swallow with Video (Video Swallowing Test): {Done Not Done WOFH:150366064}    Treatments at the Time of Hospital Discharge:   Respiratory Treatments: ***  Oxygen Therapy:  {Therapy; copd oxygen:38045}  Ventilator:    { CC Vent NUBC:282391033}    Rehab Therapies: {THERAPEUTIC INTERVENTION:4855284383}  Weight Bearing Status/Restrictions: 508 hZanna BORJA Weight Bearin}  Other Medical Equipment (for information only, NOT a DME order):  {EQUIPMENT:238357025}  Other Treatments: ***    Patient's personal belongings (please select all that are sent with patient):  {CHP DME Belongings:988522432}    RN SIGNATURE:  {Esignature:378281042}    CASE MANAGEMENT/SOCIAL WORK SECTION    Inpatient Status Date: 4/8/2020    Readmission Risk Assessment Score:  Readmission Risk              Risk of Unplanned Readmission:        41           Discharging to Facility/ Agency   · Name: [facility]  · Address: [address]   · Phone: [phone]  · Fax: [phone]    Dialysis Facility (if applicable)   · Name:  · Address:  · Dialysis Schedule:  · Phone:  · Fax:    / signature: Electronically signed by NORMA Kaur on 4/9/20 at 10:46 AM EDT    PHYSICIAN SECTION    Prognosis: {Prognosis:1428124831}    Condition at Discharge: [address] [place] [address] Patient Condition:713790151}    Rehab Potential (if transferring to Rehab): {Prognosis:4048097894}    Recommended Labs or Other Treatments After Discharge: ***    Physician Certification: I certify the above information and transfer of Valerei Wyman  is necessary for the continuing treatment of the diagnosis listed and that he requires {Admit to Appropriate Level of Care:30545} for {GREATER/LESS:780489090} 30 days.      Update Admission H&P: {CHP DME Changes in QZS:088313510}    PHYSICIAN SIGNATURE:  {Esignature:341820817}

## 2020-04-09 NOTE — PROGRESS NOTES
oz (111.6 kg)  · Admission Body Wt: 248 lb (112.5 kg)  · Usual Body Wt: (has varied from 242#-267# in last 4 months)  · % Weight Change:  ,  up and down  · Ideal Body Wt: 178 lb (80.7 kg), % Ideal Body 138%  · BMI Classification: BMI 30.0 - 34.9 Obese Class I    Lab Results   Component Value Date     04/09/2020    K 3.8 04/09/2020    CL 94 (L) 04/09/2020    CO2 28 04/09/2020    BUN 28 (H) 04/09/2020    CREATININE 1.12 04/09/2020    GLUCOSE 85 04/09/2020    CALCIUM 8.7 04/09/2020    PROT 6.3 (L) 04/01/2020    LABALBU 3.6 04/01/2020    BILITOT 1.04 04/01/2020    ALKPHOS 158 (H) 04/01/2020    AST 63 (H) 04/01/2020    ALT 54 (H) 04/01/2020    LABGLOM >60 04/09/2020    GFRAA >60 04/09/2020    GLOB NOT REPORTED 04/01/2020     No results found for: LABA1C  No results found for: EAG  No results found for: VITD25    Nutrition Interventions:   Continue current diet  Continued Inpatient Monitoring, Education Initiated, Coordination of Care    Nutrition Evaluation:   · Evaluation: Goals set   · Goals: PO >75% meals with low sodium choices    · Monitoring: Meal Intake, Pertinent Labs, Weight, I&O, Patient/Family Education    Electronically signed by Angelo Asif RD, LD on 4/9/20 at 8:22 AM EDT    Contact Number: [phone]

## 2020-04-09 NOTE — H&P
normal heart sounds. Exam reveals no gallop and no friction rubs. No murmur was heard. Pulmonary/Chest: Effort normal and breath sounds normal. No respiratory distress. He has no wheezes, rhonchi or rales. Crackles in the left base. Abdominal: Soft, non-tender. Bowel sounds and aorta are normal. He exhibits no organomegaly, mass or bruit. Extremities: 1+ up to the knees bilaterally. No cyanosis or clubbing. 2+ radial and carotid pulses. Distal extremity pulses: Not palpable left worse than right. Very Thready. .  Neurological: He is alert and oriented to person, place, and time. No evidence of gross cranial nerve deficit. Coordination appeared normal.   Skin: Skin is warm and dry. There is no rash or diaphoresis. Psychiatric: He has a normal mood and affect. His speech is normal and behavior is normal.      MOST RECENT LABS ON RECORD:   Lab Results   Component Value Date    WBC 5.0 04/08/2020    HGB 9.2 (L) 04/08/2020    HCT 31.1 (L) 04/08/2020     04/08/2020    CHOL 85 03/19/2020    TRIG 91 03/19/2020    HDL 18 (L) 03/19/2020    ALT 54 (H) 04/01/2020    AST 63 (H) 04/01/2020     04/09/2020    K 3.8 04/09/2020    CL 94 (L) 04/09/2020    CREATININE 1.12 04/09/2020    BUN 28 (H) 04/09/2020    CO2 28 04/09/2020    TSH 9.96 (H) 03/18/2020    PSA 0.78 05/16/2019    INR 1.3 03/19/2020    LABMICR CANNOT BE CALCULATED 12/17/2014       ASSESSMENT:      Diagnosis Orders   1. Acute on chronic combined systolic and diastolic congestive heart failure, NYHA class 4 ([address]     2. ASHD (arteriosclerotic heart disease)     3. Portal hypertension ([address]     4. Hyponatremia     5. Idiopathic hypotension       PLAN:        Acute on chronic combined heart failure: Severe Cardiomyopathy, New York Heart Association Class: IV, Echo done on 2/26/20 that showed an EF of 17%, has improved slightly to 20-25%.    I do believe that the best option is to admit Mr. Obed Mistry to the hospital due to his symptoms as well as failure of the patient's current medical conditions are: high. The documentation recorded by the scribe, accurately and completely reflects the services I personally performed and the decisions made by me. Kellie Sullivan MD, MS, F.A.C.C.  April 9, 2020

## 2020-04-09 NOTE — PROGRESS NOTES
SW spoke with pt on the phone to complete assessment due to pt being tested for covid 19. SW familiar to pt from recent admission. Pt was alert and oriented. Pt is a 48year old male admitted for CHF. Pt lives alone in the community. Pt currently has home oxygen but he is uncertain who supplies this to him. Pt also has a life vest that he wears at home. Pt attends [facility] [employer] [facility] [employer] for nursing, psychiatry, and his counselor Jacob. Pt receives counseling once every 2 weeks. Pt was also referred to [facility] at discharge from [address] but they have not actually made start of care contact yet. GABRIELA notified Anjel Kahn at [facility] that pt is currently admitted and they will hold his information until discharge. Pt receives rides from friends and family to get to appointments and such. Pt is a full code and follows with Emmitt Cogan CNP as PCP. Pt does not have advance directives and is not currently interested in these. Pt reports that his medications are covered with his insurance. Pt reports that he would like to return home at discharge and is open to [facility] continuing with him at discharge for RN and PT. Pt identifies no other discharge needs currently. SW will continue to follow and remain available as needed.  Vicky Sosa [facility] 4/9/2020

## 2020-04-09 NOTE — PROGRESS NOTES
Writer found orders from Dr. Sho Aleman put in at 2038 today saying that Patient is suspected covid, exposure to COVID-19. Writer attempted to call Dr. Sho Aleman but the answering service does not accept calls anymore so writer attempted to call the digital beeper. Will speak with the supervisor for the next step.

## 2020-04-09 NOTE — PLAN OF CARE
Problem: Falls - Risk of:  Goal: Will remain free from falls  Description: Will remain free from falls  Outcome: Ongoing  Note: Pt A&Ox4. Pt uses call light appropriately and call light and bedside table remain in reach. Rails up x2 bed in lowest position. Bed alarm is on. Pt wears nonskid socks when standing or ambulating. Fall sign in place. Room remains free of clutter. Goal: Absence of physical injury  Description: Absence of physical injury  Outcome: Ongoing     Problem: Discharge Planning:  Goal: Discharged to appropriate level of care  Description: Discharged to appropriate level of care  Outcome: Ongoing  Note: Patient is being educated on medications and encouraged to ask questions regarding therapy. Discharge planning is in process. Goal: Participates in care planning  Description: Participates in care planning  Outcome: Ongoing     Problem: Airway Clearance - Ineffective:  Goal: Clear lung sounds  Description: Clear lung sounds  Outcome: Ongoing  Goal: Ability to maintain a clear airway will improve  Description: Ability to maintain a clear airway will improve  Outcome: Ongoing  Note: Patient is reminded to use cough and deep breathing often. Patient is receiving scheduled breathing treatments. O2 therapy in use. Lung sounds assessed twice per shift and PRN. Problem: Fluid Volume - Deficit:  Goal: Achieves intake and output within specified parameters  Description: Achieves intake and output within specified parameters  Outcome: Ongoing  Note: Maintain hydration by drinking small amounts of clear fluids. Vitals and pulses are checked twice per shift and PRN. Skin is being assessed and I&O's recorded. Labs are being drawn daily. Respiratory status being checked with vitals and PRN. Problem: Gas Exchange - Impaired:  Goal: Levels of oxygenation will improve  Description: Levels of oxygenation will improve  Outcome: Ongoing  Note: Patient is receiving scheduled breathing treatments. Lung sounds are assessed twice per shift and PRN. I&O is being monitored each shift. Patient is encouraged to cough and deep breathe. Vitals are monitored twice per shift and PRN. Problem: Hyperthermia:  Goal: Ability to maintain a body temperature in the normal range will improve  Description: Ability to maintain a body temperature in the normal range will improve  Outcome: Ongoing  Note: Labs are being drawn daily. Vitals with temperature are assessed twice each shift or PRN. Antibiotics are ordered. Patient educated on signs of hyperthermia and instructed to call for nurse for any signs of fever.         Problem: Tobacco Use:  Goal: Will participate in inpatient tobacco-use cessation counseling  Description: Will participate in inpatient tobacco-use cessation counseling  Outcome: Ongoing

## 2020-04-10 LAB
ANION GAP SERPL CALCULATED.3IONS-SCNC: 14 MMOL/L (ref 9–17)
BUN BLDV-MCNC: 26 MG/DL (ref 6–20)
BUN/CREAT BLD: 25 (ref 9–20)
CALCIUM SERPL-MCNC: 8.6 MG/DL (ref 8.6–10.4)
CHLORIDE BLD-SCNC: 90 MMOL/L (ref 98–107)
CO2: 27 MMOL/L (ref 20–31)
CREAT SERPL-MCNC: 1.06 MG/DL (ref 0.7–1.2)
GFR AFRICAN AMERICAN: >60 ML/MIN
GFR NON-AFRICAN AMERICAN: >60 ML/MIN
GFR SERPL CREATININE-BSD FRML MDRD: ABNORMAL ML/MIN/{1.73_M2}
GFR SERPL CREATININE-BSD FRML MDRD: ABNORMAL ML/MIN/{1.73_M2}
GLUCOSE BLD-MCNC: 87 MG/DL (ref 70–99)
MAGNESIUM: 2.2 MG/DL (ref 1.6–2.6)
POTASSIUM SERPL-SCNC: 3.5 MMOL/L (ref 3.7–5.3)
SARS-COV-2, NAA: NORMAL
SARS-COV-2, PCR: NORMAL
SARS-COV-2: NOT DETECTED
SODIUM BLD-SCNC: 131 MMOL/L (ref 135–144)
SOURCE: NORMAL

## 2020-04-10 PROCEDURE — 6360000002 HC RX W HCPCS: Performed by: FAMILY MEDICINE

## 2020-04-10 PROCEDURE — 80048 BASIC METABOLIC PNL TOTAL CA: CPT

## 2020-04-10 PROCEDURE — 36415 COLL VENOUS BLD VENIPUNCTURE: CPT

## 2020-04-10 PROCEDURE — 97162 PT EVAL MOD COMPLEX 30 MIN: CPT

## 2020-04-10 PROCEDURE — 6370000000 HC RX 637 (ALT 250 FOR IP): Performed by: INTERNAL MEDICINE

## 2020-04-10 PROCEDURE — 6370000000 HC RX 637 (ALT 250 FOR IP): Performed by: FAMILY MEDICINE

## 2020-04-10 PROCEDURE — 97166 OT EVAL MOD COMPLEX 45 MIN: CPT

## 2020-04-10 PROCEDURE — 6370000000 HC RX 637 (ALT 250 FOR IP): Performed by: NURSE PRACTITIONER

## 2020-04-10 PROCEDURE — 1200000000 HC SEMI PRIVATE

## 2020-04-10 PROCEDURE — 99232 SBSQ HOSP IP/OBS MODERATE 35: CPT | Performed by: FAMILY MEDICINE

## 2020-04-10 PROCEDURE — 83735 ASSAY OF MAGNESIUM: CPT

## 2020-04-10 RX ORDER — DOCUSATE SODIUM 100 MG/1
100 CAPSULE, LIQUID FILLED ORAL DAILY
Status: DISCONTINUED | OUTPATIENT
Start: 2020-04-10 | End: 2020-04-11 | Stop reason: HOSPADM

## 2020-04-10 RX ORDER — DIGOXIN 125 MCG
125 TABLET ORAL DAILY
Status: DISCONTINUED | OUTPATIENT
Start: 2020-04-10 | End: 2020-04-11 | Stop reason: HOSPADM

## 2020-04-10 RX ORDER — POTASSIUM CHLORIDE 20 MEQ/1
20 TABLET, EXTENDED RELEASE ORAL 2 TIMES DAILY WITH MEALS
Status: DISCONTINUED | OUTPATIENT
Start: 2020-04-10 | End: 2020-04-11 | Stop reason: HOSPADM

## 2020-04-10 RX ORDER — BUMETANIDE 1 MG/1
2 TABLET ORAL 2 TIMES DAILY
Status: DISCONTINUED | OUTPATIENT
Start: 2020-04-10 | End: 2020-04-11 | Stop reason: HOSPADM

## 2020-04-10 RX ORDER — BUMETANIDE 1 MG/1
2 TABLET ORAL ONCE
Status: COMPLETED | OUTPATIENT
Start: 2020-04-10 | End: 2020-04-10

## 2020-04-10 RX ORDER — ACETAMINOPHEN 325 MG/1
650 TABLET ORAL EVERY 4 HOURS PRN
Status: DISCONTINUED | OUTPATIENT
Start: 2020-04-10 | End: 2020-04-11 | Stop reason: HOSPADM

## 2020-04-10 RX ADMIN — DIGOXIN 125 MCG: 125 TABLET ORAL at 10:28

## 2020-04-10 RX ADMIN — BUMETANIDE 2 MG: 1 TABLET ORAL at 10:28

## 2020-04-10 RX ADMIN — ACETAMINOPHEN 650 MG: 325 TABLET, FILM COATED ORAL at 15:14

## 2020-04-10 RX ADMIN — DIVALPROEX SODIUM 1500 MG: 500 TABLET, DELAYED RELEASE ORAL at 08:26

## 2020-04-10 RX ADMIN — FAMOTIDINE 20 MG: 20 TABLET ORAL at 20:16

## 2020-04-10 RX ADMIN — FAMOTIDINE 20 MG: 20 TABLET ORAL at 08:26

## 2020-04-10 RX ADMIN — ATORVASTATIN CALCIUM 40 MG: 40 TABLET, FILM COATED ORAL at 08:26

## 2020-04-10 RX ADMIN — DOCUSATE SODIUM 100 MG: 100 CAPSULE, LIQUID FILLED ORAL at 17:27

## 2020-04-10 RX ADMIN — BENZTROPINE MESYLATE 1 MG: 1 TABLET ORAL at 08:27

## 2020-04-10 RX ADMIN — FOLIC ACID 1 MG: 1 TABLET ORAL at 08:26

## 2020-04-10 RX ADMIN — BENZTROPINE MESYLATE 1 MG: 1 TABLET ORAL at 20:16

## 2020-04-10 RX ADMIN — ASPIRIN 81 MG: 81 TABLET, COATED ORAL at 08:26

## 2020-04-10 RX ADMIN — POTASSIUM CHLORIDE 20 MEQ: 1500 TABLET, EXTENDED RELEASE ORAL at 17:28

## 2020-04-10 RX ADMIN — BUMETANIDE 2 MG: 1 TABLET ORAL at 15:14

## 2020-04-10 RX ADMIN — FUROSEMIDE 20 MG: 10 INJECTION, SOLUTION INTRAMUSCULAR; INTRAVENOUS at 08:26

## 2020-04-10 RX ADMIN — Medication 100 MG: at 08:26

## 2020-04-10 RX ADMIN — LEVOTHYROXINE SODIUM 50 MCG: 50 TABLET ORAL at 07:50

## 2020-04-10 RX ADMIN — CLOPIDOGREL BISULFATE 75 MG: 75 TABLET ORAL at 08:26

## 2020-04-10 ASSESSMENT — PAIN SCALES - GENERAL
PAINLEVEL_OUTOF10: 3
PAINLEVEL_OUTOF10: 0
PAINLEVEL_OUTOF10: 10
PAINLEVEL_OUTOF10: 3

## 2020-04-10 ASSESSMENT — PAIN DESCRIPTION - DESCRIPTORS
DESCRIPTORS: ACHING
DESCRIPTORS: ACHING

## 2020-04-10 ASSESSMENT — PAIN DESCRIPTION - PAIN TYPE
TYPE: ACUTE PAIN
TYPE: ACUTE PAIN

## 2020-04-10 ASSESSMENT — PAIN DESCRIPTION - LOCATION
LOCATION: FACE;MOUTH
LOCATION: FACE

## 2020-04-10 ASSESSMENT — PAIN DESCRIPTION - FREQUENCY: FREQUENCY: INTERMITTENT

## 2020-04-10 ASSESSMENT — PAIN DESCRIPTION - ORIENTATION: ORIENTATION: RIGHT

## 2020-04-10 NOTE — CONSULTS
Psychiatric: He has a normal mood and affect. His speech is normal and behavior is normal.        Patient Education/Instructions: I did spend about 10 minutes with the Mr. Tova Acosta going over his heart failure packet including dietary guidelines including a low sodium diet and what food types are considered low in sodium. I also went over the heart failure zones including the signs and symptoms to watch for including shortness of breath, weight gain, lightheadedness/dizziness, chest pressure or tightness, increased cough, feeling of uneasiness and the importance of daily weights, and to keep her legs elevated as much as possible. I asked him to call the office if he were to develop persistent or worsening shortness of breath or weight gain of more than 3 pounds in 1-7 days. Mr. Tova Acosta verbalized understanding. Follow Up:  Appointment has been scheduled for next week to see Dr. Cristiana Tran. I will plan to call him next week prior to his appointment with Dr. Cristiana Tran for a follow up call to see how he is feeling including his daily weights, medication compliance, and if any swelling or symptoms.        Henna Dumas RN   Heart Failure Clinic Nurse   [address], [facility].

## 2020-04-10 NOTE — PROGRESS NOTES
palpitations. PAST MEDICAL HISTORY:        Past Medical History:   Diagnosis Date    [facility]olic [facility]     Back pain     Bipolar 1 disorder ([facility]     CAD (coronary artery disease) 09/2019    s/p proximal LAD stent    CHF (congestive heart failure) (HCC)     Idiopathic cardiomyopathy (HCC)     Seizures ([facility]     LAST SEIZURE 9-4-19       CURRENT ALLERGIES: Ticagrelor and Pcn [penicillins] REVIEW OF SYSTEMS: 14 systems were reviewed. Pertinent positives and negatives as above, all else negative. Past Surgical History:   Procedure Laterality Date    CARDIAC CATHETERIZATION Left 09/17/2019    DR Valerio/[facility]/right radial-Severe two vessel coronary artery disease involving involving a 100% proximal probably co-dominant RCA, 90% stenosis in the distal part of the proximal LAD at a bifircation with a moderate sized D1 branch that also has an 80% stenosis. Mildly elevated left ventricular end diastolic pressure. Consult to cardiothoracic surgery for consideration of multi-vess    TOE SURGERY      TOOTH EXTRACTION      Social History:  Social History     Tobacco Use    Smoking status: Current Every Day Smoker     Packs/day: 0.25     Types: Cigarettes    Smokeless tobacco: Never Used    Tobacco comment: [facility] smokes 1 cigarette/day   Substance Use Topics    Alcohol use: Yes     Comment: bottle of vodka a week. [facility] has not drank in [facility] Drug use: Not Currently     Frequency: 1.0 times per week     Types: Marijuana        CURRENT MEDICATIONS:        No outpatient medications have been marked as taking for the 4/8/20 encounter [facility] Encounter). FAMILY HISTORY: Reviewed but non-contributory     Physical Examination:     /60   Pulse 80   Temp 98.3 °F (36.8 °C) (Temporal)   Resp 20   Ht 6' (1.829 m)   Wt 247 lb 4.8 oz (112.2 kg)   SpO2 96%   BMI 33.54 kg/m²  Body mass index is 33.54 kg/m².     [ INSTRUCTIONS:  \"[x]\" Indicates a positive item  \"[]\" Priority: Low    NSTEMI (non-ST elevated myocardial infarction) ([facility] 02/10/2020     Priority: Low    CAD (coronary artery disease) 09/19/2019     Priority: Low    Ischemic cardiomyopathy 09/18/2019     Priority: Low    CAD, multiple vessel 09/18/2019     Priority: Low    Acute heart failure ([facility] 09/17/2019     Priority: Low    Acute on chronic diastolic CHF (congestive heart failure), NYHA class 3 ([facility] 09/13/2019     Priority: Low    Other specified disease of nail 03/25/2015     Priority: Low      PLAN:        Acute on chronic combined heart failure: Severe Cardiomyopathy, New York Heart Association Class: IV, Echo done on 2/26/20 that showed an EF of 17%, has improved slightly to 20-25%. Improving clinically since admission   Beta Blocker: Continue Metoprolol tartrate (Lopressor) 25 mg 1/2 tab bid.  Diuretics: Contniue furosemide (Lasix) 20 mg IV daily. ReStart Bumex 2 mg bid. Start Digoxin 125 mcg today and daily.  Continue Potassium Chloride 20 mEq daily   Heart failure counseling: I advised them to try and keep their legs up whenever possible and to limit salt in their diet. Start Compression stockings in the hospital ASAP.  Bi-Ventricular Intra Cardiac Defibrillator (ICD): I still think that he really needs this to not only reduce his risk of death but also improve his heart failure status but we will need to optimize his heart failure status 1st    · Hypotension due to severe combined HF: Has improved slightly since admission  · Continue digoxin 125 mcg daily with digoxin level in 1-2 weeks     Atherosclerotic Heart Disease: S/P Stenting x2 9/18/19: NSTEMI Appears to be doing well. No Angina  Antiplatelet Agent: Continue Aspirin 81 mg daily.  Beta Blocker: Continue Metoprolol tartrate (Lopressor) 25 mg 1/2 tab bid.  Anti-anginal medications: Continue nitroglycerin 0.4 mg tablets as needed for chest pain.  Cholesterol Reduction Therapy: Continue Atorvastatin (Lipitor) 40 mg daily. · Hypokalemia and hyponatremia on last blood test: potassium level 3.5 today. · Restart KCL 20 meq bid    Once again, thank you for allowing me to participate in this patients care. Please do not hesitate to contact me if I could be of any further assistance. Sincerely,  Lynnette Juares. Iman GALLO, MS, F.A.C.C. [facility] Cardiology Specialist    [address], [place], [address] [place] [address]  Phone: [phone], Fax: [phone]     I believe that the risk of significant morbidity and mortality related to the patient's current medical conditions are: Intermediate. Kirstin Ordaz is a 48 y.o. male being evaluated by a Virtual Visit (video visit) encounter to address concerns as mentioned above. A caregiver was present when appropriate. Due to this being a TeleHealth encounter (During [employer] public [facility] emergency), evaluation of the following organ systems was limited: Vitals/Constitutional/EENT/Resp/CV/GI//MS/Neuro/Skin/Heme-Lymph-Imm. Pursuant to the emergency declaration under the [facility], [address] [place] [address] authority and the [employer] Act, this Virtual Visit was conducted with patient's (and/or legal guardian's) consent, to reduce the patient's risk of exposure to COVID-19 and provide necessary medical care. The patient (and/or legal guardian) has also been advised to contact this office for worsening conditions or problems, and seek emergency medical treatment and/or call 911 if deemed necessary. Services were provided through a video synchronous discussion virtually to substitute for in-person clinic visit. Patient and provider were located at their individual homes. --Paulino Bunch MD on 4/10/2020 at 2:04 PM    An electronic signature was used to authenticate this note.

## 2020-04-10 NOTE — PROGRESS NOTES
Progress Note    SUBJECTIVE:  SOB    OBJECTIVE:  Resting in bed lying on left side. Alert and oriented without complaints or distress. Afebrile. Tolerates diet well.       Vitals:   Vitals:    04/10/20 1010   BP: 119/60   Pulse: 80   Resp: 20   Temp: 98.3 °F (36.8 °C)   SpO2: 96%     Weight: 247 lb 4.8 oz (112.2 kg)   Height: 6' (182.9 cm)   -----------------------------------------------------------------  Exam:    CONSTITUTIONAL:  awake, alert, cooperative, no apparent distress, and appears stated age  EYES:  Lids and lashes normal, pupils equal, round and reactive to light, extra ocular muscles intact, sclera clear, conjunctiva normal  ENT:  normocepalic, without obvious abnormality, atraumatic  NECK:  supple, symmetrical, trachea midline, skin normal and no stridor  HEMATOLOGIC/LYMPHATICS:  no cervical lymphadenopathy and no supraclavicular lymphadenopathy  LUNGS:  no increased work of breathing, good air exchange and crackles right base and left base, diminished breath sounds throughout lungs  CARDIOVASCULAR:  Normal apical impulse, regular rate and rhythm, normal S1 and S2, no S3 or S4, and no murmur noted  ABDOMEN:  No scars, normal bowel sounds, soft, non-distended, non-tender, no masses palpated, no hepatosplenomegally  MUSCULOSKELETAL:  there is no redness, warmth, or swelling of the joints  full range of motion noted  tone is normal  NEUROLOGIC:  Mental Status Exam:  Level of Alertness:   awake  Orientation:   person, place, time  Memory:   normal  SKIN:  no bruising or bleeding, normal skin color, texture, turgor, no redness, warmth, or swelling, no rashes and no lesions  EXT:     no cyanosis, clubbing or edema present    -----------------------------------------------------------------  Diagnostic Data: Reviewed    MAXIMUS [[id number]]    Collected: 04/08/20 2245    Updated: 04/10/20 0813    Specimen Source: Nasopharyngeal Swab     SARS-CoV-2 Not Detected    Comment:        The specimen is NEGATIVE for

## 2020-04-10 NOTE — PROGRESS NOTES
Nutrition Assessment    Type and Reason for Visit: Reassess    Nutrition Recommendations:   1. Continue current diet. 2. Low sodium diet education materials attached to d/c instructions. Nutrition Assessment: Continued obesity aeb BMI 33.6. Pt expected to d/c tomorrow to home. COVID-19 was negative. Pt continues with + 2 pitting BLE edema. BNP 9440. Elytes are low. Malnutrition Assessment:  · Malnutrition Status: Insufficient data  · Context: Acute illness or injury  · Findings of the 6 clinical characteristics of malnutrition (Minimum of 2 out of 6 clinical characteristics is required to make the diagnosis of moderate or severe Protein Calorie Malnutrition based on AND/ASPEN Guidelines):  1. Energy Intake-Unable to assess,      2. Weight Loss-No significant weight loss,    3. Fat Loss-Unable to assess,    4. Muscle Loss-Unable to assess,    5. Fluid Accumulation-Mild fluid accumulation, Extremities  6.  Strength-Not measured    Nutrition Risk Level:  Moderate    Nutrient Needs:  · Estimated Daily Total Kcal: 6025-2058(18-20)  · Estimated Daily Protein (g): (1.2-1.3)  · Estimated Daily Total Fluid (ml/day): 2200    Nutrition Diagnosis:   · Problem: Overweight/Obese  · Etiology: related to Excessive energy intake     Signs and symptoms:  as evidenced by BMI    Objective Information:  · Nutrition-Focused Physical Findings: Obese  · Wound Type: None  · Current Nutrition Therapies:  · Oral Diet Orders: Cardiac   · Oral Diet intake: Unable to assess(No PO records)  · Oral Nutrition Supplement (ONS) Orders: None  · Anthropometric Measures:  · Ht: 6' (182.9 cm)   · Current Body Wt: 247 lb 4.8 oz (112.2 kg)  · Admission Body Wt: 248 lb (112.5 kg)  · Usual Body Wt: (has varied from 242#-267# in last 4 months)  · % Weight Change:  ,  up and down  · Ideal Body Wt: 178 lb (80.7 kg), % Ideal Body 139%  · BMI Classification: BMI 30.0 - 34.9 Obese Class I  Recent Labs     04/08/20  1720 04/09/20  0540 04/10/20  0530   * 136 131*   K 4.4 3.8 3.5*   CL 88* 94* 90*   CO2 27 28 27   BUN 33* 28* 26*   CREATININE 1.28* 1.12 1.06   GLUCOSE 89 85 87   GFR                                       Lab Results   Component Value Date    LABALBU 3.6 04/01/2020    LABALBU 4.7 11/15/2011      Nutrition Interventions:   Continue current diet  (low sodium diet information attached to d/c instructions)    Nutrition Evaluation:   · Evaluation: Progressing toward goals   · Goals: PO >75% meals with low sodium choices    · Monitoring: Meal Intake, Pertinent Labs, Weight, I&O, Patient/Family Education      Electronically signed by Juhi Lyons RD, LD on 4/10/20 at 12:24 PM EDT    Contact Number: [phone]

## 2020-04-10 NOTE — PROGRESS NOTES
Pt up unassisted, bed alarm heard, staff responded immediately and found to be in the bathroom. Pt again educated on importance of using call light to alert staff of needs. Will continue to monitor.

## 2020-04-10 NOTE — PROGRESS NOTES
goal 1: Pt to ambulate 150ft with no LOB. Short term goal 2: Pt to demonstrate good dynamic standing balance. Short term goal 3: Pt to tolerate 20-30 mins ther ex/act for improved strength and endurance with ADL's. Short term goal 4: Pt to perform all functional mobility and gait independently.    Patient Goals   Patient goals : none stated       Therapy Time   Individual Concurrent Group Co-treatment   Time In 1231         Time Out 1244         Minutes 13                 Jennifer Mathew, PT, DPT, CMPT

## 2020-04-10 NOTE — PLAN OF CARE
Problem: Falls - Risk of:  Goal: Will remain free from falls  Description: Will remain free from falls  4/10/2020 1441 by Britt Clark RN  Outcome: Ongoing  4/10/2020 0332 by Abril Palacios RN  Outcome: Ongoing  Note: Pt A&Ox4. Pt uses call light appropriately and call light and bedside table remain in reach. Rails up x2 bed in lowest position. Bed alarm is on. Pt wears nonskid socks when standing or ambulating. Fall sign in place. Room remains free of clutter. Pt knows when and how to use assistive device when ambulating. Pt educated on importance of using call light to alert staff to needs. Telesitter in place for pt safety. Goal: Absence of physical injury  Description: Absence of physical injury  Outcome: Ongoing     Problem: Discharge Planning:  Goal: Discharged to appropriate level of care  Description: Discharged to appropriate level of care  4/10/2020 1441 by Britt Clark RN  Outcome: Ongoing  4/10/2020 0332 by Abril Palacios RN  Note: Patient is being educated on medications and encouraged to ask questions regarding therapy. Discharge planning is in process. Patient to be discharged home when medically stable. Goal: Participates in care planning  Description: Participates in care planning  Outcome: Ongoing     Problem: Airway Clearance - Ineffective:  Goal: Clear lung sounds  Description: Clear lung sounds  4/10/2020 1441 by Britt Clark RN  Outcome: Ongoing  4/10/2020 0332 by Abril Palacios RN  Outcome: Ongoing  Note: Patient is reminded to use cough and deep breathing often. Patient is receiving scheduled breathing treatments. O2 therapy in use.     Goal: Ability to maintain a clear airway will improve  Description: Ability to maintain a clear airway will improve  Outcome: Ongoing     Problem: Fluid Volume - Deficit:  Goal: Achieves intake and output within specified parameters  Description: Achieves intake and output within specified parameters  4/10/2020 1441 by Britt Clark been educated on use and possible side effects of pain medications. Patient understands to ask for pain medications before pain becomes too unbearable but has also been educated and nonpharmacological options such as deep breathing, distraction, and repositioning.     Goal: Control of chronic pain  Description: Control of chronic pain  Outcome: Ongoing     Problem: Musculor/Skeletal Functional Status  Goal: Highest potential functional level  Outcome: Ongoing  Goal: Absence of falls  Outcome: Ongoing

## 2020-04-10 NOTE — PLAN OF CARE
Problem: Falls - Risk of:  Goal: Will remain free from falls  Description: Will remain free from falls  Outcome: Ongoing  Note: Pt A&Ox4. Pt uses call light appropriately and call light and bedside table remain in reach. Rails up x2 bed in lowest position. Bed alarm is on. Pt wears nonskid socks when standing or ambulating. Fall sign in place. Room remains free of clutter. Pt knows when and how to use assistive device when ambulating. Problem: Airway Clearance - Ineffective:  Goal: Clear lung sounds  Description: Clear lung sounds  Outcome: Ongoing  Note: Patient is reminded to use cough and deep breathing often. Patient is receiving scheduled breathing treatments. O2 therapy in use. Problem: Fluid Volume - Deficit:  Goal: Achieves intake and output within specified parameters  Description: Achieves intake and output within specified parameters  Outcome: Ongoing  Note: Maintain hydration by drinking small amounts of clear fluids frequently. Vitals and pulses are checked twice per shift and PRN. Skin is being assessed and I&O's recorded. Labs are being drawn daily. Respiratory status being checked with vitals and PRN. Problem: Gas Exchange - Impaired:  Goal: Levels of oxygenation will improve  Description: Levels of oxygenation will improve  Outcome: Ongoing  Note:  Lung sounds are assessed twice per shift and PRN. I&O is being monitored each shift. Patient is encouraged to cough and deep breathe. Vitals are monitored twice per shift and PRN. Problem: Hyperthermia:  Goal: Ability to maintain a body temperature in the normal range will improve  Description: Ability to maintain a body temperature in the normal range will improve  Outcome: Ongoing  Note: Labs are being drawn daily. Vitals with temperature are assessed twice each shift or PRN. Antibiotics are ordered. Patient educated on signs of hyperthermia and instructed to call for nurse for any signs of fever.         Problem: Pain:  Goal:

## 2020-04-11 VITALS
DIASTOLIC BLOOD PRESSURE: 54 MMHG | HEIGHT: 72 IN | OXYGEN SATURATION: 99 % | RESPIRATION RATE: 18 BRPM | TEMPERATURE: 98.4 F | HEART RATE: 91 BPM | WEIGHT: 247.1 LBS | SYSTOLIC BLOOD PRESSURE: 91 MMHG | BODY MASS INDEX: 33.47 KG/M2

## 2020-04-11 LAB
ANION GAP SERPL CALCULATED.3IONS-SCNC: 13 MMOL/L (ref 9–17)
BUN BLDV-MCNC: 23 MG/DL (ref 6–20)
BUN/CREAT BLD: 19 (ref 9–20)
CALCIUM SERPL-MCNC: 8.3 MG/DL (ref 8.6–10.4)
CHLORIDE BLD-SCNC: 91 MMOL/L (ref 98–107)
CO2: 29 MMOL/L (ref 20–31)
CREAT SERPL-MCNC: 1.23 MG/DL (ref 0.7–1.2)
GFR AFRICAN AMERICAN: >60 ML/MIN
GFR NON-AFRICAN AMERICAN: >60 ML/MIN
GFR SERPL CREATININE-BSD FRML MDRD: ABNORMAL ML/MIN/{1.73_M2}
GFR SERPL CREATININE-BSD FRML MDRD: ABNORMAL ML/MIN/{1.73_M2}
GLUCOSE BLD-MCNC: 137 MG/DL (ref 70–99)
MAGNESIUM: 2.1 MG/DL (ref 1.6–2.6)
POTASSIUM SERPL-SCNC: 3.2 MMOL/L (ref 3.7–5.3)
SODIUM BLD-SCNC: 133 MMOL/L (ref 135–144)

## 2020-04-11 PROCEDURE — 83735 ASSAY OF MAGNESIUM: CPT

## 2020-04-11 PROCEDURE — 36415 COLL VENOUS BLD VENIPUNCTURE: CPT

## 2020-04-11 PROCEDURE — 80048 BASIC METABOLIC PNL TOTAL CA: CPT

## 2020-04-11 PROCEDURE — 6370000000 HC RX 637 (ALT 250 FOR IP): Performed by: NURSE PRACTITIONER

## 2020-04-11 PROCEDURE — 6360000002 HC RX W HCPCS: Performed by: FAMILY MEDICINE

## 2020-04-11 PROCEDURE — 97110 THERAPEUTIC EXERCISES: CPT

## 2020-04-11 PROCEDURE — 6370000000 HC RX 637 (ALT 250 FOR IP): Performed by: FAMILY MEDICINE

## 2020-04-11 PROCEDURE — 6370000000 HC RX 637 (ALT 250 FOR IP): Performed by: INTERNAL MEDICINE

## 2020-04-11 PROCEDURE — 97116 GAIT TRAINING THERAPY: CPT

## 2020-04-11 RX ORDER — DIGOXIN 125 MCG
125 TABLET ORAL DAILY
Qty: 30 TABLET | Refills: 3 | Status: SHIPPED
Start: 2020-04-12 | End: 2020-04-13 | Stop reason: SINTOL

## 2020-04-11 RX ORDER — POTASSIUM CHLORIDE 20 MEQ/1
20 TABLET, EXTENDED RELEASE ORAL 2 TIMES DAILY WITH MEALS
Qty: 60 TABLET | Refills: 3 | Status: SHIPPED | OUTPATIENT
Start: 2020-04-11

## 2020-04-11 RX ADMIN — DOCUSATE SODIUM 100 MG: 100 CAPSULE, LIQUID FILLED ORAL at 08:31

## 2020-04-11 RX ADMIN — FOLIC ACID 1 MG: 1 TABLET ORAL at 08:31

## 2020-04-11 RX ADMIN — ACETAMINOPHEN 650 MG: 325 TABLET, FILM COATED ORAL at 03:42

## 2020-04-11 RX ADMIN — Medication 100 MG: at 08:31

## 2020-04-11 RX ADMIN — DIGOXIN 125 MCG: 125 TABLET ORAL at 08:31

## 2020-04-11 RX ADMIN — FAMOTIDINE 20 MG: 20 TABLET ORAL at 08:31

## 2020-04-11 RX ADMIN — LEVOTHYROXINE SODIUM 50 MCG: 50 TABLET ORAL at 08:33

## 2020-04-11 RX ADMIN — ATORVASTATIN CALCIUM 40 MG: 40 TABLET, FILM COATED ORAL at 08:31

## 2020-04-11 RX ADMIN — BENZTROPINE MESYLATE 1 MG: 1 TABLET ORAL at 08:31

## 2020-04-11 RX ADMIN — ASPIRIN 81 MG: 81 TABLET, COATED ORAL at 08:31

## 2020-04-11 RX ADMIN — DIVALPROEX SODIUM 1500 MG: 500 TABLET, DELAYED RELEASE ORAL at 08:31

## 2020-04-11 RX ADMIN — BUMETANIDE 2 MG: 1 TABLET ORAL at 08:31

## 2020-04-11 RX ADMIN — POTASSIUM CHLORIDE 20 MEQ: 1500 TABLET, EXTENDED RELEASE ORAL at 08:31

## 2020-04-11 RX ADMIN — FUROSEMIDE 20 MG: 10 INJECTION, SOLUTION INTRAMUSCULAR; INTRAVENOUS at 08:32

## 2020-04-11 RX ADMIN — CLOPIDOGREL BISULFATE 75 MG: 75 TABLET ORAL at 08:31

## 2020-04-11 ASSESSMENT — PAIN DESCRIPTION - ORIENTATION: ORIENTATION: RIGHT

## 2020-04-11 ASSESSMENT — PAIN SCALES - GENERAL
PAINLEVEL_OUTOF10: 4
PAINLEVEL_OUTOF10: 0
PAINLEVEL_OUTOF10: 0

## 2020-04-11 ASSESSMENT — PAIN DESCRIPTION - PAIN TYPE: TYPE: ACUTE PAIN

## 2020-04-11 ASSESSMENT — PAIN DESCRIPTION - LOCATION: LOCATION: FACE

## 2020-04-11 ASSESSMENT — PAIN DESCRIPTION - DESCRIPTORS: DESCRIPTORS: ACHING;DISCOMFORT

## 2020-04-11 NOTE — PLAN OF CARE
Problem: Falls - Risk of:  Goal: Will remain free from falls  Description: Will remain free from falls  4/10/2020 2237 by Constance Cleary RN  Outcome: Ongoing  Note: Pt is at high risk for falls. Non skid socks on. Bed in low position and wheels locked. Fall sign posted and bracelet on. Siderails padded. Bed alarm on. Telesitter in use. Call light within reach. Will continue to assess. Problem: Airway Clearance - Ineffective:  Goal: Clear lung sounds  Description: Clear lung sounds  4/10/2020 2237 by Constance Cleary RN  Outcome: Ongoing  Note: Pulse ox WNL. Pt is on room air. Lungs clear. No SOB noted. Pt denies any discomfort. Will continue to assess. Problem: Fluid Volume - Deficit:  Goal: Achieves intake and output within specified parameters  Description: Achieves intake and output within specified parameters  4/10/2020 2237 by Constance Cleary RN  Outcome: Ongoing  Note: Oral fluids encouraged. Urine output measured. I & O monitored. Daily weights. Labs drawn daily. Will continue to assess. Problem: Hyperthermia:  Goal: Ability to maintain a body temperature in the normal range will improve  Description: Ability to maintain a body temperature in the normal range will improve  4/10/2020 2237 by Constance Cleary RN  Outcome: Ongoing  Note: No temperature noted. VS stable and WNL. Will continue to assess. Problem: Pain:  Goal: Pain level will decrease  Description: Pain level will decrease  4/10/2020 2237 by Constance Cleary RN  Outcome: Ongoing  Note: Pt denies pain at this time. Pain meds given as needed & as ordered. Will continue to assess.

## 2020-04-11 NOTE — DISCHARGE SUMMARY
Discharge Summary    Georgina Dent  :  1965  MRN:  [id number]    Admit date:  2020      Discharge date:    2020    Admitting Physician:  No admitting provider for patient encounter. Discharge Diagnoses:      Principal Problem:    Acute on chronic combined systolic and diastolic CHF, NYHA class 4 (HCC)  Active Problems:    Undifferentiated schizophrenia ([facility] [address]    Pneumonia    Exposure to COVID-19 virus /  COVID-19 test negative x2  Resolved Problems:    * No resolved hospital problems. *      Active Hospital Problems    Diagnosis Date Noted    Undifferentiated schizophrenia ([facility] [address] [F20.3]      Priority: High    Acute on chronic combined systolic and diastolic CHF, NYHA class 4 (HCC) [I50.43] 2020    Exposure to COVID-19 virus /  COVID-19 test negative x2 [Z20.828] 2020    Pneumonia [J18.9] 2020       Discharge Medications:       [facility] Medication Instructions [id number]:[id number]    Printed on:20 1234   Medication Information                      aspirin 81 MG EC tablet  Take 1 tablet by mouth daily             atorvastatin (LIPITOR) 40 MG tablet  Take 40 mg by mouth daily             benztropine (COGENTIN) 1 MG tablet  Take 1 mg by mouth 2 times daily             bumetanide (BUMEX) 2 MG tablet  Take 1 tablet by mouth 2 times daily             clopidogrel (PLAVIX) 75 MG tablet  Take 1 tablet by mouth daily             digoxin (LANOXIN) 125 MCG tablet  Take 1 tablet by mouth daily             divalproex (DEPAKOTE) 500 MG EC tablet  Take 1,500 mg by mouth daily.                doxepin (SINEQUAN) 50 MG capsule  Take 50 mg by mouth             famotidine (PEPCID) 20 MG tablet  Take 1 tablet by mouth 2 times daily             folic acid (FOLVITE) 1 MG tablet  Take 1 tablet by mouth daily             levothyroxine (SYNTHROID) 50 MCG tablet  Take 1 tablet by mouth Daily             metoprolol tartrate (LOPRESSOR) 25 MG tablet  Take 0.5 tablets by mouth 2 times daily             midodrine (PROAMATINE) 10 MG tablet  Take 1 tablet by mouth 3 times daily (with meals)             Multiple Vitamin (MULTIVITAMIN) tablet  Take 1 tablet by mouth daily             nicotine (NICODERM CQ) 21 MG/24HR  Place 1 patch onto the skin daily             ondansetron (ZOFRAN-ODT) 4 MG disintegrating tablet  Take 1 tablet by mouth every 8 hours as needed for Nausea or Vomiting             potassium chloride (KLOR-CON M) 20 MEQ extended release tablet  Take 1 tablet by mouth 2 times daily (with meals)             vitamin B-1 100 MG tablet  Take 1 tablet by mouth daily                 Consultants:  cardiology     Hospital Course:   Mimi Ho is a 48 y.o. male admitted with  Systolic congestive heart failure. Exacerbation of symptoms. Clarification of his meds and will be followed up as an outpatient. Minimal improvement in symptoms. Exam:   Clear lung sounds slightly diminished.   1+ ankle edema    Condition:  fair    Disposition:   Home    Patient will be followed by CAM Mueller CNP in 1-2 weeks    Signed:  Juve Edwards  4/11/2020, 12:34 PM

## 2020-04-11 NOTE — PROGRESS NOTES
Discharge instructions reviewed with the patient. All questions and concerns were addressed. Unit number provided to the patient should they come up with any questions once they are home. Pt verbalizes understanding of instructions.

## 2020-04-11 NOTE — PROGRESS NOTES
Physical Therapy  Facility/Department: [facility]  Daily Treatment Note  NAME: Patti Padron  : 1965  MRN: [id number]    Date of Service: 2020    Discharge Recommendations:  Continue to assess pending progress, Patient would benefit from continued therapy after discharge        Assessment      Activity Tolerance  Activity Tolerance: Patient Tolerated treatment well;Patient limited by endurance; Patient limited by cognitive status     Patient Diagnosis(es): The encounter diagnosis was Acute on chronic combined systolic and diastolic CHF, NYHA class 4 ([address] has a past medical history of Alcoholic cirrhosis ([address] Back pain, Bipolar 1 disorder ([address] CAD (coronary artery disease), CHF (congestive heart failure) ([address] Idiopathic cardiomyopathy ([address] and Seizures ([facility] [address] has a past surgical history that includes Toe Surgery; Tooth Extraction; and Cardiac catheterization (Left, 2019). Restrictions  Restrictions/Precautions  Restrictions/Precautions: General Precautions, Fall Risk  Subjective   General  Chart Reviewed: Yes  Response To Previous Treatment: Patient with no complaints from previous session.   Family / Caregiver Present: No  Subjective  Subjective: Patient reports he is tired but agrees to physical therapy          Orientation  Orientation  Overall Orientation Status: Within Functional Limits  Cognition      Objective   Bed mobility  Supine to Sit: Stand by assistance  Sit to Supine: Stand by assistance  Transfers  Sit to Stand: Contact guard assistance  Stand to sit: Stand by assistance  Comment: Patient is impulsive with sit/stand and stand pivot transfers and demo's poor understanding of vc's to slow down and control movements and use proper hand placement  Ambulation  Ambulation?: Yes  Ambulation 1  Surface: level tile  Device: No Device  Assistance: Contact guard assistance  Distance: 50ft+100ft+150ft with seated rest breaks between bouts of ambulation due to shortness

## 2020-04-13 ENCOUNTER — TELEPHONE (OUTPATIENT)
Dept: OTHER | Facility: CLINIC | Age: 55
End: 2020-04-13

## 2020-04-13 ENCOUNTER — APPOINTMENT (OUTPATIENT)
Dept: GENERAL RADIOLOGY | Age: 55
End: 2020-04-13
Payer: MEDICARE

## 2020-04-13 ENCOUNTER — HOSPITAL ENCOUNTER (EMERGENCY)
Age: 55
Discharge: HOME | End: 2020-04-13
Attending: EMERGENCY MEDICINE
Payer: MEDICARE

## 2020-04-13 VITALS
BODY MASS INDEX: 33.91 KG/M2 | DIASTOLIC BLOOD PRESSURE: 89 MMHG | WEIGHT: 250 LBS | HEART RATE: 105 BPM | SYSTOLIC BLOOD PRESSURE: 100 MMHG | RESPIRATION RATE: 22 BRPM | OXYGEN SATURATION: 92 % | TEMPERATURE: 97 F

## 2020-04-13 LAB
ABSOLUTE EOS #: <0.03 K/UL (ref 0–0.44)
ABSOLUTE IMMATURE GRANULOCYTE: <0.03 K/UL (ref 0–0.3)
ABSOLUTE LYMPH #: 1.23 K/UL (ref 1.1–3.7)
ABSOLUTE MONO #: 0.37 K/UL (ref 0.1–1.2)
ALBUMIN SERPL-MCNC: 3.7 G/DL (ref 3.5–5.2)
ALBUMIN/GLOBULIN RATIO: 1.2 (ref 1–2.5)
ALP BLD-CCNC: 117 U/L (ref 40–129)
ALT SERPL-CCNC: 21 U/L (ref 5–41)
ANION GAP SERPL CALCULATED.3IONS-SCNC: 13 MMOL/L (ref 9–17)
AST SERPL-CCNC: 44 U/L
BASOPHILS # BLD: 0 % (ref 0–2)
BASOPHILS ABSOLUTE: <0.03 K/UL (ref 0–0.2)
BILIRUB SERPL-MCNC: 1.25 MG/DL (ref 0.3–1.2)
BILIRUBIN DIRECT: 0.36 MG/DL
BILIRUBIN, INDIRECT: 0.89 MG/DL (ref 0–1)
BNP INTERPRETATION: ABNORMAL
BUN BLDV-MCNC: 20 MG/DL (ref 6–20)
BUN/CREAT BLD: 24 (ref 9–20)
CALCIUM SERPL-MCNC: 9 MG/DL (ref 8.6–10.4)
CHLORIDE BLD-SCNC: 94 MMOL/L (ref 98–107)
CO2: 31 MMOL/L (ref 20–31)
CREAT SERPL-MCNC: 0.85 MG/DL (ref 0.7–1.2)
DIFFERENTIAL TYPE: ABNORMAL
DIGOXIN DATE LAST DOSE: ABNORMAL
DIGOXIN DOSE AMOUNT: ABNORMAL
DIGOXIN DOSE TIME: ABNORMAL
DIGOXIN LEVEL: 0.4 NG/ML (ref 0.5–2)
EKG ATRIAL RATE: 92 BPM
EKG P AXIS: 75 DEGREES
EKG P-R INTERVAL: 218 MS
EKG Q-T INTERVAL: 358 MS
EKG QRS DURATION: 92 MS
EKG QTC CALCULATION (BAZETT): 442 MS
EKG R AXIS: 121 DEGREES
EKG T AXIS: 47 DEGREES
EKG VENTRICULAR RATE: 92 BPM
EOSINOPHILS RELATIVE PERCENT: 0 % (ref 1–4)
GFR AFRICAN AMERICAN: >60 ML/MIN
GFR NON-AFRICAN AMERICAN: >60 ML/MIN
GFR SERPL CREATININE-BSD FRML MDRD: ABNORMAL ML/MIN/{1.73_M2}
GFR SERPL CREATININE-BSD FRML MDRD: ABNORMAL ML/MIN/{1.73_M2}
GLOBULIN: ABNORMAL G/DL (ref 1.5–3.8)
GLUCOSE BLD-MCNC: 100 MG/DL (ref 70–99)
HCT VFR BLD CALC: 34 % (ref 40.7–50.3)
HEMOGLOBIN: 10.1 G/DL (ref 13–17)
IMMATURE GRANULOCYTES: 0 %
LYMPHOCYTES # BLD: 26 % (ref 24–43)
MCH RBC QN AUTO: 25.9 PG (ref 25.2–33.5)
MCHC RBC AUTO-ENTMCNC: 29.7 G/DL (ref 28.4–34.8)
MCV RBC AUTO: 87.2 FL (ref 82.6–102.9)
MONOCYTES # BLD: 8 % (ref 3–12)
NRBC AUTOMATED: 0.4 PER 100 WBC
PDW BLD-RTO: 18.8 % (ref 11.8–14.4)
PLATELET # BLD: 159 K/UL (ref 138–453)
PLATELET ESTIMATE: ABNORMAL
PMV BLD AUTO: 9 FL (ref 8.1–13.5)
POTASSIUM SERPL-SCNC: 3.7 MMOL/L (ref 3.7–5.3)
PRO-BNP: 7636 PG/ML
RBC # BLD: 3.9 M/UL (ref 4.21–5.77)
RBC # BLD: ABNORMAL 10*6/UL
SEG NEUTROPHILS: 65 % (ref 36–65)
SEGMENTED NEUTROPHILS ABSOLUTE COUNT: 3.09 K/UL (ref 1.5–8.1)
SODIUM BLD-SCNC: 138 MMOL/L (ref 135–144)
TOTAL PROTEIN: 6.8 G/DL (ref 6.4–8.3)
TROPONIN INTERP: ABNORMAL
TROPONIN INTERP: ABNORMAL
TROPONIN T: ABNORMAL NG/ML
TROPONIN T: ABNORMAL NG/ML
TROPONIN, HIGH SENSITIVITY: 56 NG/L (ref 0–22)
TROPONIN, HIGH SENSITIVITY: 61 NG/L (ref 0–22)
WBC # BLD: 4.7 K/UL (ref 3.5–11.3)
WBC # BLD: ABNORMAL 10*3/UL

## 2020-04-13 PROCEDURE — 80162 ASSAY OF DIGOXIN TOTAL: CPT

## 2020-04-13 PROCEDURE — 99285 EMERGENCY DEPT VISIT HI MDM: CPT

## 2020-04-13 PROCEDURE — 80048 BASIC METABOLIC PNL TOTAL CA: CPT

## 2020-04-13 PROCEDURE — 71045 X-RAY EXAM CHEST 1 VIEW: CPT

## 2020-04-13 PROCEDURE — 93005 ELECTROCARDIOGRAM TRACING: CPT | Performed by: EMERGENCY MEDICINE

## 2020-04-13 PROCEDURE — 93010 ELECTROCARDIOGRAM REPORT: CPT | Performed by: INTERNAL MEDICINE

## 2020-04-13 PROCEDURE — 36415 COLL VENOUS BLD VENIPUNCTURE: CPT

## 2020-04-13 PROCEDURE — 84484 ASSAY OF TROPONIN QUANT: CPT

## 2020-04-13 PROCEDURE — 80076 HEPATIC FUNCTION PANEL: CPT

## 2020-04-13 PROCEDURE — 83880 ASSAY OF NATRIURETIC PEPTIDE: CPT

## 2020-04-13 PROCEDURE — 85025 COMPLETE CBC W/AUTO DIFF WBC: CPT

## 2020-04-13 RX ORDER — DIGOXIN 250 MCG
250 TABLET ORAL DAILY
Qty: 30 TABLET | Refills: 0 | Status: SHIPPED | OUTPATIENT
Start: 2020-04-13 | End: 2020-04-28

## 2020-04-13 NOTE — ED NOTES
Patient eating [employer].  Pt reports stopping at drive thru before coming to hospital     Josetta Baller, [place]  04/13/20 4667

## 2020-04-14 ENCOUNTER — CARE COORDINATION (OUTPATIENT)
Dept: CASE MANAGEMENT | Age: 55
End: 2020-04-14

## 2020-04-14 ASSESSMENT — ENCOUNTER SYMPTOMS
DIARRHEA: 0
SHORTNESS OF BREATH: 1
RHINORRHEA: 0
EYE REDNESS: 0
CHEST TIGHTNESS: 0
ABDOMINAL DISTENTION: 0
CONSTIPATION: 0
COUGH: 0
ANAL BLEEDING: 0
EYE DISCHARGE: 0
NAUSEA: 0
WHEEZING: 0
RECTAL PAIN: 0
VOMITING: 0
SORE THROAT: 0
STRIDOR: 0
CHOKING: 0
ABDOMINAL PAIN: 0
BLOOD IN STOOL: 0

## 2020-04-14 NOTE — ED PROVIDER NOTES
[facility] [place] ED  EMERGENCY DEPARTMENT ENCOUNTER      Pt Name: Michael Kelly  MRN: [id number]  [place] 1965  Date of evaluation: 4/13/2020  Provider: Celia Rashid MD    [address] [place] [address]     Chief Complaint   Patient presents with    Shortness of Breath     Patient states shortness of breath going on \"for a while\". Reports having shortness of breath earlier today. Was recently hospitalized. HISTORY OF PRESENT ILLNESS   (Location/Symptom, Timing/Onset, Context/Setting,Quality, Duration, Modifying Factors, Severity)  Note limiting factors. Michael Kelly is a51 y.o. male who presents to the emergency department with shortness of breath. Patient has multiple recent admissions for congestive heart failure. Patient was discharged 2 days ago. No chest pain. Shortness of breath when laying flat. Increase shortness of breath with activity. Patient reports that overall he is feeling much better than he did prior to his last admission. Patient does not not keep track of his weights. No abdominal pain nausea, vomiting or diarrhea. Patient reports that he is taking all of his medications as prescribed. Patient reports that he is trying to get a follow-up appointment with his cardiologist to discuss having a biventricular pacemaker placed. Patient reports that he needs to be admitted again for his shortness of breath. Nursing Notes were reviewed. REVIEW OF SYSTEMS    (2-9 systems for level 4, 10 or more for level 5)     Review of Systems   Constitutional: Negative for activity change, fatigue and fever. HENT: Negative for congestion, rhinorrhea and sore throat. Eyes: Negative for discharge, redness and visual disturbance. Respiratory: Positive for shortness of breath. Negative for cough, choking, chest tightness, wheezing and stridor. Cardiovascular: Positive for leg swelling. Negative for chest pain and palpitations.    Gastrointestinal: Negative for abdominal distention, abdominal pain, anal bleeding, blood in stool, constipation, diarrhea, nausea, rectal pain and vomiting. Endocrine: Negative for cold intolerance and heat intolerance. Genitourinary: Negative for difficulty urinating, dysuria, flank pain, testicular pain and urgency. Musculoskeletal: Negative for arthralgias, joint swelling, myalgias, neck pain and neck stiffness. Skin: Negative for rash. Neurological: Negative for seizures, syncope, speech difficulty, weakness, light-headedness, numbness and headaches. Psychiatric/Behavioral: Negative for suicidal ideas. Except as noted above the remainder of the review of systems was reviewed and negative. PAST MEDICAL HISTORY     Past Medical History:   Diagnosis Date    Alcoholic cirrhosis ([facility]     Back pain     Bipolar 1 disorder ([facility]     CAD (coronary artery disease) 09/2019    s/p proximal LAD stent    CHF (congestive heart failure) (HCC)     Idiopathic cardiomyopathy ([facility]     Seizures ([facility]     LAST SEIZURE 9-4-19         SURGICALHISTORY       Past Surgical History:   Procedure Laterality Date    CARDIAC CATHETERIZATION Left 09/17/2019    DR Valerio[facility]/right radial-Severe two vessel coronary artery disease involving involving a 100% proximal probably co-dominant RCA, 90% stenosis in the distal part of the proximal LAD at a bifircation with a moderate sized D1 branch that also has an 80% stenosis. Mildly elevated left ventricular end diastolic pressure.  Consult to cardiothoracic surgery for consideration of multi-vess    TOE SURGERY      TOOTH EXTRACTION           CURRENT MEDICATIONS       Discharge Medication List as of 4/13/2020  9:56 PM      CONTINUE these medications which have NOT CHANGED    Details   enoxaparin (LOVENOX) 30 MG/0.3ML injection Inject into the skin dailyHistorical Med      potassium chloride (KLOR-CON M) 20 MEQ extended release tablet Take 1 tablet by mouth 2 times daily (with meals), Disp-60 tablet, R-3Normal      aspirin 81 MG EC tablet Take 1 tablet by mouth daily, Disp-90 tablet, R-3Normal      metoprolol tartrate (LOPRESSOR) 25 MG tablet Take 0.5 tablets by mouth 2 times daily, Disp-60 tablet, R-3Normal      atorvastatin (LIPITOR) 40 MG tablet Take 40 mg by mouth dailyHistorical Med      doxepin (SINEQUAN) 50 MG capsule Take 50 mg by mouthHistorical Med      clopidogrel (PLAVIX) 75 MG tablet Take 1 tablet by mouth daily, Disp-90 tablet, R-3Normal      divalproex (DEPAKOTE) 500 MG EC tablet Take 1,500 mg by mouth daily. Historical Med      bumetanide (BUMEX) 2 MG tablet Take 1 tablet by mouth 2 times daily, Disp-30 tablet, R-3Normal      midodrine (PROAMATINE) 10 MG tablet Take 1 tablet by mouth 3 times daily (with meals), Disp-90 tablet, R-3Normal      levothyroxine (SYNTHROID) 50 MCG tablet Take 1 tablet by mouth Daily, Disp-30 tablet, J-4RP to SNF      folic acid (FOLVITE) 1 MG tablet Take 1 tablet by mouth daily, Disp-30 tablet, R-3Normal      ondansetron (ZOFRAN-ODT) 4 MG disintegrating tablet Take 1 tablet by mouth every 8 hours as needed for Nausea or Vomiting, Disp-30 tablet, R-1Normal      vitamin B-1 100 MG tablet Take 1 tablet by mouth daily, Disp-30 tablet, R-3Normal      Multiple Vitamin (MULTIVITAMIN) tablet Take 1 tablet by mouth daily, R-0DC to SNF      famotidine (PEPCID) 20 MG tablet Take 1 tablet by mouth 2 times daily, Disp-60 tablet, R-3Normal      nicotine (NICODERM CQ) 21 MG/24HR Place 1 patch onto the skin daily, Disp-30 patch, R-3Normal      benztropine (COGENTIN) 1 MG tablet Take 1 mg by mouth 2 times dailyHistorical Med                  Ticagrelor and Pcn [penicillins]    FAMILY HISTORY     No family history on file.        SOCIAL HISTORY       Social History     Socioeconomic History    Marital status: Single     Spouse name: Not on file    Number of children: Not on file    Years of education: Not on file    Highest education level: Not on file   Occupational History Mouth/Throat:      Mouth: Mucous membranes are moist.   Eyes:      Extraocular Movements: Extraocular movements intact. Conjunctiva/sclera: Conjunctivae normal.      Pupils: Pupils are equal, round, and reactive to light. Neck:      Musculoskeletal: Normal range of motion and neck supple. Vascular: No carotid bruit. Cardiovascular:      Rate and Rhythm: Tachycardia present. Pulses: Normal pulses. Pulmonary:      Effort: Pulmonary effort is normal.      Breath sounds: Normal breath sounds. Comments: Breath sounds are slightly diminished at bases bilaterally  Abdominal:      General: Abdomen is flat. Bowel sounds are normal.      Palpations: Abdomen is soft. Musculoskeletal: Normal range of motion. Right lower leg: Edema present. Left lower leg: Edema present. Skin:     General: Skin is warm. Capillary Refill: Capillary refill takes less than 2 seconds. Coloration: Skin is not jaundiced. Findings: No rash. Neurological:      General: No focal deficit present. Mental Status: He is alert and oriented to person, place, and time. Mental status is at baseline. DIAGNOSTIC RESULTS     EKG: All EKG's are interpreted by the Emergency Department Physician who either signs or Co-signs this chart in the absence of a cardiologist.        RADIOLOGY:   Plain film images such as CT, Ultrasound and MRI are read by the radiologist. Plain radiographic images are visualized and preliminarily interpreted by the emergency physician with the below findings:        Interpretation per the Radiologist below, ifavailable at the time of this note:    XR CHEST PORTABLE   Final Result   Cardiomegaly with pulmonary vascular congestion suggestive of CHF. Follow-up   chest x-ray suggested.                ED BEDSIDE ULTRASOUND:   Performed by ED Physician - none    LABS:  Labs Reviewed   BASIC METABOLIC PANEL W/ REFLEX TO MG FOR LOW K - Abnormal; Notable for the following

## 2020-04-14 NOTE — CARE COORDINATION
[facility] Transitions Follow Up Call    2020    Patient: Brayan Cartwright  Patient : 1965   MRN: [id number]  Reason for Admission:   Discharge Date: 20 RARS: Readmission Risk Score: 43    Follow Up:  2020 Final  for admission 3/31/20-20. Patient no longer eligible for BPCI bundle due to excluded DRG.      Shawn Summers RN

## 2020-04-16 ENCOUNTER — APPOINTMENT (OUTPATIENT)
Dept: GENERAL RADIOLOGY | Age: 55
End: 2020-04-16
Payer: MEDICARE

## 2020-04-16 ENCOUNTER — HOSPITAL ENCOUNTER (EMERGENCY)
Age: 55
Discharge: ANOTHER HOSPITAL | End: 2020-04-16
Attending: EMERGENCY MEDICINE
Payer: MEDICARE

## 2020-04-16 ENCOUNTER — OFFICE VISIT (OUTPATIENT)
Dept: CARDIOLOGY | Age: 55
End: 2020-04-16
Payer: MEDICARE

## 2020-04-16 VITALS
OXYGEN SATURATION: 91 % | HEART RATE: 101 BPM | SYSTOLIC BLOOD PRESSURE: 93 MMHG | DIASTOLIC BLOOD PRESSURE: 58 MMHG | WEIGHT: 248 LBS | BODY MASS INDEX: 33.63 KG/M2

## 2020-04-16 VITALS
TEMPERATURE: 97.3 F | HEART RATE: 100 BPM | RESPIRATION RATE: 14 BRPM | HEIGHT: 72 IN | BODY MASS INDEX: 33.86 KG/M2 | OXYGEN SATURATION: 92 % | DIASTOLIC BLOOD PRESSURE: 65 MMHG | SYSTOLIC BLOOD PRESSURE: 109 MMHG | WEIGHT: 250 LBS

## 2020-04-16 PROBLEM — R06.02 SHORTNESS OF BREATH: Status: ACTIVE | Noted: 2020-04-16

## 2020-04-16 PROBLEM — I50.40 COMBINED SYSTOLIC AND DIASTOLIC CONGESTIVE HEART FAILURE (HCC): Status: ACTIVE | Noted: 2020-02-26

## 2020-04-16 LAB
ABSOLUTE EOS #: <0.03 K/UL (ref 0–0.44)
ABSOLUTE IMMATURE GRANULOCYTE: <0.03 K/UL (ref 0–0.3)
ABSOLUTE LYMPH #: 1.32 K/UL (ref 1.1–3.7)
ABSOLUTE MONO #: 0.68 K/UL (ref 0.1–1.2)
ANION GAP SERPL CALCULATED.3IONS-SCNC: 14 MMOL/L (ref 9–17)
BASOPHILS # BLD: 1 % (ref 0–2)
BASOPHILS ABSOLUTE: 0.03 K/UL (ref 0–0.2)
BNP INTERPRETATION: ABNORMAL
BUN BLDV-MCNC: 19 MG/DL (ref 6–20)
BUN/CREAT BLD: 21 (ref 9–20)
CALCIUM SERPL-MCNC: 9.1 MG/DL (ref 8.6–10.4)
CHLORIDE BLD-SCNC: 94 MMOL/L (ref 98–107)
CO2: 28 MMOL/L (ref 20–31)
CREAT SERPL-MCNC: 0.89 MG/DL (ref 0.7–1.2)
DIFFERENTIAL TYPE: ABNORMAL
EOSINOPHILS RELATIVE PERCENT: 0 % (ref 1–4)
GFR AFRICAN AMERICAN: >60 ML/MIN
GFR NON-AFRICAN AMERICAN: >60 ML/MIN
GFR SERPL CREATININE-BSD FRML MDRD: ABNORMAL ML/MIN/{1.73_M2}
GFR SERPL CREATININE-BSD FRML MDRD: ABNORMAL ML/MIN/{1.73_M2}
GLUCOSE BLD-MCNC: 83 MG/DL (ref 70–99)
HCT VFR BLD CALC: 34.1 % (ref 40.7–50.3)
HEMOGLOBIN: 9.9 G/DL (ref 13–17)
IMMATURE GRANULOCYTES: 0 %
LYMPHOCYTES # BLD: 24 % (ref 24–43)
MCH RBC QN AUTO: 25.8 PG (ref 25.2–33.5)
MCHC RBC AUTO-ENTMCNC: 29 G/DL (ref 28.4–34.8)
MCV RBC AUTO: 88.8 FL (ref 82.6–102.9)
MONOCYTES # BLD: 12 % (ref 3–12)
NRBC AUTOMATED: 1.3 PER 100 WBC
PDW BLD-RTO: 19.4 % (ref 11.8–14.4)
PLATELET # BLD: 169 K/UL (ref 138–453)
PLATELET ESTIMATE: ABNORMAL
PMV BLD AUTO: 8.7 FL (ref 8.1–13.5)
POTASSIUM SERPL-SCNC: 3.8 MMOL/L (ref 3.7–5.3)
PRO-BNP: 7758 PG/ML
RBC # BLD: 3.84 M/UL (ref 4.21–5.77)
RBC # BLD: ABNORMAL 10*6/UL
SEG NEUTROPHILS: 63 % (ref 36–65)
SEGMENTED NEUTROPHILS ABSOLUTE COUNT: 3.42 K/UL (ref 1.5–8.1)
SODIUM BLD-SCNC: 136 MMOL/L (ref 135–144)
TROPONIN INTERP: ABNORMAL
TROPONIN T: ABNORMAL NG/ML
TROPONIN, HIGH SENSITIVITY: 52 NG/L (ref 0–22)
WBC # BLD: 5.5 K/UL (ref 3.5–11.3)
WBC # BLD: ABNORMAL 10*3/UL

## 2020-04-16 PROCEDURE — 99285 EMERGENCY DEPT VISIT HI MDM: CPT

## 2020-04-16 PROCEDURE — 83880 ASSAY OF NATRIURETIC PEPTIDE: CPT

## 2020-04-16 PROCEDURE — 99215 OFFICE O/P EST HI 40 MIN: CPT | Performed by: FAMILY MEDICINE

## 2020-04-16 PROCEDURE — G8417 CALC BMI ABV UP PARAM F/U: HCPCS | Performed by: FAMILY MEDICINE

## 2020-04-16 PROCEDURE — 71045 X-RAY EXAM CHEST 1 VIEW: CPT

## 2020-04-16 PROCEDURE — 80048 BASIC METABOLIC PNL TOTAL CA: CPT

## 2020-04-16 PROCEDURE — 84484 ASSAY OF TROPONIN QUANT: CPT

## 2020-04-16 PROCEDURE — 36415 COLL VENOUS BLD VENIPUNCTURE: CPT

## 2020-04-16 PROCEDURE — G8427 DOCREV CUR MEDS BY ELIG CLIN: HCPCS | Performed by: FAMILY MEDICINE

## 2020-04-16 PROCEDURE — 3017F COLORECTAL CA SCREEN DOC REV: CPT | Performed by: FAMILY MEDICINE

## 2020-04-16 PROCEDURE — 93005 ELECTROCARDIOGRAM TRACING: CPT | Performed by: EMERGENCY MEDICINE

## 2020-04-16 PROCEDURE — 96374 THER/PROPH/DIAG INJ IV PUSH: CPT

## 2020-04-16 PROCEDURE — 1111F DSCHRG MED/CURRENT MED MERGE: CPT | Performed by: FAMILY MEDICINE

## 2020-04-16 PROCEDURE — 6360000002 HC RX W HCPCS: Performed by: EMERGENCY MEDICINE

## 2020-04-16 PROCEDURE — 4004F PT TOBACCO SCREEN RCVD TLK: CPT | Performed by: FAMILY MEDICINE

## 2020-04-16 PROCEDURE — 85025 COMPLETE CBC W/AUTO DIFF WBC: CPT

## 2020-04-16 PROCEDURE — 96375 TX/PRO/DX INJ NEW DRUG ADDON: CPT

## 2020-04-16 RX ORDER — LORAZEPAM 2 MG/ML
0.5 INJECTION INTRAMUSCULAR ONCE
Status: COMPLETED | OUTPATIENT
Start: 2020-04-16 | End: 2020-04-16

## 2020-04-16 RX ORDER — ONDANSETRON 2 MG/ML
4 INJECTION INTRAMUSCULAR; INTRAVENOUS ONCE
Status: COMPLETED | OUTPATIENT
Start: 2020-04-16 | End: 2020-04-16

## 2020-04-16 RX ADMIN — ONDANSETRON 4 MG: 2 INJECTION INTRAMUSCULAR; INTRAVENOUS at 19:58

## 2020-04-16 RX ADMIN — LORAZEPAM 0.5 MG: 2 INJECTION INTRAMUSCULAR; INTRAVENOUS at 19:58

## 2020-04-16 ASSESSMENT — ENCOUNTER SYMPTOMS
SHORTNESS OF BREATH: 1
TROUBLE SWALLOWING: 0
ABDOMINAL PAIN: 1
CONSTIPATION: 0
ABDOMINAL DISTENTION: 0
NAUSEA: 0
VOMITING: 0
DIARRHEA: 0

## 2020-04-16 NOTE — ED PROVIDER NOTES
[facility] ED  EMERGENCY DEPARTMENT ENCOUNTER      Pt Name:Hayes Padron  MRN: [id number]  Birthdate 1965  Date of evaluation: 4/16/2020  Provider: Lisset Oliver MD    CHIEF COMPLAINT     Chief Complaint   Patient presents with    Shortness of Breath     hx of CHF, sent by cardiologist.         HISTORY OF PRESENT ILLNESS   (Location/Symptom, Timing/Onset, Context/Setting, Quality, Duration, Modifying Factors, Severity)  Note limiting factors. HPI the patient is a 78-year-old male who has a very bad heart. He is in chronic congestive heart failure. He has an ejection fraction of 15 to 20%. Dr. Adrian England is making arrangements for the patient to be transferred to [place]. Nursing Notes were reviewed. REVIEW OF SYSTEMS    (2-9 systems for level 4, 10 or more for level 5)     Review of Systems   Constitutional: Positive for activity change, diaphoresis and fatigue. HENT: Negative for congestion and trouble swallowing. Eyes: Negative for visual disturbance. Respiratory: Positive for shortness of breath. Cardiovascular: Positive for leg swelling. Negative for chest pain and palpitations. Gastrointestinal: Positive for abdominal pain. Negative for abdominal distention, constipation, diarrhea, nausea and vomiting. Genitourinary: Negative for difficulty urinating. Musculoskeletal: Positive for gait problem. Skin: Negative for rash. Neurological: Positive for light-headedness. Negative for dizziness and headaches. Psychiatric/Behavioral: Positive for dysphoric mood. Negative for confusion and decreased concentration.               MEDICAL HISTORY     Past Medical History:   Diagnosis Date    Alcoholic cirrhosis ([facility]     Back pain     Bipolar 1 disorder ([facility]     CAD (coronary artery disease) 09/2019    s/p proximal LAD stent    CHF (congestive heart failure) (HCC)     Idiopathic cardiomyopathy (HCC)     Seizures ([facility]     LAST SEIZURE 9-4-19         SURGICAL MEQ EXTENDED RELEASE TABLET    Take 1 tablet by mouth 2 times daily (with meals)    VITAMIN B-1 100 MG TABLET    Take 1 tablet by mouth daily       ALLERGIES     Ticagrelor and Pcn [penicillins]    FAMILY HISTORY     No family history on file. SOCIAL HISTORY       Social History     Socioeconomic History    Marital status: Single     Spouse name: Not on file    Number of children: Not on file    Years of education: Not on file    Highest education level: Not on file   Occupational History    Not on file   Social Needs    Financial resource strain: Not on file    Food insecurity     Worry: Not on file     Inability: Not on file    Transportation needs     Medical: Not on file     Non-medical: Not on file   Tobacco Use    Smoking status: Current Every Day Smoker     Packs/day: 0.25     Types: Cigarettes    Smokeless tobacco: Never Used    Tobacco comment: [facility] smokes 1 cigarette/day   Substance and Sexual Activity    Alcohol use: Yes     Comment: bottle of vodka a week.  States has not drank in [facility] Drug use: Not Currently     Frequency: 1.0 times per week     Types: Marijuana    Sexual activity: Not on file   Lifestyle    Physical activity     Days per week: Not on file     Minutes per session: Not on file    Stress: Not on file   Relationships    Social connections     Talks on phone: Not on file     Gets together: Not on file     Attends Jain service: Not on file     Active member of club or organization: Not on file     Attends meetings of clubs or organizations: Not on file     Relationship status: Not on file    Intimate partner violence     Fear of current or ex partner: Not on file     Emotionally abused: Not on file     Physically abused: Not on file     Forced sexual activity: Not on file   Other Topics Concern    Not on file   Social History Narrative    Not on file       SCREENINGS    Ayush Coma Scale  Eye Opening: Spontaneous  Best Verbal Response: Oriented  Best Motor Response: Obeys commands  Ayush Coma Scale Score: 15        PHYSICAL EXAM  (up to 7 for level 4, 8 or more for level 5)     ED Triage Vitals   BP Temp Temp Source Pulse Resp SpO2 Height Weight   04/16/20 1532 04/16/20 1532 04/16/20 1532 04/16/20 1531 04/16/20 1531 04/16/20 1531 04/16/20 1531 04/16/20 1531   101/76 97.3 °F (36.3 °C) Tympanic 104 18 96 % 6' (1.829 m) 250 lb (113.4 kg)       Physical Exam  Constitutional:       Appearance: He is obese. He is ill-appearing. HENT:      Head: Normocephalic. Eyes:      Extraocular Movements: Extraocular movements intact. Conjunctiva/sclera: Conjunctivae normal.      Pupils: Pupils are equal, round, and reactive to light. Neck:      Musculoskeletal: Normal range of motion and neck supple. No muscular tenderness. Cardiovascular:      Rate and Rhythm: Regular rhythm. Tachycardia present. Pulses: Normal pulses. Heart sounds: Murmur present. Pulmonary:      Breath sounds: Rales present. No wheezing. Abdominal:      General: There is distension. Comments: Patient has ascites. Musculoskeletal:         General: Swelling present. Right lower leg: Edema present. Left lower leg: Edema present. Skin:     General: Skin is warm and dry. Neurological:      General: No focal deficit present. Mental Status: He is alert and oriented to person, place, and time. Mental status is at baseline. Psychiatric:      Comments: Patient is becoming depressed. DIAGNOSTIC RESULTS     EKG: All EKG's are interpreted by the Emergency Department Physician whoeither signs or Co-signs this chart in the absence of a cardiologist.  Normal sinus rhythm at a rate of 99 axis of 123 low voltage QRS with widened QRS consider an anterior lateral infarct in the past.  Much baseline artifact.     RADIOLOGY:   Non-plain film images such as CT, Ultrasound and MRI are read by the radiologist. Plain radiographic images are visualized and preliminarily chronicity [facility])          DISPOSITION/PLAN   DISPOSITION Decision To Transfer 04/16/2020 04:57:20 PM      PATIENT REFERRED TO:  No follow-up provider specified.     DISCHARGE MEDICATIONS:  New Prescriptions    No medications on file              (Please note that portions ofthis note were completed with a voice recognition program.  Efforts were made to edit the dictations but occasionally words are mis-transcribed.)      Kinjal Florez MD (electronically signed)  Attending Emergency Physician          Mari Guevara MD  04/16/20 2610 Jessa Siddiqui MD  04/16/20 0666

## 2020-04-16 NOTE — ED NOTES
When this writer rounded on patient he had his wallet in his hand and was pulling his money out of his wallet and held it in his hand. When this writer asked him what he was going to do with his money he stated \"spend it at the disco\".          Cheli Xie RN  04/16/20 2161

## 2020-04-16 NOTE — PROGRESS NOTES
well-nourished. In no acute distress. HEENT: Normocephalic and atraumatic. No JVD present. Carotid bruit is not present. No mass and no thyromegaly present. No lymphadenopathy present. Cardiovascular: Tachycardic rate, regular rhythm, normal heart sounds. Exam reveals no gallop and no friction rubs. No murmur was heard. Pulmonary/Chest: Effort normal and breath sounds normal. No respiratory distress. He has no wheezes, rhonchi or rales. Crackles in the left base. Abdominal: Soft, non-tender. Bowel sounds and aorta are normal. He exhibits no organomegaly, mass or bruit. Extremities: Trace-1+ at the ankles bilaterally. No cyanosis or clubbing. 2+ radial and carotid pulses. Distal extremity pulses: Not palpable left worse than right. Neurological: He is alert and oriented to person, place, and time. No evidence of gross cranial nerve deficit. Coordination appeared normal.   Skin: Skin is warm and dry. There is no rash or diaphoresis. Psychiatric: He has a normal mood and affect. His speech is normal and behavior is normal.      MOST RECENT LABS ON RECORD:   Lab Results   Component Value Date    WBC 4.7 04/13/2020    HGB 10.1 (L) 04/13/2020    HCT 34.0 (L) 04/13/2020     04/13/2020    CHOL 85 03/19/2020    TRIG 91 03/19/2020    HDL 18 (L) 03/19/2020    ALT 21 04/13/2020    AST 44 (H) 04/13/2020     04/13/2020    K 3.7 04/13/2020    CL 94 (L) 04/13/2020    CREATININE 0.85 04/13/2020    BUN 20 04/13/2020    CO2 31 04/13/2020    TSH 9.96 (H) 03/18/2020    PSA 0.78 05/16/2019    INR 1.3 03/19/2020    LABMICR CANNOT BE CALCULATED 12/17/2014       ASSESSMENT:      Diagnosis Orders   1. Acute on chronic combined systolic and diastolic congestive heart failure, NYHA class 4 ([facility]     2. Severe hypotension     3. ASHD (arteriosclerotic heart disease)     4.  Hypokalemia due to loss of potassium       PLAN:        Acute on chronic combined heart failure: with NYHA Class IV symptoms: Severe Cardiomyopathy, New York Heart Association Class: IV, Echo done on 2/26/20 that showed an EF of 17%, has improved slightly to 20-25%. I do believe that the best option is to admit Mr. Timmy Santana to the hospital due to his symptoms as well as failure of outpatient management as I think that at this point the risks of developing potentially life threatening complications are to great. I spoke with Dr. oJny Edwards in the ER as well as with Dr. Adi Marquez at [facility] to discuss this further. Beta Blocker: Continue Metoprolol succinate (Toprol XL) 25 mg 1/2 tab daily. Also continue Digoxin 250 mcg daily. ACE Inibitor/ARB: Unable to tolerate due to hypotension   Diuretics: Continue bumetinide (Bumex) 2 mg 2 times daily. I also discussed the potential side effects of this medication including lightheadedness and dizziness and instructed them to stop the medication of this occurs and call our office if this occurs. Nonpharmacologic management of Heart Failure: Because of his significant lower extremity edema I again advised him to again start wearing mild compression knee high stockings to help decreased his lower extremity edema as well as his risk of developing cellulitis. I also advised him to try and keep his legs up and try and limit salt in his diet.  Bi-Ventricular Intra Cardiac Defibrillator (ICD): I still think that he really needs this to not only reduce his risk of death but also improve his heart failure status but we will need to optimize his heart failure status 1st   I am going to take him to ED for further workup and plan on transfer to [facility]. · Severe hypotension in office today: symptomatic with standing  · Continue digoxin 250 mg daily     Atherosclerotic Heart Disease: S/P Stenting x2 9/18/19: NSTEMI Appears to be doing well. No Angina  Antiplatelet Agent: Continue Aspirin 81 mg daily.  Beta Blocker: Continue Metoprolol tartrate (Lopressor) 25 mg 1/2 tab bid.     Anti-anginal medications: Continue nitroglycerin

## 2020-04-16 NOTE — ED NOTES
Received call from [address] [place] [address] was unaware of this pt. Currently speaking with Dr. Andres Gaitan.      Sparkle RODRIGUEZ Reser  04/16/20 [id number]

## 2020-04-16 NOTE — ED NOTES
[facility]ed [facility] and gave them info for transfer, they will arrange transport     Lajuan Homans  04/16/20 8851

## 2020-04-17 ENCOUNTER — CARE COORDINATION (OUTPATIENT)
Dept: CASE MANAGEMENT | Age: 55
End: 2020-04-17

## 2020-04-17 LAB
EKG ATRIAL RATE: 99 BPM
EKG P AXIS: 76 DEGREES
EKG P-R INTERVAL: 198 MS
EKG Q-T INTERVAL: 328 MS
EKG QRS DURATION: 94 MS
EKG QTC CALCULATION (BAZETT): 420 MS
EKG R AXIS: 123 DEGREES
EKG T AXIS: 72 DEGREES
EKG VENTRICULAR RATE: 99 BPM

## 2020-04-17 PROCEDURE — 93010 ELECTROCARDIOGRAM REPORT: CPT | Performed by: INTERNAL MEDICINE

## 2020-04-17 NOTE — CARE COORDINATION
[facility] Transitions Follow Up Call    2020    Patient: Angel Calles  Patient : 1965   MRN: [id number]  Reason for Admission:   Discharge Date: 20 RARS: Readmission Risk Score: 43    Follow Up:  Patient was transferred to [address] [place] [address]. Will continue to follow when he is discharged.       Future Appointments   Date Time Provider Damaris Gonzalez   2020  3:00 PM Vincent Frances MD TIFF CARD [facility]       Jenny Becerra, RN   Care Transition Nurse

## 2020-04-28 ENCOUNTER — OFFICE VISIT (OUTPATIENT)
Dept: CARDIOLOGY | Age: 55
End: 2020-04-28
Payer: MEDICARE

## 2020-04-28 ENCOUNTER — TELEPHONE (OUTPATIENT)
Dept: CARDIOLOGY | Age: 55
End: 2020-04-28

## 2020-04-28 VITALS
HEART RATE: 95 BPM | RESPIRATION RATE: 20 BRPM | DIASTOLIC BLOOD PRESSURE: 63 MMHG | SYSTOLIC BLOOD PRESSURE: 91 MMHG | WEIGHT: 247.2 LBS | BODY MASS INDEX: 33.53 KG/M2

## 2020-04-28 PROCEDURE — G8417 CALC BMI ABV UP PARAM F/U: HCPCS | Performed by: FAMILY MEDICINE

## 2020-04-28 PROCEDURE — 3017F COLORECTAL CA SCREEN DOC REV: CPT | Performed by: FAMILY MEDICINE

## 2020-04-28 PROCEDURE — G8427 DOCREV CUR MEDS BY ELIG CLIN: HCPCS | Performed by: FAMILY MEDICINE

## 2020-04-28 PROCEDURE — 99214 OFFICE O/P EST MOD 30 MIN: CPT | Performed by: FAMILY MEDICINE

## 2020-04-28 PROCEDURE — 99211 OFF/OP EST MAY X REQ PHY/QHP: CPT | Performed by: FAMILY MEDICINE

## 2020-04-28 PROCEDURE — 4004F PT TOBACCO SCREEN RCVD TLK: CPT | Performed by: FAMILY MEDICINE

## 2020-04-28 PROCEDURE — 1111F DSCHRG MED/CURRENT MED MERGE: CPT | Performed by: FAMILY MEDICINE

## 2020-04-28 RX ORDER — TORSEMIDE 20 MG/1
20 TABLET ORAL 2 TIMES DAILY
COMMUNITY

## 2020-04-28 RX ORDER — UBIDECARENONE 75 MG
50 CAPSULE ORAL DAILY
COMMUNITY

## 2020-04-28 RX ORDER — THIAMINE HYDROCHLORIDE 100 MG/ML
100 INJECTION, SOLUTION INTRAMUSCULAR; INTRAVENOUS DAILY
COMMUNITY

## 2020-04-28 RX ORDER — THIAMINE MONONITRATE (VIT B1) 100 MG
100 TABLET ORAL DAILY
COMMUNITY

## 2020-04-28 RX ORDER — LOSARTAN POTASSIUM 25 MG/1
25 TABLET ORAL DAILY
COMMUNITY

## 2020-04-28 RX ORDER — METOPROLOL SUCCINATE 25 MG/1
25 TABLET, EXTENDED RELEASE ORAL DAILY
COMMUNITY

## 2020-04-28 RX ORDER — OLANZAPINE 7.5 MG/1
7.5 TABLET ORAL NIGHTLY
COMMUNITY

## 2020-04-28 RX ORDER — SPIRONOLACTONE 25 MG/1
25 TABLET ORAL DAILY
COMMUNITY

## 2020-04-28 NOTE — PROGRESS NOTES
tablet Take by mouth daily      OLANZapine (ZYPREXA) 7.5 MG tablet Take 7.5 mg by mouth nightly      spironolactone (ALDACTONE) 25 MG tablet Take 25 mg by mouth daily 1/2 tablet daily      thiamine (B-1) 100 MG/ML injection Infuse 100 mg intravenously daily      vitamin B-1 (THIAMINE) 100 MG tablet Take 100 mg by mouth daily Take 5 tablets daily      torsemide (DEMADEX) 20 MG tablet Take 20 mg by mouth 2 times daily Take 2 tablets 2 times daily before meals      vitamin B-12 (CYANOCOBALAMIN) 100 MCG tablet Take 50 mcg by mouth daily      enoxaparin (LOVENOX) 30 MG/0.3ML injection Inject into the skin daily      potassium chloride (KLOR-CON M) 20 MEQ extended release tablet Take 1 tablet by mouth 2 times daily (with meals) 60 tablet 3    aspirin 81 MG EC tablet Take 1 tablet by mouth daily 90 tablet 3    levothyroxine (SYNTHROID) 50 MCG tablet Take 1 tablet by mouth Daily 30 tablet 3    folic acid (FOLVITE) 1 MG tablet Take 1 tablet by mouth daily 30 tablet 3    ondansetron (ZOFRAN-ODT) 4 MG disintegrating tablet Take 1 tablet by mouth every 8 hours as needed for Nausea or Vomiting 30 tablet 1    Multiple Vitamin (MULTIVITAMIN) tablet Take 1 tablet by mouth daily  0    atorvastatin (LIPITOR) 40 MG tablet Take 40 mg by mouth daily      clopidogrel (PLAVIX) 75 MG tablet Take 1 tablet by mouth daily 90 tablet 3    famotidine (PEPCID) 20 MG tablet Take 1 tablet by mouth 2 times daily 60 tablet 3       FAMILY HISTORY: Reviewed but non-contributory     Physical Examination:     BP 91/63 (Site: Right Upper Arm, Position: Sitting, Cuff Size: Medium Adult)   Pulse 95   Resp 20   Wt 247 lb 3.2 oz (112.1 kg)   BMI 33.53 kg/m²  Body mass index is 33.53 kg/m². Constitutional: He is oriented to person, place, and time. He appears well-developed and well-nourished. In no acute distress. HEENT: Normocephalic and atraumatic. No JVD present. Carotid bruit is not present. No mass and no thyromegaly present.  No lymphadenopathy present. Cardiovascular: Tachycardic rate, regular rhythm, normal heart sounds. Exam reveals no gallop and no friction rubs. No murmur was heard. Pulmonary/Chest: Effort normal and breath sounds normal. No respiratory distress. He has no wheezes, rhonchi or rales. Few scattered crackles in the right lung base. Abdominal: Soft, non-tender. Bowel sounds and aorta are normal. He exhibits no organomegaly, mass or bruit. Extremities: Trace-1+ at the ankles bilaterally. No cyanosis or clubbing. 2+ radial and carotid pulses. Distal extremity pulses: Not palpable left worse than right. Neurological: He is alert and oriented to person, place, and time. No evidence of gross cranial nerve deficit. Coordination appeared normal.   Skin: Skin is warm and dry. There is no rash or diaphoresis. Psychiatric: He has a normal mood and affect. His speech is normal and behavior is normal.      MOST RECENT LABS ON RECORD:   Lab Results   Component Value Date    WBC 5.5 04/16/2020    HGB 9.9 (L) 04/16/2020    HCT 34.1 (L) 04/16/2020     04/16/2020    CHOL 85 03/19/2020    TRIG 91 03/19/2020    HDL 18 (L) 03/19/2020    ALT 21 04/13/2020    AST 44 (H) 04/13/2020     04/16/2020    K 3.8 04/16/2020    CL 94 (L) 04/16/2020    CREATININE 0.89 04/16/2020    BUN 19 04/16/2020    CO2 28 04/16/2020    TSH 9.96 (H) 03/18/2020    PSA 0.78 05/16/2019    INR 1.3 03/19/2020    LABMICR CANNOT BE CALCULATED 12/17/2014       ASSESSMENT:      Diagnosis Orders   1. Acute on chronic combined systolic and diastolic congestive heart failure ([facility] [address]     2. ASHD (arteriosclerotic heart disease)     3. Hypokalemia       PLAN:        Acute on chronic combined heart failure: with NYHA Class IV symptoms: Severe Cardiomyopathy, New York Heart Association Class: IV, Echo done on 2/26/20 that showed an EF of 17%, has improved slightly to 20-25%. · Beta Blocker: Continue Metoprolol succinate (Toprol XL) 25 mg daily.    · ACE

## 2020-04-28 NOTE — TELEPHONE ENCOUNTER
Hayes's sister, Sandra Degroot, called stating Bhupendra Clark needs to be admitted to nursing home. His apartment is being repaired and there is no family available for him to stay with. He is to be discharged from [facility] and has no where to go. I advised her to check with primary care for guidance also. Will you admit to nursing home or what should he do? Please advise! Thank you!

## 2020-04-28 NOTE — TELEPHONE ENCOUNTER
Elena notified and she will contact [facility] staff to admit Link Blizzard to [facility] or Elieser Alejandro at discharge

## 2020-05-04 ENCOUNTER — HOSPITAL ENCOUNTER (OUTPATIENT)
Age: 55
Discharge: HOME | End: 2020-05-04
Payer: MEDICARE

## 2020-05-04 ENCOUNTER — CARE COORDINATION (OUTPATIENT)
Dept: CASE MANAGEMENT | Age: 55
End: 2020-05-04

## 2020-05-04 LAB
DIGOXIN DATE LAST DOSE: ABNORMAL
DIGOXIN DOSE AMOUNT: ABNORMAL
DIGOXIN DOSE TIME: ABNORMAL
DIGOXIN LEVEL: 0.4 NG/ML (ref 0.5–2)

## 2020-05-04 PROCEDURE — 80162 ASSAY OF DIGOXIN TOTAL: CPT

## 2020-05-04 PROCEDURE — 36415 COLL VENOUS BLD VENIPUNCTURE: CPT

## 2021-08-13 NOTE — CARE COORDINATION
Airway       Patient location during procedure: OR       Procedure Start/Stop Times: 8/13/2021 3:28 PM  Staff -        CRNA: Bailey Loza APRN CRNA       Performed By: CRNA  Consent for Airway        Urgency: elective  Indications and Patient Condition       Indications for airway management: srinath-procedural         Mask difficulty assessment: 1 - vent by mask    Final Airway Details       Final airway type: endotracheal airway       Successful airway: Oral and TRAVIS  Endotracheal Airway Details        ETT size (mm): 7.0       Cuffed: yes       Cuff volume (mL): 10       Successful intubation technique: video laryngoscopy       VL Blade Size: Glidescope 3       Grade View of Cords: 1       Adjucts: stylet       Position: Right       Bite block used: None    Post intubation assessment        Placement verified by: capnometry, equal breath sounds and chest rise        Number of attempts at approach: 1       Secured with: pink tape       Ease of procedure: easy       Dentition: Intact and Unchanged           Faxed patient information to [facility], for a life vest.  Spoke with Yamilet Smith. They will be out to fit the patient this evening or in the morning.

## 2023-08-23 NOTE — PROGRESS NOTES
disorder ([facility]     CAD (coronary artery disease) 09/2019    s/p proximal LAD stent    CHF (congestive heart failure) (HCC)     Idiopathic cardiomyopathy (HCC)     Seizures ([facility]     LAST SEIZURE 9-4-19       CURRENT ALLERGIES: Ticagrelor and Pcn [penicillins] REVIEW OF SYSTEMS: 14 systems were reviewed. Pertinent positives and negatives as above, all else negative. Past Surgical History:   Procedure Laterality Date    CARDIAC CATHETERIZATION Left 09/17/2019    DR Valerio/[facility] Guy/right radial-Severe two vessel coronary artery disease involving involving a 100% proximal probably co-dominant RCA, 90% stenosis in the distal part of the proximal LAD at a bifircation with a moderate sized D1 branch that also has an 80% stenosis. Mildly elevated left ventricular end diastolic pressure. Consult to cardiothoracic surgery for consideration of multi-vess    TOE SURGERY      TOOTH EXTRACTION      Social History:  Social History     Tobacco Use    Smoking status: Former Smoker     Types: Cigarettes    Smokeless tobacco: Never Used   Substance Use Topics    Alcohol use: Yes     Comment: bottle of vodka a week    Drug use: Yes     Frequency: 1.0 times per week     Types: Marijuana        CURRENT MEDICATIONS:        Outpatient Medications Marked as Taking for the 3/24/20 encounter (Office Visit) with Annabelle Tomlinson MD   Medication Sig Dispense Refill    metoprolol tartrate (LOPRESSOR) 25 MG tablet Take 0.5 tablets by mouth 2 times daily 60 tablet 3    bumetanide (BUMEX) 2 MG tablet Take 1 tablet by mouth 2 times daily 30 tablet 3    nitroGLYCERIN (NITROSTAT) 0.4 MG SL tablet up to max of 3 total doses.  If no relief after 1 dose, call 911. 25 tablet 3    atorvastatin (LIPITOR) 40 MG tablet Take 40 mg by mouth daily      doxepin (SINEQUAN) 50 MG capsule Take 50 mg by mouth      clopidogrel (PLAVIX) 75 MG tablet Take 1 tablet by mouth daily 90 tablet 3    divalproex (DEPAKOTE) 500 MG EC tablet Take 1,500 mg by mouth daily. FAMILY HISTORY: Reviewed but non-contributory     Physical Examination:     /72 (Site: Left Upper Arm, Position: Sitting, Cuff Size: Medium Adult)   Pulse 83   Resp 18   Ht 6' (1.829 m)   Wt 255 lb (115.7 kg)   SpO2 97%   BMI 34.58 kg/m²  Body mass index is 34.58 kg/m². Constitutional: He is oriented to person, place, and time. He appears well-developed and well-nourished. In no acute distress. HEENT: Normocephalic and atraumatic. No JVD present. Carotid bruit is not present. No mass and no thyromegaly present. No lymphadenopathy present. Cardiovascular: Normal rate, regular rhythm, normal heart sounds. Exam reveals no gallop and no friction rubs. No murmur was heard. Pulmonary/Chest: Effort normal and breath sounds normal. No respiratory distress. He has no wheezes, rhonchi or rales. Abdominal: Soft, non-tender. Bowel sounds and aorta are normal. He exhibits no organomegaly, mass or bruit. Extremities: Trace-1+ at the ankles bilaterally. No cyanosis or clubbing. 2+ radial and carotid pulses. Distal extremity pulses: 2+ bilaterally. Neurological: He is alert and oriented to person, place, and time. No evidence of gross cranial nerve deficit. Coordination appeared normal.   Skin: Skin is warm and dry. There is no rash or diaphoresis. Psychiatric: He has a normal mood and affect.  His speech is normal and behavior is normal.      MOST RECENT LABS ON RECORD:   Lab Results   Component Value Date    WBC 6.3 03/23/2020    HGB 11.1 (L) 03/23/2020    HCT 37.1 (L) 03/23/2020     (L) 03/23/2020    CHOL 85 03/19/2020    TRIG 91 03/19/2020    HDL 18 (L) 03/19/2020     (H) 03/19/2020     (H) 03/19/2020     (L) 03/23/2020    K 3.4 (L) 03/23/2020    CL 86 (L) 03/23/2020    CREATININE 0.87 03/23/2020    BUN 16 03/23/2020    CO2 32 (H) 03/23/2020    TSH 9.96 (H) 03/18/2020    PSA 0.78 05/16/2019    INR 1.3 03/19/2020    LABMICR CANNOT BE CALCULATED patient was found doing cocaine in the bathroom spoke to patient patient will be discharged Patient was seen by the catch team

## 2023-09-25 NOTE — TELEPHONE ENCOUNTER
Writer contacted Dr. Nasreen Ac,  ED provider to inform of 30 day readmission risk. No Decision on disposition at this time.
denies pain/discomfort (Rating = 0)